# Patient Record
Sex: MALE | Race: BLACK OR AFRICAN AMERICAN | NOT HISPANIC OR LATINO | Employment: OTHER | URBAN - METROPOLITAN AREA
[De-identification: names, ages, dates, MRNs, and addresses within clinical notes are randomized per-mention and may not be internally consistent; named-entity substitution may affect disease eponyms.]

---

## 2017-02-03 ENCOUNTER — OFFICE VISIT (OUTPATIENT)
Dept: INTERNAL MEDICINE | Facility: CLINIC | Age: 77
End: 2017-02-03
Payer: MEDICARE

## 2017-02-03 ENCOUNTER — IMMUNIZATION (OUTPATIENT)
Dept: INTERNAL MEDICINE | Facility: CLINIC | Age: 77
End: 2017-02-03
Payer: MEDICARE

## 2017-02-03 VITALS
WEIGHT: 315 LBS | DIASTOLIC BLOOD PRESSURE: 88 MMHG | SYSTOLIC BLOOD PRESSURE: 138 MMHG | HEART RATE: 80 BPM | BODY MASS INDEX: 42.66 KG/M2 | HEIGHT: 72 IN

## 2017-02-03 DIAGNOSIS — G47.33 OSA ON CPAP: ICD-10-CM

## 2017-02-03 DIAGNOSIS — Z00.00 ENCOUNTER FOR PREVENTIVE HEALTH EXAMINATION: Primary | ICD-10-CM

## 2017-02-03 DIAGNOSIS — M54.50 MIDLINE LOW BACK PAIN WITHOUT SCIATICA, UNSPECIFIED CHRONICITY: ICD-10-CM

## 2017-02-03 DIAGNOSIS — E55.9 VITAMIN D DEFICIENCY DISEASE: ICD-10-CM

## 2017-02-03 DIAGNOSIS — Z85.46 HISTORY OF PROSTATE CANCER: ICD-10-CM

## 2017-02-03 DIAGNOSIS — Z99.89: ICD-10-CM

## 2017-02-03 DIAGNOSIS — E78.5 HYPERLIPIDEMIA, UNSPECIFIED HYPERLIPIDEMIA TYPE: ICD-10-CM

## 2017-02-03 DIAGNOSIS — I70.0 AORTIC ATHEROSCLEROSIS: ICD-10-CM

## 2017-02-03 DIAGNOSIS — I77.1 TORTUOUS AORTA: ICD-10-CM

## 2017-02-03 DIAGNOSIS — E11.40 TYPE 2 DIABETES MELLITUS WITH DIABETIC NEUROPATHY, WITHOUT LONG-TERM CURRENT USE OF INSULIN: ICD-10-CM

## 2017-02-03 DIAGNOSIS — G47.30 SLEEP APNEA, UNSPECIFIED TYPE: ICD-10-CM

## 2017-02-03 DIAGNOSIS — N52.9 IMPOTENCE OF ORGANIC ORIGIN: ICD-10-CM

## 2017-02-03 PROCEDURE — 90662 IIV NO PRSV INCREASED AG IM: CPT | Mod: S$GLB,,, | Performed by: NURSE PRACTITIONER

## 2017-02-03 PROCEDURE — 99499 UNLISTED E&M SERVICE: CPT | Mod: S$GLB,,, | Performed by: NURSE PRACTITIONER

## 2017-02-03 PROCEDURE — G0008 ADMIN INFLUENZA VIRUS VAC: HCPCS | Mod: S$GLB,,, | Performed by: NURSE PRACTITIONER

## 2017-02-03 PROCEDURE — G0439 PPPS, SUBSEQ VISIT: HCPCS | Mod: S$GLB,,, | Performed by: NURSE PRACTITIONER

## 2017-02-03 PROCEDURE — 99999 PR PBB SHADOW E&M-EST. PATIENT-LVL III: CPT | Mod: PBBFAC,,, | Performed by: NURSE PRACTITIONER

## 2017-02-03 NOTE — Clinical Note
Robert Jones MD,  Mr. Elan Yepez was seen today for an HRA visit.  At the visit a mini-cognitive assessment was performed and found to be abnormal (score: 4).  He also had a timed get up and go test that was found to be abnormal (score >12 sec.) I am bringing this to your attention so that further evaluation or follow-up can be done should you deem it is appropriate.   Thank you for allowing me to participate in the care of your patient.  SELINA Arevalo

## 2017-02-03 NOTE — MR AVS SNAPSHOT
[facility]  [address]  [facility] [address]  Phone: [phone]  Fax: [phone]                  Elan Yepez   2/3/2017 10:00 AM   Office Visit    Description:  Male : 1940   Provider:  AMY LAZARO   Department:  [facility]           Reason for Visit     Health Risk Assessment           Diagnoses this Visit        Comments    Encounter for preventive health examination    -  Primary            To Do List           Future Appointments        Provider Department Dept Phone    2/3/2017 11:00 AM FLU, INTERNAL MEDICINE [facility] [phone]    2017 11:00 AM Charles Collado MD [facility] [phone]    3/7/2017 9:00 AM Leeann Lomeli DPM [facility] [phone]    3/21/2017 10:40 AM Liane Tijerina MD [facility] [phone]      Goals (5 Years of Data)     None      Follow-Up and Disposition     Return in about 7 weeks (around 3/21/2017) for Follow up with PCP, SOONER IF NEEDED, HRA VISIT IN 1 YEAR.      [facility]     [facility] On [facility] Nurse Care Line -  Assistance  Registered nurses in the [facility] Center provide clinical advisement, health education, appointment booking, and other advisory services.  Call for this free service at [phone].             Medications           Message regarding Medications     Verify the changes and/or additions to your medication regime listed below are the same as discussed with your clinician today.  If any of these changes or additions are incorrect, please notify your healthcare provider.        STOP taking these medications     lidocaine-prilocaine (EMLA) cream            Verify that the below list of medications is an accurate representation of the medications you are currently taking.  If none reported, the list may be blank. If incorrect, please contact your healthcare provider. Carry this list with you in case of  emergency.           Current Medications     aspirin (ECOTRIN) 81 MG EC tablet     atorvastatin (LIPITOR) 40 MG tablet Take 1 tablet (40 mg total) by mouth once daily. 1 Tablet Oral Every day.  Replaces simvastatin 80mg daily.    blood sugar diagnostic Strp 1 strip by Misc.(Non-Drug; Combo Route) route once daily.    cholecalciferol, vitamin D3, 5,000 unit capsule Take 1 capsule by mouth Daily.    diclofenac sodium (VOLTAREN) 1 % Gel Apply 2 g topically once daily.    fluocinonide (LIDEX) 0.05 % ointment Apply topically Twice daily. to replace Elocon cream    lancets (SOFT TOUCH LANCETS) Misc 1 Device by Misc.(Non-Drug; Combo Route) route once daily.    metformin (GLUCOPHAGE) 500 MG tablet Take 2 tablets (1,000 mg total) by mouth 2 (two) times daily with meals.    MULTIVITS-MINERALS/FA/LYCOPENE (ONE-A-DAY MEN'S ORAL) Take 1 tablet by mouth Daily.    tadalafil (CIALIS) 20 MG Tab Take 1 tablet (20 mg total) by mouth daily as needed. take as directed    TRUE METRIX GLUCOSE METER [facility]            Clinical Reference Information           Your Vitals Were     BP Pulse Height Weight BMI    138/88 (BP Location: Right arm, Patient Position: Sitting, BP Method: Manual) 80 6' (1.829 m) 144.8 kg (319 lb 3.6 oz) 43.29 kg/m2      Blood Pressure          Most Recent Value    BP  138/88      Allergies as of 2/3/2017     No Known Drug Allergies      Immunizations Administered on Date of Encounter - 2/3/2017     None      [facility] Sign-Up     Activating your [facility] account is as easy as 1-2-3!     1) Visit [email or web address], select Sign Up Now, enter this activation code and your date of birth, then select Next.  [email or web address]  Expires: 3/20/2017 10:42 AM      2) Create a username and password to use when you visit [facility] in the future and select a security question in case you lose your password and select Next.    3) Enter your e-mail address and click Sign Up!    Additional Information  If you have questions, please e-mail  [email or web address] or call [phone] to talk to our [facility] staff. Remember, [facility] is NOT to be used for urgent needs. For medical emergencies, dial 911.         Instructions      Counseling and Referral of Other Preventative  (Italic type indicates deductible and co-insurance are waived)    Patient Name: Elan Yepez  Today's Date: 2/3/2017      SERVICE LIMITATIONS RECOMMENDATION    Vaccines    · Pneumococcal (once after 65)    · Influenza (annually)    · Hepatitis B (if medium/high risk)    · Prevnar 13      Hepatitis B medium/high risk factors:       - End-stage renal disease       - Hemophiliacs who received Factor VII or         IX concentrates       - Clients of institutions for the mentally             retarded       - Persons who live in the same house as          a HepB carrier       - Homosexual men       - Illicit injectable drug abusers     Pneumococcal:  03/02/2010  NO REPEAT IS NECESSARY     Influenza: Scheduled - see appointments     Hepatitis B: DEFER TO PCP RECOMMENDATIONS     Prevnar 13: 03/10/2016    NO REPEAT IS NECESSARY    Prostate cancer screening (annually to age 75)     Prostate specific antigen (PSA) Shared decision making with Provider. Sometimes a co-pay may be required if the patient decides to have this test. The USPSTF no longer recommends prostate cancer screening routinely in medicine:   04/07/2015 DISCUSS WITH PCP RECOMMENDATIONS    Colorectal cancer screening (to age 75)    · Fecal occult blood test (annual)  · Flexible sigmoidoscopy (5y)  · Screening colonoscopy (10y)  · Barium enema   Last done 02/04/2014, recommend to repeat every 3  years    Diabetes self-management training (no USPSTF recommendations)  Requires referral by treating physician for patient with diabetes or renal disease. 10 hours of initial DSMT sessions of no less than 30 minutes each in a continuous 12-month period. 2 hours of follow-up DSMT in subsequent years.  Scheduled, see appointments     Glaucoma screening (no USPSTF recommendation)  Diabetes mellitus, family history   , age 50 or over    American, age 65 or over  Last done 07/11/2016, recommend to repeat every 1  years    Medical nutrition therapy for diabetes or renal disease (no recommended schedule)  Requires referral by treating physician for patient with diabetes or renal disease or kidney transplant within the past 3 years.  Can be provided in same year as diabetes self-management training (DSMT), and CMS recommends medical nutrition therapy take place after DSMT. Up to 3 hours for initial year and 2 hours in subsequent years.  Scheduled, see appointments    Cardiovascular screening blood tests (every 5 years)  · Fasting lipid panel  Order as a panel if possible  Last done 09/08/2016, recommend to repeat every 1  years    Diabetes screening tests (at least every 3 years, Medicare covers annually or at 6-month intervals for prediabetic patients)  · Fasting blood sugar (FBS) or glucose tolerance test (GTT)  Patient must be diagnosed with one of the following:       - Hypertension       - Dyslipidemia       - Obesity (BMI 30kg/m2)       - Previous elevated impaired FBS or GTT       ... or any two of the following:       - Overweight (BMI 25 but <30)       - Family history of diabetes       - Age 65 or older       - History of gestational diabetes or birth of baby weighing more than 9 pounds  PATIENT IS DIABETIC     Abdominal aortic aneurysm screening (once)  · Sonogram   Limited to patients who meet one of the following criteria:       - Men who are 65-75 years old and have smoked more than 100 cigarette in their lifetime       - Anyone with a family history of abdominal aortic aneurysm       - Anyone recommended for screening by the USPSTF  NONSMOKER    HIV screening (annually for increased risk patients)  · HIV-1 and HIV-2 by EIA, or JAIME, rapid antibody test or oral mucosa transudate  Patients must be at increased  risk for HIV infection per USPSTF guidelines or pregnant. Tests covered annually for patient at increased risk or as requested by the patient. Pregnant patients may receive up to 3 tests during pregnancy.  Risks discussed, screening is not recommended    Smoking cessation counseling (up to 8 sessions per year)  Patients must be asymptomatic of tobacco-related conditions to receive as a preventative service.  NONSMOKER    Subsequent annual wellness visit  At least 12 months since last AWV  Return in one year     The following information is provided to all patients.  This information is to help you find resources for any of the problems found today that may be affecting your health:                Living healthy guide: www.[facility].[place].[facility]      Understanding Diabetes: www.diabetes.org      Eating healthy: www.cdc.gov/healthyweight      [facility] home safety checklist: www.cdc.gov/steadi/patient.html      Agency on Aging: www.goea.[place].[facility]      Alcoholics anonymous (AA): www.aa.org      Physical Activity: www.anup.nih.gov/pj3cfhc      Tobacco use: www.quitwithusla.org     Eating Heart-Healthy Foods  Eating has a big impact on your heart health. In fact, eating healthier can improve several of your heart risks at once. For instance, it helps you manage weight, cholesterol, and blood pressure. Here are ideas to help you make heart-healthy changes without giving up all the foods and flavors you love.  Getting started  · Talk with your health care provider about eating plans, such as the DASH or Mediterranean diet. You may also be referred to a dietitian.  · Change a few things at a time. Give yourself time to get used to a few eating changes before adding more.  · Work to create a tasty, healthy eating plan that you can stick to for the rest of your life.    Goals for healthy eating  Below are some tips to improve your eating habits:  · Limit saturated fats and trans fats. Saturated fats raise your levels of cholesterol,  so keep these fats to a minimum. They are found in foods such as fatty meats, whole milk, cheese, and palm and coconut oils. Avoid trans fats because they lower good cholesterol as well as raise bad cholesterol. Trans fats are most often found in processed foods.  · Reduce sodium (salt) intake. Eating too much salt may increase your blood pressure. Limit your sodium intake to 2,300 milligrams (mg) per day, or less if your health care provider recommends it. Dining out less often and eating fewer processed foods are two great ways to decrease the amount of salt you consume.  · Managing calories. A calorie is a unit of energy. Your body burns calories for fuel, but if you eat more calories than your body burns, the extras are stored as fat. Your health care provider can help you create a diet plan to manage your calories. This will likely include eating healthier foods as well as exercising regularly. To help you track your progress, keep a diary to record what you eat and how often you exercise.  Choose the right foods  Aim to make these foods staples of your diet. If you have diabetes, you may have different recommendations than what is listed here:  · Fruits and vegetable provide plenty of nutrients without a lot of calories. At meals, fill half your plate with these foods. Split the other half of your plate between whole grains and lean protein.  · Whole grains are high in fiber and rich in vitamins and nutrients. Good choices include whole-wheat bread, pasta, and brown rice.  · Lean proteins give you nutrition with less fat. Good choices include fish, skinless chicken, and beans.  · Low-fat or nonfat dairy provides nutrients without a lot of fat. Try low-fat or nonfat milk, cheese, or yogurt.  · Healthy fats can be good for you in small amounts. These are unsaturated fats, such as olive oil, nuts, and fish. Try to have at least 2 servings per week of fatty fish such as salmon, sardines, mackerel, rainbow trout,  and albacore tuna. These contain omega-3 fatty acids, which are good for your heart. Flaxseed is another source of a heart-healthy fat.  More on heart healthy eating    Read food labels  Healthy eating starts at the grocery store. Be sure to pay attention to food labels on packaged foods. Look for products that are high in fiber and protein, and low in saturated fat, cholesterol, and sodium. Avoid products that contain trans fat. And pay close attention to serving size. For instance, if you plan to eat two servings, double all the numbers on the label.  Prepare food right  A key part of healthy cooking is cutting down on added fat and salt. Look on the internet for lower-fat, lower-sodium recipes. Also, try these tips:  · Remove fat from meat and skin from poultry before cooking.  · Skim fat from the surface of soups and sauces.  · Broil, boil, bake, steam, grill, and microwave food without added fats.  · Choose ingredients that spice up your food without adding calories, fat, or sodium. Try these items: horseradish, hot sauce, lemon, mustard, nonfat salad dressings, and vinegar. For salt-free herbs and spices, try basil, cilantro, cinnamon, pepper, and rosemary.  Date Last Reviewed: 6/25/2015  © 5577-1116 [employer]. [address] [place] [address]. All rights reserved. This information is not intended as a substitute for professional medical care. Always follow your healthcare professional's instructions.        Diabetes and Heart Disease     Take your medicines as directed each day, even if you feel fine.   If you have diabetes, you are two to four times more likely to have heart disease than someone without diabetes. This higher risk is due to diabetes, but it is also due to other risk factors for heart disease that happen in people with diabetes. But theres good news. You can help control your health risks by making some changes in your life. You can take steps to reduce your risk of  heart disease by half--similar to the risk in people who don't have diabetes.  Your main risk factors  Three major risk factors for heart disease are high blood sugar, high blood pressure, and high levels of lipids. By keeping risk factors under control, you can help keep your heart and arteries healthy. This may reduce your chances of a heart attack.  · Blood sugar. High blood sugar can make artery walls tough and rough. Plaque (waxy material in the blood) can then build up along the artery walls, making it harder for blood to flow through the arteries. Having high blood sugar increases the chances of having high blood pressure and high cholesterol.  · Blood pressure. When blood pressure is high all the time it causes your heart to work harder to pump blood. Artery walls become damaged. This increases the risk for plaque build up.  · Lipids. The body needs some lipids in the blood to stay healthy. But lipid levels that are too high can damage the artery walls. Lipids include cholesterol and triglycerides. There are two kinds of cholesterol. LDL (bad) cholesterol can damage the arteries. But HDL (good) cholesterol helps clear LDL cholesterol from the blood vessels. This helps keep the arteries healthy. When blood sugar is high, the level of triglycerides in the blood may also be high. High blood triglyceride levels can cause plaque to form.   Other risk factors  Certain lifestyle factors can increase levels of your blood sugar, blood pressure, and lipids. Such increases raise your risk of heart disease:  · Smoking damages the lining of your arteries. This allows plaque to build up in the artery walls. Smoking also constricts (narrows) the arteries. This can raise blood pressure and cause chest pain or angina. Smoking also increases your risk of getting type 2 diabetes.  · Not being active makes it harder for your heart to do its work. Inactivity is linked to many other risk factors, such as high blood pressure  and poor cholesterol levels. Inactivity also increases your risk of getting type 2 diabetes.  · Being overweight makes it harder for your body to use insulin. It also makes your heart work too hard. Being overweight is also the main contributor to the development of type 2 diabetes,   Changes you can make  Following a few simple steps can help keep your risk factors under control. Work with your healthcare team to reach your goals.  · Quitting smoking could save your life. Smoking damages the lining of the blood vessels and raises blood pressure. Smoking also affects how your body uses insulin. This makes it harder to keep blood sugar under control. If you smoke and need help quitting, talk to your healthcare team.   · Testing your blood sugar is the only way to know whether it is under control. Be sure to test your blood sugar yourself. Also get your blood tested in the lab, as directed.  · Monitoring your blood pressure and lipid levels can help you achieve safe levels. Visit your healthcare team as scheduled.  · Taking medicines as directed can help control blood sugar, blood pressure, blood clotting, and/or cholesterol levels.  · Eating right can reduce your risk factors and help you lose weight. Try to limit the amount of processed or refined carbohydrates you eat at one time. Cut back on your total calorie intake. Eat foods low in saturated fat and cholesterol. Eat fiber, including vegetables and whole grains, and cut down on salt. A dietitian or diabetes educator can help form a meal plan that works for you--even if you are on a low budget.   · Being active can help reduce your weight, strengthen your heart, and lower your lipid levels and blood pressure. Exercise and activity are good for your whole body. Talk to your healthcare team about increasing your activity safely over time.  · Keeping your appointments with your healthcare provider helps you stay healthy. Go in for checkups and lab tests as  scheduled.  Date Last Reviewed: 5/19/2016  © 9892-4682 [employer]. [address] [place] [address], [place] [address]. All rights reserved. This information is not intended as a substitute for professional medical care. Always follow your healthcare professional's instructions.             Language Assistance Services     ATTENTION: Language assistance services are available, free of charge. Please call [phone].      ATENCIÓN: Si habla español, tiene a combs disposición servicios gratuitos de asistencia lingüística. Llame al [phone].     CHÚ Ý: N?u b?n nói Ti?ng Vi?t, có các d?ch v? h? tr? ngôn ng? mi?n phí dành cho b?n. G?i s? [phone].         [facility] complies with applicable Federal civil rights laws and does not discriminate on the basis of race, color, national origin, age, disability, or sex.

## 2017-02-03 NOTE — PATIENT INSTRUCTIONS
Counseling and Referral of Other Preventative  (Italic type indicates deductible and co-insurance are waived)    Patient Name: Elan Yepez  Today's Date: 2/3/2017      SERVICE LIMITATIONS RECOMMENDATION    Vaccines    · Pneumococcal (once after 65)    · Influenza (annually)    · Hepatitis B (if medium/high risk)    · Prevnar 13      Hepatitis B medium/high risk factors:       - End-stage renal disease       - Hemophiliacs who received Factor VII or         IX concentrates       - Clients of institutions for the mentally             retarded       - Persons who live in the same house as          a HepB carrier       - Homosexual men       - Illicit injectable drug abusers     Pneumococcal:  03/02/2010  NO REPEAT IS NECESSARY     Influenza: Scheduled - see appointments     Hepatitis B: DEFER TO PCP RECOMMENDATIONS     Prevnar 13: 03/10/2016    NO REPEAT IS NECESSARY    Prostate cancer screening (annually to age 75)     Prostate specific antigen (PSA) Shared decision making with Provider. Sometimes a co-pay may be required if the patient decides to have this test. The USPSTF no longer recommends prostate cancer screening routinely in medicine:   04/07/2015 DISCUSS WITH PCP RECOMMENDATIONS    Colorectal cancer screening (to age 75)    · Fecal occult blood test (annual)  · Flexible sigmoidoscopy (5y)  · Screening colonoscopy (10y)  · Barium enema   Last done 02/04/2014, recommend to repeat every 3  years    Diabetes self-management training (no USPSTF recommendations)  Requires referral by treating physician for patient with diabetes or renal disease. 10 hours of initial DSMT sessions of no less than 30 minutes each in a continuous 12-month period. 2 hours of follow-up DSMT in subsequent years.  Scheduled, see appointments    Glaucoma screening (no USPSTF recommendation)  Diabetes mellitus, family history   , age 50 or over    American, age 65 or over  Last done 07/11/2016, recommend to repeat  every 1  years    Medical nutrition therapy for diabetes or renal disease (no recommended schedule)  Requires referral by treating physician for patient with diabetes or renal disease or kidney transplant within the past 3 years.  Can be provided in same year as diabetes self-management training (DSMT), and CMS recommends medical nutrition therapy take place after DSMT. Up to 3 hours for initial year and 2 hours in subsequent years.  Scheduled, see appointments    Cardiovascular screening blood tests (every 5 years)  · Fasting lipid panel  Order as a panel if possible  Last done 09/08/2016, recommend to repeat every 1  years    Diabetes screening tests (at least every 3 years, Medicare covers annually or at 6-month intervals for prediabetic patients)  · Fasting blood sugar (FBS) or glucose tolerance test (GTT)  Patient must be diagnosed with one of the following:       - Hypertension       - Dyslipidemia       - Obesity (BMI 30kg/m2)       - Previous elevated impaired FBS or GTT       ... or any two of the following:       - Overweight (BMI 25 but <30)       - Family history of diabetes       - Age 65 or older       - History of gestational diabetes or birth of baby weighing more than 9 pounds  PATIENT IS DIABETIC     Abdominal aortic aneurysm screening (once)  · Sonogram   Limited to patients who meet one of the following criteria:       - Men who are 65-75 years old and have smoked more than 100 cigarette in their lifetime       - Anyone with a family history of abdominal aortic aneurysm       - Anyone recommended for screening by the USPSTF  NONSMOKER    HIV screening (annually for increased risk patients)  · HIV-1 and HIV-2 by EIA, or JAIME, rapid antibody test or oral mucosa transudate  Patients must be at increased risk for HIV infection per USPSTF guidelines or pregnant. Tests covered annually for patient at increased risk or as requested by the patient. Pregnant patients may receive up to 3 tests during  pregnancy.  Risks discussed, screening is not recommended    Smoking cessation counseling (up to 8 sessions per year)  Patients must be asymptomatic of tobacco-related conditions to receive as a preventative service.  NONSMOKER    Subsequent annual wellness visit  At least 12 months since last AWV  Return in one year     The following information is provided to all patients.  This information is to help you find resources for any of the problems found today that may be affecting your health:                Living healthy guide: www.[facility].[place].[facility]      Understanding Diabetes: www.diabetes.org      Eating healthy: www.cdc.gov/healthyweight      CDC home safety checklist: www.cdc.gov/steadi/patient.html      Agency on Aging: www.goea.[place].[facility]      Alcoholics anonymous (AA): www.aa.org      Physical Activity: www.anup.nih.gov/th2pgux      Tobacco use: www.quitwithusla.org     Eating Heart-Healthy Foods  Eating has a big impact on your heart health. In fact, eating healthier can improve several of your heart risks at once. For instance, it helps you manage weight, cholesterol, and blood pressure. Here are ideas to help you make heart-healthy changes without giving up all the foods and flavors you love.  Getting started  · Talk with your health care provider about eating plans, such as the DASH or Mediterranean diet. You may also be referred to a dietitian.  · Change a few things at a time. Give yourself time to get used to a few eating changes before adding more.  · Work to create a tasty, healthy eating plan that you can stick to for the rest of your life.    Goals for healthy eating  Below are some tips to improve your eating habits:  · Limit saturated fats and trans fats. Saturated fats raise your levels of cholesterol, so keep these fats to a minimum. They are found in foods such as fatty meats, whole milk, cheese, and palm and coconut oils. Avoid trans fats because they lower good cholesterol as well as raise  bad cholesterol. Trans fats are most often found in processed foods.  · Reduce sodium (salt) intake. Eating too much salt may increase your blood pressure. Limit your sodium intake to 2,300 milligrams (mg) per day, or less if your health care provider recommends it. Dining out less often and eating fewer processed foods are two great ways to decrease the amount of salt you consume.  · Managing calories. A calorie is a unit of energy. Your body burns calories for fuel, but if you eat more calories than your body burns, the extras are stored as fat. Your health care provider can help you create a diet plan to manage your calories. This will likely include eating healthier foods as well as exercising regularly. To help you track your progress, keep a diary to record what you eat and how often you exercise.  Choose the right foods  Aim to make these foods staples of your diet. If you have diabetes, you may have different recommendations than what is listed here:  · Fruits and vegetable provide plenty of nutrients without a lot of calories. At meals, fill half your plate with these foods. Split the other half of your plate between whole grains and lean protein.  · Whole grains are high in fiber and rich in vitamins and nutrients. Good choices include whole-wheat bread, pasta, and brown rice.  · Lean proteins give you nutrition with less fat. Good choices include fish, skinless chicken, and beans.  · Low-fat or nonfat dairy provides nutrients without a lot of fat. Try low-fat or nonfat milk, cheese, or yogurt.  · Healthy fats can be good for you in small amounts. These are unsaturated fats, such as olive oil, nuts, and fish. Try to have at least 2 servings per week of fatty fish such as salmon, sardines, mackerel, rainbow trout, and albacore tuna. These contain omega-3 fatty acids, which are good for your heart. Flaxseed is another source of a heart-healthy fat.  More on heart healthy eating    Read food labels  Healthy  eating starts at the grocery store. Be sure to pay attention to food labels on packaged foods. Look for products that are high in fiber and protein, and low in saturated fat, cholesterol, and sodium. Avoid products that contain trans fat. And pay close attention to serving size. For instance, if you plan to eat two servings, double all the numbers on the label.  Prepare food right  A key part of healthy cooking is cutting down on added fat and salt. Look on the internet for lower-fat, lower-sodium recipes. Also, try these tips:  · Remove fat from meat and skin from poultry before cooking.  · Skim fat from the surface of soups and sauces.  · Broil, boil, bake, steam, grill, and microwave food without added fats.  · Choose ingredients that spice up your food without adding calories, fat, or sodium. Try these items: horseradish, hot sauce, lemon, mustard, nonfat salad dressings, and vinegar. For salt-free herbs and spices, try basil, cilantro, cinnamon, pepper, and rosemary.  Date Last Reviewed: 6/25/2015  © 6881-9915 [employer]. [address] [place] [address], [place] [address]. All rights reserved. This information is not intended as a substitute for professional medical care. Always follow your healthcare professional's instructions.        Diabetes and Heart Disease     Take your medicines as directed each day, even if you feel fine.   If you have diabetes, you are two to four times more likely to have heart disease than someone without diabetes. This higher risk is due to diabetes, but it is also due to other risk factors for heart disease that happen in people with diabetes. But theres good news. You can help control your health risks by making some changes in your life. You can take steps to reduce your risk of heart disease by half--similar to the risk in people who don't have diabetes.  Your main risk factors  Three major risk factors for heart disease are high blood sugar, high blood pressure, and  high levels of lipids. By keeping risk factors under control, you can help keep your heart and arteries healthy. This may reduce your chances of a heart attack.  · Blood sugar. High blood sugar can make artery walls tough and rough. Plaque (waxy material in the blood) can then build up along the artery walls, making it harder for blood to flow through the arteries. Having high blood sugar increases the chances of having high blood pressure and high cholesterol.  · Blood pressure. When blood pressure is high all the time it causes your heart to work harder to pump blood. Artery walls become damaged. This increases the risk for plaque build up.  · Lipids. The body needs some lipids in the blood to stay healthy. But lipid levels that are too high can damage the artery walls. Lipids include cholesterol and triglycerides. There are two kinds of cholesterol. LDL (bad) cholesterol can damage the arteries. But HDL (good) cholesterol helps clear LDL cholesterol from the blood vessels. This helps keep the arteries healthy. When blood sugar is high, the level of triglycerides in the blood may also be high. High blood triglyceride levels can cause plaque to form.   Other risk factors  Certain lifestyle factors can increase levels of your blood sugar, blood pressure, and lipids. Such increases raise your risk of heart disease:  · Smoking damages the lining of your arteries. This allows plaque to build up in the artery walls. Smoking also constricts (narrows) the arteries. This can raise blood pressure and cause chest pain or angina. Smoking also increases your risk of getting type 2 diabetes.  · Not being active makes it harder for your heart to do its work. Inactivity is linked to many other risk factors, such as high blood pressure and poor cholesterol levels. Inactivity also increases your risk of getting type 2 diabetes.  · Being overweight makes it harder for your body to use insulin. It also makes your heart work too  hard. Being overweight is also the main contributor to the development of type 2 diabetes,   Changes you can make  Following a few simple steps can help keep your risk factors under control. Work with your healthcare team to reach your goals.  · Quitting smoking could save your life. Smoking damages the lining of the blood vessels and raises blood pressure. Smoking also affects how your body uses insulin. This makes it harder to keep blood sugar under control. If you smoke and need help quitting, talk to your healthcare team.   · Testing your blood sugar is the only way to know whether it is under control. Be sure to test your blood sugar yourself. Also get your blood tested in the lab, as directed.  · Monitoring your blood pressure and lipid levels can help you achieve safe levels. Visit your healthcare team as scheduled.  · Taking medicines as directed can help control blood sugar, blood pressure, blood clotting, and/or cholesterol levels.  · Eating right can reduce your risk factors and help you lose weight. Try to limit the amount of processed or refined carbohydrates you eat at one time. Cut back on your total calorie intake. Eat foods low in saturated fat and cholesterol. Eat fiber, including vegetables and whole grains, and cut down on salt. A dietitian or diabetes educator can help form a meal plan that works for you--even if you are on a low budget.   · Being active can help reduce your weight, strengthen your heart, and lower your lipid levels and blood pressure. Exercise and activity are good for your whole body. Talk to your healthcare team about increasing your activity safely over time.  · Keeping your appointments with your healthcare provider helps you stay healthy. Go in for checkups and lab tests as scheduled.  Date Last Reviewed: 5/19/2016 © 2000-2016 [employer]. [address] [place] [address], [place] [address]. All rights reserved. This information is not intended as a substitute for  professional medical care. Always follow your healthcare professional's instructions.

## 2017-02-03 NOTE — PROGRESS NOTES
Elan Yepez presented for a  Medicare AWV and comprehensive Health Risk Assessment today. His wife is present for today's visit. The following components were reviewed and updated:    · Medical history  · Family History  · Social history  · Allergies and Current Medications  · Health Risk Assessment  · Health Maintenance  · Care Team     ** See Completed Assessments for Annual Wellness Visit within the encounter summary.**       The following assessments were completed:  · Living Situation  · Depression Screening  · Timed Get Up and Go  · Whisper Test  · Cognitive Function Screening      · Nutrition Screening  · ADL Screening  · PAQ Screening    Vitals:    02/03/17 1000   BP: 138/88   BP Location: Right arm   Patient Position: Sitting   BP Method: Manual   Pulse: 80   Weight: (!) 144.8 kg (319 lb 3.6 oz)   Height: 6' (1.829 m)     Body mass index is 43.29 kg/(m^2).  Physical Exam   Constitutional: He is oriented to person, place, and time. He appears well-developed and well-nourished.   HENT:   Head: Normocephalic and atraumatic.   Mouth/Throat: Oropharynx is clear and moist.   Eyes: Pupils are equal, round, and reactive to light.   Neck: Normal range of motion.   Cardiovascular: Normal rate, regular rhythm, normal heart sounds and intact distal pulses.  Exam reveals no gallop and no friction rub.    No murmur heard.  Pulmonary/Chest: Effort normal and breath sounds normal. No respiratory distress. He has no wheezes.   Abdominal: Soft. Bowel sounds are normal. He exhibits no distension. There is no tenderness.   obese   Musculoskeletal: Normal range of motion.   Neurological: He is alert and oriented to person, place, and time.   Skin: Skin is warm and dry.   Psychiatric: He has a normal mood and affect. His behavior is normal.   Nursing note and vitals reviewed.        Diagnoses and health risks identified today and associated recommendations/orders:    1. Encounter for preventive health examination      2. Type 2  diabetes mellitus with diabetic neuropathy, without long-term current use of insulin  Stable and controlled. Continue current treatment plan as previously prescribed by PCP.       3. Aortic atherosclerosis  Stable and controlled. Continue current treatment plan as previously prescribed by PCP.       4. Tortuous aorta  Stable and controlled. Continue current treatment plan as previously prescribed by PCP.       5. BMI 40.0-44.9, adult  Stable and controlled.  Discussed with patient the importance of proper diet and exercise.  He was provided with literature from the [employer] database.  Continue current treatment plan as previously prescribed by PCP.       6. Hyperlipidemia, unspecified hyperlipidemia type  Stable and controlled. Continue current treatment plan as previously prescribed by PCP.       7. JESSICA on CPAP  Stable and controlled. Continue current treatment plan as previously prescribed by Sleep Medicine.       8. CPAP/BiPAP dependence  Stable and controlled. Continue current treatment plan as previously prescribed by Sleep Medicine.       9. Sleep apnea, unspecified type  Stable and controlled. Continue current treatment plan as previously prescribed by Sleep Medicine.       10. Impotence of organic origin  Stable and controlled. Continue current treatment plan as previously prescribed by PCP.       11. Midline low back pain without sciatica, unspecified chronicity  Stable and controlled. Continue current treatment plan as previously prescribed by PCP.       12. Vitamin D deficiency disease  Stable and controlled. Continue current treatment plan as previously prescribed by PCP.       13. History of prostate cancer  Stable and controlled. Continue current treatment plan as previously prescribed by PCP.         Provided Elan with a 5-10 year written screening schedule and personal prevention plan. Recommendations were developed using the USPSTF age appropriate recommendations. Education, counseling, and  referrals were provided as needed. After Visit Summary printed and given to patient which includes a list of additional screenings\tests needed.    Return in about 7 weeks (around 3/21/2017) for Follow up with PCP, SOONER IF NEEDED, HRA VISIT IN 1 YEAR.    Alesia Swenson, DINORAC

## 2017-02-22 DIAGNOSIS — G47.33 OSA ON CPAP: Primary | ICD-10-CM

## 2017-03-07 ENCOUNTER — OFFICE VISIT (OUTPATIENT)
Dept: PODIATRY | Facility: CLINIC | Age: 77
End: 2017-03-07
Payer: MEDICARE

## 2017-03-07 VITALS
HEIGHT: 72 IN | HEART RATE: 84 BPM | BODY MASS INDEX: 42.66 KG/M2 | WEIGHT: 315 LBS | SYSTOLIC BLOOD PRESSURE: 136 MMHG | DIASTOLIC BLOOD PRESSURE: 83 MMHG | RESPIRATION RATE: 18 BRPM

## 2017-03-07 DIAGNOSIS — B35.1 DERMATOPHYTOSIS, NAIL: ICD-10-CM

## 2017-03-07 DIAGNOSIS — E11.49 TYPE 2 DIABETES MELLITUS WITH NEUROLOGICAL MANIFESTATIONS: Primary | ICD-10-CM

## 2017-03-07 DIAGNOSIS — L84 CORN OR CALLUS: ICD-10-CM

## 2017-03-07 PROCEDURE — 11721 DEBRIDE NAIL 6 OR MORE: CPT | Mod: 59,Q9,S$GLB, | Performed by: PODIATRIST

## 2017-03-07 PROCEDURE — 99999 PR PBB SHADOW E&M-EST. PATIENT-LVL III: CPT | Mod: PBBFAC,,, | Performed by: PODIATRIST

## 2017-03-07 PROCEDURE — 99499 UNLISTED E&M SERVICE: CPT | Mod: S$GLB,,, | Performed by: PODIATRIST

## 2017-03-07 PROCEDURE — 11056 PARNG/CUTG B9 HYPRKR LES 2-4: CPT | Mod: Q9,S$GLB,, | Performed by: PODIATRIST

## 2017-03-07 NOTE — PROGRESS NOTES
Subjective:      Patient ID: Elan Yepez is a 76 y.o. male.    Chief Complaint: PCP (SELINA Ulloa 2/03/17); Diabetic Foot Exam; and Nail Care    Elan is a 76 y.o. male who presents to the clinic for evaluation and treatment of high risk feet. Elan has a past medical history of BMI 40.0-44.9, adult; Diabetes mellitus; Dysmetabolic syndrome X (10/2/2012); Hyperlipidemia; Hypertension; Joint pain; MDD (major depressive disorder) (4/7/2015); Midline low back pain without sciatica (5/5/2016); Prostate CA; Sleep apnea; Trouble in sleeping; and Type II or unspecified type diabetes mellitus with neurological manifestations, not stated as uncontrolled. The patient's chief complaint is long, thick toenails. This patient has documented high risk feet requiring routine maintenance secondary to diabetes mellitis and those secondary complications of diabetes, as mentioned..    PCP: Liane Jones MD    Date Last Seen by PCP:  Chief Complaint   Patient presents with    PCP     SELINA Ulloa 2/03/17    Diabetic Foot Exam    Nail Care         Current shoe gear:dm shoes   Hemoglobin A1C   Date Value Ref Range Status   09/08/2016 6.5 (H) 4.5 - 6.2 % Final     Comment:     According to ADA guidelines, hemoglobin A1C <7.0% represents  optimal control in non-pregnant diabetic patients.  Different  metrics may apply to specific populations.   Standards of Medical Care in Diabetes - 2016.  For the purpose of screening for the presence of diabetes:  <5.7%     Consistent with the absence of diabetes  5.7-6.4%  Consistent with increasing risk for diabetes   (prediabetes)  >or=6.5%  Consistent with diabetes  Currently no consensus exists for use of hemoglobin A1C  for diagnosis of diabetes for children.     05/05/2016 6.7 (H) 4.5 - 6.2 % Final   04/07/2015 6.6 (H) 4.5 - 6.2 % Final       Review of Systems   Constitution: Negative for chills, decreased appetite and fever.   Cardiovascular: Negative for chest pain,  claudication and leg swelling.   Respiratory: Negative for cough.    Skin: Positive for dry skin and nail changes. Negative for itching.   Musculoskeletal: Negative for arthritis, joint pain, joint swelling and myalgias.   Gastrointestinal: Negative for nausea and vomiting.   Neurological: Positive for numbness. Negative for loss of balance and paresthesias.           Objective:      Physical Exam   Constitutional: He is oriented to person, place, and time. He appears well-developed and well-nourished.   Cardiovascular:   Dorsalis pedis and posterior tibial pulses are diminished Bilaterally. Toes are cool to touch. Feet are warm proximally.There is decreased digital hair. Skin is atrophic, slightly hyperpigmented, and mildly edematous       Musculoskeletal: Normal range of motion. He exhibits no tenderness or deformity.   Adequate joint range of motion without pain, limitation, nor crepitation Bilateral feet and ankle joints. Muscle strength is 5/5 in all groups bilaterally.         Neurological: He is alert and oriented to person, place, and time.   [place]-Marci 5.07 monofilamant testing is diminished Raffi feet. Sharp/dull sensation diminished Bilaterally. Light touch absent Bilaterally.       Skin: Skin is warm, dry and intact. No ecchymosis and no lesion noted. No erythema. No pallor.   Nails x10 are elongated by  2-5mm's, thickened by 2-4 mm's, dystrophic, and are darkened in  coloration . Xerosis Bilaterally. No open lesions noted.    Hyperkeratotic tissue noted to distal hallux b/l      Psychiatric: He has a normal mood and affect. His behavior is normal.   Nursing note and vitals reviewed.            Assessment:       Encounter Diagnoses   Name Primary?    Type 2 diabetes mellitus with neurological manifestations Yes    Dermatophytosis, nail          Plan:       Elan was seen today for pcp, diabetic foot exam and nail care.    Diagnoses and all orders for this visit:    Type 2 diabetes mellitus with  neurological manifestations    Dermatophytosis, nail      I counseled the patient on his conditions, their implications and medical management.        - Shoe inspection. Diabetic Foot Education. Patient reminded of the importance of good nutrition and blood sugar control to help prevent podiatric complications of diabetes. Patient instructed on proper foot hygeine. We discussed wearing proper shoe gear, daily foot inspections, never walking without protective shoe gear, never putting sharp instruments to feet, routine podiatric nail visits every 2-3 months.      - With patient's permission, nails were aggressively reduced and debrided x 10 to their soft tissue attachment mechanically and with electric , removing all offending nail and debris. Patient relates relief following the procedure. He will continue to monitor the areas daily, inspect his feet, wear protective shoe gear when ambulatory, moisturizer to maintain skin integrity and follow in this office in approximately 2-3 months, sooner p.r.n.    - After cleansing the  area w/ alcohol prep pad the above mentioned hyperkeratosis was trimmed utilizing No 15 scapel, to a smooth base with out incident. Patient tolerated this  well and reported comfort to the area of distal hallux b/l

## 2017-03-07 NOTE — MR AVS SNAPSHOT
[facility]  [address]  [facility] [address]  Phone: [phone]                  Elan Yepez   3/7/2017 9:00 AM   Office Visit    Description:  Male : 1940   Provider:  Leeann Lomeli DPM   Department:  [facility]           Reason for Visit     PCP     Diabetic Foot Exam     Nail Care           Diagnoses this Visit        Comments    Type 2 diabetes mellitus with neurological manifestations    -  Primary     Dermatophytosis, nail                To Do List           Future Appointments        Provider Department Dept Phone    3/9/2017 10:00 AM Charles Collado MD [facility] - Sleep Clinic [phone]    3/21/2017 10:40 AM Liane Tijerina MD [facility] - Internal Medicine [phone]    3/21/2017 1:00 PM DIABETES EDUCATOR, INT MED 1 [facility] -  Diabetes Program [phone]    2017 9:00 AM Leeann Lomeli DPM [facility] [phone]      Goals (5 Years of Data)     None      [facility]     [facility] -  Assistance  Registered nurses in the [facility] Center provide clinical advisement, health education, appointment booking, and other advisory services.  Call for this free service at [phone].             Medications           Message regarding Medications     Verify the changes and/or additions to your medication regime listed below are the same as discussed with your clinician today.  If any of these changes or additions are incorrect, please notify your healthcare provider.             Verify that the below list of medications is an accurate representation of the medications you are currently taking.  If none reported, the list may be blank. If incorrect, please contact your healthcare provider. Carry this list with you in case of emergency.           Current Medications     aspirin (ECOTRIN) 81 MG EC tablet     atorvastatin (LIPITOR) 40 MG tablet Take 1 tablet (40 mg total) by mouth once daily. 1  Tablet Oral Every day.  Replaces simvastatin 80mg daily.    blood sugar diagnostic Strp 1 strip by Misc.(Non-Drug; Combo Route) route once daily.    cholecalciferol, vitamin D3, 5,000 unit capsule Take 1 capsule by mouth Daily.    diclofenac sodium (VOLTAREN) 1 % Gel Apply 2 g topically once daily.    fluocinonide (LIDEX) 0.05 % ointment Apply topically Twice daily. to replace Elocon cream    lancets (SOFT TOUCH LANCETS) Misc 1 Device by Misc.(Non-Drug; Combo Route) route once daily.    metformin (GLUCOPHAGE) 500 MG tablet Take 2 tablets (1,000 mg total) by mouth 2 (two) times daily with meals.    MULTIVITS-MINERALS/FA/LYCOPENE (ONE-A-DAY MEN'S ORAL) Take 1 tablet by mouth Daily.    tadalafil (CIALIS) 20 MG Tab Take 1 tablet (20 mg total) by mouth daily as needed. take as directed    TRUE METRIX GLUCOSE METER [place]            Clinical Reference Information           Your Vitals Were     BP Pulse Resp Height Weight BMI    136/83 84 18 6' (1.829 m) 144.7 kg (319 lb) 43.26 kg/m2      Blood Pressure          Most Recent Value    BP  136/83      Allergies as of 3/7/2017     No Known Drug Allergies      Immunizations Administered on Date of Encounter - 3/7/2017     None      [facility] Sign-Up     Activating your [facility] account is as easy as 1-2-3!     1) Visit [email or web address], select Sign Up Now, enter this activation code and your date of birth, then select Next.  [email or web address]  Expires: 3/20/2017 10:42 AM      2) Create a username and password to use when you visit [facility] in the future and select a security question in case you lose your password and select Next.    3) Enter your e-mail address and click Sign Up!    Additional Information  If you have questions, please e-mail [email or web address] or call [phone] to talk to our [facility] staff. Remember, [facility] is NOT to be used for urgent needs. For medical emergencies, dial 911.         Language Assistance Services     ATTENTION: Language assistance  services are available, free of charge. Please call [phone].      ATENCIÓN: Si habla ponchoañol, tiene a combs disposición servicios gratuitos de asistencia lingüística. Llame al [phone].     CHÚ Ý: N?u b?n nói Ti?ng Vi?t, có các d?ch v? h? tr? ngôn ng? mi?n phí dành cho b?n. G?i s? [phone].         [facility] complies with applicable Federal civil rights laws and does not discriminate on the basis of race, color, national origin, age, disability, or sex.

## 2017-03-09 ENCOUNTER — OFFICE VISIT (OUTPATIENT)
Dept: SLEEP MEDICINE | Facility: CLINIC | Age: 77
End: 2017-03-09
Payer: MEDICARE

## 2017-03-09 VITALS
HEIGHT: 72 IN | HEART RATE: 86 BPM | OXYGEN SATURATION: 95 % | WEIGHT: 315 LBS | SYSTOLIC BLOOD PRESSURE: 132 MMHG | DIASTOLIC BLOOD PRESSURE: 78 MMHG | RESPIRATION RATE: 18 BRPM | BODY MASS INDEX: 42.66 KG/M2

## 2017-03-09 DIAGNOSIS — G47.33 OSA ON CPAP: Primary | ICD-10-CM

## 2017-03-09 PROCEDURE — 1160F RVW MEDS BY RX/DR IN RCRD: CPT | Mod: S$GLB,,, | Performed by: INTERNAL MEDICINE

## 2017-03-09 PROCEDURE — 99999 PR PBB SHADOW E&M-EST. PATIENT-LVL III: CPT | Mod: PBBFAC,,, | Performed by: INTERNAL MEDICINE

## 2017-03-09 PROCEDURE — 99213 OFFICE O/P EST LOW 20 MIN: CPT | Mod: S$GLB,,, | Performed by: INTERNAL MEDICINE

## 2017-03-09 PROCEDURE — 1159F MED LIST DOCD IN RCRD: CPT | Mod: S$GLB,,, | Performed by: INTERNAL MEDICINE

## 2017-03-09 PROCEDURE — 1157F ADVNC CARE PLAN IN RCRD: CPT | Mod: S$GLB,,, | Performed by: INTERNAL MEDICINE

## 2017-03-09 NOTE — PROGRESS NOTES
Subjective:       Elan Yepez is a 76 y.o. male  Last seen 7/18/2016  He had a split study with severe obstructive sleep apnea.  He was converted to CPAP and tolerated 14 cm water pressure  His machine is currently set on AutoPap 4-20 with  mean pressure of around 7.9 cm water pressure  The AHI recorded on this pressure setting is 17.2.  He has had some leaking in the tubing and the tubing was replaced  He is getting at least 8 hours of sleep when he is a device  His usage greater than 4 hours was 86.7%  Patient is compliant using the device  His [place] score is 8  Bedtime is 10 PM wakeup time is 5 AM.  Patient doesn't have any daytime sleepiness.    He does have trouble hearing and he needs a hearing aid  I like him to change his pressure to 14 cm water pressure and have her download done in 4 weeks  Patient has also inquired as to whether he can get a nasal mask  I have no objection with him using a nasal mask but if he requires higher pressures may be difficult to use a nasal mask    Previous Report(s) Reviewed: historical medical records and lab reports     The following portions of the patient's history were reviewed and updated as appropriate:   He  has a past medical history of BMI 40.0-44.9, adult; Diabetes mellitus; Dysmetabolic syndrome X (10/2/2012); Hyperlipidemia; Hypertension; Joint pain; MDD (major depressive disorder) (4/7/2015); Midline low back pain without sciatica (5/5/2016); Prostate CA; Sleep apnea; Trouble in sleeping; and Type II or unspecified type diabetes mellitus with neurological manifestations, not stated as uncontrolled.  He  does not have any pertinent problems on file.  He  has a past surgical history that includes ostate surgery; Nasal septum surgery; and Colonoscopy (02/04/2014).  His family history includes Allergies in his brother; Cancer in his sister; Diabetes in his mother, sister, and sister; Heart disease in his father and mother; Hypertension in his brother and father; No  Known Problems in his sister and son; Thyroid disease in his brother. There is no history of Melanoma.  He  reports that he has never smoked. He has never used smokeless tobacco. He reports that he does not drink alcohol or use illicit drugs.  He has a current medication list which includes the following prescription(s): aspirin, atorvastatin, blood sugar diagnostic, cholecalciferol (vitamin d3), diclofenac sodium, fluocinonide, lancets, metformin, multivit-minerals/fa/lycopene, tadalafil, and true metrix glucose meter.  Current Outpatient Prescriptions on File Prior to Visit   Medication Sig Dispense Refill    aspirin (ECOTRIN) 81 MG EC tablet       atorvastatin (LIPITOR) 40 MG tablet Take 1 tablet (40 mg total) by mouth once daily. 1 Tablet Oral Every day.  Replaces simvastatin 80mg daily. 90 tablet 3    blood sugar diagnostic Strp 1 strip by Misc.(Non-Drug; Combo Route) route once daily. 100 strip 11    cholecalciferol, vitamin D3, 5,000 unit capsule Take 1 capsule by mouth Daily.      diclofenac sodium (VOLTAREN) 1 % Gel Apply 2 g topically once daily. 100 g 3    fluocinonide (LIDEX) 0.05 % ointment Apply topically Twice daily. to replace Elocon cream      lancets (SOFT TOUCH LANCETS) Misc 1 Device by Misc.(Non-Drug; Combo Route) route once daily. 100 each 11    metformin (GLUCOPHAGE) 500 MG tablet Take 2 tablets (1,000 mg total) by mouth 2 (two) times daily with meals. 360 tablet 3    MULTIVITS-MINERALS/FA/LYCOPENE (ONE-A-DAY MEN'S ORAL) Take 1 tablet by mouth Daily.      tadalafil (CIALIS) 20 MG Tab Take 1 tablet (20 mg total) by mouth daily as needed. take as directed 10 tablet 5    TRUE METRIX GLUCOSE METER Misc        No current facility-administered medications on file prior to visit.      He is allergic to no known drug allergies..    Review of Systems  A comprehensive review of systems was negative.      Objective:      /78  Pulse 86  Resp 18  Ht 6' (1.829 m)  Wt (!) 144.1 kg (317 lb  10.9 oz)  SpO2 95%  BMI 43.09 kg/m2  General appearance: alert, appears stated age, cooperative, no distress and morbidly obese  Head: Normocephalic, without obvious abnormality, atraumatic  Lungs: clear to auscultation bilaterally and normal percussion bilaterally  Heart: regular rate and rhythm, S1, S2 normal, no murmur, click, rub or gallop  Extremities: extremities normal, atraumatic, no cyanosis or edema  Pulses: 2+ and symmetric  Skin: Skin color, texture, turgor normal. No rashes or lesions  Neurologic: Grossly normal         DOWNLOAD    2/5/2017 - 3/6/2017  YOB: 1940  Mask:  Compliance Summary  2/5/2017 - 3/6/2017 (30 days)  Days with Device Usage 30 days  Days without Device Usage 0 days  Percent Days with Device Usage 100.0%  Cumulative Usage 10 days 13 hrs. 27 mins. 30 secs.  Maximum Usage (1 Day) 12 hrs. 33 mins. 36 secs.  Average Usage (All Days) 8 hrs. 26 mins. 55 secs.  Average Usage (Days Used) 8 hrs. 26 mins. 55 secs.  Minimum Usage (1 Day) 1 hrs. 38 mins. 55 secs.  Percent of Days with Usage >= 4 Hours 96.7%  Percent of Days with Usage < 4 Hours 3.3%  Date Range  Total Blower Time 10 days 15 hrs. 30 mins. 54 secs.  Average AHI 24.2  Auto CPAP Summary  Auto CPAP Mean Pressure 7.7 cmH2O  Auto CPAP Peak Average Pressure 9.1 cmH2O  Average Device Pressure <= 90% of Time 10.4 cmH2O  Average Time in Large Leak Per Day 4 hrs. 3 mins. 32 secs.  Printed By:  Assessment:      Problem List Items Addressed This Visit     JESSICA on CPAP - Primary     Eldridge score 8  Bed time 10 pm  Wake time 5 am    Change to CPAP 14 and download in 4 weeks             Relevant Orders    HME - OTHER         During titration CPAP 14 cm was tolerated with REM  Will trial this pressure for 4 weeks  Appears adherent however AHI is high  BIPAP may be necessary  Plan:      Change pressure   Return in about 3 months (around 6/9/2017) for download: pressure changed to 14 cm CARMEN.    This note was prepared using voice  recognition system and is likely to have sound alike errors that may have been overlooked even after proof reading.  Please call me with any questions    Discussed diagnosis, its evaluation, treatment and usual course. All questions answered.    Thank you for the courtesy of participating in the care of this patient      Charles Collado MD

## 2017-03-09 NOTE — PATIENT INSTRUCTIONS
Discussed therapeutic goals for positive airway pressure therapy(CPAP or BiPAP):   Ideal is usage 100% of nights for 6 - 8 hours per night.   Minimum usage is 70% of night for at least 4 hours per night used.  Mask choices discussed.  Patient expressed understanding. All Questions answered.

## 2017-03-09 NOTE — MR AVS SNAPSHOT
[facility]  [address] [place] [address]  Phone: [phone]                  Elan Yepez   3/9/2017 10:00 AM   Office Visit    Description:  Male : 1940   Provider:  Charles Collado MD   Department:  [facility]           Reason for Visit     Sleep Apnea           Diagnoses this Visit        Comments    JESSICA on CPAP    -  Primary            To Do List           Future Appointments        Provider Department Dept Phone    3/21/2017 10:40 AM MD [facility] - Internal Medicine [phone]    3/21/2017 1:00 PM DIABETES EDUCATOR, INT MED 1 [facility] IM Diabetes Program [phone]    2017 9:00 AM [facility] - Podiatry [phone]    2017 9:40 AM Elizabeth Lejeune, NP [facility] [phone]      Goals (5 Years of Data)     None      Follow-Up and Disposition     Return in about 3 months (around 2017) for download: pressure changed to 14 cm LIZ. [facility]     [facility] -  Assistance  Registered nurses in the [facility] Call Center provide clinical advisement, health education, appointment booking, and other advisory services.  Call for this free service at [phone].             Medications           Message regarding Medications     Verify the changes and/or additions to your medication regime listed below are the same as discussed with your clinician today.  If any of these changes or additions are incorrect, please notify your healthcare provider.             Verify that the below list of medications is an accurate representation of the medications you are currently taking.  If none reported, the list may be blank. If incorrect, please contact your healthcare provider. Carry this list with you in case of emergency.           Current Medications     aspirin (ECOTRIN) 81 MG EC tablet     atorvastatin (LIPITOR) 40 MG tablet Take 1 tablet (40 mg total) by mouth once  daily. 1 Tablet Oral Every day.  Replaces simvastatin 80mg daily.    blood sugar diagnostic Strp 1 strip by Misc.(Non-Drug; Combo Route) route once daily.    cholecalciferol, vitamin D3, 5,000 unit capsule Take 1 capsule by mouth Daily.    diclofenac sodium (VOLTAREN) 1 % Gel Apply 2 g topically once daily.    fluocinonide (LIDEX) 0.05 % ointment Apply topically Twice daily. to replace Elocon cream    lancets (SOFT TOUCH LANCETS) Misc 1 Device by Misc.(Non-Drug; Combo Route) route once daily.    metformin (GLUCOPHAGE) 500 MG tablet Take 2 tablets (1,000 mg total) by mouth 2 (two) times daily with meals.    MULTIVITS-MINERALS/FA/LYCOPENE (ONE-A-DAY MEN'S ORAL) Take 1 tablet by mouth Daily.    tadalafil (CIALIS) 20 MG Tab Take 1 tablet (20 mg total) by mouth daily as needed. take as directed    TRUE METRIX GLUCOSE METER [facility]            Clinical Reference Information           Your Vitals Were     BP Pulse Resp Height Weight SpO2    132/78 86 18 6' (1.829 m) 144.1 kg (317 lb 10.9 oz) 95%    BMI                43.09 kg/m2          Blood Pressure          Most Recent Value    BP  132/78      Allergies as of 3/9/2017     No Known Drug Allergies      Immunizations Administered on Date of Encounter - 3/9/2017     None      Orders Placed During Today's Visit      Normal Orders This Visit    E - OTHER       [facility] Sign-Up     Activating your [facility] account is as easy as 1-2-3!     1) Visit [email or web address], select Sign Up Now, enter this activation code and your date of birth, then select Next.  [email or web address]  Expires: 3/20/2017 10:42 AM      2) Create a username and password to use when you visit [facility] in the future and select a security question in case you lose your password and select Next.    3) Enter your e-mail address and click Sign Up!    Additional Information  If you have questions, please e-mail [email or web address] or call [phone] to talk to our [facility] staff. Remember, [facility] is NOT to  be used for urgent needs. For medical emergencies, dial 911.         Instructions    Discussed therapeutic goals for positive airway pressure therapy(CPAP or BiPAP):   Ideal is usage 100% of nights for 6 - 8 hours per night.   Minimum usage is 70% of night for at least 4 hours per night used.  Mask choices discussed.  Patient expressed understanding. All Questions answered.         Language Assistance Services     ATTENTION: Language assistance services are available, free of charge. Please call [phone].      ATENCIÓN: Si habla español, tiene a combs disposición servicios gratuitos de asistencia lingüística. Llame al [phone].     [facility] Ý: N?u b?n nói Ti?ng Vi?t, có các d?ch v? h? tr? ngôn ng? mi?n phí dành cho b?n. G?i s? [phone].         [facility] complies with applicable Federal civil rights laws and does not discriminate on the basis of race, color, national origin, age, disability, or sex.

## 2017-03-21 ENCOUNTER — LAB VISIT (OUTPATIENT)
Dept: LAB | Facility: HOSPITAL | Age: 77
End: 2017-03-21
Attending: INTERNAL MEDICINE
Payer: MEDICARE

## 2017-03-21 ENCOUNTER — CLINICAL SUPPORT (OUTPATIENT)
Dept: DIABETES | Facility: CLINIC | Age: 77
End: 2017-03-21
Payer: MEDICARE

## 2017-03-21 ENCOUNTER — OFFICE VISIT (OUTPATIENT)
Dept: INTERNAL MEDICINE | Facility: CLINIC | Age: 77
End: 2017-03-21
Payer: MEDICARE

## 2017-03-21 VITALS
DIASTOLIC BLOOD PRESSURE: 80 MMHG | HEIGHT: 72 IN | HEART RATE: 80 BPM | SYSTOLIC BLOOD PRESSURE: 120 MMHG | BODY MASS INDEX: 42.66 KG/M2 | WEIGHT: 315 LBS

## 2017-03-21 DIAGNOSIS — E11.40 TYPE 2 DIABETES MELLITUS WITH DIABETIC NEUROPATHY, WITHOUT LONG-TERM CURRENT USE OF INSULIN: ICD-10-CM

## 2017-03-21 DIAGNOSIS — E55.9 VITAMIN D DEFICIENCY DISEASE: ICD-10-CM

## 2017-03-21 DIAGNOSIS — I77.1 TORTUOUS AORTA: ICD-10-CM

## 2017-03-21 DIAGNOSIS — Z85.46 HISTORY OF PROSTATE CANCER: ICD-10-CM

## 2017-03-21 DIAGNOSIS — G31.84 AMNESTIC MCI (MILD COGNITIVE IMPAIRMENT WITH MEMORY LOSS): Primary | ICD-10-CM

## 2017-03-21 DIAGNOSIS — E53.8 VITAMIN B 12 DEFICIENCY: ICD-10-CM

## 2017-03-21 DIAGNOSIS — G31.84 AMNESTIC MCI (MILD COGNITIVE IMPAIRMENT WITH MEMORY LOSS): ICD-10-CM

## 2017-03-21 DIAGNOSIS — H90.0 CONDUCTIVE HEARING LOSS, BILATERAL: ICD-10-CM

## 2017-03-21 LAB
25(OH)D3+25(OH)D2 SERPL-MCNC: 57 NG/ML
ALBUMIN SERPL BCP-MCNC: 3.6 G/DL
ALP SERPL-CCNC: 86 U/L
ALT SERPL W/O P-5'-P-CCNC: 21 U/L
ANION GAP SERPL CALC-SCNC: 11 MMOL/L
AST SERPL-CCNC: 23 U/L
BASOPHILS # BLD AUTO: 0.01 K/UL
BASOPHILS NFR BLD: 0.1 %
BILIRUB SERPL-MCNC: 0.7 MG/DL
BUN SERPL-MCNC: 16 MG/DL
CALCIUM SERPL-MCNC: 9.6 MG/DL
CHLORIDE SERPL-SCNC: 104 MMOL/L
CHOLEST/HDLC SERPL: 3 {RATIO}
CO2 SERPL-SCNC: 26 MMOL/L
COMPLEXED PSA SERPL-MCNC: <0.01 NG/ML
CREAT SERPL-MCNC: 1.1 MG/DL
CRP SERPL-MCNC: 2.2 MG/L
DIFFERENTIAL METHOD: ABNORMAL
EOSINOPHIL # BLD AUTO: 0.1 K/UL
EOSINOPHIL NFR BLD: 0.9 %
ERYTHROCYTE [DISTWIDTH] IN BLOOD BY AUTOMATED COUNT: 13.4 %
ERYTHROCYTE [SEDIMENTATION RATE] IN BLOOD BY WESTERGREN METHOD: 30 MM/HR
EST. GFR  (AFRICAN AMERICAN): >60 ML/MIN/1.73 M^2
EST. GFR  (NON AFRICAN AMERICAN): >60 ML/MIN/1.73 M^2
GLUCOSE SERPL-MCNC: 83 MG/DL
HCT VFR BLD AUTO: 41 %
HDL/CHOLESTEROL RATIO: 33 %
HDLC SERPL-MCNC: 112 MG/DL
HDLC SERPL-MCNC: 37 MG/DL
HGB BLD-MCNC: 13.4 G/DL
LDLC SERPL CALC-MCNC: 58 MG/DL
LYMPHOCYTES # BLD AUTO: 2.6 K/UL
LYMPHOCYTES NFR BLD: 32.4 %
MCH RBC QN AUTO: 30.7 PG
MCHC RBC AUTO-ENTMCNC: 32.7 %
MCV RBC AUTO: 94 FL
MONOCYTES # BLD AUTO: 0.8 K/UL
MONOCYTES NFR BLD: 9.3 %
NEUTROPHILS # BLD AUTO: 4.6 K/UL
NEUTROPHILS NFR BLD: 56.9 %
NONHDLC SERPL-MCNC: 75 MG/DL
PLATELET # BLD AUTO: 194 K/UL
PMV BLD AUTO: 10.6 FL
POTASSIUM SERPL-SCNC: 4.5 MMOL/L
PROT SERPL-MCNC: 7.3 G/DL
RBC # BLD AUTO: 4.37 M/UL
SODIUM SERPL-SCNC: 141 MMOL/L
TRIGL SERPL-MCNC: 85 MG/DL
TSH SERPL DL<=0.005 MIU/L-ACNC: 1.71 UIU/ML
VIT B12 SERPL-MCNC: 906 PG/ML
WBC # BLD AUTO: 8.14 K/UL

## 2017-03-21 PROCEDURE — 1126F AMNT PAIN NOTED NONE PRSNT: CPT | Mod: S$GLB,,, | Performed by: INTERNAL MEDICINE

## 2017-03-21 PROCEDURE — 99215 OFFICE O/P EST HI 40 MIN: CPT | Mod: S$GLB,,, | Performed by: INTERNAL MEDICINE

## 2017-03-21 PROCEDURE — 1157F ADVNC CARE PLAN IN RCRD: CPT | Mod: S$GLB,,, | Performed by: INTERNAL MEDICINE

## 2017-03-21 PROCEDURE — 1159F MED LIST DOCD IN RCRD: CPT | Mod: S$GLB,,, | Performed by: INTERNAL MEDICINE

## 2017-03-21 PROCEDURE — 99999 PR PBB SHADOW E&M-EST. PATIENT-LVL V: CPT | Mod: PBBFAC,,, | Performed by: INTERNAL MEDICINE

## 2017-03-21 PROCEDURE — 1160F RVW MEDS BY RX/DR IN RCRD: CPT | Mod: S$GLB,,, | Performed by: INTERNAL MEDICINE

## 2017-03-21 PROCEDURE — G0108 DIAB MANAGE TRN  PER INDIV: HCPCS | Mod: S$GLB,,, | Performed by: DIETITIAN, REGISTERED

## 2017-03-21 RX ORDER — DONEPEZIL HYDROCHLORIDE 10 MG/1
10 TABLET, FILM COATED ORAL NIGHTLY
Qty: 90 TABLET | Refills: 3 | Status: SHIPPED | OUTPATIENT
Start: 2017-03-21 | End: 2017-06-22 | Stop reason: SDUPTHER

## 2017-03-21 NOTE — PROGRESS NOTES
Diabetes Education  Author: Joaquina Wilson RD  Date: 3/21/2017    Diabetes Education Visit  Diabetes Education Record Assessment/Progress: Initial    Diabetes Type  Diabetes Type : Type II         Nutrition  Meal Planning: 3 meals per day, water (does not like diet drinks - drinks juice, regular soda or regular tea occasionally, not daily)  Meal Plan 24 Hour Recall - Breakfast: 2 pkt grits, eggs, 1 slice toast, 1 cup milk, coffee with equal  Meal Plan 24 Hour Recall - Lunch: largest meal - meat, starch, vegetable, water or regular tea  Meal Plan 24 Hour Recall - Dinner: cornflakes and milk  Meal Plan 24 Hour Recall - Snack: rarely    Monitoring   Monitoring:  (True Metrix)  Self Monitoring : SMBG 2 times daily - reports readings mostly 110-120s, highest he has ever seen was 190  Blood Glucose Logs: No    Exercise   Exercise Type:  (no structured exercise)    Current Diabetes Treatment   Current Treatment: Oral Medication (Metformin 1000mg BID)    Social History  Preferred Learning Method: Face to Face, Reading Materials  Primary Support: Self, Spouse (wife attended visit today)  Occupation: retired                     Barriers to Change  Barriers to Change: None  Learning Challenges : Hearing  Hearing - further explanation: hearing impaired (hard of hearing)  Hearing -  present?: No    Readiness to Learn   Readiness to Learn : Acceptance    Cultural Influences  Cultural Influences: No    Diabetes Education Assessment/Progress    Acute Complications (preventing, detecting, and treating acute complications): Discussion, Instructed, Competent (verbalizes/demonstrates), Individual Session, Written Materials Provided (Reviewed s/s of hyperglycemia and s/s of hypoglycemia and appropriate treatment. Explained not likely to have hypos with Metformin. )    Chronic Complications (preventing, detecting, and treating chronic complications): Discussion, Instructed, Written Materials Provided, Individual Session,  Competent (verbalizes/demonstrates) (Up to date on foot exam. Has eye appt scheduled. Reviewed care schedule and home foot care. )    Diabetes Disease Process (diabetes disease process and treatment options): Discussion, Instructed, Written Materials Provided, Competent (verbalizes/demonstrates), Individual Session    Nutrition (Incorporating nutritional management into one's lifestyle): Discussion, Instructed, Written Materials Provided, Competent (verbalizes/demonstrates), Individual Session, Competent Family/SO (Pt is mindful of portion sizes and shows understanding of sources of CHO. Does drink regular beverages and juices on occasion. Encouraged limiting juice to no more than 4oz and making unsweet tea with non-CHO sweetener. Reviewed CHO serving sizes, label reading, and plate method of meal planning. )    Physical Activity (incorporating physical activity into one's lifestyle): Discussion, Instructed, Written Materials Provided, Competent (verbalizes/demonstrates), Individual Session (Verbalized has gym membership and plans to go back to riding bike regularly. Discussed benefits and goals.)    Medications (states correct name, dose, onset, peak, duration, side effects & timing of meds): Discussion, Instructed, Written Materials Provided, Competent (verbalizes/demonstrates), Individual Session (Reviewed timing, dosage, and MOA of Metformin. Denies missing doses. )    Monitoring (monitoring blood glucose/other parameters & using results): Discussion, Instructed, Written Materials Provided, Individual Session, Competent (verbalizes/demonstrates) (Reviewed SMBG once daily, alternating times, goal BG readings, and keeping log. Provided log sheet. )    Goal Setting and Problem Solving (verbalizes behavior change strategies & sets realistic goals): Discussion, Individual Session  Behavior Change (developing personal strategies to health & behavior change): Discussion, Individual Session    Goals  Physical Activity:  Set (Ride bike at least 3 times weekly.)  Start Date: 03/21/17  Target Date: 09/21/17         Diabetes Care Plan/Intervention  Education Plan/Intervention: Other (Pt is already meeting most self-management standards. Annual DE follow-up. )    Diabetes Meal Plan  Carbohydrate Per Meal: 45-60g  Carbohydrate Per Snack : 15-20g    Education Units of Time   Time Spent: 30 min              Health Maintenance Due   Topic Date Due    TETANUS VACCINE  04/09/1958    Urine Microalbumin  06/09/2016    Hemoglobin A1c  03/08/2017

## 2017-03-21 NOTE — MR AVS SNAPSHOT
[facility]  [address]  [facility] [address]  Phone: [phone]  Fax: [phone]                  Elan Yepez   3/21/2017 10:40 AM   Office Visit    Description:  Male : 1940   Provider:  Liane Tijerina MD   Department:  [facility]           Reason for Visit     Follow-up     Memory Loss           Diagnoses this Visit        Comments    Amnestic MCI (mild cognitive impairment with memory loss)    -  Primary     Vitamin D deficiency disease         Type 2 diabetes mellitus with diabetic neuropathy, without long-term current use of insulin         Tortuous aorta         Conductive hearing loss, bilateral         History of prostate cancer         Vitamin B 12 deficiency                To Do List           Future Appointments        Provider Department Dept Phone    3/21/2017 1:00 PM DIABETES EDUCATOR, INT MED [facility] [phone]    2017 9:00 AM MICKEY Huynh - Podiatry [phone]    2017 9:40 AM Elizabeth Lejeune, NP [facility] - Sleep Clinic [phone]      Goals (5 Years of Data)     None      Follow-Up and Disposition     Return in about 3 months (around 2017).       These Medications        Disp Refills Start End    donepezil (ARICEPT) 10 MG tablet 90 tablet 3 3/21/2017 3/21/2018    Take 1 tablet (10 mg total) by mouth every evening. - Oral    Pharmacy: [facility] - [place], [place] - [address] [facility] Ph #: [phone]         [facility] On Call     [facility] -  Assistance  Registered nurses in the [facility] On Call Center provide clinical advisement, health education, appointment booking, and other advisory services.  Call for this free service at [phone].             Medications           Message regarding Medications     Verify the changes and/or additions to your medication regime listed below are the same as discussed with your clinician  today.  If any of these changes or additions are incorrect, please notify your healthcare provider.        START taking these NEW medications        Refills    donepezil (ARICEPT) 10 MG tablet 3    Sig: Take 1 tablet (10 mg total) by mouth every evening.    Class: Normal    Route: Oral           Verify that the below list of medications is an accurate representation of the medications you are currently taking.  If none reported, the list may be blank. If incorrect, please contact your healthcare provider. Carry this list with you in case of emergency.           Current Medications     aspirin (ECOTRIN) 81 MG EC tablet     atorvastatin (LIPITOR) 40 MG tablet Take 1 tablet (40 mg total) by mouth once daily. 1 Tablet Oral Every day.  Replaces simvastatin 80mg daily.    blood sugar diagnostic Strp 1 strip by Misc.(Non-Drug; Combo Route) route once daily.    cholecalciferol, vitamin D3, 5,000 unit capsule Take 1 capsule by mouth Daily.    diclofenac sodium (VOLTAREN) 1 % Gel Apply 2 g topically once daily.    fluocinonide (LIDEX) 0.05 % ointment Apply topically Twice daily. to replace Elocon cream    lancets (SOFT TOUCH LANCETS) Misc 1 Device by Misc.(Non-Drug; Combo Route) route once daily.    metformin (GLUCOPHAGE) 500 MG tablet Take 2 tablets (1,000 mg total) by mouth 2 (two) times daily with meals.    MULTIVITS-MINERALS/FA/LYCOPENE (ONE-A-DAY MEN'S ORAL) Take 1 tablet by mouth Daily.    tadalafil (CIALIS) 20 MG Tab Take 1 tablet (20 mg total) by mouth daily as needed. take as directed    TRUE METRIX GLUCOSE METER [facility]     donepezil (ARICEPT) 10 MG tablet Take 1 tablet (10 mg total) by mouth every evening.           Clinical Reference Information           Your Vitals Were     BP Pulse Height Weight BMI    120/80 80 6' (1.829 m) 142.9 kg (315 lb 0.6 oz) 42.73 kg/m2      Blood Pressure          Most Recent Value    BP  120/80      Allergies as of 3/21/2017     No Known Drug Allergies      Immunizations Administered on  Date of Encounter - 3/21/2017     None      Orders Placed During Today's Visit      Normal Orders This Visit    Ambulatory Referral to Audiology     Ambulatory Referral to ENT     Ambulatory referral to Optometry     Future Labs/Procedures Expected by Expires    C-reactive protein  3/21/2017 3/21/2018    CBC auto differential  3/21/2017 3/21/2018    Comprehensive metabolic panel  3/21/2017 5/20/2018    Lipid panel  3/21/2017 5/20/2018    MRI Brain W WO Contrast  3/21/2017 3/21/2018    PROSTATE SPECIFIC ANTIGEN, DIAGNOSTIC  3/21/2017 5/20/2018    Sedimentation rate, manual  3/21/2017 3/21/2018    TSH  3/21/2017 5/20/2018    Vitamin B12  3/21/2017 5/20/2018    Vitamin D  3/21/2017 6/19/2017      [facility] Sign-Up     Activating your [facility] account is as easy as 1-2-3!     1) Visit [email or web address], select Sign Up Now, enter this activation code and your date of birth, then select Next.  [email or web address]  Expires: 5/5/2017 12:10 PM      2) Create a username and password to use when you visit [facility] in the future and select a security question in case you lose your password and select Next.    3) Enter your e-mail address and click Sign Up!    Additional Information  If you have questions, please e-mail [email or web address] or call [phone] to talk to our [facility] staff. Remember, [facility] is NOT to be used for urgent needs. For medical emergencies, dial 911.         Language Assistance Services     ATTENTION: Language assistance services are available, free of charge. Please call [phone].      ATENCIÓN: Si habla español, tiene a combs disposición servicios gratuitos de asistencia lingüística. Llame al [phone].     CHÚ Ý: N?u b?n nói Ti?ng Vi?t, có các d?ch v? h? tr? ngôn ng? mi?n phí dành cho b?n. G?i s? [phone].         [facility] complies with applicable Federal civil rights laws and does not discriminate on the basis of race, color, national origin, age, disability, or  sex.

## 2017-03-21 NOTE — PROGRESS NOTES
Subjective:       Patient ID: Elan Yepez is a 76 y.o. male.    Chief Complaint: Follow-up and Memory Loss   Mr. Yepez had an HRA screening.  He was unable to draw a clock or set the time at 10 minutes after 11.  He presents to the office today for a Mini-Mental Status Examination.      He scored a 20 out of a score of 30.  He has evidence of short-term memory loss, visual spatial impairment, inability to perform serial sevens, 0 recall of 3 named objects at 3 minutes.  He had no problem with orientation except he said the season of the year was fall and it is spring.    His wife accompanies him to this visit.  Mr. Yepez denies depression and she collaborates that he is not depressed.  She has done a checkbook for years.  He is independent with all activities of daily living and his wife has not noticed anything unusual about his behavior or mood.  He is retired and spends most of his time outdoors working in the garden, riding a mower to maintain 3 acres of lawn or on the tractor farming.  He prefers to be outdoors.  He likes to hunt.  He proudly showed a picture of himself with 2 deer.  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  He was the eldest of the family and never had the opportunity to go to college.  HPI  Review of Systems   Constitutional: Negative for activity change, chills and fever.   HENT: Negative for congestion, dental problem, hearing loss, tinnitus and trouble swallowing.    Eyes: Negative for visual disturbance.   Respiratory: Negative for cough, shortness of breath and wheezing.    Cardiovascular: Negative for chest pain, palpitations and leg swelling.   Genitourinary: Negative for dysuria, frequency and urgency.   Musculoskeletal: Negative for back pain and neck pain.   Neurological: Negative for dizziness, weakness and headaches.   Psychiatric/Behavioral: Negative for dysphoric mood and sleep disturbance. The patient is  not nervous/anxious.        Objective:      Physical Exam   Constitutional: He is oriented to person, place, and time. No distress.   HENT:   Head: Atraumatic.   Eyes: Conjunctivae are normal. No scleral icterus.   Neck: Neck supple.   Cardiovascular: Normal rate and regular rhythm.    Pulmonary/Chest: Effort normal and breath sounds normal.   Abdominal: Soft. There is no tenderness.   Musculoskeletal: He exhibits no edema.   Lymphadenopathy:     He has no cervical adenopathy.   Neurological: He is alert and oriented to person, place, and time.   Skin: Skin is warm and dry.   Psychiatric: He has a normal mood and affect. His behavior is normal.   Nursing note and vitals reviewed.      Assessment:       1. Amnestic MCI (mild cognitive impairment with memory loss)    2. Vitamin D deficiency disease    3. Type 2 diabetes mellitus with diabetic neuropathy, without long-term current use of insulin    4. Tortuous aorta    5. Conductive hearing loss, bilateral    6. History of prostate cancer    7. Vitamin B 12 deficiency        Plan:   Elan was seen today for follow-up and memory loss.    Diagnoses and all orders for this visit:    Amnestic MCI (mild cognitive impairment with memory loss).  He is at risk for multi-infarct dementia and he has a history of prostate cancer.  An MRI will be obtained.  Unfortunately, his presentation and his main mental status examination is most consistent with an Alzheimer's or related dementia.  He is given a handout on the [place] Protocol.  He is urged to begin exercising by walking as tolerated starting with 5 minutes per day.  Being active outdoors, on the tractor, on the mower, even the riding mower does count as exercise.  Recommend starting Aricept at 10 mg at bedtime.  -     TSH; Future  -     Sedimentation rate, manual; Future  -     C-reactive protein; Future  -     CBC auto differential; Future  -     MRI Brain W WO Contrast; Future    Vitamin D deficiency disease.   Recheck.  -     Vitamin D; Future    Type 2 diabetes mellitus with diabetic neuropathy, without long-term current use of insulin.  Monitor.  -     Lipid panel; Future  -     Comprehensive metabolic panel; Future  -     Sedimentation rate, manual; Future  -     C-reactive protein; Future  -     Ambulatory referral to Optometry  -     MRI Brain W WO Contrast; Future    -  Conductive hearing loss, bilateral.  He is missing out on a lot of conversation because of impaired hearing.  This doesn't appear to be a processing problem, it appears that he simply is hard of hearing.  If he needs hearing aids, I urged him to get them.  It will improve many facets of his life.  -     Ambulatory Referral to Audiology  -     Ambulatory Referral to ENT    History of prostate cancer.  Monitor with diagnostic PSA.  -     Comprehensive metabolic panel; Future  -     Sedimentation rate, manual; Future  -     C-reactive protein; Future  -     CBC auto differential; Future  -     PROSTATE SPECIFIC ANTIGEN, DIAGNOSTIC; Future  -     MRI Brain W WO Contrast; Future    Vitamin B 12 deficiency.  Recheck.  -     Vitamin B12; Future  -     MRI Brain W WO Contrast; Future    Other orders  -     donepezil (ARICEPT) 10 MG tablet; Take 1 tablet (10 mg total) by mouth every evening.    Return in about 3 months (around 6/5/2017).

## 2017-03-30 ENCOUNTER — TELEPHONE (OUTPATIENT)
Dept: INTERNAL MEDICINE | Facility: CLINIC | Age: 77
End: 2017-03-30

## 2017-03-30 ENCOUNTER — HOSPITAL ENCOUNTER (OUTPATIENT)
Dept: RADIOLOGY | Facility: HOSPITAL | Age: 77
Discharge: HOME | End: 2017-03-30
Attending: INTERNAL MEDICINE
Payer: MEDICARE

## 2017-03-30 DIAGNOSIS — G31.84 AMNESTIC MCI (MILD COGNITIVE IMPAIRMENT WITH MEMORY LOSS): ICD-10-CM

## 2017-03-30 DIAGNOSIS — I77.1 TORTUOUS AORTA: ICD-10-CM

## 2017-03-30 DIAGNOSIS — Z85.46 HISTORY OF PROSTATE CANCER: ICD-10-CM

## 2017-03-30 DIAGNOSIS — E11.40 TYPE 2 DIABETES MELLITUS WITH DIABETIC NEUROPATHY, WITHOUT LONG-TERM CURRENT USE OF INSULIN: ICD-10-CM

## 2017-03-30 DIAGNOSIS — E53.8 VITAMIN B 12 DEFICIENCY: ICD-10-CM

## 2017-03-30 PROCEDURE — 25500020 PHARM REV CODE 255: Performed by: INTERNAL MEDICINE

## 2017-03-30 PROCEDURE — 70553 MRI BRAIN STEM W/O & W/DYE: CPT | Mod: 26,,, | Performed by: RADIOLOGY

## 2017-03-30 PROCEDURE — A9585 GADOBUTROL INJECTION: HCPCS | Performed by: INTERNAL MEDICINE

## 2017-03-30 PROCEDURE — 70553 MRI BRAIN STEM W/O & W/DYE: CPT | Mod: TC

## 2017-03-30 RX ORDER — GADOBUTROL 604.72 MG/ML
10 INJECTION INTRAVENOUS
Status: COMPLETED | OUTPATIENT
Start: 2017-03-30 | End: 2017-03-30

## 2017-03-30 RX ADMIN — GADOBUTROL 10 ML: 604.72 INJECTION INTRAVENOUS at 04:03

## 2017-03-30 NOTE — TELEPHONE ENCOUNTER
Telephone call  No cancer, no stroke  His wife took the message for him.  Memory problem hopefully will improve on Aricept  Please schedule an appointment for this patient in about 3 -4 months

## 2017-04-06 ENCOUNTER — DOCUMENTATION ONLY (OUTPATIENT)
Dept: PULMONOLOGY | Facility: CLINIC | Age: 77
End: 2017-04-06

## 2017-04-06 NOTE — PROGRESS NOTES
Download on CPAP 14 cm  AHI now down to 14.5cm  Leak 1 hr 59 mins  Usage > 4 hrs was 96.7%  Period 3/7/2017 to 04/05/2017

## 2017-05-15 ENCOUNTER — CLINICAL SUPPORT (OUTPATIENT)
Dept: AUDIOLOGY | Facility: CLINIC | Age: 77
End: 2017-05-15
Payer: MEDICARE

## 2017-05-15 ENCOUNTER — INITIAL CONSULT (OUTPATIENT)
Dept: OTOLARYNGOLOGY | Facility: CLINIC | Age: 77
End: 2017-05-15
Payer: MEDICARE

## 2017-05-15 VITALS
WEIGHT: 313.69 LBS | HEART RATE: 86 BPM | BODY MASS INDEX: 42.49 KG/M2 | HEIGHT: 72 IN | DIASTOLIC BLOOD PRESSURE: 89 MMHG | TEMPERATURE: 99 F | SYSTOLIC BLOOD PRESSURE: 151 MMHG

## 2017-05-15 DIAGNOSIS — G31.84 MILD COGNITIVE IMPAIRMENT: ICD-10-CM

## 2017-05-15 DIAGNOSIS — H90.3 SENSORINEURAL HEARING LOSS, BILATERAL: Primary | ICD-10-CM

## 2017-05-15 PROCEDURE — 92567 TYMPANOMETRY: CPT | Mod: S$GLB,,, | Performed by: AUDIOLOGIST

## 2017-05-15 PROCEDURE — 1160F RVW MEDS BY RX/DR IN RCRD: CPT | Mod: S$GLB,,, | Performed by: OTOLARYNGOLOGY

## 2017-05-15 PROCEDURE — 99213 OFFICE O/P EST LOW 20 MIN: CPT | Mod: S$GLB,,, | Performed by: OTOLARYNGOLOGY

## 2017-05-15 PROCEDURE — 92557 COMPREHENSIVE HEARING TEST: CPT | Mod: S$GLB,,, | Performed by: AUDIOLOGIST

## 2017-05-15 PROCEDURE — 1126F AMNT PAIN NOTED NONE PRSNT: CPT | Mod: S$GLB,,, | Performed by: OTOLARYNGOLOGY

## 2017-05-15 PROCEDURE — 99999 PR PBB SHADOW E&M-EST. PATIENT-LVL III: CPT | Mod: PBBFAC,,, | Performed by: OTOLARYNGOLOGY

## 2017-05-15 PROCEDURE — 1159F MED LIST DOCD IN RCRD: CPT | Mod: S$GLB,,, | Performed by: OTOLARYNGOLOGY

## 2017-05-15 NOTE — PATIENT INSTRUCTIONS
Audiometry reviewed; significant bilateral  SNHL   Pt. is a candidate for hearing amplification for one or both ears  Copy of audiogram/SHELBY Guevara's card/Rx to obtain hearing aid(s) provided  Low sodium diet encouraged  Monitor hearng yearly

## 2017-05-15 NOTE — LETTER
May 15, 2017      Liane Tijerina MD  [address]  [facility] [address]           [facility] - Otorhinolaryngology  [address]  [facility] [address]  Phone: [phone]  Fax: [phone]          Patient: Elan Yepez   MR Number: [id number]   YOB: 1940   Date of Visit: 5/15/2017       Dear Dr. Liane Tijerina:    Thank you for referring Elan Yepez to me for evaluation. Attached you will find relevant portions of my assessment and plan of care.    If you have questions, please do not hesitate to call me. I look forward to following Elan Yepez along with you.    Sincerely,    Mk Wagner III, MD    Enclosure  CC:  No Recipients    If you would like to receive this communication electronically, please contact [email or web address] or [phone] to request more information on [facility] access.    For providers and/or their staff who would like to refer a patient to [facility], please contact us through our one-stop-shop provider referral line, [facility], at [phone].    If you feel you have received this communication in error or would no longer like to receive these types of communications, please e-mail [email or web address]

## 2017-05-15 NOTE — PROGRESS NOTES
Subjective:       Patient ID: Elan Yepez is a 77 y.o. male.    Chief Complaint: No chief complaint on file.    HPI: Mr. Yepez is a 77 year old AAM who is accompanied by his wife today.  He is here for hearing test related to his history of hearing loss.    He has a long history of hearing loss related to his history of noise exposure during his working with [employer] for many years.  His wife expresses frustration a communicating with him especially in a competing noise environments.  He completed an audiometric study performed in July 2015 which indicated his bilateral 35 dB SRT scores as well as sloping mild to moderate 1K and severe 2 through 8K sensorineural hearing loss problem.  He has never worn amplification to this point in time.    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    Nose:  Positive for postnasal drip and snoring.    Respiratory:  Positive for recent cough.    Other:  Positive for depression and anxiety. Negative for rash.    His medical problem list includes hyperlipidemia, type 2 diabetes, vitamin D deficiency, prostate cancer 2002 with recurrence 2009, sleep apnea, metabolic syndrome X, hearing loss      He completed an audiometric study performed by the [facility] audiology service.  The study is duplicated below and the results reviewed with the patient.  Objective:           Blood pressure 151/89 pulse 86 temperature 98.8  height 6 feet  weight 313 pounds  Gen.: Alert and oriented gentleman in no acute distress  Physical Exam   Constitutional: He is oriented to person, place, and time. He appears well-developed and well-nourished.   HENT:   Head: Normocephalic.   Right Ear: Hearing, tympanic membrane and ear canal normal. No drainage. No foreign bodies. No mastoid tenderness. Tympanic membrane  is not perforated. No decreased hearing is noted.   Left Ear: Hearing, tympanic membrane and ear canal normal. No drainage. No foreign bodies. No mastoid tenderness. Tympanic membrane is not perforated. No decreased hearing is noted.   Ears:    Nose: No nose lacerations, nasal deformity, septal deviation or nasal septal hematoma. No epistaxis. Right sinus exhibits no maxillary sinus tenderness and no frontal sinus tenderness. Left sinus exhibits no maxillary sinus tenderness and no frontal sinus tenderness.   Mouth/Throat: Uvula is midline, oropharynx is clear and moist and mucous membranes are normal. He does not have dentures. No oral lesions. No trismus in the jaw. No uvula swelling or dental caries. No oropharyngeal exudate or tonsillar abscesses.   Neck: No thyromegaly present.   Pulmonary/Chest: Effort normal. No stridor.   Lymphadenopathy:     He has no cervical adenopathy.   Neurological: He is alert and oriented to person, place, and time.   Skin: No rash noted.   Psychiatric: His behavior is normal.       Assessment:       1. 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 Mild cognitive impairment        Plan:     Audiometry reviewed; significant bilateral  SNHL   Pt. is a candidate for hearing amplification for one or both ears  Copy of audiogram/SHELBY Guevara's card/Rx to obtain hearing aid(s) provided  Low sodium diet encouraged  Monitor hearng yearly

## 2017-05-15 NOTE — PROGRESS NOTES
Normal to profound sensorineural hearing loss in the right ear.  Mild to severe sensorineural hearing loss in th left ear.  Type A tympanograms bilaterally.

## 2017-05-18 ENCOUNTER — DOCUMENTATION ONLY (OUTPATIENT)
Dept: PULMONOLOGY | Facility: CLINIC | Age: 77
End: 2017-05-18

## 2017-05-18 NOTE — PROGRESS NOTES
CPAP download compliance from 4/18/2017 to 5/17/2017  Device was used for 30 days 100% of the time.  Usage greater than 4 hours is 100%  Average AHI was 14.3 which is reduced from prior  Download  CPAP 14 cm water pressure

## 2017-06-08 ENCOUNTER — OFFICE VISIT (OUTPATIENT)
Dept: PODIATRY | Facility: CLINIC | Age: 77
End: 2017-06-08
Payer: MEDICARE

## 2017-06-08 VITALS
HEIGHT: 72 IN | WEIGHT: 315 LBS | RESPIRATION RATE: 19 BRPM | HEART RATE: 89 BPM | SYSTOLIC BLOOD PRESSURE: 125 MMHG | DIASTOLIC BLOOD PRESSURE: 78 MMHG | BODY MASS INDEX: 42.66 KG/M2

## 2017-06-08 DIAGNOSIS — E11.49 TYPE 2 DIABETES MELLITUS WITH NEUROLOGICAL MANIFESTATIONS: Primary | ICD-10-CM

## 2017-06-08 DIAGNOSIS — B35.1 DERMATOPHYTOSIS, NAIL: ICD-10-CM

## 2017-06-08 DIAGNOSIS — L84 CORN OR CALLUS: ICD-10-CM

## 2017-06-08 PROCEDURE — 99499 UNLISTED E&M SERVICE: CPT | Mod: S$GLB,,, | Performed by: PODIATRIST

## 2017-06-08 PROCEDURE — 99999 PR PBB SHADOW E&M-EST. PATIENT-LVL III: CPT | Mod: PBBFAC,,, | Performed by: PODIATRIST

## 2017-06-08 PROCEDURE — 11056 PARNG/CUTG B9 HYPRKR LES 2-4: CPT | Mod: Q9,S$GLB,, | Performed by: PODIATRIST

## 2017-06-08 PROCEDURE — 11721 DEBRIDE NAIL 6 OR MORE: CPT | Mod: 59,Q9,S$GLB, | Performed by: PODIATRIST

## 2017-06-08 NOTE — PROGRESS NOTES
Subjective:      Patient ID: Elan Yepez is a 77 y.o. male.    Chief Complaint: PCP (Liane Tijerina MD 3/21/17); Diabetic Foot Exam; and Nail Care    Elan is a 77 y.o. male who presents to the clinic for evaluation and treatment of high risk feet. Elan has a past medical history of BMI 40.0-44.9, adult; Diabetes mellitus; Dysmetabolic syndrome X (10/2/2012); Hyperlipidemia; Hypertension; Joint pain; MDD (major depressive disorder) (4/7/2015); Midline low back pain without sciatica (5/5/2016); Prostate CA; Sleep apnea; Trouble in sleeping; and Type II or unspecified type diabetes mellitus with neurological manifestations, not stated as uncontrolled. The patient's chief complaint is long, thick toenails. This patient has documented high risk feet requiring routine maintenance secondary to diabetes mellitis and those secondary complications of diabetes, as mentioned..    PCP: Liane Tijerina MD    Date Last Seen by PCP:  Chief Complaint   Patient presents with    PCP     Liane Tijerina MD 3/21/17    Diabetic Foot Exam    Nail Care         Current shoe gear:dm shoes   Hemoglobin A1C   Date Value Ref Range Status   09/08/2016 6.5 (H) 4.5 - 6.2 % Final     Comment:     According to ADA guidelines, hemoglobin A1C <7.0% represents  optimal control in non-pregnant diabetic patients.  Different  metrics may apply to specific populations.   Standards of Medical Care in Diabetes - 2016.  For the purpose of screening for the presence of diabetes:  <5.7%     Consistent with the absence of diabetes  5.7-6.4%  Consistent with increasing risk for diabetes   (prediabetes)  >or=6.5%  Consistent with diabetes  Currently no consensus exists for use of hemoglobin A1C  for diagnosis of diabetes for children.     05/05/2016 6.7 (H) 4.5 - 6.2 % Final   04/07/2015 6.6 (H) 4.5 - 6.2 % Final       Review of Systems   Constitution: Negative for chills, decreased appetite and fever.   Cardiovascular: Negative for chest pain,  claudication and leg swelling.   Respiratory: Negative for cough.    Skin: Positive for dry skin and nail changes. Negative for itching.   Musculoskeletal: Negative for arthritis, back pain, falls, joint pain, joint swelling and myalgias.   Gastrointestinal: Negative for nausea and vomiting.   Neurological: Positive for numbness. Negative for loss of balance and paresthesias.           Objective:      Physical Exam   Constitutional: He is oriented to person, place, and time. He appears well-developed and well-nourished.   Cardiovascular:   Dorsalis pedis and posterior tibial pulses are diminished Bilaterally. Toes are cool to touch. Feet are warm proximally.There is decreased digital hair. Skin is atrophic, slightly hyperpigmented, and mildly edematous       Musculoskeletal: Normal range of motion. He exhibits no tenderness or deformity.   Adequate joint range of motion without pain, limitation, nor crepitation Bilateral feet and ankle joints. Muscle strength is 5/5 in all groups bilaterally.         Neurological: He is alert and oriented to person, place, and time.   [place]iley[place]-Marci 5.07 monofilamant testing is diminished Raffi feet. Sharp/dull sensation diminished Bilaterally. Light touch absent Bilaterally.       Skin: Skin is warm, dry and intact. No ecchymosis and no lesion noted. No erythema. No pallor.   Nails x10 are elongated by  2-6mm's, thickened by 2-4 mm's, dystrophic, and are darkened in  coloration . Xerosis Bilaterally. No open lesions noted.    Hyperkeratotic tissue noted to distal hallux b/l      Psychiatric: He has a normal mood and affect. His behavior is normal.   Nursing note and vitals reviewed.            Assessment:       Encounter Diagnoses   Name Primary?    Type 2 diabetes mellitus with neurological manifestations Yes    Dermatophytosis, nail     Corn or callus          Plan:       Elan was seen today for pcp, diabetic foot exam and nail care.    Diagnoses and all orders for this  visit:    Type 2 diabetes mellitus with neurological manifestations    Dermatophytosis, nail    Corn or callus      I counseled the patient on his conditions, their implications and medical management.        - Shoe inspection. Diabetic Foot Education. Patient reminded of the importance of good nutrition and blood sugar control to help prevent podiatric complications of diabetes. Patient instructed on proper foot hygeine. We discussed wearing proper shoe gear, daily foot inspections, never walking without protective shoe gear, never putting sharp instruments to feet, routine podiatric nail visits every 2-3 months.      - With patient's permission, nails were aggressively reduced and debrided x 10 to their soft tissue attachment mechanically and with electric , removing all offending nail and debris. Patient relates relief following the procedure. He will continue to monitor the areas daily, inspect his feet, wear protective shoe gear when ambulatory, moisturizer to maintain skin integrity and follow in this office in approximately 2-3 months, sooner p.r.n.    - After cleansing the  area w/ alcohol prep pad the above mentioned hyperkeratosis was trimmed utilizing No 15 scapel, to a smooth base with out incident. Patient tolerated this  well and reported comfort to the area of distal hallux b/l

## 2017-06-21 ENCOUNTER — OFFICE VISIT (OUTPATIENT)
Dept: SLEEP MEDICINE | Facility: CLINIC | Age: 77
End: 2017-06-21
Payer: MEDICARE

## 2017-06-21 VITALS
RESPIRATION RATE: 18 BRPM | HEART RATE: 83 BPM | SYSTOLIC BLOOD PRESSURE: 136 MMHG | WEIGHT: 315 LBS | DIASTOLIC BLOOD PRESSURE: 70 MMHG | OXYGEN SATURATION: 98 % | HEIGHT: 72 IN | BODY MASS INDEX: 42.66 KG/M2

## 2017-06-21 DIAGNOSIS — G47.33 OSA ON CPAP: Primary | ICD-10-CM

## 2017-06-21 PROCEDURE — 99499 UNLISTED E&M SERVICE: CPT | Mod: S$GLB,,, | Performed by: NURSE PRACTITIONER

## 2017-06-21 PROCEDURE — 99213 OFFICE O/P EST LOW 20 MIN: CPT | Mod: S$GLB,,, | Performed by: NURSE PRACTITIONER

## 2017-06-21 PROCEDURE — 99999 PR PBB SHADOW E&M-EST. PATIENT-LVL III: CPT | Mod: PBBFAC,,, | Performed by: NURSE PRACTITIONER

## 2017-06-21 PROCEDURE — 1159F MED LIST DOCD IN RCRD: CPT | Mod: S$GLB,,, | Performed by: NURSE PRACTITIONER

## 2017-06-21 NOTE — PROGRESS NOTES
Subjective:      Patient ID: Elan Yepez is a 77 y.o. male.    Chief Complaint: Sleep Apnea    Patient presents to the office today for evaluation of sleep apnea.  Patient was on AutoPap with a high AHI.  After review of last sleep study, Dr. Collado change settings to 14 cm water pressure.  Patient is here to follow-up.  Patient states he wears nightly.  His wife states the CPAP is making a noise, possibly from the mask.  He continues to sleep throughout the day when he slows down.    Patient Active Problem List:     Hyperlipemia     Impotence of organic origin     Vitamin D deficiency disease     History of prostate cancer     BMI 40.0-44.9, adult     Aortic atherosclerosis     [place] (hard of hearing)     Type 2 diabetes mellitus with diabetic neuropathy     Tortuous aorta     JESSICA on CPAP     Asbestos exposure     Midline low back pain without sciatica     Amnestic MCI (mild cognitive impairment with memory loss)     Vitamin B 12 deficiency            /70   Pulse 83   Resp 18   Ht 6' (1.829 m)   Wt (!) 144.4 kg (318 lb 5.5 oz)   SpO2 98%   BMI 43.18 kg/m²   Body mass index is 43.18 kg/m².    Review of Systems   Constitutional: Negative.    HENT: Negative.    Respiratory: Negative.    Cardiovascular: Negative.    Musculoskeletal: Negative.    Gastrointestinal: Negative.    Neurological: Negative.    Psychiatric/Behavioral: Negative.      Objective:      Physical Exam   Constitutional: He is oriented to person, place, and time. He appears well-developed and well-nourished.   Obese   HENT:   Head: Normocephalic and atraumatic.   Nose: Nose normal.   Mouth/Throat: Uvula is midline and oropharynx is clear and moist.   Neck: Trachea normal and normal range of motion. Neck supple. No thyroid mass and no thyromegaly present.   Cardiovascular: Normal rate, regular rhythm and normal heart sounds.    Pulmonary/Chest: Effort normal and breath sounds normal. He has no wheezes. He has no rhonchi. He has no rales.  Chest wall is not dull to percussion.   Abdominal: Soft. He exhibits no mass. There is no hepatosplenomegaly or splenomegaly. There is no tenderness.   Musculoskeletal: Normal range of motion. He exhibits no edema.   Neurological: He is alert and oriented to person, place, and time.   Skin: Skin is warm and dry.   Psychiatric: He has a normal mood and affect.     Personal Diagnostic Review  CPAP download shows patient wears on average 9 hrs and 12 minutes. Greater than 4 hrs 100 % of the time. AHI 17.3.  Large mask leak for 3 hours and 53 minutes on average nightly    Assessment:       1. JESSICA on CPAP    2. BMI 40.0-44.9, adult        Outpatient Encounter Prescriptions as of 6/21/2017   Medication Sig Dispense Refill    aspirin (ECOTRIN) 81 MG EC tablet       atorvastatin (LIPITOR) 40 MG tablet Take 1 tablet (40 mg total) by mouth once daily. 1 Tablet Oral Every day.  Replaces simvastatin 80mg daily. 90 tablet 3    blood sugar diagnostic Strp 1 strip by Misc.(Non-Drug; Combo Route) route once daily. 100 strip 11    cholecalciferol, vitamin D3, 5,000 unit capsule Take 1 capsule by mouth Daily.      diclofenac sodium (VOLTAREN) 1 % Gel Apply 2 g topically once daily. 100 g 3    donepezil (ARICEPT) 10 MG tablet Take 1 tablet (10 mg total) by mouth every evening. 90 tablet 3    fluocinonide (LIDEX) 0.05 % ointment Apply topically Twice daily. to replace Elocon cream      lancets (SOFT TOUCH LANCETS) Misc 1 Device by Misc.(Non-Drug; Combo Route) route once daily. 100 each 11    metformin (GLUCOPHAGE) 500 MG tablet Take 2 tablets (1,000 mg total) by mouth 2 (two) times daily with meals. 360 tablet 3    MULTIVITS-MINERALS/FA/LYCOPENE (ONE-A-DAY MEN'S ORAL) Take 1 tablet by mouth Daily.      tadalafil (CIALIS) 20 MG Tab Take 1 tablet (20 mg total) by mouth daily as needed. take as directed 10 tablet 5    TRUE METRIX GLUCOSE METER [facility]        No facility-administered encounter medications on file as of 6/21/2017.       No orders of the defined types were placed in this encounter.    Plan:       no improvement in AHI.  Appears to have a large mask leak which could be contributing to some optimal treatment.  We did troubleshooting with mask and he will try again tonight. If continued noise/leak he will need a new mask fit.   Follow up in 2 months to review download.

## 2017-06-22 ENCOUNTER — OFFICE VISIT (OUTPATIENT)
Dept: INTERNAL MEDICINE | Facility: CLINIC | Age: 77
End: 2017-06-22
Payer: MEDICARE

## 2017-06-22 ENCOUNTER — LAB VISIT (OUTPATIENT)
Dept: LAB | Facility: HOSPITAL | Age: 77
End: 2017-06-22
Attending: INTERNAL MEDICINE
Payer: MEDICARE

## 2017-06-22 ENCOUNTER — OFFICE VISIT (OUTPATIENT)
Dept: OPTOMETRY | Facility: CLINIC | Age: 77
End: 2017-06-22
Payer: MEDICARE

## 2017-06-22 ENCOUNTER — TELEPHONE (OUTPATIENT)
Dept: OPTOMETRY | Facility: CLINIC | Age: 77
End: 2017-06-22

## 2017-06-22 ENCOUNTER — CLINICAL SUPPORT (OUTPATIENT)
Dept: OPHTHALMOLOGY | Facility: CLINIC | Age: 77
End: 2017-06-22
Payer: MEDICARE

## 2017-06-22 VITALS
WEIGHT: 315 LBS | DIASTOLIC BLOOD PRESSURE: 80 MMHG | SYSTOLIC BLOOD PRESSURE: 128 MMHG | BODY MASS INDEX: 42.66 KG/M2 | HEIGHT: 72 IN | HEART RATE: 73 BPM

## 2017-06-22 DIAGNOSIS — H90.0 CONDUCTIVE HEARING LOSS, BILATERAL: ICD-10-CM

## 2017-06-22 DIAGNOSIS — G31.84 AMNESTIC MCI (MILD COGNITIVE IMPAIRMENT WITH MEMORY LOSS): Primary | ICD-10-CM

## 2017-06-22 DIAGNOSIS — H25.13 NUCLEAR SCLEROTIC CATARACT OF BOTH EYES: ICD-10-CM

## 2017-06-22 DIAGNOSIS — E11.9 TYPE 2 DIABETES MELLITUS WITHOUT RETINOPATHY: Primary | ICD-10-CM

## 2017-06-22 DIAGNOSIS — H52.223 REGULAR ASTIGMATISM OF BOTH EYES: ICD-10-CM

## 2017-06-22 DIAGNOSIS — M54.50 MIDLINE LOW BACK PAIN WITHOUT SCIATICA, UNSPECIFIED CHRONICITY: ICD-10-CM

## 2017-06-22 DIAGNOSIS — Z83.511 FAMILY HISTORY OF GLAUCOMA: ICD-10-CM

## 2017-06-22 DIAGNOSIS — E11.9 TYPE 2 DIABETES MELLITUS WITHOUT COMPLICATION, WITHOUT LONG-TERM CURRENT USE OF INSULIN: ICD-10-CM

## 2017-06-22 DIAGNOSIS — H40.021 OAG (OPEN ANGLE GLAUCOMA) SUSPECT, HIGH RISK, RIGHT: ICD-10-CM

## 2017-06-22 DIAGNOSIS — G47.33 OSA ON CPAP: ICD-10-CM

## 2017-06-22 DIAGNOSIS — H35.372 ERM OS (EPIRETINAL MEMBRANE, LEFT EYE): ICD-10-CM

## 2017-06-22 DIAGNOSIS — Z85.46 HISTORY OF PROSTATE CANCER: ICD-10-CM

## 2017-06-22 DIAGNOSIS — E11.40 TYPE 2 DIABETES MELLITUS WITH DIABETIC NEUROPATHY, WITHOUT LONG-TERM CURRENT USE OF INSULIN: ICD-10-CM

## 2017-06-22 DIAGNOSIS — I70.0 AORTIC ATHEROSCLEROSIS: ICD-10-CM

## 2017-06-22 PROBLEM — E53.8 VITAMIN B 12 DEFICIENCY: Status: RESOLVED | Noted: 2017-03-21 | Resolved: 2017-06-22

## 2017-06-22 LAB
ESTIMATED AVG GLUCOSE: 134 MG/DL
HBA1C MFR BLD HPLC: 6.3 %

## 2017-06-22 PROCEDURE — 1126F AMNT PAIN NOTED NONE PRSNT: CPT | Mod: S$GLB,,, | Performed by: INTERNAL MEDICINE

## 2017-06-22 PROCEDURE — 92014 COMPRE OPH EXAM EST PT 1/>: CPT | Mod: S$GLB,,, | Performed by: OPTOMETRIST

## 2017-06-22 PROCEDURE — 1159F MED LIST DOCD IN RCRD: CPT | Mod: S$GLB,,, | Performed by: INTERNAL MEDICINE

## 2017-06-22 PROCEDURE — 99999 PR PBB SHADOW E&M-EST. PATIENT-LVL II: CPT | Mod: PBBFAC,,, | Performed by: OPTOMETRIST

## 2017-06-22 PROCEDURE — 99499 UNLISTED E&M SERVICE: CPT | Mod: S$GLB,,, | Performed by: INTERNAL MEDICINE

## 2017-06-22 PROCEDURE — 92133 CPTRZD OPH DX IMG PST SGM ON: CPT | Mod: S$GLB,,, | Performed by: OPTOMETRIST

## 2017-06-22 PROCEDURE — 92015 DETERMINE REFRACTIVE STATE: CPT | Mod: S$GLB,,, | Performed by: OPTOMETRIST

## 2017-06-22 PROCEDURE — 76514 ECHO EXAM OF EYE THICKNESS: CPT | Mod: S$GLB,,, | Performed by: OPTOMETRIST

## 2017-06-22 PROCEDURE — 92083 EXTENDED VISUAL FIELD XM: CPT | Mod: S$GLB,,, | Performed by: OPTOMETRIST

## 2017-06-22 PROCEDURE — 99999 PR PBB SHADOW E&M-EST. PATIENT-LVL III: CPT | Mod: PBBFAC,,, | Performed by: INTERNAL MEDICINE

## 2017-06-22 PROCEDURE — 99499 UNLISTED E&M SERVICE: CPT | Mod: S$GLB,,, | Performed by: OPTOMETRIST

## 2017-06-22 PROCEDURE — 99214 OFFICE O/P EST MOD 30 MIN: CPT | Mod: S$GLB,,, | Performed by: INTERNAL MEDICINE

## 2017-06-22 RX ORDER — ATORVASTATIN CALCIUM 40 MG/1
40 TABLET, FILM COATED ORAL NIGHTLY
Qty: 90 TABLET | Refills: 3 | Status: SHIPPED | OUTPATIENT
Start: 2017-06-22 | End: 2018-02-20 | Stop reason: SDUPTHER

## 2017-06-22 RX ORDER — ASPIRIN 81 MG/1
81 TABLET ORAL NIGHTLY
Qty: 100 TABLET | Refills: 3
Start: 2017-06-22

## 2017-06-22 RX ORDER — METFORMIN HYDROCHLORIDE 500 MG/1
1000 TABLET ORAL 2 TIMES DAILY WITH MEALS
Qty: 360 TABLET | Refills: 3 | Status: SHIPPED | OUTPATIENT
Start: 2017-06-22 | End: 2018-02-20 | Stop reason: SDUPTHER

## 2017-06-22 RX ORDER — DONEPEZIL HYDROCHLORIDE 10 MG/1
10 TABLET, FILM COATED ORAL NIGHTLY
Qty: 90 TABLET | Refills: 3 | Status: SHIPPED | OUTPATIENT
Start: 2017-06-22 | End: 2018-02-20 | Stop reason: SDUPTHER

## 2017-06-22 NOTE — PATIENT INSTRUCTIONS
GLAUCOMA    Glaucoma is a disease caused by increased intraocular pressure (IOP) resulting either from a malformation or malfunction of the eyes drainage structures.  Left untreated, an elevated IOP causes irreversible damage the optic nerve and retinal fibers resulting in a progressive, permanent loss of vision.  However, early detection and treatment can slow, or even halt the progression of the disease.     Causes of glaucoma:    The eye constantly produces aqueous, the clear fluid that fills the anterior chamber (the space between the cornea and iris).  The aqueous filters out of the anterior chamber through a complex drainage system.  The delicate balance between the production and drainage of aqueous determines the eyes intraocular pressure (IOP). Most peoples IOPs fall between 8 and 21.  However, some eyes can tolerate higher pressures than others. Thats why it may be normal for one person to have a higher pressure than another.     Common types of glaucoma    Open Angle    Open angle (also called chronic open angle or primary open angle) is the most common type of glaucoma.  With this type, even though the anterior structures of the eye appear normal, aqueous fluid builds within the anterior chamber, causing the IOP to become elevated.  Left untreated, this may result in permanent damage of the optic nerve and retina.  Eye drops are generally prescribed to lower the eye pressure.  In some cases, surgery is performed if the IOP cannot be adequately controlled with medical therapy.      Acute Angle Closure    Only about 10% of the population with glaucoma has this type.  Acute angle closure occurs because of an abnormality of the structures in the front of the eye.  In most of these cases, the space between the iris and cornea is more narrow than normal, leaving a smaller channel for the aqueous to pass through.  If the flow of aqueous becomes completely blocked, the IOP rises sharply, causing a sudden  angle closure attack.    While patients with open angle glaucoma don't typically have symptoms, those with angle closure glaucoma may experience severe eye pain accompanied by nausea, blurred vision, rainbows around lights, and a red eye. This problem is an emergency and should be treated immediately. If left untreated, severe and permanent loss of vision will occur in a matter of days.    Secondary Glaucoma    This type occurs as a result of another disease or problem within the eye such as: inflammation, trauma, previous surgery, diabetes, tumor, and certain medications.  For this type, both the glaucoma and the underlying problem must be treated.    Congenital    This is a rare type of glaucoma that is generally seen in infants. In most cases, surgery is required.    Signs and Symptoms    Glaucoma is an insidious disease because it rarely causes symptoms.        Detection and Diagnosis    Because glaucoma does not cause symptoms in most cases, those who are 40 or older should have an annual examination including a measurement of the intraocular pressure.  Those who are glaucoma suspects may need additional testing.      The glaucoma evaluation has several components. In addition to measuring the intraocular pressure, the doctor will also evaluate the health of the optic nerve (ophthalmoscopy), test the peripheral vision (visual field test), and examine the structures in the front of the eye with a special lens (gonioscopy) before making a diagnosis.       The doctor evaluates the optic nerve and grades its health by noting the cup to disc ratio.  This is simply a comparison of the cup (the depressed area in the center of the nerve) to the entire diameter of the optic nerve.  As glaucoma progresses, the area of cupping, or depression, increases.  Therefore, a patient with a higher ratio has more damage.     The progression of glaucoma is monitored with a visual field test.  This test maps the peripheral vision,  allowing the doctor to determine the extent of vision loss from glaucoma and a measure of the effectiveness of the treatment.  The visual field test is periodically repeated to verify that the intraocular pressure is being adequately controlled.    The structures in the front of the eye are normally difficult to see without the help of a special gonioscopy lens.  This special mirrored contact lens allows the doctor to examine the anterior chamber and the eyes drainage system.        Treatment     Most patients with glaucoma require only medication to control the eye pressure.  Sometimes, several medications that complement each other are necessary to reduce the pressure adequately.      Surgery is indicated when medical treatment fails to lower the pressure satisfactorily.  There are several types of procedures, some involve laser and can be done in the office, others must be performed in the operating room.  The objective of any glaucoma operation is to allow fluid to drain from the eye more efficiently.     ==============================================    CATARACT    Symptoms and Signs:  A cataract starts out small, and at first has little effect on your vision. You may notice that your vision is blurred a little, like looking through a cloudy piece of glass or viewing an impressionist painting. A cataract may make light from the sun or a lamp seem too bright or glaring. Or you may notice when you drive at night that the oncoming headlights cause more glare than before. Colors may not appear as bright as they once did.  The type of cataract you have will affect exactly which symptoms you experience and how soon they will occur. When a nuclear cataract first develops it can bring about a temporary improvement in your near vision, called second sight. Unfortunately, the improved vision is short-lived and will disappear as the cataract worsens. Meanwhile, a sub-capsular cataract may not produce any symptoms until  it's well-developed.    Causes:  No one knows for sure why the eye's lens changes as we age, forming cataracts. Researchers are gradually identifying factors that may cause cataracts - and information that may help to prevent them.  Many studies suggest that exposure to ultraviolet light is associated with cataracts, so eye care practitioners recommend wearing sunglasses and a wide-brimmed hat to lessen your exposure.  Other studies suggest people with diabetes are at risk for developing a cataract.   Some eye care practitioners believe that a diet high in antioxidants, such as beta-carotene (vitamin A), selenium and vitamins C and E, may forestall cataracts.  The most important of these is probably vitamin C; it might be helpful to supplement the diet with an extra Vitamin C tablet.  Meanwhile, eating a lot of salt may increase your risk.  Other risk factors include cigarette smoke, air pollution and heavy alcohol consumption.  We simply recommend that you be careful to use sunglasses and to take Vitamin C.    Treatment:  When symptoms begin to appear, we can improve your vision for a while using new glasses, strong bifocals, magnification, appropriate lighting or other visual aids.  This is true in your case; your cataract does not impact your vision very much at this time. If you experience any of the symptoms we described you can return at any time. Otherwise it is fine to see you in 1 year.

## 2017-06-22 NOTE — TELEPHONE ENCOUNTER
----- Message from Verena Haji OD sent at 6/22/2017  2:59 PM CDT -----  Regarding: call pt with test results  Hello,    Please call this patient with the results of the eye tests he did this afternoon. All the tests were normal and he does not have glaucoma at this time. I will continue to monitor him yearly.    Thank you,  Verena Haji OD

## 2017-06-22 NOTE — TELEPHONE ENCOUNTER
Called pt, got answering machine left message regarding testing done by Dr. Haji, everything came out normal, do not have glaucoma at this time will monitor yearly any questions are concerns please feel free to contact the office.

## 2017-06-22 NOTE — LETTER
June 22, 2017      Liane Tijerina MD  [address]  [facility] [address]           [facility]  [address]  [facility] [address]  Phone: [phone]          Patient: Elan Yepez   MR Number: [id number]   YOB: 1940   Date of Visit: 6/22/2017       Dear Dr. Liane Tijerina:    Thank you for referring Elan Yepez to me for evaluation. Attached you will find relevant portions of my assessment and plan of care.    If you have questions, please do not hesitate to call me. I look forward to following Elan Yepez along with you.    Sincerely,    Verena Haji, OD    Enclosure  CC:  No Recipients    If you would like to receive this communication electronically, please contact [email or web address] or [phone] to request more information on [facility] access.    For providers and/or their staff who would like to refer a patient to [facility], please contact us through our one-stop-shop provider referral line, [facility], at [phone].    If you feel you have received this communication in error or would no longer like to receive these types of communications, please e-mail [email or web address]

## 2017-06-22 NOTE — Clinical Note
Dear Dr. Tijerina,  I had the pleasure of seeing Mr. Yepez today for a diabetic eye examination and there is no retinopathy. Please let me know if you have questions.  Sincerely, Verena Haji OD

## 2017-06-22 NOTE — PROGRESS NOTES
HPI     Mr. Elan Yepez was referred by Liane Jones MD for a diabetic eye   examination.    He reports clear vision all ranges with glasses (Rx about 2-3 yrs old,   didn't bring today). He requests refraction today.    (-)drops  (-)flashes  (-)floaters  (-)diplopia    Diabetic Type 2  Hemoglobin A1C       Date                     Value               Ref Range             Status                09/08/2016               6.5 (H)             4.5 - 6.2 %           Final                 05/05/2016               6.7 (H)             4.5 - 6.2 %           Final                 04/07/2015               6.6 (H)             4.5 - 6.2 %           Final            ----------    OCULAR HISTORY  Last Eye Exam 07/11/16 with Dr. Doyle  (-)eye surgery   Cataracts OU  ERM OS    FAMILY HISTORY  (+)Glaucoma: paternal grandmother       Last edited by Verena Haji, OD on 6/22/2017  2:58 PM. (History)            Assessment /Plan     For exam results, see Encounter Report.    Type 2 diabetes mellitus without retinopathy   No retinopathy noted OU. Continue management of DM as directed by PCP. Monitor with DFE in 1 year, or RTC immediately with any vision changes.     OAG (open angle glaucoma) suspect, high risk, right  Family history of glaucoma   High risk based on increased c/d ratio OD (0.55 today vs 0.45 last year) and possible inferior notch, +family history of glaucoma in paternal grandmother, borderline-elevated Tmax (OD 21mmHg, OS 18mmHg), and central corneal thickness thinner than average OU (491/488). 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   Explained nature of glaucoma and potential to progress to permanent loss of peripheral vision if untreated. Recommended HVF 24-2ss, RNFL OCT, and Posterior Pole OCT; pt agrees, scheduled for later today. Will call pt with results.    Interpretation of RNFL OCT (6/22/2017): WNL all sectors OU.   Interpretation of HVF 24-2ss (6/22/2017):    OD: Fair reliability  (fixation losses 3/15, false positives 1%, false negatives 1%). A few scattered misses with no glaucomatous pattern. GHT WNL.    OS: Good reliability (fixation losses 2/14, false positives 0%, false negatives 1%). A few shallow scattered misses with no glaucomatous pattern. GHT WNL.   Plan: Will call pt with results. Monitor with yearly DFE. Consider repeat HVF/OCT in the future if changes noted in clinical appearance.    -     Kumar Visual Field - OU - Extended - Both Eyes  -     Posterior Segment OCT Optic Nerve- Both eyes    Nuclear sclerotic cataract of both eyes   Cause of mildly reduced best-corrected visual acuity; but activities of daily living unaffected so surgery not yet indicated. Monitor yearly.    ERM OS (epiretinal membrane, left eye)   Mild, as previously noted. Monitor.    Regular astigmatism of both eyes   Relatively stable refractive error OU vs last wearing Rx documented in past notes. New glasses prescription released, adaptation expected.  New glasses optional.   Eyeglass Final Rx     Eyeglass Final Rx       Sphere Cylinder Axis Add    Right -0.50 +0.75 180 +2.50    Left -0.50 +1.00 175 +2.50    Expiration Date:  6/23/2018                 RTC later today for HVF 24-2ss, RNFL OCT, and posterior pole OCT  Next diabetic DFE due in 1 year (June 2018)

## 2017-06-23 NOTE — PROGRESS NOTES
Subjective:       Patient ID: Elan Yepez is a 77 y.o. male.    Chief Complaint: Follow-up (3 month f/u) and Back Pain (1+ week , pain= 7 (onset) , Laying down and when Pt gets up , Pt has not been taking anything for the pain )  since March 21, 2017, he has been taking Aricept 10 mg at bedtime.  He reports that he believes this medication has helped him.  He thinks he understands what is happening on television better.  His wife says that she is very pleased and thinks that he has improved because he is actively participating in Sunday school recently.    He went to be evaluated for hearing aids. He does not wish to wear hearing aids.  His wife is bothered by him having the television very loud.  He will consider hearing aids but isn't ready at this time.  He enjoys outdoor activities particularly riding on his tractor and he could not wear hearing aids when doing these activities.    He has obstructive sleep apnea and he is using CPAP.    He's not having any problems with his feet.    He complains of back pain at about waist level on the left side.  Nonradiating.  He has trouble standing for any length of time because his legs get weak.    He is taking metformin 500 mg twice a day.he is trying to lose some weight.  HPI  Review of Systems    Objective:      Physical Exam    Assessment:       1. Amnestic MCI (mild cognitive impairment with memory loss)    2. Type 2 diabetes mellitus with diabetic neuropathy, without long-term current use of insulin    3. Aortic atherosclerosis    4. Conductive hearing loss, bilateral    5. Midline low back pain without sciatica, unspecified chronicity    6. Type 2 diabetes mellitus without complication, without long-term current use of insulin    7. JESSICA on CPAP    8. History of prostate cancer        Plan:   Elan was seen today for follow-up and back pain.    Diagnoses and all orders for this visit:    Amnestic MCI (mild cognitive impairment with memory loss).  Continue Aricept  10 mg nightly.    Type 2 diabetes mellitus with diabetic neuropathy, without long-term current use of insulin  -     Hemoglobin A1c; Future  -     Hemoglobin A1c; Future  -     Comprehensive metabolic panel; Future    Aortic atherosclerosis.  Continue aspirin 81 mg daily    Conductive hearing loss, bilateral.  Consider a trial of hearing aids.    Midline low back pain without sciatica, unspecified chronicity.  We will monitor.    JESSICA on CPAP    History of prostate cancer.  PSA March 2017 negative.    Other orders  -     atorvastatin (LIPITOR) 40 MG tablet; Take 1 tablet (40 mg total) by mouth every evening.  -     aspirin (ECOTRIN) 81 MG EC tablet; Take 1 tablet (81 mg total) by mouth every evening.  -     donepezil (ARICEPT) 10 MG tablet; Take 1 tablet (10 mg total) by mouth every evening.  -     metformin (GLUCOPHAGE) 500 MG tablet; Take 2 tablets (1,000 mg total) by mouth 2 (two) times daily with meals.    Return in about 6 months (around 12/22/2017).\

## 2017-08-24 ENCOUNTER — DOCUMENTATION ONLY (OUTPATIENT)
Dept: PULMONOLOGY | Facility: CLINIC | Age: 77
End: 2017-08-24

## 2017-09-14 ENCOUNTER — OFFICE VISIT (OUTPATIENT)
Dept: PODIATRY | Facility: CLINIC | Age: 77
End: 2017-09-14
Payer: MEDICARE

## 2017-09-14 VITALS
HEIGHT: 72 IN | SYSTOLIC BLOOD PRESSURE: 137 MMHG | HEART RATE: 88 BPM | DIASTOLIC BLOOD PRESSURE: 84 MMHG | WEIGHT: 315 LBS | RESPIRATION RATE: 19 BRPM | BODY MASS INDEX: 42.66 KG/M2

## 2017-09-14 DIAGNOSIS — L84 CORN OR CALLUS: ICD-10-CM

## 2017-09-14 DIAGNOSIS — E11.49 TYPE 2 DIABETES MELLITUS WITH NEUROLOGICAL MANIFESTATIONS: Primary | ICD-10-CM

## 2017-09-14 DIAGNOSIS — B35.1 DERMATOPHYTOSIS, NAIL: ICD-10-CM

## 2017-09-14 PROCEDURE — 99499 UNLISTED E&M SERVICE: CPT | Mod: S$GLB,,, | Performed by: PODIATRIST

## 2017-09-14 PROCEDURE — 11721 DEBRIDE NAIL 6 OR MORE: CPT | Mod: 59,Q9,S$GLB, | Performed by: PODIATRIST

## 2017-09-14 PROCEDURE — 99999 PR PBB SHADOW E&M-EST. PATIENT-LVL III: CPT | Mod: PBBFAC,,, | Performed by: PODIATRIST

## 2017-09-14 PROCEDURE — 11056 PARNG/CUTG B9 HYPRKR LES 2-4: CPT | Mod: Q9,S$GLB,, | Performed by: PODIATRIST

## 2017-09-14 NOTE — PROGRESS NOTES
Subjective:      Patient ID: Elan Yepez is a 77 y.o. male.    Chief Complaint: PCP (Liane Tijerina MD 6/22/17); Diabetic Foot Exam; and Nail Care    Elan is a 77 y.o. male who presents to the clinic for evaluation and treatment of high risk feet. 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 The patient's chief complaint is long, thick toenails. This patient has documented high risk feet requiring routine maintenance secondary to diabetes mellitis and those secondary complications of diabetes, as mentioned..    PCP: Liane Tijerina MD    Date Last Seen by PCP:  Chief Complaint   Patient presents with    PCP     Liane Tijerina MD 6/22/17    Diabetic Foot Exam    Nail Care         Current shoe gear:dm shoes   Hemoglobin A1C   Date Value Ref Range Status   06/22/2017 6.3 (H) 4.0 - 5.6 % Final     Comment:     According to ADA guidelines, hemoglobin A1c <7.0% represents  optimal control in non-pregnant diabetic patients. Different  metrics may apply to specific patient populations.   Standards of Medical Care in Diabetes-2016.  For the purpose of screening for the presence of diabetes:  <5.7%     Consistent with the absence of diabetes  5.7-6.4%  Consistent with increasing risk for diabetes   (prediabetes)  >or=6.5%  Consistent with diabetes  Currently, no consensus exists for use of hemoglobin A1c  for diagnosis of diabetes for children.  This Hemoglobin A1c assay has significant interference with fetal   hemoglobin   (HbF). The results are invalid for patients with abnormal  amounts of   HbF,   including those with known Hereditary Persistence   of Fetal Hemoglobin. Heterozygous hemoglobin variants (HbAS, HbAC,   HbAD, HbAE, HbA2) do not significantly interfere with this assay;   however, presence of multiple variants in a sample may impact the %   interference.     09/08/2016 6.5 (H) 4.5 - 6.2 % Final     Comment:     According to ADA guidelines, hemoglobin A1C <7.0% represents  optimal control in non-pregnant diabetic patients.  Different  metrics may apply to specific populations.   Standards of Medical Care in Diabetes - 2016.  For the purpose of screening for the presence of diabetes:  <5.7%     Consistent with the absence of diabetes  5.7-6.4%  Consistent with increasing risk for diabetes   (prediabetes)  >or=6.5%  Consistent with diabetes  Currently no consensus exists for use of hemoglobin A1C  for diagnosis of diabetes for children.     05/05/2016 6.7 (H) 4.5 - 6.2 % Final       Review of Systems   Constitution: Negative for chills, decreased appetite and fever.   Cardiovascular: Negative for chest pain, claudication and leg swelling.   Respiratory: Negative for cough.    Skin: Positive for nail changes. Negative for itching.   Musculoskeletal: Negative for arthritis, back pain, falls, gout, joint pain, joint swelling and myalgias.   Gastrointestinal: Negative for nausea and vomiting.   Neurological: Positive for numbness. Negative for loss of balance and paresthesias.           Objective:      Physical Exam   Constitutional: He is oriented to person, place, and time. He appears well-developed and well-nourished.   Cardiovascular:   Dorsalis pedis and posterior tibial pulses are diminished Bilaterally. Toes are cool to touch. Feet are warm proximally.There is decreased digital hair. Skin is atrophic, slightly hyperpigmented, and mildly edematous       Musculoskeletal: Normal range of motion. He exhibits no tenderness or deformity.   Adequate joint range of motion without pain,  limitation, nor crepitation Bilateral feet and ankle joints. Muscle strength is 5/5 in all groups bilaterally.         Neurological: He is alert and oriented to person, place, and time.   Lee-Marci 5.07 monofilamant testing is diminished Raffi feet. Sharp/dull sensation diminished Bilaterally. Light touch absent Bilaterally.       Skin: Skin is warm, dry and intact. No ecchymosis and no lesion noted. No erythema. No pallor.   Nails x10 are elongated by  2-4mm's, thickened by 2-4 mm's, dystrophic, and are darkened in  coloration . Xerosis Bilaterally. No open lesions noted.    Hyperkeratotic tissue noted to distal hallux b/l      Psychiatric: He has a normal mood and affect. His behavior is normal.   Nursing note and vitals reviewed.            Assessment:       Encounter Diagnoses   Name Primary?    Type 2 diabetes mellitus with neurological manifestations Yes    Dermatophytosis, nail     Corn or callus          Plan:       Elan was seen today for pcp, diabetic foot exam and nail care.    Diagnoses and all orders for this visit:    Type 2 diabetes mellitus with neurological manifestations    Dermatophytosis, nail    Corn or callus      I counseled the patient on his conditions, their implications and medical management.        - Shoe inspection. Diabetic Foot Education. Patient reminded of the importance of good nutrition and blood sugar control to help prevent podiatric complications of diabetes. Patient instructed on proper foot hygeine. We discussed wearing proper shoe gear, daily foot inspections, never walking without protective shoe gear, never putting sharp instruments to feet, routine podiatric nail visits every 2-3 months.      - With patient's permission, nails were aggressively reduced and debrided x 10 to their soft tissue attachment mechanically and with electric , removing all offending nail and debris. Patient relates relief following the procedure. He will continue to monitor the  areas daily, inspect his feet, wear protective shoe gear when ambulatory, moisturizer to maintain skin integrity and follow in this office in approximately 2-3 months, sooner p.r.n.    - After cleansing the  area w/ alcohol prep pad the above mentioned hyperkeratosis was trimmed utilizing No 15 scapel, to a smooth base with out incident. Patient tolerated this  well and reported comfort to the area of distal hallux b/l

## 2017-09-29 DIAGNOSIS — E11.9 TYPE 2 DIABETES MELLITUS WITHOUT COMPLICATION: ICD-10-CM

## 2017-10-20 ENCOUNTER — TELEPHONE (OUTPATIENT)
Dept: INTERNAL MEDICINE | Facility: CLINIC | Age: 77
End: 2017-10-20

## 2017-10-20 NOTE — TELEPHONE ENCOUNTER
----- Message from Pramod Henderson sent at 10/20/2017  2:51 PM CDT -----  Contact: self [phone]  Patient is returning a call from the office . Please advise , Thanks !

## 2018-01-19 ENCOUNTER — PES CALL (OUTPATIENT)
Dept: ADMINISTRATIVE | Facility: CLINIC | Age: 78
End: 2018-01-19

## 2018-02-13 ENCOUNTER — LAB VISIT (OUTPATIENT)
Dept: LAB | Facility: HOSPITAL | Age: 78
End: 2018-02-13
Attending: INTERNAL MEDICINE
Payer: MEDICARE

## 2018-02-13 DIAGNOSIS — E11.40 TYPE 2 DIABETES MELLITUS WITH DIABETIC NEUROPATHY, WITHOUT LONG-TERM CURRENT USE OF INSULIN: ICD-10-CM

## 2018-02-13 LAB
ALBUMIN SERPL BCP-MCNC: 3.7 G/DL
ALP SERPL-CCNC: 88 U/L
ALT SERPL W/O P-5'-P-CCNC: 33 U/L
ANION GAP SERPL CALC-SCNC: 11 MMOL/L
AST SERPL-CCNC: 25 U/L
BILIRUB SERPL-MCNC: 0.6 MG/DL
BUN SERPL-MCNC: 17 MG/DL
CALCIUM SERPL-MCNC: 9.8 MG/DL
CHLORIDE SERPL-SCNC: 103 MMOL/L
CO2 SERPL-SCNC: 25 MMOL/L
CREAT SERPL-MCNC: 0.9 MG/DL
EST. GFR  (AFRICAN AMERICAN): >60 ML/MIN/1.73 M^2
EST. GFR  (NON AFRICAN AMERICAN): >60 ML/MIN/1.73 M^2
ESTIMATED AVG GLUCOSE: 128 MG/DL
GLUCOSE SERPL-MCNC: 116 MG/DL
HBA1C MFR BLD HPLC: 6.1 %
POTASSIUM SERPL-SCNC: 4.9 MMOL/L
PROT SERPL-MCNC: 7.7 G/DL
SODIUM SERPL-SCNC: 139 MMOL/L

## 2018-02-13 PROCEDURE — 36415 COLL VENOUS BLD VENIPUNCTURE: CPT | Mod: PO

## 2018-02-13 PROCEDURE — 80053 COMPREHEN METABOLIC PANEL: CPT

## 2018-02-13 PROCEDURE — 83036 HEMOGLOBIN GLYCOSYLATED A1C: CPT

## 2018-02-20 ENCOUNTER — HOSPITAL ENCOUNTER (OUTPATIENT)
Dept: RADIOLOGY | Facility: HOSPITAL | Age: 78
Discharge: HOME | End: 2018-02-20
Attending: INTERNAL MEDICINE
Payer: MEDICARE

## 2018-02-20 ENCOUNTER — OFFICE VISIT (OUTPATIENT)
Dept: INTERNAL MEDICINE | Facility: CLINIC | Age: 78
End: 2018-02-20
Payer: MEDICARE

## 2018-02-20 ENCOUNTER — OFFICE VISIT (OUTPATIENT)
Dept: PODIATRY | Facility: CLINIC | Age: 78
End: 2018-02-20
Payer: MEDICARE

## 2018-02-20 ENCOUNTER — TELEPHONE (OUTPATIENT)
Dept: INTERNAL MEDICINE | Facility: CLINIC | Age: 78
End: 2018-02-20

## 2018-02-20 VITALS
SYSTOLIC BLOOD PRESSURE: 138 MMHG | BODY MASS INDEX: 44.1 KG/M2 | WEIGHT: 315 LBS | HEART RATE: 76 BPM | DIASTOLIC BLOOD PRESSURE: 72 MMHG | HEIGHT: 71 IN

## 2018-02-20 VITALS
DIASTOLIC BLOOD PRESSURE: 81 MMHG | HEART RATE: 74 BPM | WEIGHT: 315 LBS | RESPIRATION RATE: 19 BRPM | SYSTOLIC BLOOD PRESSURE: 135 MMHG | BODY MASS INDEX: 42.66 KG/M2 | HEIGHT: 72 IN

## 2018-02-20 DIAGNOSIS — I70.0 AORTIC ATHEROSCLEROSIS: ICD-10-CM

## 2018-02-20 DIAGNOSIS — G31.84 AMNESTIC MCI (MILD COGNITIVE IMPAIRMENT WITH MEMORY LOSS): ICD-10-CM

## 2018-02-20 DIAGNOSIS — E11.49 TYPE 2 DIABETES MELLITUS WITH NEUROLOGICAL MANIFESTATIONS: Primary | ICD-10-CM

## 2018-02-20 DIAGNOSIS — R06.09 DOE (DYSPNEA ON EXERTION): ICD-10-CM

## 2018-02-20 DIAGNOSIS — B35.1 DERMATOPHYTOSIS, NAIL: ICD-10-CM

## 2018-02-20 DIAGNOSIS — R53.83 OTHER FATIGUE: ICD-10-CM

## 2018-02-20 DIAGNOSIS — L84 CORN OR CALLUS: ICD-10-CM

## 2018-02-20 DIAGNOSIS — R19.7 DIARRHEA, UNSPECIFIED TYPE: ICD-10-CM

## 2018-02-20 DIAGNOSIS — E78.5 HYPERLIPIDEMIA, UNSPECIFIED HYPERLIPIDEMIA TYPE: ICD-10-CM

## 2018-02-20 DIAGNOSIS — E11.40 TYPE 2 DIABETES MELLITUS WITH DIABETIC NEUROPATHY, WITHOUT LONG-TERM CURRENT USE OF INSULIN: ICD-10-CM

## 2018-02-20 DIAGNOSIS — Z77.090 ASBESTOS EXPOSURE: ICD-10-CM

## 2018-02-20 DIAGNOSIS — G47.33 OSA ON CPAP: ICD-10-CM

## 2018-02-20 DIAGNOSIS — M17.11 PRIMARY OSTEOARTHRITIS OF RIGHT KNEE: ICD-10-CM

## 2018-02-20 DIAGNOSIS — Z85.46 HISTORY OF PROSTATE CANCER: ICD-10-CM

## 2018-02-20 DIAGNOSIS — H90.0 CONDUCTIVE HEARING LOSS, BILATERAL: ICD-10-CM

## 2018-02-20 DIAGNOSIS — F33.1 MODERATE EPISODE OF RECURRENT MAJOR DEPRESSIVE DISORDER: Primary | ICD-10-CM

## 2018-02-20 PROCEDURE — 99499 UNLISTED E&M SERVICE: CPT | Mod: S$GLB,,, | Performed by: PODIATRIST

## 2018-02-20 PROCEDURE — 71046 X-RAY EXAM CHEST 2 VIEWS: CPT | Mod: TC

## 2018-02-20 PROCEDURE — 99999 PR PBB SHADOW E&M-EST. PATIENT-LVL IV: CPT | Mod: PBBFAC,,, | Performed by: INTERNAL MEDICINE

## 2018-02-20 PROCEDURE — 1125F AMNT PAIN NOTED PAIN PRSNT: CPT | Mod: S$GLB,,, | Performed by: INTERNAL MEDICINE

## 2018-02-20 PROCEDURE — 11721 DEBRIDE NAIL 6 OR MORE: CPT | Mod: 59,Q9,S$GLB, | Performed by: PODIATRIST

## 2018-02-20 PROCEDURE — 99999 PR PBB SHADOW E&M-EST. PATIENT-LVL III: CPT | Mod: PBBFAC,,, | Performed by: PODIATRIST

## 2018-02-20 PROCEDURE — 73560 X-RAY EXAM OF KNEE 1 OR 2: CPT | Mod: 26,50,, | Performed by: RADIOLOGY

## 2018-02-20 PROCEDURE — 11056 PARNG/CUTG B9 HYPRKR LES 2-4: CPT | Mod: Q9,S$GLB,, | Performed by: PODIATRIST

## 2018-02-20 PROCEDURE — 99499 UNLISTED E&M SERVICE: CPT | Mod: S$GLB,,, | Performed by: INTERNAL MEDICINE

## 2018-02-20 PROCEDURE — 99214 OFFICE O/P EST MOD 30 MIN: CPT | Mod: S$GLB,,, | Performed by: INTERNAL MEDICINE

## 2018-02-20 PROCEDURE — 71046 X-RAY EXAM CHEST 2 VIEWS: CPT | Mod: 26,,, | Performed by: RADIOLOGY

## 2018-02-20 PROCEDURE — 3008F BODY MASS INDEX DOCD: CPT | Mod: S$GLB,,, | Performed by: INTERNAL MEDICINE

## 2018-02-20 PROCEDURE — 1159F MED LIST DOCD IN RCRD: CPT | Mod: S$GLB,,, | Performed by: INTERNAL MEDICINE

## 2018-02-20 PROCEDURE — 73560 X-RAY EXAM OF KNEE 1 OR 2: CPT | Mod: TC,50

## 2018-02-20 RX ORDER — ATORVASTATIN CALCIUM 40 MG/1
40 TABLET, FILM COATED ORAL NIGHTLY
Qty: 90 TABLET | Refills: 3 | Status: SHIPPED | OUTPATIENT
Start: 2018-02-20 | End: 2018-05-23 | Stop reason: SINTOL

## 2018-02-20 RX ORDER — METFORMIN HYDROCHLORIDE 500 MG/1
TABLET ORAL
Qty: 180 TABLET | Refills: 3 | Status: SHIPPED | OUTPATIENT
Start: 2018-02-20 | End: 2018-08-29 | Stop reason: SDUPTHER

## 2018-02-20 RX ORDER — DONEPEZIL HYDROCHLORIDE 10 MG/1
10 TABLET, FILM COATED ORAL NIGHTLY
Qty: 90 TABLET | Refills: 3 | Status: SHIPPED | OUTPATIENT
Start: 2018-02-20 | End: 2018-05-23 | Stop reason: SDUPTHER

## 2018-02-20 RX ORDER — METFORMIN HYDROCHLORIDE 500 MG/1
TABLET ORAL
Qty: 180 TABLET | Refills: 3 | Status: SHIPPED | OUTPATIENT
Start: 2018-02-20 | End: 2018-02-20 | Stop reason: SDUPTHER

## 2018-02-20 RX ORDER — SERTRALINE HYDROCHLORIDE 50 MG/1
50 TABLET, FILM COATED ORAL EVERY MORNING
Qty: 30 TABLET | Refills: 11 | Status: SHIPPED | OUTPATIENT
Start: 2018-02-20 | End: 2018-05-23 | Stop reason: SDUPTHER

## 2018-02-20 NOTE — PROGRESS NOTES
Subjective:      Patient ID: Elan Yepez is a 77 y.o. male.    Chief Complaint: PCP (Liane Tijerina MD 2/20/18); Diabetic Foot Exam; Nail Problem; and Nail Care    Elan is a 77 y.o. male who presents to the clinic for evaluation and treatment of high risk feet. 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 The patient's chief complaint is long, thick toenails. This patient has documented high risk feet requiring routine maintenance secondary to diabetes mellitis and those secondary complications of diabetes, as mentioned..    PCP: Liane Tijerina MD    Date Last Seen by PCP:  Chief Complaint   Patient presents with    PCP     Liane Tijerina MD 2/20/18    Diabetic Foot Exam    Nail Problem    Nail Care         Current shoe gear:dm shoes   Hemoglobin A1C   Date Value Ref Range Status   02/13/2018 6.1 (H) 4.0 - 5.6 % Final     Comment:     According to ADA guidelines, hemoglobin A1c <7.0% represents  optimal control in non-pregnant diabetic patients. Different  metrics may apply to specific patient populations.   Standards of Medical Care in Diabetes-2016.  For the purpose of screening for the presence of diabetes:  <5.7%     Consistent with the absence of diabetes  5.7-6.4%  Consistent with increasing risk for diabetes   (prediabetes)  >or=6.5%  Consistent with diabetes  Currently, no consensus exists for use of hemoglobin A1c  for diagnosis of diabetes for children.  This Hemoglobin A1c assay has significant interference with fetal   hemoglobin   (HbF). The results  are invalid for patients with abnormal amounts of   HbF,   including those with known Hereditary Persistence   of Fetal Hemoglobin. Heterozygous hemoglobin variants (HbAS, HbAC,   HbAD, HbAE, HbA2) do not significantly interfere with this assay;   however, presence of multiple variants in a sample may impact the %   interference.     06/22/2017 6.3 (H) 4.0 - 5.6 % Final     Comment:     According to ADA guidelines, hemoglobin A1c <7.0% represents  optimal control in non-pregnant diabetic patients. Different  metrics may apply to specific patient populations.   Standards of Medical Care in Diabetes-2016.  For the purpose of screening for the presence of diabetes:  <5.7%     Consistent with the absence of diabetes  5.7-6.4%  Consistent with increasing risk for diabetes   (prediabetes)  >or=6.5%  Consistent with diabetes  Currently, no consensus exists for use of hemoglobin A1c  for diagnosis of diabetes for children.  This Hemoglobin A1c assay has significant interference with fetal   hemoglobin   (HbF). The results are invalid for patients with abnormal amounts of   HbF,   including those with known Hereditary Persistence   of Fetal Hemoglobin. Heterozygous hemoglobin variants (HbAS, HbAC,   HbAD, HbAE, HbA2) do not significantly interfere with this assay;   however, presence of multiple variants in a sample may impact the %   interference.     09/08/2016 6.5 (H) 4.5 - 6.2 % Final     Comment:     According to ADA guidelines, hemoglobin A1C <7.0% represents  optimal control in non-pregnant diabetic patients.  Different  metrics may apply to specific populations.   Standards of Medical Care in Diabetes - 2016.  For the purpose of screening for the presence of diabetes:  <5.7%     Consistent with the absence of diabetes  5.7-6.4%  Consistent with increasing risk for diabetes   (prediabetes)  >or=6.5%  Consistent with diabetes  Currently no consensus exists for use of hemoglobin A1C  for diagnosis of diabetes for  children.         Review of Systems   Constitution: Negative for chills, decreased appetite and fever.   Cardiovascular: Negative for chest pain, claudication and leg swelling.   Respiratory: Negative for cough.    Skin: Positive for nail changes. Negative for color change, dry skin and itching.   Musculoskeletal: Negative for arthritis, back pain, falls, gout, joint pain, joint swelling and myalgias.   Gastrointestinal: Negative for nausea and vomiting.   Neurological: Positive for numbness. Negative for loss of balance and paresthesias.           Objective:      Physical Exam   Constitutional: He is oriented to person, place, and time. He appears well-developed and well-nourished.   Cardiovascular:   Dorsalis pedis and posterior tibial pulses are diminished Bilaterally. Toes are cool to touch. Feet are warm proximally.There is decreased digital hair. Skin is atrophic, slightly hyperpigmented, and mildly edematous       Musculoskeletal: Normal range of motion. He exhibits no tenderness or deformity.   Adequate joint range of motion without pain, limitation, nor crepitation Bilateral feet and ankle joints. Muscle strength is 5/5 in all groups bilaterally.         Neurological: He is alert and oriented to person, place, and time.   [place]-Marci 5.07 monofilamant testing is diminished Raffi feet. Sharp/dull sensation diminished Bilaterally. Light touch absent Bilaterally.       Skin: Skin is warm, dry and intact. No ecchymosis and no lesion noted. No erythema. No pallor.   Nails x10 are elongated by  2-8mm's, thickened by 2-4 mm's, dystrophic, and are darkened in  coloration . Xerosis Bilaterally. No open lesions noted.    Hyperkeratotic tissue noted to distal hallux b/l      Psychiatric: He has a normal mood and affect. His behavior is normal.   Nursing note and vitals reviewed.            Assessment:       Encounter Diagnoses   Name Primary?    Type 2 diabetes mellitus with neurological manifestations Yes     Dermatophytosis, nail     Corn or callus          Plan:       Elan was seen today for pcp, diabetic foot exam, nail problem and nail care.    Diagnoses and all orders for this visit:    Type 2 diabetes mellitus with neurological manifestations    Dermatophytosis, nail    Corn or callus      I counseled the patient on his conditions, their implications and medical management.        - Shoe inspection. Diabetic Foot Education. Patient reminded of the importance of good nutrition and blood sugar control to help prevent podiatric complications of diabetes. Patient instructed on proper foot hygeine. We discussed wearing proper shoe gear, daily foot inspections, never walking without protective shoe gear, never putting sharp instruments to feet, routine podiatric nail visits every 2-3 months.      - With patient's permission, nails were aggressively reduced and debrided x 10 to their soft tissue attachment mechanically and with electric , removing all offending nail and debris. Patient relates relief following the procedure. He will continue to monitor the areas daily, inspect his feet, wear protective shoe gear when ambulatory, moisturizer to maintain skin integrity and follow in this office in approximately 2-3 months, sooner p.r.n.    - After cleansing the  area w/ alcohol prep pad the above mentioned hyperkeratosis was trimmed utilizing No 15 scapel, to a smooth base with out incident. Patient tolerated this  well and reported comfort to the area of distal hallux b/l

## 2018-02-20 NOTE — PATIENT INSTRUCTIONS
Chest Echocardiography (Transthoracic)     During an echo, images of your heart appear on a monitor.   An echocardiogram (echo) is an imaging test.  A transthoracic echocardiogram is sometimes called by its abbreviation TTE. It may also be called surface echocardiogram because the images are non-invasive taken from the surface of the chest wall. It helps your healthcare provider evaluate your heart. A more invasive type of echocardiogram involves the ultrasound probe being passed into the esophagus to get images (transesophageal).     This test:  · Is safe and generally painless. Some people have discomfort from the echo probe being pressed against the bony areas of the chest. This is relieves once the probe is moved.  · Can be done in a hospital, test center, or doctors office  · Bounces harmless sound waves (ultrasound) off the heart using a transducer or probe (device that looks like a microphone)  · Allows your healthcare provider evaluate the size and shape of your heart, and the size, thickness and movement of your heart's walls, and the heart's pumping strength.  · Shows if the heart valves are working correctly, if blood is leaking backwards through your heart valves (regurgitation), or if the heart valves are to onarrow (stenosis)  · Shows if there is a tumor or infectious growth around your heart valves  · Will help your healthcare provider find out if there are problems with the outer lining of your heart (pericardium)  · Shows problems with the large blood vessels that enter and leave the heart  · Demonstrates blood clots in the heart chambers  · Shows abnormal holes between heart chambers  Before your echo  · Discuss any questions or concerns you have with your healthcare provider.  · Mention any over-the-counter or prescription medicines, herbs, or supplements youre taking.  · Allow extra time for checking in. Bring your insurance cards, identification, and any co-payments that are required for the  test.  · Wear a 2-piece outfit for the test. You may be asked to remove clothing and jewelry from the waist up. If so, youll be given a short hospital gown.  · An intravenous catheter may be inserted into a vein in your arm or hand. Contrast or bubbles will be injected during the study.  During your echo  · Small pads (electrodes) are placed on your chest to monitor your heartbeat.  · A transducer coated with cool gel is moved firmly over your chest. This device creates the sound waves that make images of your heart. If you are overweight, the technician may have to apply more pressure to the chest wall to improve the quality of the images. This pressure can be uncomfortable over bony areas. Tell your technician if you are uncomfortable.  · At times, you may be asked to exhale and hold your breath for a few seconds. Air in your lungs can affect the images.  · The transducer may also be used to do a Doppler study. This test measures the direction and speed of blood flowing through the heart. During the test, you may hear a whooshing sound. This is the sound of blood flowing through the heart.  · The technician may use IV contrast to improve the image quality or agitated saline may be used to follow blood flow through the chambers of the heart.  · The images of your heart are stored electronically. This is so your healthcare provider can review them later.  After your echo  · Return to normal activity unless your healthcare provider tells you otherwise.  · Be sure to keep follow-up appointments.  Your test results  Your healthcare provider will discuss your test results with you during a future office visit. The test results help the healthcare provider plan your treatment and any other tests that are needed.  Date Last Reviewed: 12/1/2016  © 8361-1561 "[employer]. [address] [place] [address], [place] [address]. All rights reserved. This information is not intended as a substitute for professional medical  care. Always follow your healthcare professional's instructions.

## 2018-02-21 NOTE — TELEPHONE ENCOUNTER
Please call patient to let him know his chest x-ray is normal.  He was worried because he's having trouble with shortness of breath.  Additionally, his blood test, PSA is normal.  The x-ray of his knees shows degenerative change and he needs to see an orthopedist about that right knee

## 2018-02-21 NOTE — TELEPHONE ENCOUNTER
Spoke with pt and he understood his results pt also has a appt scheduled for Orthopedics and he will keep his upcoming appt with them

## 2018-02-23 NOTE — PROGRESS NOTES
Subjective:       Patient ID: Elan Yepez is a 77 y.o. male.    Chief Complaint: Follow-up (amnestic MCI (Mild Impairment With Memory Loss))    Past Medical History:   Diagnosis Date    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  Began Aricept 10 mg at bedtime.    BMI 40.0-44.9, adult     Diabetes mellitus     diet management    Dysmetabolic syndrome X 10/2/2012    Hyperlipidemia     Hypertension     diet managemen    Joint pain     MDD (major depressive disorder) 4/7/2015    Midline low back pain without sciatica 5/5/2016    Primary osteoarthritis of right knee 2/20/2018    Prostate CA     Sleep apnea     cipap    Trouble in sleeping     Type II or unspecified type diabetes mellitus with neurological manifestations, not stated as uncontrolled(250.60)        since March 21, 2017, he has been taking Aricept 10 mg at bedtime.  He reports that he believes this medication has helped him.  He thinks he understands what is happening on television better.  His wife says that she is very pleased and thinks that he has improved because he is actively participating in Sunday school recently.   The only problem has been his affect.  He's been flat or down.  He is having trouble getting up out of bed to get started with this day.  He is going to drive to visit his brother alone who lives outside of [place].  His wife does not have any apprehension about this.  Since he has improved on Aricept, his wife would like to know if he could try an antidepressant to boost his mood and energy.    Otherwise, he has been doing well.  No cardiovascular symptoms.  HPI  Review of Systems   Constitutional: Negative for activity change, chills and fever.   HENT: Negative for congestion, dental problem, hearing loss, tinnitus and trouble swallowing.    Eyes: Negative for visual disturbance.   Respiratory: Negative for cough,  shortness of breath and wheezing.    Cardiovascular: Negative for chest pain, palpitations and leg swelling.   Genitourinary: Negative for dysuria, frequency and urgency.   Musculoskeletal: Negative for back pain and neck pain.   Neurological: Negative for dizziness, weakness and headaches.   Psychiatric/Behavioral: Negative for dysphoric mood and sleep disturbance. The patient is not nervous/anxious.        Objective:      Physical Exam   Neurological: Abnormal reflex: .follo.   Nursing note and vitals reviewed.      Assessment:       1. Moderate episode of recurrent major depressive disorder    2. Amnestic MCI (mild cognitive impairment with memory loss)    3. Aortic atherosclerosis    4. Asbestos exposure    5. BMI 40.0-44.9, adult    6. JESSICA on CPAP    7. Other fatigue    8. History of prostate cancer    9. Conductive hearing loss, bilateral    10. Hyperlipidemia, unspecified hyperlipidemia type    11. Type 2 diabetes mellitus with diabetic neuropathy, without long-term current use of insulin    12. Primary osteoarthritis of right knee    13. Diarrhea, unspecified type    14. URRUTIA (dyspnea on exertion)        Plan:   Elan was seen today for follow-up.    Diagnoses and all orders for this visit:    Moderate episode of recurrent major depressive disorder    Amnestic MCI (mild cognitive impairment with memory loss)    Aortic atherosclerosis    Asbestos exposure    BMI 40.0-44.9, adult    JESSICA on CPAP  -     EKG 12-lead; Future  -     X-Ray Chest PA And Lateral; Future  -     2D echo with color flow doppler; Future    Other fatigue  -     EKG 12-lead; Future  -     X-Ray Chest PA And Lateral; Future  -     2D echo with color flow doppler; Future    History of prostate cancer  -     PROSTATE SPECIFIC ANTIGEN, DIAGNOSTIC; Future    Conductive hearing loss, bilateral    Hyperlipidemia, unspecified hyperlipidemia type    Type 2 diabetes mellitus with diabetic neuropathy, without long-term current use of insulin  -      Hemoglobin A1c; Future    Primary osteoarthritis of right knee  -     X-ray AP Standing Knees with Right Lateral; Future  -     Ambulatory Referral to Orthopedics    Diarrhea, unspecified type    URRUTIA (dyspnea on exertion)  -     EKG 12-lead; Future  -     X-Ray Chest PA And Lateral; Future  -     2D echo with color flow doppler; Future    Other orders  -     sertraline (ZOLOFT) 50 MG tablet; Take 1 tablet (50 mg total) by mouth every morning. Increase energy and boost mood  -     Discontinue: metFORMIN (GLUCOPHAGE) 500 MG tablet; Take two tablets at bedtime  -     donepezil (ARICEPT) 10 MG tablet; Take 1 tablet (10 mg total) by mouth every evening.  -     atorvastatin (LIPITOR) 40 MG tablet; Take 1 tablet (40 mg total) by mouth every evening.  -     metFORMIN (GLUCOPHAGE) 500 MG tablet; Take two tablets at bedtime

## 2018-02-27 ENCOUNTER — OFFICE VISIT (OUTPATIENT)
Dept: ORTHOPEDICS | Facility: CLINIC | Age: 78
End: 2018-02-27
Payer: MEDICARE

## 2018-02-27 ENCOUNTER — HOSPITAL ENCOUNTER (OUTPATIENT)
Dept: CARDIOLOGY | Facility: CLINIC | Age: 78
Discharge: HOME | End: 2018-02-27
Attending: INTERNAL MEDICINE
Payer: MEDICARE

## 2018-02-27 ENCOUNTER — HOSPITAL ENCOUNTER (OUTPATIENT)
Dept: CARDIOLOGY | Facility: CLINIC | Age: 78
Discharge: HOME | End: 2018-02-27
Payer: MEDICARE

## 2018-02-27 DIAGNOSIS — I36.2: ICD-10-CM

## 2018-02-27 DIAGNOSIS — R06.09 DOE (DYSPNEA ON EXERTION): ICD-10-CM

## 2018-02-27 DIAGNOSIS — G47.33 OSA ON CPAP: ICD-10-CM

## 2018-02-27 DIAGNOSIS — M70.51 SUPRAPATELLAR BURSITIS OF RIGHT KNEE: ICD-10-CM

## 2018-02-27 DIAGNOSIS — R53.83 OTHER FATIGUE: ICD-10-CM

## 2018-02-27 DIAGNOSIS — M17.11 OSTEOARTHRITIS OF RIGHT KNEE, UNSPECIFIED OSTEOARTHRITIS TYPE: Primary | ICD-10-CM

## 2018-02-27 LAB
ESTIMATED PA SYSTOLIC PRESSURE: 29.42
MITRAL VALVE MOBILITY: NORMAL
RETIRED EF AND QEF - SEE NOTES: 50 (ref 55–65)
TRICUSPID VALVE REGURGITATION: ABNORMAL

## 2018-02-27 PROCEDURE — 99203 OFFICE O/P NEW LOW 30 MIN: CPT | Mod: S$GLB,,, | Performed by: PHYSICIAN ASSISTANT

## 2018-02-27 PROCEDURE — 93000 ELECTROCARDIOGRAM COMPLETE: CPT | Mod: S$GLB,,, | Performed by: INTERNAL MEDICINE

## 2018-02-27 PROCEDURE — 93306 TTE W/DOPPLER COMPLETE: CPT | Mod: S$GLB,,, | Performed by: INTERNAL MEDICINE

## 2018-02-27 PROCEDURE — 99999 PR PBB SHADOW E&M-EST. PATIENT-LVL II: CPT | Mod: PBBFAC,,, | Performed by: PHYSICIAN ASSISTANT

## 2018-02-27 RX ORDER — NAPROXEN 500 MG/1
500 TABLET ORAL 2 TIMES DAILY WITH MEALS
Qty: 30 TABLET | Refills: 0 | Status: SHIPPED | OUTPATIENT
Start: 2018-02-27 | End: 2018-05-23

## 2018-02-27 NOTE — LETTER
March 4, 2018      Liane Tijerina MD  [address]  [facility] [address]           [facility]  [address], [address]  [facility] [address]  Phone: [phone]          Patient: Elan Yepez   MR Number: [id number]   YOB: 1940   Date of Visit: 2/27/2018       Dear Dr. Liane Tijerina:    Thank you for referring Elan Yepez to me for evaluation. Attached you will find relevant portions of my assessment and plan of care.    If you have questions, please do not hesitate to call me. I look forward to following Elan Yepez along with you.    Sincerely,    Krysta Yuen PA-C    Enclosure  CC:  No Recipients    If you would like to receive this communication electronically, please contact [email or web address] or [phone] to request more information on [facility] [employer] access.    For providers and/or their staff who would like to refer a patient to [facility], please contact us through our one-stop-shop provider referral line, Adina Moon, at [phone].    If you feel you have received this communication in error or would no longer like to receive these types of communications, please e-mail [email or web address]

## 2018-03-03 DIAGNOSIS — E11.8 TYPE 2 DIABETES MELLITUS WITH COMPLICATION, WITHOUT LONG-TERM CURRENT USE OF INSULIN: Primary | ICD-10-CM
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
## 2018-04-05 ENCOUNTER — CLINICAL SUPPORT (OUTPATIENT)
Dept: DIABETES | Facility: CLINIC | Age: 78
End: 2018-04-05
Payer: MEDICARE

## 2018-04-05 DIAGNOSIS — E11.9 TYPE 2 DIABETES MELLITUS WITHOUT COMPLICATION, WITHOUT LONG-TERM CURRENT USE OF INSULIN: ICD-10-CM

## 2018-04-05 PROCEDURE — G0108 DIAB MANAGE TRN  PER INDIV: HCPCS | Mod: S$GLB,,, | Performed by: DIETITIAN, REGISTERED

## 2018-04-13 NOTE — PROGRESS NOTES
Diabetes Education  Author: Aminah Obrien RD  Date: 2018    Diabetes Education Visit  Diabetes Education Record Assessment/Progress: Initial (annual f/u)    Diabetes Type  Diabetes Type : Type II    Nutrition  Meal Planning: 3 meals per day  What type of beverages do you drink?: regular soda/tea (peach tea (with sugar - 12 oz per day, half at lunch and half at dinner))  Meal Plan 24 Hour Recall - Breakfast:  (grits, eggs, blue - no more biscuits)  Meal Plan 24 Hour Recall - Lunch:  (mustard greens or green beans, cornbread, corn, meat)  Meal Plan 24 Hour Recall - Dinner:  (milk and cereal (cornflakes))  Meal Plan 24 Hour Recall - Snack:  (fruit)    Monitoring   Self Monitoring :  (SMBG 1-2 times daily; FB; after dinner: 125 )  Blood Glucose Logs: No  Do you use a personal glucose monitor?: No    In the last month, how often have you had a low blood sugar reaction?: never (reports one value between 70-80 in the last month, but none <70)  Can you tell when your blood sugar is too high?: no      Exercise   Exercise Type:  (gardening, yard work; starting gym membership now)    Current Diabetes Treatment   Current Treatment: Oral Medication (metformin 500 mg - 2 tabs in evening)    Social History  Preferred Learning Method: Face to Face  Primary Support: Self, Spouse  Smoking Status: Never a Smoker    DDS-2 Score  ( > 3 = SIGNIFICANT DISTRESS): 1      Barriers to Change  Barriers to Change: None  Learning Challenges : Hearing  Hearing - further explanation: hearing impaired  Hearing -  present?: No    Readiness to Learn   Readiness to Learn : Acceptance    Cultural Influences  Cultural Influences: No      Diabetes Education Assessment/Progress  Diabetes Disease Process (diabetes disease process and treatment options): Discussion (Reviewed disease course and management options. Pt's A1c has continued to decrease - has been able to cut back on metformin dose.)    Nutrition (Incorporating nutritional  management into one's lifestyle): Discussion, Instructed, Individual Session, Written Materials Provided, Comprehends Key Points (Reviewed basic ADA diet recommendations including carb vs non-carb food and appropriate servings of carbs at meals and snack times. Encouraged limiting peach tea to <6 oz at one time.)    Physical Activity (incorporating physical activity into one's lifestyle): Discussion (Encouraged resuming gym visits - 3 hours weekly or more. )    Medications (states correct name, dose, onset, peak, duration, side effects & timing of meds): Discussion, Instructed, Individual Session, Comprehends Key Points (Reviewed timing and MOA of metformin - pt takes both pills at night.)    Monitoring (monitoring blood glucose/other parameters & using results): Discussion, Instructed, Individual Session, Written Materials Provided, Comprehends Key Points (Reviewed goal BG's and encouraged to continue testing at least 3-4 times per week.)    Acute Complications (preventing, detecting, and treating acute complications): Discussion, Instructed, Individual Session, Written Materials Provided, Comprehends Key Points (Reviewed s/s of hyperglycemia and s/s of hypoglycemia and appropriate treatment. )    Chronic Complications (preventing, detecting, and treating chronic complications): Discussion (Reviewed annual care schedule. Reports he is UTD.)    Cognitive (knowledge of self-management skills, functional health literacy): Discussion (Arrives with prior and current knowledge of diabetes self-management. )    Behavioral (readiness for change, lifestyle practices, self-care behaviors): Discussion, Individual Session (Pt remains motivated to maintain dietary habits and BG control.)        Goals  Patient has selected/evaluated goals during today's session: No         Diabetes Care Plan/Intervention  Education Plan/Intervention: Other (Pt is already meeting most self-management standards. Annual DE follow-up.  )      Diabetes Meal Plan  Carbohydrate Per Meal: 45-60g  Carbohydrate Per Snack : 15-20g      Education Units of Time   Time Spent: 45 min    Health Maintenance was reviewed today with patient. Discussed with patient importance of routine eye exams, foot exams/foot care, blood work (i.e.: A1c, microalbumin, and lipid), dental visits, yearly flu vaccine, and pneumonia vaccine as indicated by PCP. Patient verbalized understanding.     Health Maintenance Topics with due status: Not Due       Topic Last Completion Date    Foot Exam 06/22/2017    Eye Exam 06/22/2017    Hemoglobin A1c 02/13/2018     Health Maintenance Due   Topic Date Due    TETANUS VACCINE  04/09/1958    Urine Microalbumin  06/09/2016    Influenza Vaccine  08/01/2017    Lipid Panel  03/21/2018

## 2018-05-23 ENCOUNTER — OFFICE VISIT (OUTPATIENT)
Dept: PODIATRY | Facility: CLINIC | Age: 78
End: 2018-05-23
Payer: MEDICARE

## 2018-05-23 ENCOUNTER — OFFICE VISIT (OUTPATIENT)
Dept: INTERNAL MEDICINE | Facility: CLINIC | Age: 78
End: 2018-05-23
Payer: MEDICARE

## 2018-05-23 VITALS
BODY MASS INDEX: 42.66 KG/M2 | OXYGEN SATURATION: 96 % | DIASTOLIC BLOOD PRESSURE: 72 MMHG | WEIGHT: 315 LBS | HEART RATE: 80 BPM | HEIGHT: 72 IN | SYSTOLIC BLOOD PRESSURE: 114 MMHG

## 2018-05-23 VITALS
DIASTOLIC BLOOD PRESSURE: 78 MMHG | SYSTOLIC BLOOD PRESSURE: 135 MMHG | HEART RATE: 82 BPM | BODY MASS INDEX: 44.1 KG/M2 | RESPIRATION RATE: 18 BRPM | HEIGHT: 71 IN | WEIGHT: 315 LBS

## 2018-05-23 DIAGNOSIS — Z85.46 HISTORY OF PROSTATE CANCER: ICD-10-CM

## 2018-05-23 DIAGNOSIS — M17.11 PRIMARY OSTEOARTHRITIS OF RIGHT KNEE: ICD-10-CM

## 2018-05-23 DIAGNOSIS — L84 CORN OR CALLUS: ICD-10-CM

## 2018-05-23 DIAGNOSIS — R29.898 WEAKNESS OF BOTH LOWER EXTREMITIES: ICD-10-CM

## 2018-05-23 DIAGNOSIS — G47.33 OSA ON CPAP: ICD-10-CM

## 2018-05-23 DIAGNOSIS — E11.9 TYPE 2 DIABETES MELLITUS WITHOUT COMPLICATION, WITHOUT LONG-TERM CURRENT USE OF INSULIN: Primary | ICD-10-CM

## 2018-05-23 DIAGNOSIS — I77.1 TORTUOUS AORTA: ICD-10-CM

## 2018-05-23 DIAGNOSIS — G72.0 STATIN MYOPATHY: ICD-10-CM

## 2018-05-23 DIAGNOSIS — C61 PROSTATE CANCER: ICD-10-CM

## 2018-05-23 DIAGNOSIS — E11.49 TYPE 2 DIABETES MELLITUS WITH NEUROLOGICAL MANIFESTATIONS: Primary | ICD-10-CM

## 2018-05-23 DIAGNOSIS — B35.1 DERMATOPHYTOSIS, NAIL: ICD-10-CM

## 2018-05-23 DIAGNOSIS — I70.0 AORTIC ATHEROSCLEROSIS: ICD-10-CM

## 2018-05-23 DIAGNOSIS — G31.84 AMNESTIC MCI (MILD COGNITIVE IMPAIRMENT WITH MEMORY LOSS): ICD-10-CM

## 2018-05-23 DIAGNOSIS — T46.6X5A STATIN MYOPATHY: ICD-10-CM

## 2018-05-23 PROCEDURE — 11056 PARNG/CUTG B9 HYPRKR LES 2-4: CPT | Mod: Q9,S$GLB,, | Performed by: PODIATRIST

## 2018-05-23 PROCEDURE — 99499 UNLISTED E&M SERVICE: CPT | Mod: S$GLB,,, | Performed by: PODIATRIST

## 2018-05-23 PROCEDURE — 99499 UNLISTED E&M SERVICE: CPT | Mod: HCNC,S$GLB,, | Performed by: INTERNAL MEDICINE

## 2018-05-23 PROCEDURE — 99214 OFFICE O/P EST MOD 30 MIN: CPT | Mod: S$GLB,,, | Performed by: INTERNAL MEDICINE

## 2018-05-23 PROCEDURE — 99999 PR PBB SHADOW E&M-EST. PATIENT-LVL III: CPT | Mod: PBBFAC,,, | Performed by: PODIATRIST

## 2018-05-23 PROCEDURE — 99999 PR PBB SHADOW E&M-EST. PATIENT-LVL IV: CPT | Mod: PBBFAC,,, | Performed by: INTERNAL MEDICINE

## 2018-05-23 PROCEDURE — 11721 DEBRIDE NAIL 6 OR MORE: CPT | Mod: 59,Q9,S$GLB, | Performed by: PODIATRIST

## 2018-05-23 RX ORDER — DONEPEZIL HYDROCHLORIDE 10 MG/1
10 TABLET, FILM COATED ORAL NIGHTLY
Qty: 90 TABLET | Refills: 3 | Status: SHIPPED | OUTPATIENT
Start: 2018-05-23 | End: 2018-06-25 | Stop reason: SDUPTHER

## 2018-05-23 RX ORDER — SERTRALINE HYDROCHLORIDE 50 MG/1
50 TABLET, FILM COATED ORAL EVERY MORNING
Qty: 90 TABLET | Refills: 3 | Status: SHIPPED | OUTPATIENT
Start: 2018-05-23 | End: 2019-06-05 | Stop reason: SDUPTHER

## 2018-05-23 NOTE — PROGRESS NOTES
Subjective:      Patient ID: Elan Yepez is a 78 y.o. male.    Chief Complaint: PCP (Liane Tijerina MD  2/20/18); Diabetic Foot Exam; and Nail Care    Elan is a 78 y.o. male who presents to the clinic for evaluation and treatment of high risk feet. 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 The patient's chief complaint is long, thick toenails. This patient has documented high risk feet requiring routine maintenance secondary to diabetes mellitis and those secondary complications of diabetes, as mentioned..    PCP: Liane Tijerina MD    Date Last Seen by PCP:  Chief Complaint   Patient presents with    PCP     Liane Tijerina MD  2/20/18    Diabetic Foot Exam    Nail Care         Current shoe gear:dm shoes   Hemoglobin A1C   Date Value Ref Range Status   02/13/2018 6.1 (H) 4.0 - 5.6 % Final     Comment:     According to ADA guidelines, hemoglobin A1c <7.0% represents  optimal control in non-pregnant diabetic patients. Different  metrics may apply to specific patient populations.   Standards of Medical Care in Diabetes-2016.  For the purpose of screening for the presence of diabetes:  <5.7%     Consistent with the absence of diabetes  5.7-6.4%  Consistent with increasing risk for diabetes   (prediabetes)  >or=6.5%  Consistent with diabetes  Currently, no consensus exists for use of hemoglobin A1c  for diagnosis of diabetes for children.  This Hemoglobin A1c assay has significant interference with fetal   hemoglobin    (HbF). The results are invalid for patients with abnormal amounts of   HbF,   including those with known Hereditary Persistence   of Fetal Hemoglobin. Heterozygous hemoglobin variants (HbAS, HbAC,   HbAD, HbAE, HbA2) do not significantly interfere with this assay;   however, presence of multiple variants in a sample may impact the %   interference.     06/22/2017 6.3 (H) 4.0 - 5.6 % Final     Comment:     According to ADA guidelines, hemoglobin A1c <7.0% represents  optimal control in non-pregnant diabetic patients. Different  metrics may apply to specific patient populations.   Standards of Medical Care in Diabetes-2016.  For the purpose of screening for the presence of diabetes:  <5.7%     Consistent with the absence of diabetes  5.7-6.4%  Consistent with increasing risk for diabetes   (prediabetes)  >or=6.5%  Consistent with diabetes  Currently, no consensus exists for use of hemoglobin A1c  for diagnosis of diabetes for children.  This Hemoglobin A1c assay has significant interference with fetal   hemoglobin   (HbF). The results are invalid for patients with abnormal amounts of   HbF,   including those with known Hereditary Persistence   of Fetal Hemoglobin. Heterozygous hemoglobin variants (HbAS, HbAC,   HbAD, HbAE, HbA2) do not significantly interfere with this assay;   however, presence of multiple variants in a sample may impact the %   interference.     09/08/2016 6.5 (H) 4.5 - 6.2 % Final     Comment:     According to ADA guidelines, hemoglobin A1C <7.0% represents  optimal control in non-pregnant diabetic patients.  Different  metrics may apply to specific populations.   Standards of Medical Care in Diabetes - 2016.  For the purpose of screening for the presence of diabetes:  <5.7%     Consistent with the absence of diabetes  5.7-6.4%  Consistent with increasing risk for diabetes   (prediabetes)  >or=6.5%  Consistent with diabetes  Currently no consensus exists for use of hemoglobin A1C  for diagnosis  of diabetes for children.         Review of Systems   Constitution: Negative for chills, decreased appetite and fever.   Cardiovascular: Negative for chest pain, claudication and leg swelling.   Respiratory: Negative for cough.    Skin: Positive for nail changes. Negative for color change, dry skin and itching.   Musculoskeletal: Negative for arthritis, back pain, falls, gout, joint pain, joint swelling and myalgias.   Gastrointestinal: Negative for nausea and vomiting.   Neurological: Positive for numbness. Negative for loss of balance and paresthesias.           Objective:      Physical Exam   Constitutional: He is oriented to person, place, and time. He appears well-developed and well-nourished.   Cardiovascular:   Dorsalis pedis and posterior tibial pulses are diminished Bilaterally. Toes are cool to touch. Feet are warm proximally.There is decreased digital hair. Skin is atrophic, slightly hyperpigmented, and mildly edematous       Musculoskeletal: Normal range of motion. He exhibits no tenderness or deformity.   Adequate joint range of motion without pain, limitation, nor crepitation Bilateral feet and ankle joints. Muscle strength is 5/5 in all groups bilaterally.         Neurological: He is alert and oriented to person, place, and time.   Grimstead-Marci 5.07 monofilamant testing is diminished Raffi feet. Sharp/dull sensation diminished Bilaterally. Light touch absent Bilaterally.       Skin: Skin is warm, dry and intact. No ecchymosis and no lesion noted. No erythema. No pallor.   Nails x10 are elongated by  2-5mm's, thickened by 2-4 mm's, dystrophic, and are darkened in  coloration . Xerosis Bilaterally. No open lesions noted.    Hyperkeratotic tissue noted to distal hallux b/l      Psychiatric: He has a normal mood and affect. His behavior is normal.   Nursing note and vitals reviewed.            Assessment:       Encounter Diagnoses   Name Primary?    Type 2 diabetes mellitus with neurological  manifestations Yes    Dermatophytosis, nail     Corn or callus          Plan:       Elan was seen today for pcp, diabetic foot exam and nail care.    Diagnoses and all orders for this visit:    Type 2 diabetes mellitus with neurological manifestations    Dermatophytosis, nail    Corn or callus      I counseled the patient on his conditions, their implications and medical management.        - Shoe inspection. Diabetic Foot Education. Patient reminded of the importance of good nutrition and blood sugar control to help prevent podiatric complications of diabetes. Patient instructed on proper foot hygeine. We discussed wearing proper shoe gear, daily foot inspections, never walking without protective shoe gear, never putting sharp instruments to feet, routine podiatric nail visits every 2-3 months.      - With patient's permission, nails were aggressively reduced and debrided x 10 to their soft tissue attachment mechanically and with electric , removing all offending nail and debris. Patient relates relief following the procedure. He will continue to monitor the areas daily, inspect his feet, wear protective shoe gear when ambulatory, moisturizer to maintain skin integrity and follow in this office in approximately 2-3 months, sooner p.r.n.    - After cleansing the  area w/ alcohol prep pad the above mentioned hyperkeratosis was trimmed utilizing No 15 scapel, to a smooth base with out incident. Patient tolerated this  well and reported comfort to the area of distal hallux b/l

## 2018-05-23 NOTE — PATIENT INSTRUCTIONS
Medication List with Changes/Refills   Current Medications    ASPIRIN (ECOTRIN) 81 MG EC TABLET    Take 1 tablet (81 mg total) by mouth every evening.    BLOOD SUGAR DIAGNOSTIC STRP    1 strip by Misc.(Non-Drug; Combo Route) route once daily.    CHOLECALCIFEROL, VITAMIN D3, 5,000 UNIT CAPSULE    Take 1 capsule by mouth Daily.    LANCETS (SOFT TOUCH LANCETS) MISC    1 Device by Misc.(Non-Drug; Combo Route) route once daily.    METFORMIN (GLUCOPHAGE) 500 MG TABLET    Take two tablets at bedtime    MULTIVITS-MINERALS/FA/LYCOPENE (ONE-A-DAY MEN'S ORAL)    Take 1 tablet by mouth Daily.    NAPROXEN (NAPROSYN) 500 MG TABLET    Take 1 tablet (500 mg total) by mouth 2 (two) times daily with meals.    TADALAFIL (CIALIS) 20 MG TAB    Take 1 tablet (20 mg total) by mouth daily as needed. take as directed    TRUE METRIX GLUCOSE METER MISC       Changed and/or Refilled Medications    Modified Medication Previous Medication    DONEPEZIL (ARICEPT) 10 MG TABLET donepezil (ARICEPT) 10 MG tablet       Take 1 tablet (10 mg total) by mouth every evening.    Take 1 tablet (10 mg total) by mouth every evening.    SERTRALINE (ZOLOFT) 50 MG TABLET sertraline (ZOLOFT) 50 MG tablet       Take 1 tablet (50 mg total) by mouth every morning. Increase energy and boost mood    Take 1 tablet (50 mg total) by mouth every morning. Increase energy and boost mood   Discontinued Medications    ATORVASTATIN (LIPITOR) 40 MG TABLET    Take 1 tablet (40 mg total) by mouth every evening.

## 2018-05-24 RX ORDER — CALCIUM CITRATE/VITAMIN D3 200MG-6.25
TABLET ORAL
Qty: 100 STRIP | Refills: 3 | Status: SHIPPED | OUTPATIENT
Start: 2018-05-24 | End: 2018-06-09 | Stop reason: SDUPTHER

## 2018-05-25 NOTE — PROGRESS NOTES
Subjective:       Patient ID: Elan Yepez is a 78 y.o. male.    Chief Complaint: Follow-up (3 month follow up) and Knee Pain   He has been having a lot of trouble standing.  His legs got really weak and heavy.  He has sit down after standing at [employer] for about 5 min.  He has had trouble with statin toxicity in the past.  He has never had a heart attack or stroke.  The value of primary prevention in this gentleman in view of the symptoms is in question.  HPI  Review of Systems   Neurological: Positive for weakness.       Objective:      Physical Exam   Constitutional: He is oriented to person, place, and time. No distress.   HENT:   Head: Atraumatic.   Eyes: Conjunctivae are normal. No scleral icterus.   Neck: Neck supple.   Cardiovascular: Normal rate and regular rhythm.    Pulmonary/Chest: Effort normal and breath sounds normal.   Abdominal: Soft. There is no tenderness.   Musculoskeletal: He exhibits no edema.   Lymphadenopathy:     He has no cervical adenopathy.   Neurological: He is alert and oriented to person, place, and time.   Skin: Skin is warm and dry.   Psychiatric: He has a normal mood and affect. His behavior is normal.   Nursing note and vitals reviewed.      Assessment:       1. Type 2 diabetes mellitus without complication, without long-term current use of insulin    2. Tortuous aorta    3. Aortic atherosclerosis    4. Amnestic MCI (mild cognitive impairment with memory loss)    5. BMI 40.0-44.9, adult    6. History of prostate cancer    7. Weakness of both lower extremities    8. Prostate cancer    9. Statin myopathy    10. JESSICA on CPAP    11. Primary osteoarthritis of right knee        Plan:   Elan was seen today for follow-up and knee pain.    Diagnoses and all orders for this visit:    Type 2 diabetes mellitus without complication, without long-term current use of insulin  -     Comprehensive metabolic panel; Future  -     Hemoglobin A1c; Future    Tortuous aorta    Aortic  atherosclerosis    Amnestic MCI (mild cognitive impairment with memory loss)  -     Ambulatory Referral to Physical/Occupational Therapy    BMI 40.0-44.9, adult    History of prostate cancer    Weakness of both lower extremities  -     CK; Future  -     Ambulatory Referral to Physical/Occupational Therapy  -     CANE FOR HOME USE    Prostate cancer    Statin myopathy  -     CK; Future  -     Ambulatory Referral to Physical/Occupational Therapy    JESSICA on CPAP    Primary osteoarthritis of right knee  -     Ambulatory Referral to Physical/Occupational Therapy  -     CANE FOR HOME USE    Other orders  -     sertraline (ZOLOFT) 50 MG tablet; Take 1 tablet (50 mg total) by mouth every morning. Increase energy and boost mood  -     donepezil (ARICEPT) 10 MG tablet; Take 1 tablet (10 mg total) by mouth every evening.      Medication List with Changes/Refills   Current Medications    ASPIRIN (ECOTRIN) 81 MG EC TABLET    Take 1 tablet (81 mg total) by mouth every evening.    CHOLECALCIFEROL, VITAMIN D3, 5,000 UNIT CAPSULE    Take 1 capsule by mouth Daily.    LANCETS (SOFT TOUCH LANCETS) MISC    1 Device by Misc.(Non-Drug; Combo Route) route once daily.    METFORMIN (GLUCOPHAGE) 500 MG TABLET    Take two tablets at bedtime    MULTIVITS-MINERALS/FA/LYCOPENE (ONE-A-DAY MEN'S ORAL)    Take 1 tablet by mouth Daily.    TADALAFIL (CIALIS) 20 MG TAB    Take 1 tablet (20 mg total) by mouth daily as needed. take as directed    TRUE METRIX GLUCOSE METER MISC       Changed and/or Refilled Medications    Modified Medication Previous Medication    DONEPEZIL (ARICEPT) 10 MG TABLET donepezil (ARICEPT) 10 MG tablet       Take 1 tablet (10 mg total) by mouth every evening.    Take 1 tablet (10 mg total) by mouth every evening.    SERTRALINE (ZOLOFT) 50 MG TABLET sertraline (ZOLOFT) 50 MG tablet       Take 1 tablet (50 mg total) by mouth every morning. Increase energy and boost mood    Take 1 tablet (50 mg total) by mouth every morning.  Increase energy and boost mood    TRUE METRIX GLUCOSE TEST STRIP STRP blood sugar diagnostic Strp       USE STRIP TO CHECK GLUCOSE ONCE DAILY    1 strip by Misc.(Non-Drug; Combo Route) route once daily.   Discontinued Medications    ATORVASTATIN (LIPITOR) 40 MG TABLET    Take 1 tablet (40 mg total) by mouth every evening.     Follow-up for schedule labs before August appointment.

## 2018-06-07 ENCOUNTER — CLINICAL SUPPORT (OUTPATIENT)
Dept: REHABILITATION | Facility: HOSPITAL | Age: 78
End: 2018-06-07
Payer: MEDICARE

## 2018-06-07 DIAGNOSIS — R29.898 BILATERAL LEG WEAKNESS: Primary | ICD-10-CM

## 2018-06-07 PROCEDURE — G8979 MOBILITY GOAL STATUS: HCPCS | Mod: CL,PO

## 2018-06-07 PROCEDURE — 97110 THERAPEUTIC EXERCISES: CPT | Mod: PO

## 2018-06-07 PROCEDURE — 97162 PT EVAL MOD COMPLEX 30 MIN: CPT | Mod: PO

## 2018-06-07 PROCEDURE — G8978 MOBILITY CURRENT STATUS: HCPCS | Mod: CL,PO

## 2018-06-07 NOTE — PLAN OF CARE
PHYSICAL THERAPY INITIAL OUTPATIENT EVALUATION    Referring Provider:  Dr. Liane Tijerina    Diagnosis:       ICD-10-CM ICD-9-CM    1. Bilateral leg weakness R29.898 729.89        Orders:  Evaluate and Treat    Date of Initial Evaluation:  18    Orders :  18    Coding Cycle Visit # 1     SUBJECTIVE:  Patient reports to therapy with weakness in both lower extremities. He states the past couple years he has been getting progressively weaker and at times has pain. His pain usually starts after walking and standing for long periods. The patient states his main concern is he gets tired and wants to strengthen and increase his endurance. The right side feels like it gets tired quicker and he tends to feel more pain on that side. The pain he feels is a burning pain when he works the muscle to much. The knee pain on the right is described as achy. He has no history of knee and pain and hasn't received any therapy before. He also feels because of the weakness he has trouble with his balance.     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  Began Aricept 10 mg at bedtime.    BMI 40.0-44.9, adult     Diabetes mellitus     diet management    Dysmetabolic syndrome X 10/2/2012    Hyperlipidemia     Hypertension     diet managemen    Joint pain     MDD (major depressive disorder) 2015    Midline low back pain without sciatica 2016    Primary osteoarthritis of right knee 2018    Prostate CA     Sleep apnea     cipap    Trouble in sleeping     Type II or unspecified type diabetes mellitus with neurological manifestations, not stated as uncontrolled(250.60)        Patient Active Problem List   Diagnosis    Hyperlipemia    Impotence of organic origin    History of prostate cancer    BMI 40.0-44.9, adult    Aortic atherosclerosis    [place] (hard of  hearing)    Tortuous aorta    JESSICA on CPAP    Asbestos exposure    Midline low back pain without sciatica    Amnestic MCI (mild cognitive impairment with memory loss)    Type 2 diabetes mellitus without complication, without long-term current use of insulin    Primary osteoarthritis of right knee    URRUTIA (dyspnea on exertion)    Weakness of both lower extremities    Prostate cancer    Statin myopathy         Current Outpatient Prescriptions:     aspirin (ECOTRIN) 81 MG EC tablet, Take 1 tablet (81 mg total) by mouth every evening., Disp: 100 tablet, Rfl: 3    cholecalciferol, vitamin D3, 5,000 unit capsule, Take 1 capsule by mouth Daily., Disp: , Rfl:     donepezil (ARICEPT) 10 MG tablet, Take 1 tablet (10 mg total) by mouth every evening., Disp: 90 tablet, Rfl: 3    lancets (SOFT TOUCH LANCETS) Misc, 1 Device by Misc.(Non-Drug; Combo Route) route once daily., Disp: 100 each, Rfl: 11    metFORMIN (GLUCOPHAGE) 500 MG tablet, Take two tablets at bedtime, Disp: 180 tablet, Rfl: 3    MULTIVITS-MINERALS/FA/LYCOPENE (ONE-A-DAY MEN'S ORAL), Take 1 tablet by mouth Daily., Disp: , Rfl:     sertraline (ZOLOFT) 50 MG tablet, Take 1 tablet (50 mg total) by mouth every morning. Increase energy and boost mood, Disp: 90 tablet, Rfl: 3    tadalafil (CIALIS) 20 MG Tab, Take 1 tablet (20 mg total) by mouth daily as needed. take as directed, Disp: 10 tablet, Rfl: 5    TRUE METRIX GLUCOSE METER Misc, , Disp: , Rfl:     TRUE METRIX GLUCOSE TEST STRIP Strp, USE STRIP TO CHECK GLUCOSE ONCE DAILY, Disp: 100 strip, Rfl: 3    OBJECTIVE:  Pain: now 0/10, worst 6/10 located in the front of his right knee, described as achy pain.    Sensation:  intact to light touch     Knee ROM:  Flexion     R 110 L 120      Extension   R -5 L -5       Strength:  Quadriceps   R 4-/5 L 4/5     Hamstrings   R 4-/5 L 4/5      Hip Flexor   R 4/5 L 4/5        Ankle Dorsiflexion   R 4/5 L 4/5    Muscle Length:  Decrease hamstring length  bilaterally           Function: LOWER EXTREMITY FUNCTIONAL SCALE                EVAL  reeval  1. Any of your usual work, housework or school activities   1/4  2. Your usual hobbies, sporting     0/4  3. Getting in and out of tub      3/4  4. Walking between rooms      1/4  5. Putting on shoes or socks      4/4  6. Squatting        1/4  7. Lifting an object from the ground      4/4  8. Performing light activities around the home   3/4  9. Performing heavy activities around the home   1/4  10. Getting in and out of car      3/4  11. Walking 2 blocks       0/4  12.Walking a mile       0/4  13. Getting up and down 1 flight of stairs    0/4  14. Standing for 1 hour      0/4  15. Sitting for an hour       4/4  16. Running on even ground      0/4  17. Running on uneven ground     0/4  18. Making sharp turns when running fast    0/4  19. Hopping        0/4  20. Rolling over in bed       4/4    Patient reports 31.25% ability based on score of the Lower Extremity Functional Scale.    Functional Limitations and Goal:  This patient's primary physical therapy goal is to return to their prior level of function (Mobility - Walking, Moving Around [id number]) without limitations. The patient's current level of impairment is 60 to 79% impaired (CL) based on their score of 39% on the Lower Extremity Functional Scale. The patient is expected to achieve a score of 60-79% impaired (CL) within 10 treatment days.    KUMAR Assessment    1. Sitting to Standing   3 - able to stand independely using hands  2. Standing Unsupported   4 - able to stand safely 2 minutes without hold  3. Sitting Unsupported   4 - able to sit safely and securely 2 minutes  4. Standing to Sitting   2 - uses back of legs against chair to control descent  5. Pivot Transfer   3 - able to transfer safely with definite use of hands  6. Standing with Eyes Closed   3 - able to stand 10 seconds with supervision  7. Standing with Feet Together   4 - able to place feet together  independently and stand 1 minute safely  8. Reaching Forward with Outstretched Arm   3 - can reach forward 12 cm/5 inches safely  9. Retrieving Object from Floor   3 - able to pick slipper but needs supervision  10. Turning to Look Behind   4 - looks behind from both sides and weights shifts well  11. Turning 360 Degrees   2 - able to trun 360 safely but slowly  12. Placing Alternate Foot on Step   3 - able to stand independently and completely 8 steps > 20 seconds  13. Standing with One Foot in Front   0 - Looses balance while stepping or standing  14. Standing on One Foot   1 - tries to lift leg and unable to hold 3 seconds but remains standing independently    Eval 6/7/18: 39/56    Tenderness to palpation:  Over the medial joint line of the right knee    ASSESSMENT:  The patient is a 78 y.o. year old male who presents to physical therapy with complaints of bilateral lower extremity weakness with pain in the right knee.  Patient's impairments include muscle weakness. Decrease muscle length, decreased joint ROM, and decreased balance.  These impairments are limiting patient's ability to perform house hold activities, ascend and descend stairs, get in and out of the bath, and walk around home and community.  Patient's prognosis is good.  Patient will benefit from skilled physical therapy intervention to increase muscle strength, improve joint ROM, increase balance, increase endurance, and decrease pain.    Co-morbidities which may impact the plan of care and potentially impede the patient's progress in therapy include:  Obesity, chronic stage of condition, DM.     The patient's clinical presentation is stable.  Based on patient's stable clinical presentation, 3+ co-morbidities, and examination of 2 body systems, patient presents with moderate complexity.    Short Term Goals:  (3 weeks)  1.  Patient will improve R knee flexion ROM to 115 degrees in order to ascend/descend stairs safely.  2.  Patient will report  improved function indicated by a score of 38% ability on the Lower Extremity Functional Scale..  3.  Patient will be independent with home exercise program.    Long Term Goals:  (6 weeks)  1.  Patient will improve quad strength bilaterally to 4+/5 in order to get in and out of the bath safely.  2.  Patient will improve Freeman Balance score to 42 in order to improve safety during ambulation.  3.  Patient will report improved function indicated by a score of 45% ability on the Lower Extremity Functional Scale.  4.  Patient will improve hamstring strength bilaterally to 4+/5 in order to get in and out of the car with less difficulty.   5.  Patient will improve R knee flexion ROM to 120 degrees in order bend to  objects off the floor.     TREATMENT PROVIDED:    Initial evaluation completed.    Manual Therapy:  (0 minutes)      Therapeutic Exercise:  (10 minutes)      Heels Slide x30    Hamstring Stretch 2 minutes each side    Step taps x10 each    Patient tolerated today's treatment well with no complaints.     PLAN:  Patient will benefit from physical therapy (3) x/week for (6) weeks including manual therapy, therapeutic exercise, neuromuscular re-education, functional activities, modalities, and patient education.    Thank you for this referral.    These services are reasonable and necessary for the conditions set forth above while under my care.

## 2018-06-07 NOTE — PROGRESS NOTES
PHYSICAL THERAPY INITIAL OUTPATIENT EVALUATION    Referring Provider:  Dr. Liaen Tijerina    Diagnosis:       ICD-10-CM ICD-9-CM    1. Bilateral leg weakness R29.898 729.89        Orders:  Evaluate and Treat    Date of Initial Evaluation:  18    Orders :  18    Coding Cycle Visit # 1     SUBJECTIVE:  Patient reports to therapy with weakness in both lower extremities. He states the past couple years he has been getting progressively weaker and at times has pain. His pain usually starts after walking and standing for long periods. The patient states his main concern is he gets tired and wants to strengthen and increase his endurance. The right side feels like it gets tired quicker and he tends to feel more pain on that side. The pain he feels is a burning pain when he works the muscle to much. The knee pain on the right is described as achy. He has no history of knee and pain and hasn't received any therapy before. He also feels because of the weakness he has trouble with his balance.     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  Began Aricept 10 mg at bedtime.    BMI 40.0-44.9, adult     Diabetes mellitus     diet management    Dysmetabolic syndrome X 10/2/2012    Hyperlipidemia     Hypertension     diet managemen    Joint pain     MDD (major depressive disorder) 2015    Midline low back pain without sciatica 2016    Primary osteoarthritis of right knee 2018    Prostate CA     Sleep apnea     cipap    Trouble in sleeping     Type II or unspecified type diabetes mellitus with neurological manifestations, not stated as uncontrolled(250.60)        Patient Active Problem List   Diagnosis    Hyperlipemia    Impotence of organic origin    History of prostate cancer    BMI 40.0-44.9, adult    Aortic atherosclerosis    [place] (hard of  hearing)    Tortuous aorta    JESSICA on CPAP    Asbestos exposure    Midline low back pain without sciatica    Amnestic MCI (mild cognitive impairment with memory loss)    Type 2 diabetes mellitus without complication, without long-term current use of insulin    Primary osteoarthritis of right knee    URRUTIA (dyspnea on exertion)    Weakness of both lower extremities    Prostate cancer    Statin myopathy         Current Outpatient Prescriptions:     aspirin (ECOTRIN) 81 MG EC tablet, Take 1 tablet (81 mg total) by mouth every evening., Disp: 100 tablet, Rfl: 3    cholecalciferol, vitamin D3, 5,000 unit capsule, Take 1 capsule by mouth Daily., Disp: , Rfl:     donepezil (ARICEPT) 10 MG tablet, Take 1 tablet (10 mg total) by mouth every evening., Disp: 90 tablet, Rfl: 3    lancets (SOFT TOUCH LANCETS) Misc, 1 Device by Misc.(Non-Drug; Combo Route) route once daily., Disp: 100 each, Rfl: 11    metFORMIN (GLUCOPHAGE) 500 MG tablet, Take two tablets at bedtime, Disp: 180 tablet, Rfl: 3    MULTIVITS-MINERALS/FA/LYCOPENE (ONE-A-DAY MEN'S ORAL), Take 1 tablet by mouth Daily., Disp: , Rfl:     sertraline (ZOLOFT) 50 MG tablet, Take 1 tablet (50 mg total) by mouth every morning. Increase energy and boost mood, Disp: 90 tablet, Rfl: 3    tadalafil (CIALIS) 20 MG Tab, Take 1 tablet (20 mg total) by mouth daily as needed. take as directed, Disp: 10 tablet, Rfl: 5    TRUE METRIX GLUCOSE METER Misc, , Disp: , Rfl:     TRUE METRIX GLUCOSE TEST STRIP Strp, USE STRIP TO CHECK GLUCOSE ONCE DAILY, Disp: 100 strip, Rfl: 3    OBJECTIVE:  Pain: now 0/10, worst 6/10 located in the front of his right knee, described as achy pain.    Sensation:  intact to light touch     Knee ROM:  Flexion     R 110 L 120      Extension   R -5 L -5       Strength:  Quadriceps   R 4-/5 L 4/5     Hamstrings   R 4-/5 L 4/5      Hip Flexor   R 4/5 L 4/5        Ankle Dorsiflexion   R 4/5 L 4/5    Muscle Length:  Decrease hamstring length  bilaterally           Function: LOWER EXTREMITY FUNCTIONAL SCALE                EVAL  reeval  1. Any of your usual work, housework or school activities   1/4  2. Your usual hobbies, sporting     0/4  3. Getting in and out of tub      3/4  4. Walking between rooms      1/4  5. Putting on shoes or socks      4/4  6. Squatting        1/4  7. Lifting an object from the ground      4/4  8. Performing light activities around the home   3/4  9. Performing heavy activities around the home   1/4  10. Getting in and out of car      3/4  11. Walking 2 blocks       0/4  12.Walking a mile       0/4  13. Getting up and down 1 flight of stairs    0/4  14. Standing for 1 hour      0/4  15. Sitting for an hour       4/4  16. Running on even ground      0/4  17. Running on uneven ground     0/4  18. Making sharp turns when running fast    0/4  19. Hopping        0/4  20. Rolling over in bed       4/4    Patient reports 31.25% ability based on score of the Lower Extremity Functional Scale.    Functional Limitations and Goal:  This patient's primary physical therapy goal is to return to their prior level of function (Mobility - Walking, Moving Around [id number]) without limitations. The patient's current level of impairment is 60 to 79% impaired (CL) based on their score of 39% on the Lower Extremity Functional Scale. The patient is expected to achieve a score of 60-79% impaired (CL) within 10 treatment days.    KUMAR Assessment    1. Sitting to Standing   3 - able to stand independely using hands  2. Standing Unsupported   4 - able to stand safely 2 minutes without hold  3. Sitting Unsupported   4 - able to sit safely and securely 2 minutes  4. Standing to Sitting   2 - uses back of legs against chair to control descent  5. Pivot Transfer   3 - able to transfer safely with definite use of hands  6. Standing with Eyes Closed   3 - able to stand 10 seconds with supervision  7. Standing with Feet Together   4 - able to place feet together  independently and stand 1 minute safely  8. Reaching Forward with Outstretched Arm   3 - can reach forward 12 cm/5 inches safely  9. Retrieving Object from Floor   3 - able to pick slipper but needs supervision  10. Turning to Look Behind   4 - looks behind from both sides and weights shifts well  11. Turning 360 Degrees   2 - able to trun 360 safely but slowly  12. Placing Alternate Foot on Step   3 - able to stand independently and completely 8 steps > 20 seconds  13. Standing with One Foot in Front   0 - Looses balance while stepping or standing  14. Standing on One Foot   1 - tries to lift leg and unable to hold 3 seconds but remains standing independently    Eval 6/7/18: 39/56    Tenderness to palpation:  Over the medial joint line of the right knee    ASSESSMENT:  The patient is a 78 y.o. year old male who presents to physical therapy with complaints of bilateral lower extremity weakness with pain in the right knee.  Patient's impairments include muscle weakness. Decrease muscle length, decreased joint ROM, and decreased balance.  These impairments are limiting patient's ability to perform house hold activities, ascend and descend stairs, get in and out of the bath, and walk around home and community.  Patient's prognosis is good.  Patient will benefit from skilled physical therapy intervention to increase muscle strength, improve joint ROM, increase balance, increase endurance, and decrease pain.    Co-morbidities which may impact the plan of care and potentially impede the patient's progress in therapy include:  Obesity, chronic stage of condition, DM.     The patient's clinical presentation is stable.  Based on patient's stable clinical presentation, 3+ co-morbidities, and examination of 2 body systems, patient presents with moderate complexity.    Short Term Goals:  (3 weeks)  1.  Patient will improve R knee flexion ROM to 115 degrees in order to ascend/descend stairs safely.  2.  Patient will report  improved function indicated by a score of 38% ability on the Lower Extremity Functional Scale..  3.  Patient will be independent with home exercise program.    Long Term Goals:  (6 weeks)  1.  Patient will improve quad strength bilaterally to 4+/5 in order to get in and out of the bath safely.  2.  Patient will improve Freeman Balance score to 42 in order to improve safety during ambulation.  3.  Patient will report improved function indicated by a score of 45% ability on the Lower Extremity Functional Scale.  4.  Patient will improve hamstring strength bilaterally to 4+/5 in order to get in and out of the car with less difficulty.   5.  Patient will improve R knee flexion ROM to 120 degrees in order bend to  objects off the floor.     TREATMENT PROVIDED:    Initial evaluation completed.    Manual Therapy:  (0 minutes)      Therapeutic Exercise:  (10 minutes)      Heels Slide x30    Hamstring Stretch 2 minutes each side    Step taps x10 each    Patient tolerated today's treatment well with no complaints.     PLAN:  Patient will benefit from physical therapy (3) x/week for (6) weeks including manual therapy, therapeutic exercise, neuromuscular re-education, functional activities, modalities, and patient education.    Thank you for this referral.    These services are reasonable and necessary for the conditions set forth above while under my care.

## 2018-06-11 RX ORDER — CALCIUM CITRATE/VITAMIN D3 200MG-6.25
TABLET ORAL
Qty: 50 STRIP | Refills: 3 | Status: SHIPPED | OUTPATIENT
Start: 2018-06-11 | End: 2019-01-01 | Stop reason: SDUPTHER

## 2018-06-13 ENCOUNTER — CLINICAL SUPPORT (OUTPATIENT)
Dept: REHABILITATION | Facility: HOSPITAL | Age: 78
End: 2018-06-13
Payer: MEDICARE

## 2018-06-13 DIAGNOSIS — R29.898 BILATERAL LEG WEAKNESS: Primary | ICD-10-CM

## 2018-06-13 PROCEDURE — 97110 THERAPEUTIC EXERCISES: CPT | Mod: PO

## 2018-06-13 NOTE — PROGRESS NOTES
PHYSICAL THERAPY OUTPATIENT TREATMENT    Referring Provider:  Dr. Liane Tijerina    Diagnosis:       ICD-10-CM ICD-9-CM    1. Bilateral leg weakness R29.898 729.89        Orders:  Evaluate and Treat    Date of Initial Evaluation:  18    Orders :  18    Coding Cycle Visit # 2    SUBJECTIVE: Patient reports he felt good after his first treatment. His right knee is bothering him some.    Initial: Patient reports to therapy with weakness in both lower extremities. He states the past couple years he has been getting progressively weaker and at times has pain. His pain usually starts after walking and standing for long periods. The patient states his main concern is he gets tired and wants to strengthen and increase his endurance. The right side feels like it gets tired quicker and he tends to feel more pain on that side. The pain he feels is a burning pain when he works the muscle to much. The knee pain on the right is described as achy. He has no history of knee and pain and hasn't received any therapy before. He also feels because of the weakness he has trouble with his balance.     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  Began Aricept 10 mg at bedtime.    BMI 40.0-44.9, adult     Diabetes mellitus     diet management    Dysmetabolic syndrome X 10/2/2012    Hyperlipidemia     Hypertension     diet managemen    Joint pain     MDD (major depressive disorder) 2015    Midline low back pain without sciatica 2016    Primary osteoarthritis of right knee 2018    Prostate CA     Sleep apnea     cipap    Trouble in sleeping     Type II or unspecified type diabetes mellitus with neurological manifestations, not stated as uncontrolled(250.60)        Patient Active Problem List   Diagnosis    Hyperlipemia    Impotence of organic origin     History of prostate cancer    BMI 40.0-44.9, adult    Aortic atherosclerosis    [place] (hard of hearing)    Tortuous aorta    JESSICA on CPAP    Asbestos exposure    Midline low back pain without sciatica    Amnestic MCI (mild cognitive impairment with memory loss)    Type 2 diabetes mellitus without complication, without long-term current use of insulin    Primary osteoarthritis of right knee    URRUTIA (dyspnea on exertion)    Weakness of both lower extremities    Prostate cancer    Statin myopathy         Current Outpatient Prescriptions:     aspirin (ECOTRIN) 81 MG EC tablet, Take 1 tablet (81 mg total) by mouth every evening., Disp: 100 tablet, Rfl: 3    cholecalciferol, vitamin D3, 5,000 unit capsule, Take 1 capsule by mouth Daily., Disp: , Rfl:     donepezil (ARICEPT) 10 MG tablet, Take 1 tablet (10 mg total) by mouth every evening., Disp: 90 tablet, Rfl: 3    lancets (SOFT TOUCH LANCETS) Misc, 1 Device by Misc.(Non-Drug; Combo Route) route once daily., Disp: 100 each, Rfl: 11    metFORMIN (GLUCOPHAGE) 500 MG tablet, Take two tablets at bedtime, Disp: 180 tablet, Rfl: 3    MULTIVITS-MINERALS/FA/LYCOPENE (ONE-A-DAY MEN'S ORAL), Take 1 tablet by mouth Daily., Disp: , Rfl:     sertraline (ZOLOFT) 50 MG tablet, Take 1 tablet (50 mg total) by mouth every morning. Increase energy and boost mood, Disp: 90 tablet, Rfl: 3    tadalafil (CIALIS) 20 MG Tab, Take 1 tablet (20 mg total) by mouth daily as needed. take as directed, Disp: 10 tablet, Rfl: 5    TRUE METRIX GLUCOSE METER Misc, , Disp: , Rfl:     TRUE METRIX GLUCOSE TEST STRIP Strp, USE STRIP TO CHECK GLUCOSE ONCE DAILY, Disp: 50 strip, Rfl: 3    OBJECTIVE:  Pain: now 0/10, worst 6/10 located in the front of his right knee, described as achy pain.    Sensation:  intact to light touch     Knee ROM:  Flexion     R 110 L 120      Extension   R -5 L -5       Strength:  Quadriceps   R 4-/5 L 4/5     Hamstrings   R 4-/5 L 4/5      Hip Flexor   R 4/5 L  4/5        Ankle Dorsiflexion   R 4/5 L 4/5    Muscle Length:  Decrease hamstring length bilaterally           Function: LOWER EXTREMITY FUNCTIONAL SCALE                EVAL  reeval  1. Any of your usual work, housework or school activities   1/4  2. Your usual hobbies, sporting     0/4  3. Getting in and out of tub      3/4  4. Walking between rooms      1/4  5. Putting on shoes or socks      4/4  6. Squatting        1/4  7. Lifting an object from the ground      4/4  8. Performing light activities around the home   3/4  9. Performing heavy activities around the home   1/4  10. Getting in and out of car      3/4  11. Walking 2 blocks       0/4  12.Walking a mile       0/4  13. Getting up and down 1 flight of stairs    0/4  14. Standing for 1 hour      0/4  15. Sitting for an hour       4/4  16. Running on even ground      0/4  17. Running on uneven ground     0/4  18. Making sharp turns when running fast    0/4  19. Hopping        0/4  20. Rolling over in bed       4/4    Patient reports 31.25% ability based on score of the Lower Extremity Functional Scale.    Functional Limitations and Goal:  This patient's primary physical therapy goal is to return to their prior level of function (Mobility - Walking, Moving Around [id number]) without limitations. The patient's current level of impairment is 60 to 79% impaired (CL) based on their score of 39% on the Lower Extremity Functional Scale. The patient is expected to achieve a score of 60-79% impaired (CL) within 10 treatment days.    KUMAR Assessment    1. Sitting to Standing   3 - able to stand independely using hands  2. Standing Unsupported   4 - able to stand safely 2 minutes without hold  3. Sitting Unsupported   4 - able to sit safely and securely 2 minutes  4. Standing to Sitting   2 - uses back of legs against chair to control descent  5. Pivot Transfer   3 - able to transfer safely with definite use of hands  6. Standing with Eyes Closed   3 - able to stand 10  seconds with supervision  7. Standing with Feet Together   4 - able to place feet together independently and stand 1 minute safely  8. Reaching Forward with Outstretched Arm   3 - can reach forward 12 cm/5 inches safely  9. Retrieving Object from Floor   3 - able to pick slipper but needs supervision  10. Turning to Look Behind   4 - looks behind from both sides and weights shifts well  11. Turning 360 Degrees   2 - able to trun 360 safely but slowly  12. Placing Alternate Foot on Step   3 - able to stand independently and completely 8 steps > 20 seconds  13. Standing with One Foot in Front   0 - Looses balance while stepping or standing  14. Standing on One Foot   1 - tries to lift leg and unable to hold 3 seconds but remains standing independently    Eval 6/7/18: 39/56    Tenderness to palpation:  Over the medial joint line of the right knee    ASSESSMENT:  Patient tolerated today's treatment well with slight fatigue during standing activities.     Initial: The patient is a 78 y.o. year old male who presents to physical therapy with complaints of bilateral lower extremity weakness with pain in the right knee.  Patient's impairments include muscle weakness. Decrease muscle length, decreased joint ROM, and decreased balance.  These impairments are limiting patient's ability to perform house hold activities, ascend and descend stairs, get in and out of the bath, and walk around home and community.  Patient's prognosis is good.  Patient will benefit from skilled physical therapy intervention to increase muscle strength, improve joint ROM, increase balance, increase endurance, and decrease pain.    Co-morbidities which may impact the plan of care and potentially impede the patient's progress in therapy include:  Obesity, chronic stage of condition, DM.     The patient's clinical presentation is stable.  Based on patient's stable clinical presentation, 3+ co-morbidities, and examination of 2 body systems, patient  presents with moderate complexity.    Short Term Goals:  (3 weeks)  1.  Patient will improve R knee flexion ROM to 115 degrees in order to ascend/descend stairs safely.  2.  Patient will report improved function indicated by a score of 38% ability on the Lower Extremity Functional Scale..  3.  Patient will be independent with home exercise program.    Long Term Goals:  (6 weeks)  1.  Patient will improve quad strength bilaterally to 4+/5 in order to get in and out of the bath safely.  2.  Patient will improve Freeman Balance score to 42 in order to improve safety during ambulation.  3.  Patient will report improved function indicated by a score of 45% ability on the Lower Extremity Functional Scale.  4.  Patient will improve hamstring strength bilaterally to 4+/5 in order to get in and out of the car with less difficulty.   5.  Patient will improve R knee flexion ROM to 120 degrees in order bend to  objects off the floor.     TREATMENT PROVIDED:    Initial evaluation completed.    Manual Therapy:  (0 minutes)      Therapeutic Exercise:  (45 minutes)      Heels Slide x30    Hamstring Stretch 2 minutes each side    Step taps 2 minutes    Standing March 3 minutes    LAQ x30    Mini Squats x30    Hip Abd x30    Hip Add x30    Bike 10 minutes    PLAN:  Patient will benefit from physical therapy (3) x/week for (6) weeks including manual therapy, therapeutic exercise, neuromuscular re-education, functional activities, modalities, and patient education.    Thank you for this referral.    These services are reasonable and necessary for the conditions set forth above while under my care.

## 2018-06-15 ENCOUNTER — CLINICAL SUPPORT (OUTPATIENT)
Dept: REHABILITATION | Facility: HOSPITAL | Age: 78
End: 2018-06-15
Payer: MEDICARE

## 2018-06-15 DIAGNOSIS — R29.898 BILATERAL LEG WEAKNESS: Primary | ICD-10-CM

## 2018-06-15 PROCEDURE — 97110 THERAPEUTIC EXERCISES: CPT | Mod: PO

## 2018-06-15 NOTE — PROGRESS NOTES
PHYSICAL THERAPY OUTPATIENT TREATMENT    Referring Provider:  Dr. Liane Tijerina    Diagnosis:       ICD-10-CM ICD-9-CM    1. Bilateral leg weakness R29.898 729.89        Orders:  Evaluate and Treat    Date of Initial Evaluation:  18    Orders :  18    Coding Cycle Visit # 3    SUBJECTIVE: Patient reports that he was a little sore after last treatment but he has found that his knee pain is decreasing.     Initial: Patient reports to therapy with weakness in both lower extremities. He states the past couple years he has been getting progressively weaker and at times has pain. His pain usually starts after walking and standing for long periods. The patient states his main concern is he gets tired and wants to strengthen and increase his endurance. The right side feels like it gets tired quicker and he tends to feel more pain on that side. The pain he feels is a burning pain when he works the muscle to much. The knee pain on the right is described as achy. He has no history of knee and pain and hasn't received any therapy before. He also feels because of the weakness he has trouble with his balance.     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  Began Aricept 10 mg at bedtime.    BMI 40.0-44.9, adult     Diabetes mellitus     diet management    Dysmetabolic syndrome X 10/2/2012    Hyperlipidemia     Hypertension     diet managemen    Joint pain     MDD (major depressive disorder) 2015    Midline low back pain without sciatica 2016    Primary osteoarthritis of right knee 2018    Prostate CA     Sleep apnea     cipap    Trouble in sleeping     Type II or unspecified type diabetes mellitus with neurological manifestations, not stated as uncontrolled(250.60)        Patient Active Problem List   Diagnosis    Hyperlipemia    Impotence of  organic origin    History of prostate cancer    BMI 40.0-44.9, adult    Aortic atherosclerosis    [place] (hard of hearing)    Tortuous aorta    JESSICA on CPAP    Asbestos exposure    Midline low back pain without sciatica    Amnestic MCI (mild cognitive impairment with memory loss)    Type 2 diabetes mellitus without complication, without long-term current use of insulin    Primary osteoarthritis of right knee    URRUTIA (dyspnea on exertion)    Weakness of both lower extremities    Prostate cancer    Statin myopathy         Current Outpatient Prescriptions:     aspirin (ECOTRIN) 81 MG EC tablet, Take 1 tablet (81 mg total) by mouth every evening., Disp: 100 tablet, Rfl: 3    cholecalciferol, vitamin D3, 5,000 unit capsule, Take 1 capsule by mouth Daily., Disp: , Rfl:     donepezil (ARICEPT) 10 MG tablet, Take 1 tablet (10 mg total) by mouth every evening., Disp: 90 tablet, Rfl: 3    lancets (SOFT TOUCH LANCETS) Misc, 1 Device by Misc.(Non-Drug; Combo Route) route once daily., Disp: 100 each, Rfl: 11    metFORMIN (GLUCOPHAGE) 500 MG tablet, Take two tablets at bedtime, Disp: 180 tablet, Rfl: 3    MULTIVITS-MINERALS/FA/LYCOPENE (ONE-A-DAY MEN'S ORAL), Take 1 tablet by mouth Daily., Disp: , Rfl:     sertraline (ZOLOFT) 50 MG tablet, Take 1 tablet (50 mg total) by mouth every morning. Increase energy and boost mood, Disp: 90 tablet, Rfl: 3    tadalafil (CIALIS) 20 MG Tab, Take 1 tablet (20 mg total) by mouth daily as needed. take as directed, Disp: 10 tablet, Rfl: 5    TRUE METRIX GLUCOSE METER Misc, , Disp: , Rfl:     TRUE METRIX GLUCOSE TEST STRIP Strp, USE STRIP TO CHECK GLUCOSE ONCE DAILY, Disp: 50 strip, Rfl: 3    OBJECTIVE:  Pain: now 0/10, worst 6/10 located in the front of his right knee, described as achy pain.    Sensation:  intact to light touch     Knee ROM:  Flexion     R 110 L 120      Extension   R -5 L -5       Strength:  Quadriceps   R 4-/5 L 4/5     Hamstrings   R 4-/5 L 4/5      Hip  Flexor   R 4/5 L 4/5        Ankle Dorsiflexion   R 4/5 L 4/5    Muscle Length:  Decrease hamstring length bilaterally           Function: LOWER EXTREMITY FUNCTIONAL SCALE                EVAL  reeval  1. Any of your usual work, housework or school activities   1/4  2. Your usual hobbies, sporting     0/4  3. Getting in and out of tub      3/4  4. Walking between rooms      1/4  5. Putting on shoes or socks      4/4  6. Squatting        1/4  7. Lifting an object from the ground      4/4  8. Performing light activities around the home   3/4  9. Performing heavy activities around the home   1/4  10. Getting in and out of car      3/4  11. Walking 2 blocks       0/4  12.Walking a mile       0/4  13. Getting up and down 1 flight of stairs    0/4  14. Standing for 1 hour      0/4  15. Sitting for an hour       4/4  16. Running on even ground      0/4  17. Running on uneven ground     0/4  18. Making sharp turns when running fast    0/4  19. Hopping        0/4  20. Rolling over in bed       4/4    Patient reports 31.25% ability based on score of the Lower Extremity Functional Scale.    Functional Limitations and Goal:  This patient's primary physical therapy goal is to return to their prior level of function (Mobility - Walking, Moving Around [id number]) without limitations. The patient's current level of impairment is 60 to 79% impaired (CL) based on their score of 39% on the Lower Extremity Functional Scale. The patient is expected to achieve a score of 60-79% impaired (CL) within 10 treatment days.    KUMAR Assessment    1. Sitting to Standing   3 - able to stand independely using hands  2. Standing Unsupported   4 - able to stand safely 2 minutes without hold  3. Sitting Unsupported   4 - able to sit safely and securely 2 minutes  4. Standing to Sitting   2 - uses back of legs against chair to control descent  5. Pivot Transfer   3 - able to transfer safely with definite use of hands  6. Standing with Eyes Closed   3 - able  to stand 10 seconds with supervision  7. Standing with Feet Together   4 - able to place feet together independently and stand 1 minute safely  8. Reaching Forward with Outstretched Arm   3 - can reach forward 12 cm/5 inches safely  9. Retrieving Object from Floor   3 - able to pick slipper but needs supervision  10. Turning to Look Behind   4 - looks behind from both sides and weights shifts well  11. Turning 360 Degrees   2 - able to trun 360 safely but slowly  12. Placing Alternate Foot on Step   3 - able to stand independently and completely 8 steps > 20 seconds  13. Standing with One Foot in Front   0 - Looses balance while stepping or standing  14. Standing on One Foot   1 - tries to lift leg and unable to hold 3 seconds but remains standing independently    Eval 6/7/18: 39/56    Tenderness to palpation:  Over the medial joint line of the right knee    ASSESSMENT:  Patient tolerated today's treatment well. His right knee was in a little pain, ice after treatment provided relief.     Initial: The patient is a 78 y.o. year old male who presents to physical therapy with complaints of bilateral lower extremity weakness with pain in the right knee.  Patient's impairments include muscle weakness. Decrease muscle length, decreased joint ROM, and decreased balance.  These impairments are limiting patient's ability to perform house hold activities, ascend and descend stairs, get in and out of the bath, and walk around home and community.  Patient's prognosis is good.  Patient will benefit from skilled physical therapy intervention to increase muscle strength, improve joint ROM, increase balance, increase endurance, and decrease pain.    Co-morbidities which may impact the plan of care and potentially impede the patient's progress in therapy include:  Obesity, chronic stage of condition, DM.     The patient's clinical presentation is stable.  Based on patient's stable clinical presentation, 3+ co-morbidities, and  examination of 2 body systems, patient presents with moderate complexity.    Short Term Goals:  (3 weeks)  1.  Patient will improve R knee flexion ROM to 115 degrees in order to ascend/descend stairs safely.  2.  Patient will report improved function indicated by a score of 38% ability on the Lower Extremity Functional Scale..  3.  Patient will be independent with home exercise program.    Long Term Goals:  (6 weeks)  1.  Patient will improve quad strength bilaterally to 4+/5 in order to get in and out of the bath safely.  2.  Patient will improve Freeman Balance score to 42 in order to improve safety during ambulation.  3.  Patient will report improved function indicated by a score of 45% ability on the Lower Extremity Functional Scale.  4.  Patient will improve hamstring strength bilaterally to 4+/5 in order to get in and out of the car with less difficulty.   5.  Patient will improve R knee flexion ROM to 120 degrees in order bend to  objects off the floor.     TREATMENT PROVIDED:    Initial evaluation completed.    Manual Therapy:  (0 minutes)      Therapeutic Exercise:  (55 minutes)      Heels Slide x30    Hamstring Stretch 2 minutes each side    Step taps 2 minutes    Standing March 3 minutes    LAQ x30    Mini Squats x30    Hip Abd x30    Hip Add x30    Bike 10 minutes    Gastroc Stretch 2 minutes    Step Ups 2 minutes    Modalities:  Ice for 10 minutes. Patient tolerated well with no skin irritation.     PLAN:  Patient will benefit from physical therapy (3) x/week for (6) weeks including manual therapy, therapeutic exercise, neuromuscular re-education, functional activities, modalities, and patient education.    Thank you for this referral.    These services are reasonable and necessary for the conditions set forth above while under my care.

## 2018-06-18 ENCOUNTER — CLINICAL SUPPORT (OUTPATIENT)
Dept: REHABILITATION | Facility: HOSPITAL | Age: 78
End: 2018-06-18
Payer: MEDICARE

## 2018-06-18 DIAGNOSIS — R29.898 BILATERAL LEG WEAKNESS: Primary | ICD-10-CM

## 2018-06-18 PROCEDURE — 97110 THERAPEUTIC EXERCISES: CPT | Mod: PO

## 2018-06-18 NOTE — PROGRESS NOTES
PHYSICAL THERAPY OUTPATIENT TREATMENT    Referring Provider:  Dr. Liane Tijerina    Diagnosis:       ICD-10-CM ICD-9-CM    1. Bilateral leg weakness R29.898 729.89        Orders:  Evaluate and Treat    Date of Initial Evaluation:  18    Orders :  18    Coding Cycle Visit # 4    SUBJECTIVE: Patient reports to therapy stating he is feeling good but is still having some clicking in his right knee.     Initial: Patient reports to therapy with weakness in both lower extremities. He states the past couple years he has been getting progressively weaker and at times has pain. His pain usually starts after walking and standing for long periods. The patient states his main concern is he gets tired and wants to strengthen and increase his endurance. The right side feels like it gets tired quicker and he tends to feel more pain on that side. The pain he feels is a burning pain when he works the muscle to much. The knee pain on the right is described as achy. He has no history of knee and pain and hasn't received any therapy before. He also feels because of the weakness he has trouble with his balance.     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  Began Aricept 10 mg at bedtime.    BMI 40.0-44.9, adult     Diabetes mellitus     diet management    Dysmetabolic syndrome X 10/2/2012    Hyperlipidemia     Hypertension     diet managemen    Joint pain     MDD (major depressive disorder) 2015    Midline low back pain without sciatica 2016    Primary osteoarthritis of right knee 2018    Prostate CA     Sleep apnea     cipap    Trouble in sleeping     Type II or unspecified type diabetes mellitus with neurological manifestations, not stated as uncontrolled(250.60)        Patient Active Problem List   Diagnosis    Hyperlipemia    Impotence of organic  origin    History of prostate cancer    BMI 40.0-44.9, adult    Aortic atherosclerosis    [place] (hard of hearing)    Tortuous aorta    JESSICA on CPAP    Asbestos exposure    Midline low back pain without sciatica    Amnestic MCI (mild cognitive impairment with memory loss)    Type 2 diabetes mellitus without complication, without long-term current use of insulin    Primary osteoarthritis of right knee    URRUTIA (dyspnea on exertion)    Weakness of both lower extremities    Prostate cancer    Statin myopathy         Current Outpatient Prescriptions:     aspirin (ECOTRIN) 81 MG EC tablet, Take 1 tablet (81 mg total) by mouth every evening., Disp: 100 tablet, Rfl: 3    cholecalciferol, vitamin D3, 5,000 unit capsule, Take 1 capsule by mouth Daily., Disp: , Rfl:     donepezil (ARICEPT) 10 MG tablet, Take 1 tablet (10 mg total) by mouth every evening., Disp: 90 tablet, Rfl: 3    lancets (SOFT TOUCH LANCETS) Misc, 1 Device by Misc.(Non-Drug; Combo Route) route once daily., Disp: 100 each, Rfl: 11    metFORMIN (GLUCOPHAGE) 500 MG tablet, Take two tablets at bedtime, Disp: 180 tablet, Rfl: 3    MULTIVITS-MINERALS/FA/LYCOPENE (ONE-A-DAY MEN'S ORAL), Take 1 tablet by mouth Daily., Disp: , Rfl:     sertraline (ZOLOFT) 50 MG tablet, Take 1 tablet (50 mg total) by mouth every morning. Increase energy and boost mood, Disp: 90 tablet, Rfl: 3    tadalafil (CIALIS) 20 MG Tab, Take 1 tablet (20 mg total) by mouth daily as needed. take as directed, Disp: 10 tablet, Rfl: 5    TRUE METRIX GLUCOSE METER Misc, , Disp: , Rfl:     TRUE METRIX GLUCOSE TEST STRIP Strp, USE STRIP TO CHECK GLUCOSE ONCE DAILY, Disp: 50 strip, Rfl: 3    OBJECTIVE:  Pain: now 0/10, worst 6/10 located in the front of his right knee, described as achy pain.    Sensation:  intact to light touch     Knee ROM:  Flexion     R 110 L 120      Extension   R -5 L -5       Strength:  Quadriceps   R 4-/5 L 4/5     Hamstrings   R 4-/5 L 4/5      Hip Flexor   R  4/5 L 4/5        Ankle Dorsiflexion   R 4/5 L 4/5    Muscle Length:  Decrease hamstring length bilaterally           Function: LOWER EXTREMITY FUNCTIONAL SCALE                EVAL  reeval  1. Any of your usual work, housework or school activities   1/4  2. Your usual hobbies, sporting     0/4  3. Getting in and out of tub      3/4  4. Walking between rooms      1/4  5. Putting on shoes or socks      4/4  6. Squatting        1/4  7. Lifting an object from the ground      4/4  8. Performing light activities around the home   3/4  9. Performing heavy activities around the home   1/4  10. Getting in and out of car      3/4  11. Walking 2 blocks       0/4  12.Walking a mile       0/4  13. Getting up and down 1 flight of stairs    0/4  14. Standing for 1 hour      0/4  15. Sitting for an hour       4/4  16. Running on even ground      0/4  17. Running on uneven ground     0/4  18. Making sharp turns when running fast    0/4  19. Hopping        0/4  20. Rolling over in bed       4/4    Patient reports 31.25% ability based on score of the Lower Extremity Functional Scale.    Functional Limitations and Goal:  This patient's primary physical therapy goal is to return to their prior level of function (Mobility - Walking, Moving Around [id number]) without limitations. The patient's current level of impairment is 60 to 79% impaired (CL) based on their score of 39% on the Lower Extremity Functional Scale. The patient is expected to achieve a score of 60-79% impaired (CL) within 10 treatment days.    KUMAR Assessment    1. Sitting to Standing   3 - able to stand independely using hands  2. Standing Unsupported   4 - able to stand safely 2 minutes without hold  3. Sitting Unsupported   4 - able to sit safely and securely 2 minutes  4. Standing to Sitting   2 - uses back of legs against chair to control descent  5. Pivot Transfer   3 - able to transfer safely with definite use of hands  6. Standing with Eyes Closed   3 - able to stand 10  seconds with supervision  7. Standing with Feet Together   4 - able to place feet together independently and stand 1 minute safely  8. Reaching Forward with Outstretched Arm   3 - can reach forward 12 cm/5 inches safely  9. Retrieving Object from Floor   3 - able to pick slipper but needs supervision  10. Turning to Look Behind   4 - looks behind from both sides and weights shifts well  11. Turning 360 Degrees   2 - able to trun 360 safely but slowly  12. Placing Alternate Foot on Step   3 - able to stand independently and completely 8 steps > 20 seconds  13. Standing with One Foot in Front   0 - Looses balance while stepping or standing  14. Standing on One Foot   1 - tries to lift leg and unable to hold 3 seconds but remains standing independently    Eval 6/7/18: 39/56    Tenderness to palpation:  Over the medial joint line of the right knee    ASSESSMENT:  Patient states today's treatment was good and he thinks we can add more exercises next visit. Standing tolerance is increasing.     Initial: The patient is a 78 y.o. year old male who presents to physical therapy with complaints of bilateral lower extremity weakness with pain in the right knee.  Patient's impairments include muscle weakness. Decrease muscle length, decreased joint ROM, and decreased balance.  These impairments are limiting patient's ability to perform house hold activities, ascend and descend stairs, get in and out of the bath, and walk around home and community.  Patient's prognosis is good.  Patient will benefit from skilled physical therapy intervention to increase muscle strength, improve joint ROM, increase balance, increase endurance, and decrease pain.    Co-morbidities which may impact the plan of care and potentially impede the patient's progress in therapy include:  Obesity, chronic stage of condition, DM.     The patient's clinical presentation is stable.  Based on patient's stable clinical presentation, 3+ co-morbidities, and  examination of 2 body systems, patient presents with moderate complexity.    Short Term Goals:  (3 weeks)  1.  Patient will improve R knee flexion ROM to 115 degrees in order to ascend/descend stairs safely.  2.  Patient will report improved function indicated by a score of 38% ability on the Lower Extremity Functional Scale..  3.  Patient will be independent with home exercise program.    Long Term Goals:  (6 weeks)  1.  Patient will improve quad strength bilaterally to 4+/5 in order to get in and out of the bath safely.  2.  Patient will improve Freeman Balance score to 42 in order to improve safety during ambulation.  3.  Patient will report improved function indicated by a score of 45% ability on the Lower Extremity Functional Scale.  4.  Patient will improve hamstring strength bilaterally to 4+/5 in order to get in and out of the car with less difficulty.   5.  Patient will improve R knee flexion ROM to 120 degrees in order bend to  objects off the floor.     TREATMENT PROVIDED:    Initial evaluation completed.    Manual Therapy:  (0 minutes)      Therapeutic Exercise:  (56 minutes)      Heels Slide x30    Hamstring Stretch 2 minutes each side    Step taps 2 minutes    Standing March 3 minutes    LAQ x30    Mini Squats x30    Hip Abd x30    Hip Add x30    Bike 10 minutes    Gastroc Stretch 2 minutes    Step Ups 2 minutes    Glute set x30    Abdominal Isometric x30    Modalities:  Ice for 10 minutes. Patient tolerated well with no skin irritation or adverse reaction.    PLAN:  Patient will benefit from physical therapy (3) x/week for (6) weeks including manual therapy, therapeutic exercise, neuromuscular re-education, functional activities, modalities, and patient education.    Thank you for this referral.    These services are reasonable and necessary for the conditions set forth above while under my care.

## 2018-06-20 ENCOUNTER — CLINICAL SUPPORT (OUTPATIENT)
Dept: REHABILITATION | Facility: HOSPITAL | Age: 78
End: 2018-06-20
Payer: MEDICARE

## 2018-06-20 DIAGNOSIS — R29.898 BILATERAL LEG WEAKNESS: Primary | ICD-10-CM

## 2018-06-20 PROCEDURE — 97110 THERAPEUTIC EXERCISES: CPT | Mod: PO

## 2018-06-20 NOTE — PROGRESS NOTES
PHYSICAL THERAPY OUTPATIENT TREATMENT    Referring Provider:  Dr. Liane Tijerina    Diagnosis:       ICD-10-CM ICD-9-CM    1. Bilateral leg weakness R29.898 729.89        Orders:  Evaluate and Treat    Date of Initial Evaluation:  18    Orders :  18    Coding Cycle Visit # 5    SUBJECTIVE: Patient states he isn't having any pain and the clicking in his right knee is decreasing.     Initial: Patient reports to therapy with weakness in both lower extremities. He states the past couple years he has been getting progressively weaker and at times has pain. His pain usually starts after walking and standing for long periods. The patient states his main concern is he gets tired and wants to strengthen and increase his endurance. The right side feels like it gets tired quicker and he tends to feel more pain on that side. The pain he feels is a burning pain when he works the muscle to much. The knee pain on the right is described as achy. He has no history of knee and pain and hasn't received any therapy before. He also feels because of the weakness he has trouble with his balance.     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  Began Aricept 10 mg at bedtime.    BMI 40.0-44.9, adult     Diabetes mellitus     diet management    Dysmetabolic syndrome X 10/2/2012    Hyperlipidemia     Hypertension     diet managemen    Joint pain     MDD (major depressive disorder) 2015    Midline low back pain without sciatica 2016    Primary osteoarthritis of right knee 2018    Prostate CA     Sleep apnea     cipap    Trouble in sleeping     Type II or unspecified type diabetes mellitus with neurological manifestations, not stated as uncontrolled(250.60)        Patient Active Problem List   Diagnosis    Hyperlipemia    Impotence of organic origin     History of prostate cancer    BMI 40.0-44.9, adult    Aortic atherosclerosis    [place] (hard of hearing)    Tortuous aorta    JESSICA on CPAP    Asbestos exposure    Midline low back pain without sciatica    Amnestic MCI (mild cognitive impairment with memory loss)    Type 2 diabetes mellitus without complication, without long-term current use of insulin    Primary osteoarthritis of right knee    URRUTIA (dyspnea on exertion)    Weakness of both lower extremities    Prostate cancer    Statin myopathy         Current Outpatient Prescriptions:     aspirin (ECOTRIN) 81 MG EC tablet, Take 1 tablet (81 mg total) by mouth every evening., Disp: 100 tablet, Rfl: 3    cholecalciferol, vitamin D3, 5,000 unit capsule, Take 1 capsule by mouth Daily., Disp: , Rfl:     donepezil (ARICEPT) 10 MG tablet, Take 1 tablet (10 mg total) by mouth every evening., Disp: 90 tablet, Rfl: 3    lancets (SOFT TOUCH LANCETS) Misc, 1 Device by Misc.(Non-Drug; Combo Route) route once daily., Disp: 100 each, Rfl: 11    metFORMIN (GLUCOPHAGE) 500 MG tablet, Take two tablets at bedtime, Disp: 180 tablet, Rfl: 3    MULTIVITS-MINERALS/FA/LYCOPENE (ONE-A-DAY MEN'S ORAL), Take 1 tablet by mouth Daily., Disp: , Rfl:     sertraline (ZOLOFT) 50 MG tablet, Take 1 tablet (50 mg total) by mouth every morning. Increase energy and boost mood, Disp: 90 tablet, Rfl: 3    tadalafil (CIALIS) 20 MG Tab, Take 1 tablet (20 mg total) by mouth daily as needed. take as directed, Disp: 10 tablet, Rfl: 5    TRUE METRIX GLUCOSE METER Misc, , Disp: , Rfl:     TRUE METRIX GLUCOSE TEST STRIP Strp, USE STRIP TO CHECK GLUCOSE ONCE DAILY, Disp: 50 strip, Rfl: 3    OBJECTIVE:  Pain: now 0/10, worst 6/10 located in the front of his right knee, described as achy pain.    Sensation:  intact to light touch     Knee ROM:  Flexion     R 110 L 120      Extension   R -5 L -5       Strength:  Quadriceps   R 4-/5 L 4/5     Hamstrings   R 4-/5 L 4/5      Hip Flexor   R 4/5 L  4/5        Ankle Dorsiflexion   R 4/5 L 4/5    Muscle Length:  Decrease hamstring length bilaterally           Function: LOWER EXTREMITY FUNCTIONAL SCALE                EVAL  reeval  1. Any of your usual work, housework or school activities   1/4  2. Your usual hobbies, sporting     0/4  3. Getting in and out of tub      3/4  4. Walking between rooms      1/4  5. Putting on shoes or socks      4/4  6. Squatting        1/4  7. Lifting an object from the ground      4/4  8. Performing light activities around the home   3/4  9. Performing heavy activities around the home   1/4  10. Getting in and out of car      3/4  11. Walking 2 blocks       0/4  12.Walking a mile       0/4  13. Getting up and down 1 flight of stairs    0/4  14. Standing for 1 hour      0/4  15. Sitting for an hour       4/4  16. Running on even ground      0/4  17. Running on uneven ground     0/4  18. Making sharp turns when running fast    0/4  19. Hopping        0/4  20. Rolling over in bed       4/4    Patient reports 31.25% ability based on score of the Lower Extremity Functional Scale.    Functional Limitations and Goal:  This patient's primary physical therapy goal is to return to their prior level of function (Mobility - Walking, Moving Around [id number]) without limitations. The patient's current level of impairment is 60 to 79% impaired (CL) based on their score of 39% on the Lower Extremity Functional Scale. The patient is expected to achieve a score of 60-79% impaired (CL) within 10 treatment days.    KUMAR Assessment    1. Sitting to Standing   3 - able to stand independely using hands  2. Standing Unsupported   4 - able to stand safely 2 minutes without hold  3. Sitting Unsupported   4 - able to sit safely and securely 2 minutes  4. Standing to Sitting   2 - uses back of legs against chair to control descent  5. Pivot Transfer   3 - able to transfer safely with definite use of hands  6. Standing with Eyes Closed   3 - able to stand 10  seconds with supervision  7. Standing with Feet Together   4 - able to place feet together independently and stand 1 minute safely  8. Reaching Forward with Outstretched Arm   3 - can reach forward 12 cm/5 inches safely  9. Retrieving Object from Floor   3 - able to pick slipper but needs supervision  10. Turning to Look Behind   4 - looks behind from both sides and weights shifts well  11. Turning 360 Degrees   2 - able to trun 360 safely but slowly  12. Placing Alternate Foot on Step   3 - able to stand independently and completely 8 steps > 20 seconds  13. Standing with One Foot in Front   0 - Looses balance while stepping or standing  14. Standing on One Foot   1 - tries to lift leg and unable to hold 3 seconds but remains standing independently    Eval 6/7/18: 39/56    Tenderness to palpation:  Over the medial joint line of the right knee    ASSESSMENT:  Patient tolerated today's treatment well. Single leg balance was added and patient tolerated with increased fatigue.     Initial: The patient is a 78 y.o. year old male who presents to physical therapy with complaints of bilateral lower extremity weakness with pain in the right knee.  Patient's impairments include muscle weakness. Decrease muscle length, decreased joint ROM, and decreased balance.  These impairments are limiting patient's ability to perform house hold activities, ascend and descend stairs, get in and out of the bath, and walk around home and community.  Patient's prognosis is good.  Patient will benefit from skilled physical therapy intervention to increase muscle strength, improve joint ROM, increase balance, increase endurance, and decrease pain.    Co-morbidities which may impact the plan of care and potentially impede the patient's progress in therapy include:  Obesity, chronic stage of condition, DM.     The patient's clinical presentation is stable.  Based on patient's stable clinical presentation, 3+ co-morbidities, and examination of 2  body systems, patient presents with moderate complexity.    Short Term Goals:  (3 weeks)  1.  Patient will improve R knee flexion ROM to 115 degrees in order to ascend/descend stairs safely.  2.  Patient will report improved function indicated by a score of 38% ability on the Lower Extremity Functional Scale..  3.  Patient will be independent with home exercise program.    Long Term Goals:  (6 weeks)  1.  Patient will improve quad strength bilaterally to 4+/5 in order to get in and out of the bath safely.  2.  Patient will improve Freeman Balance score to 42 in order to improve safety during ambulation.  3.  Patient will report improved function indicated by a score of 45% ability on the Lower Extremity Functional Scale.  4.  Patient will improve hamstring strength bilaterally to 4+/5 in order to get in and out of the car with less difficulty.   5.  Patient will improve R knee flexion ROM to 120 degrees in order bend to  objects off the floor.     TREATMENT PROVIDED:    Initial evaluation completed.    Manual Therapy:  (0 minutes)      Therapeutic Exercise:  (56 minutes)      Heels Slide x30    Hamstring Stretch 2 minutes each side    Step taps 2 minutes    Standing March 3 minutes    LAQ x30    Mini Squats x30    Hip Abd x30    Hip Add x30    Bike 10 minutes    Gastroc Stretch 2 minutes    Step Ups 2 minutes    Glute set x30    Abdominal Isometric x30    Modalities:  Ice for 10 minutes. Patient tolerated well with no skin irritation or adverse reaction.    PLAN:  Patient will benefit from physical therapy (3) x/week for (6) weeks including manual therapy, therapeutic exercise, neuromuscular re-education, functional activities, modalities, and patient education.    Thank you for this referral.    These services are reasonable and necessary for the conditions set forth above while under my care.

## 2018-06-21 ENCOUNTER — TELEPHONE (OUTPATIENT)
Dept: PULMONOLOGY | Facility: CLINIC | Age: 78
End: 2018-06-21

## 2018-06-21 NOTE — TELEPHONE ENCOUNTER
----- Message from Lobito Pavon sent at 6/21/2018  3:09 PM CDT -----  Contact: pt   Pt has run out of tubing and headgear and would like to  more at [address] [place] [address] if possible.          ..[phone] (home) [phone]

## 2018-06-22 ENCOUNTER — CLINICAL SUPPORT (OUTPATIENT)
Dept: REHABILITATION | Facility: HOSPITAL | Age: 78
End: 2018-06-22
Payer: MEDICARE

## 2018-06-22 DIAGNOSIS — G47.33 OSA ON CPAP: Primary | ICD-10-CM

## 2018-06-22 DIAGNOSIS — R29.898 BILATERAL LEG WEAKNESS: Primary | ICD-10-CM

## 2018-06-22 PROCEDURE — 97110 THERAPEUTIC EXERCISES: CPT | Mod: PO

## 2018-06-22 NOTE — PROGRESS NOTES
PHYSICAL THERAPY OUTPATIENT TREATMENT    Referring Provider:  Dr. Liane Tijerina    Diagnosis:       ICD-10-CM ICD-9-CM    1. Bilateral leg weakness R29.898 729.89        Orders:  Evaluate and Treat    Date of Initial Evaluation:  18    Orders :  18    Coding Cycle Visit # 6    SUBJECTIVE: Patient reports he is feeling good today.     Initial: Patient reports to therapy with weakness in both lower extremities. He states the past couple years he has been getting progressively weaker and at times has pain. His pain usually starts after walking and standing for long periods. The patient states his main concern is he gets tired and wants to strengthen and increase his endurance. The right side feels like it gets tired quicker and he tends to feel more pain on that side. The pain he feels is a burning pain when he works the muscle to much. The knee pain on the right is described as achy. He has no history of knee and pain and hasn't received any therapy before. He also feels because of the weakness he has trouble with his balance.     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  Began Aricept 10 mg at bedtime.    BMI 40.0-44.9, adult     Diabetes mellitus     diet management    Dysmetabolic syndrome X 10/2/2012    Hyperlipidemia     Hypertension     diet managemen    Joint pain     MDD (major depressive disorder) 2015    Midline low back pain without sciatica 2016    Primary osteoarthritis of right knee 2018    Prostate CA     Sleep apnea     cipap    Trouble in sleeping     Type II or unspecified type diabetes mellitus with neurological manifestations, not stated as uncontrolled(250.60)        Patient Active Problem List   Diagnosis    Hyperlipemia    Impotence of organic origin    History of prostate cancer    BMI 40.0-44.9, adult     Aortic atherosclerosis    Douglas (hard of hearing)    Tortuous aorta    JESSICA on CPAP    Asbestos exposure    Midline low back pain without sciatica    Amnestic MCI (mild cognitive impairment with memory loss)    Type 2 diabetes mellitus without complication, without long-term current use of insulin    Primary osteoarthritis of right knee    URRUTIA (dyspnea on exertion)    Weakness of both lower extremities    Prostate cancer    Statin myopathy         Current Outpatient Prescriptions:     aspirin (ECOTRIN) 81 MG EC tablet, Take 1 tablet (81 mg total) by mouth every evening., Disp: 100 tablet, Rfl: 3    cholecalciferol, vitamin D3, 5,000 unit capsule, Take 1 capsule by mouth Daily., Disp: , Rfl:     donepezil (ARICEPT) 10 MG tablet, Take 1 tablet (10 mg total) by mouth every evening., Disp: 90 tablet, Rfl: 3    lancets (SOFT TOUCH LANCETS) Misc, 1 Device by Misc.(Non-Drug; Combo Route) route once daily., Disp: 100 each, Rfl: 11    metFORMIN (GLUCOPHAGE) 500 MG tablet, Take two tablets at bedtime, Disp: 180 tablet, Rfl: 3    MULTIVITS-MINERALS/FA/LYCOPENE (ONE-A-DAY MEN'S ORAL), Take 1 tablet by mouth Daily., Disp: , Rfl:     sertraline (ZOLOFT) 50 MG tablet, Take 1 tablet (50 mg total) by mouth every morning. Increase energy and boost mood, Disp: 90 tablet, Rfl: 3    tadalafil (CIALIS) 20 MG Tab, Take 1 tablet (20 mg total) by mouth daily as needed. take as directed, Disp: 10 tablet, Rfl: 5    TRUE METRIX GLUCOSE METER Misc, , Disp: , Rfl:     TRUE METRIX GLUCOSE TEST STRIP Strp, USE STRIP TO CHECK GLUCOSE ONCE DAILY, Disp: 50 strip, Rfl: 3    OBJECTIVE:  Pain: now 0/10, worst 6/10 located in the front of his right knee, described as achy pain.    Sensation:  intact to light touch     Knee ROM:  Flexion     R 110 L 120      Extension   R -5 L -5       Strength:  Quadriceps   R 4-/5 L 4/5     Hamstrings   R 4-/5 L 4/5      Hip Flexor   R 4/5 L 4/5        Ankle Dorsiflexion   R 4/5 L 4/5    Muscle  Length:  Decrease hamstring length bilaterally           Function: LOWER EXTREMITY FUNCTIONAL SCALE                EVAL  reeval  1. Any of your usual work, housework or school activities   1/4  2. Your usual hobbies, sporting     0/4  3. Getting in and out of tub      3/4  4. Walking between rooms      1/4  5. Putting on shoes or socks      4/4  6. Squatting        1/4  7. Lifting an object from the ground      4/4  8. Performing light activities around the home   3/4  9. Performing heavy activities around the home   1/4  10. Getting in and out of car      3/4  11. Walking 2 blocks       0/4  12.Walking a mile       0/4  13. Getting up and down 1 flight of stairs    0/4  14. Standing for 1 hour      0/4  15. Sitting for an hour       4/4  16. Running on even ground      0/4  17. Running on uneven ground     0/4  18. Making sharp turns when running fast    0/4  19. Hopping        0/4  20. Rolling over in bed       4/4    Patient reports 31.25% ability based on score of the Lower Extremity Functional Scale.    Functional Limitations and Goal:  This patient's primary physical therapy goal is to return to their prior level of function (Mobility - Walking, Moving Around [id number]) without limitations. The patient's current level of impairment is 60 to 79% impaired (CL) based on their score of 39% on the Lower Extremity Functional Scale. The patient is expected to achieve a score of 60-79% impaired (CL) within 10 treatment days.    KUMAR Assessment    1. Sitting to Standing   3 - able to stand independely using hands  2. Standing Unsupported   4 - able to stand safely 2 minutes without hold  3. Sitting Unsupported   4 - able to sit safely and securely 2 minutes  4. Standing to Sitting   2 - uses back of legs against chair to control descent  5. Pivot Transfer   3 - able to transfer safely with definite use of hands  6. Standing with Eyes Closed   3 - able to stand 10 seconds with supervision  7. Standing with Feet  Together   4 - able to place feet together independently and stand 1 minute safely  8. Reaching Forward with Outstretched Arm   3 - can reach forward 12 cm/5 inches safely  9. Retrieving Object from Floor   3 - able to pick slipper but needs supervision  10. Turning to Look Behind   4 - looks behind from both sides and weights shifts well  11. Turning 360 Degrees   2 - able to trun 360 safely but slowly  12. Placing Alternate Foot on Step   3 - able to stand independently and completely 8 steps > 20 seconds  13. Standing with One Foot in Front   0 - Looses balance while stepping or standing  14. Standing on One Foot   1 - tries to lift leg and unable to hold 3 seconds but remains standing independently    Eval 6/7/18: 39/56    Tenderness to palpation:  Over the medial joint line of the right knee    ASSESSMENT:  Patient tolerated treatment well. Endurance exercises still fatigue him quickly.     Initial: The patient is a 78 y.o. year old male who presents to physical therapy with complaints of bilateral lower extremity weakness with pain in the right knee.  Patient's impairments include muscle weakness. Decrease muscle length, decreased joint ROM, and decreased balance.  These impairments are limiting patient's ability to perform house hold activities, ascend and descend stairs, get in and out of the bath, and walk around home and community.  Patient's prognosis is good.  Patient will benefit from skilled physical therapy intervention to increase muscle strength, improve joint ROM, increase balance, increase endurance, and decrease pain.    Co-morbidities which may impact the plan of care and potentially impede the patient's progress in therapy include:  Obesity, chronic stage of condition, DM.     The patient's clinical presentation is stable.  Based on patient's stable clinical presentation, 3+ co-morbidities, and examination of 2 body systems, patient presents with moderate complexity.    Short Term Goals:  (3  weeks)  1.  Patient will improve R knee flexion ROM to 115 degrees in order to ascend/descend stairs safely.  2.  Patient will report improved function indicated by a score of 38% ability on the Lower Extremity Functional Scale..  3.  Patient will be independent with home exercise program.    Long Term Goals:  (6 weeks)  1.  Patient will improve quad strength bilaterally to 4+/5 in order to get in and out of the bath safely.  2.  Patient will improve Freeman Balance score to 42 in order to improve safety during ambulation.  3.  Patient will report improved function indicated by a score of 45% ability on the Lower Extremity Functional Scale.  4.  Patient will improve hamstring strength bilaterally to 4+/5 in order to get in and out of the car with less difficulty.   5.  Patient will improve R knee flexion ROM to 120 degrees in order bend to  objects off the floor.     TREATMENT PROVIDED:    Initial evaluation completed.    Manual Therapy:  (0 minutes)      Therapeutic Exercise:  (55 minutes)      Heels Slide x30    Hamstring Stretch 2 minutes each side    Step taps 2 minutes    Standing March 3 minutes    LAQ x30    Mini Squats x30    Hip Abd x30    Hip Add x30    Bike 10 minutes    Gastroc Stretch 2 minutes    Step Ups 2 minutes    Glute set x30    Abdominal Isometric x30    Modalities:  Ice for 10 minutes. Patient tolerated well with no skin irritation or adverse reaction.    PLAN:  Patient will benefit from physical therapy (3) x/week for (6) weeks including manual therapy, therapeutic exercise, neuromuscular re-education, functional activities, modalities, and patient education.    Thank you for this referral.    These services are reasonable and necessary for the conditions set forth above while under my care.

## 2018-06-25 ENCOUNTER — OFFICE VISIT (OUTPATIENT)
Dept: INTERNAL MEDICINE | Facility: CLINIC | Age: 78
End: 2018-06-25
Payer: MEDICARE

## 2018-06-25 VITALS
BODY MASS INDEX: 42.66 KG/M2 | DIASTOLIC BLOOD PRESSURE: 60 MMHG | OXYGEN SATURATION: 96 % | SYSTOLIC BLOOD PRESSURE: 110 MMHG | WEIGHT: 315 LBS | HEART RATE: 76 BPM | HEIGHT: 72 IN

## 2018-06-25 DIAGNOSIS — Z77.090 ASBESTOS EXPOSURE: ICD-10-CM

## 2018-06-25 DIAGNOSIS — R29.898 WEAKNESS OF BOTH LOWER EXTREMITIES: ICD-10-CM

## 2018-06-25 DIAGNOSIS — E66.9 DIABETES MELLITUS TYPE 2 IN OBESE: ICD-10-CM

## 2018-06-25 DIAGNOSIS — G47.33 OSA ON CPAP: ICD-10-CM

## 2018-06-25 DIAGNOSIS — H90.0 CONDUCTIVE HEARING LOSS, BILATERAL: ICD-10-CM

## 2018-06-25 DIAGNOSIS — E11.69 DIABETES MELLITUS TYPE 2 IN OBESE: ICD-10-CM

## 2018-06-25 DIAGNOSIS — G31.84 AMNESTIC MCI (MILD COGNITIVE IMPAIRMENT WITH MEMORY LOSS): ICD-10-CM

## 2018-06-25 DIAGNOSIS — E11.9 TYPE 2 DIABETES MELLITUS WITHOUT COMPLICATION, WITHOUT LONG-TERM CURRENT USE OF INSULIN: ICD-10-CM

## 2018-06-25 DIAGNOSIS — I77.1 TORTUOUS AORTA: ICD-10-CM

## 2018-06-25 DIAGNOSIS — M17.11 PRIMARY OSTEOARTHRITIS OF RIGHT KNEE: ICD-10-CM

## 2018-06-25 DIAGNOSIS — E78.5 HYPERLIPIDEMIA, UNSPECIFIED HYPERLIPIDEMIA TYPE: ICD-10-CM

## 2018-06-25 DIAGNOSIS — I70.0 AORTIC ATHEROSCLEROSIS: ICD-10-CM

## 2018-06-25 DIAGNOSIS — N52.9 IMPOTENCE OF ORGANIC ORIGIN: ICD-10-CM

## 2018-06-25 DIAGNOSIS — Z00.00 ENCOUNTER FOR PREVENTIVE HEALTH EXAMINATION: Primary | ICD-10-CM

## 2018-06-25 DIAGNOSIS — R06.09 DOE (DYSPNEA ON EXERTION): ICD-10-CM

## 2018-06-25 DIAGNOSIS — Z85.46 HISTORY OF PROSTATE CANCER: ICD-10-CM

## 2018-06-25 PROBLEM — C61 PROSTATE CANCER: Status: RESOLVED | Noted: 2018-05-23 | Resolved: 2018-06-25

## 2018-06-25 PROCEDURE — 99999 PR PBB SHADOW E&M-EST. PATIENT-LVL IV: CPT | Mod: PBBFAC,,, | Performed by: NURSE PRACTITIONER

## 2018-06-25 PROCEDURE — 99499 UNLISTED E&M SERVICE: CPT | Mod: HCNC,S$GLB,, | Performed by: NURSE PRACTITIONER

## 2018-06-25 PROCEDURE — G0439 PPPS, SUBSEQ VISIT: HCPCS | Mod: S$GLB,,, | Performed by: NURSE PRACTITIONER

## 2018-06-25 NOTE — PATIENT INSTRUCTIONS
Counseling and Referral of Other Preventative  (Italic type indicates deductible and co-insurance are waived)    Patient Name: Elan Yepez  Today's Date: 6/25/2018    Health Maintenance       Date Due Completion Date    Urine Microalbumin 06/09/2016 6/9/2015    Eye Exam 06/22/2018-will schedule 6/22/2017    Influenza Vaccine 08/01/2018 2/3/2017    Override on 3/10/2016: Declined (Patient will wait till next flu season)    Hemoglobin A1c 08/13/2018 2/13/2018    Foot Exam 05/23/2019 5/23/2018 (Done)    Override on 5/23/2018: Done    Override on 6/22/2017: Done    Override on 11/7/2016: Done    Override on 11/13/2013: Done    Lipid Panel 05/23/2019 5/23/2018 (ClinicallyNA)    Override on 5/23/2018: Not Clinically Appropriate    TETANUS VACCINE 06/25/2028 6/25/2018 (Declined)    Override on 6/25/2018: Declined        No orders of the defined types were placed in this encounter.    The following information is provided to all patients.  This information is to help you find resources for any of the problems found today that may be affecting your health:                Living healthy guide: www.[facility].[place].gov      Understanding Diabetes: www.diabetes.org      Eating healthy: www.cdc.gov/healthyweight      CDC home safety checklist: www.cdc.gov/steadi/patient.html      Agency on Aging: www.goea.[place].[facility]      Alcoholics anonymous (AA): www.aa.org      Physical Activity: www.anup.nih.gov/gk5fhjv      Tobacco use: www.quitwithusla.org

## 2018-06-25 NOTE — PROGRESS NOTES
Elan Yepez presented for a  Medicare AWV and comprehensive Health Risk Assessment today. The following components were reviewed and updated:    · Medical history  · Family History  · Social history  · Allergies and Current Medications  · Health Risk Assessment  · Health Maintenance  · Care Team     ** See Completed Assessments for Annual Wellness Visit within the encounter summary.**       The following assessments were completed:  · Living Situation  · CAGE  · Depression Screening  · Timed Get Up and Go  · Whisper Test  · Cognitive Function Screening*Amnestic MCI  · Nutrition Screening  · ADL Screening  · PAQ Screening    Vitals:    06/25/18 0857   BP: 110/60   Pulse: 76   SpO2: 96%   Weight: (!) 143.1 kg (315 lb 7.7 oz)   Height: 6' (1.829 m)     Body mass index is 42.79 kg/m².  Physical Exam   Constitutional: He is oriented to person, place, and time. He appears well-developed.   obese   HENT:   Head: Normocephalic and atraumatic.   Nose: Nose normal.   Eyes: Conjunctivae and EOM are normal.   Cardiovascular: Normal rate, regular rhythm, normal heart sounds and intact distal pulses.    Pulmonary/Chest: Effort normal and breath sounds normal. He has no wheezes.   Abdominal: Soft.   Musculoskeletal:   Decreased strength BLE   Neurological: He is alert and oriented to person, place, and time.   Skin: Skin is warm and dry.   Psychiatric: He has a normal mood and affect. His behavior is normal. Judgment and thought content normal.   Nursing note and vitals reviewed.        Diagnoses and health risks identified today and associated recommendations/orders:    1. Encounter for preventive health examination  Assessment performed. Health maintenance updated. Chart review completed.    2. Tortuous aorta  Noted on imaging. Stable with blood pressure control. Followed by PCP.    3. History of prostate cancer  Stable. Followed by PCP and Radiation Oncology.    4. Aortic atherosclerosis  Noted on imaging. Stable with blood  pressure control. Followed by PCP.    5. Amnestic MCI (mild cognitive impairment with memory loss)  Chronic. Stable with current regimen. Followed by PCP.  Reports being out of Aricept. Will send refill request to PCP.    6. Conductive hearing loss, bilateral  Chronic. Does not wear hearing aids. Followed by Audiology.  Last visit 2017.    7. Hyperlipidemia, unspecified hyperlipidemia type  Component      Latest Ref Rng & Units 3/21/2017   Cholesterol      120 - 199 mg/dL 112 (L)   Triglycerides      30 - 150 mg/dL 85   HDL      40 - 75 mg/dL 37 (L)   LDL Cholesterol      63.0 - 159.0 mg/dL 58.0 (L)   HDL/Chol Ratio      20.0 - 50.0 % 33.0   Total Cholesterol/HDL Ratio      2.0 - 5.0 3.0   Non-HDL Cholesterol      mg/dL 75   Chronic. Stable. Not currently on statin therapy. Followed by PCP.    8. Diabetes mellitus type 2 in obese  Component      Latest Ref Rng & Units 2/13/2018   Hemoglobin A1C      4.0 - 5.6 % 6.1 (H)   Stable. Controlled. Active PT. Followed by PCP and Diabetes Management.    9. Impotence of organic origin  Stable. Continue current regimen as instructed. Followed by PCP.    10. Type 2 diabetes mellitus without complication, without long-term current use of insulin  Component      Latest Ref Rng & Units 2/13/2018   Hemoglobin A1C      4.0 - 5.6 % 6.1 (H)   Stable. Well controlled. Followed by PCP and Diabetes Management.     11. BMI 40.0-44.9, adult  Chronic. Continue current regimen. Followed by PT.    12. Primary osteoarthritis of right knee  Chronic. Continue current regimen. Followed by PT.    13. Weakness of both lower extremities  Chronic. Continue current regimen. Followed by PT.    14. Asbestos exposure  Chronic. Stable. Followed by Pulmonology.    15. URRUTIA (dyspnea on exertion)  Chronic. Stable. Followed by Pulmonology.    16. JESSICA on CPAP  Chronic. Stable. Followed by Pulmonology.  Will likely be fitted for new equipment at next visit with sleep medicine this week.    Ervin Ansari with a  5-10 year written screening schedule and personal prevention plan. Recommendations were developed using the USPSTF age appropriate recommendations. Education, counseling, and referrals were provided as needed. After Visit Summary printed and given to patient which includes a list of additional screenings\tests needed.    Follow-up for follow up with Primary Care Provider as instructed, ;sooner if problems, HRA in 1 year.    HUNTER Medrano

## 2018-06-26 ENCOUNTER — CLINICAL SUPPORT (OUTPATIENT)
Dept: REHABILITATION | Facility: HOSPITAL | Age: 78
End: 2018-06-26
Payer: MEDICARE

## 2018-06-26 DIAGNOSIS — R29.898 BILATERAL LEG WEAKNESS: Primary | ICD-10-CM

## 2018-06-26 PROCEDURE — 97110 THERAPEUTIC EXERCISES: CPT | Mod: PO

## 2018-06-26 RX ORDER — DONEPEZIL HYDROCHLORIDE 10 MG/1
10 TABLET, FILM COATED ORAL NIGHTLY
Qty: 90 TABLET | Refills: 3 | Status: SHIPPED | OUTPATIENT
Start: 2018-06-26 | End: 2019-06-05 | Stop reason: SDUPTHER

## 2018-06-26 NOTE — PROGRESS NOTES
PHYSICAL THERAPY OUTPATIENT TREATMENT    Referring Provider:  Dr. Liane Tijerina    Diagnosis:       ICD-10-CM ICD-9-CM    1. Bilateral leg weakness R29.898 729.89        Orders:  Evaluate and Treat    Date of Initial Evaluation:  18    Orders :  18    Coding Cycle Visit # 7    SUBJECTIVE: Patient reports he is feeling good today.     Initial: Patient reports to therapy with weakness in both lower extremities. He states the past couple years he has been getting progressively weaker and at times has pain. His pain usually starts after walking and standing for long periods. The patient states his main concern is he gets tired and wants to strengthen and increase his endurance. The right side feels like it gets tired quicker and he tends to feel more pain on that side. The pain he feels is a burning pain when he works the muscle to much. The knee pain on the right is described as achy. He has no history of knee and pain and hasn't received any therapy before. He also feels because of the weakness he has trouble with his balance.     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  Began Aricept 10 mg at bedtime.    BMI 40.0-44.9, adult     Diabetes mellitus     diet management    Dysmetabolic syndrome X 10/2/2012    Hyperlipidemia     Hypertension     diet managemen    Joint pain     MDD (major depressive disorder) 2015    Midline low back pain without sciatica 2016    Primary osteoarthritis of right knee 2018    Prostate CA     Sleep apnea     cipap    Trouble in sleeping     Type II or unspecified type diabetes mellitus with neurological manifestations, not stated as uncontrolled(250.60)        Patient Active Problem List   Diagnosis    Hyperlipemia    Impotence of organic origin    History of prostate cancer    BMI 40.0-44.9, adult     Aortic atherosclerosis    [place] (hard of hearing)    Tortuous aorta    JESSICA on CPAP    Asbestos exposure    Midline low back pain without sciatica    Amnestic MCI (mild cognitive impairment with memory loss)    Type 2 diabetes mellitus without complication, without long-term current use of insulin    Primary osteoarthritis of right knee    URRUTIA (dyspnea on exertion)    Weakness of both lower extremities    Statin myopathy    Diabetes mellitus type 2 in obese         Current Outpatient Prescriptions:     aspirin (ECOTRIN) 81 MG EC tablet, Take 1 tablet (81 mg total) by mouth every evening., Disp: 100 tablet, Rfl: 3    cholecalciferol, vitamin D3, 5,000 unit capsule, Take 1 capsule by mouth Daily., Disp: , Rfl:     donepezil (ARICEPT) 10 MG tablet, Take 1 tablet (10 mg total) by mouth every evening., Disp: 90 tablet, Rfl: 3    lancets (SOFT TOUCH LANCETS) Misc, 1 Device by Misc.(Non-Drug; Combo Route) route once daily., Disp: 100 each, Rfl: 11    metFORMIN (GLUCOPHAGE) 500 MG tablet, Take two tablets at bedtime, Disp: 180 tablet, Rfl: 3    MULTIVITS-MINERALS/FA/LYCOPENE (ONE-A-DAY MEN'S ORAL), Take 1 tablet by mouth Daily., Disp: , Rfl:     sertraline (ZOLOFT) 50 MG tablet, Take 1 tablet (50 mg total) by mouth every morning. Increase energy and boost mood, Disp: 90 tablet, Rfl: 3    tadalafil (CIALIS) 20 MG Tab, Take 1 tablet (20 mg total) by mouth daily as needed. take as directed, Disp: 10 tablet, Rfl: 5    TRUE METRIX GLUCOSE METER Misc, , Disp: , Rfl:     TRUE METRIX GLUCOSE TEST STRIP Strp, USE STRIP TO CHECK GLUCOSE ONCE DAILY, Disp: 50 strip, Rfl: 3    OBJECTIVE:  Pain: now 0/10, worst 6/10 located in the front of his right knee, described as achy pain.    Sensation:  intact to light touch     Knee ROM:  Flexion     R 110 L 120      Extension   R -5 L -5       Strength:  Quadriceps   R 4-/5 L 4/5     Hamstrings   R 4-/5 L 4/5      Hip Flexor   R 4/5 L 4/5        Ankle Dorsiflexion   R 4/5  L 4/5    Muscle Length:  Decrease hamstring length bilaterally           Function: LOWER EXTREMITY FUNCTIONAL SCALE                EVAL  reeval  1. Any of your usual work, housework or school activities   1/4  2. Your usual hobbies, sporting     0/4  3. Getting in and out of tub      3/4  4. Walking between rooms      1/4  5. Putting on shoes or socks      4/4  6. Squatting        1/4  7. Lifting an object from the ground      4/4  8. Performing light activities around the home   3/4  9. Performing heavy activities around the home   1/4  10. Getting in and out of car      3/4  11. Walking 2 blocks       0/4  12.Walking a mile       0/4  13. Getting up and down 1 flight of stairs    0/4  14. Standing for 1 hour      0/4  15. Sitting for an hour       4/4  16. Running on even ground      0/4  17. Running on uneven ground     0/4  18. Making sharp turns when running fast    0/4  19. Hopping        0/4  20. Rolling over in bed       4/4    Patient reports 31.25% ability based on score of the Lower Extremity Functional Scale.    Functional Limitations and Goal:  This patient's primary physical therapy goal is to return to their prior level of function (Mobility - Walking, Moving Around [id number]) without limitations. The patient's current level of impairment is 60 to 79% impaired (CL) based on their score of 39% on the Lower Extremity Functional Scale. The patient is expected to achieve a score of 60-79% impaired (CL) within 10 treatment days.    KUMAR Assessment    1. Sitting to Standing   3 - able to stand independely using hands  2. Standing Unsupported   4 - able to stand safely 2 minutes without hold  3. Sitting Unsupported   4 - able to sit safely and securely 2 minutes  4. Standing to Sitting   2 - uses back of legs against chair to control descent  5. Pivot Transfer   3 - able to transfer safely with definite use of hands  6. Standing with Eyes Closed   3 - able to stand 10 seconds with supervision  7. Standing with  Feet Together   4 - able to place feet together independently and stand 1 minute safely  8. Reaching Forward with Outstretched Arm   3 - can reach forward 12 cm/5 inches safely  9. Retrieving Object from Floor   3 - able to pick slipper but needs supervision  10. Turning to Look Behind   4 - looks behind from both sides and weights shifts well  11. Turning 360 Degrees   2 - able to trun 360 safely but slowly  12. Placing Alternate Foot on Step   3 - able to stand independently and completely 8 steps > 20 seconds  13. Standing with One Foot in Front   0 - Looses balance while stepping or standing  14. Standing on One Foot   1 - tries to lift leg and unable to hold 3 seconds but remains standing independently    Eval 6/7/18: 39/56    Tenderness to palpation:  Over the medial joint line of the right knee    ASSESSMENT:  Patient tolerated today's treatment well with no complaints.     Initial: The patient is a 78 y.o. year old male who presents to physical therapy with complaints of bilateral lower extremity weakness with pain in the right knee.  Patient's impairments include muscle weakness. Decrease muscle length, decreased joint ROM, and decreased balance.  These impairments are limiting patient's ability to perform house hold activities, ascend and descend stairs, get in and out of the bath, and walk around home and community.  Patient's prognosis is good.  Patient will benefit from skilled physical therapy intervention to increase muscle strength, improve joint ROM, increase balance, increase endurance, and decrease pain.    Co-morbidities which may impact the plan of care and potentially impede the patient's progress in therapy include:  Obesity, chronic stage of condition, DM.     The patient's clinical presentation is stable.  Based on patient's stable clinical presentation, 3+ co-morbidities, and examination of 2 body systems, patient presents with moderate complexity.    Short Term Goals:  (3 weeks)  1.   Patient will improve R knee flexion ROM to 115 degrees in order to ascend/descend stairs safely.  2.  Patient will report improved function indicated by a score of 38% ability on the Lower Extremity Functional Scale..  3.  Patient will be independent with home exercise program.    Long Term Goals:  (6 weeks)  1.  Patient will improve quad strength bilaterally to 4+/5 in order to get in and out of the bath safely.  2.  Patient will improve Freeman Balance score to 42 in order to improve safety during ambulation.  3.  Patient will report improved function indicated by a score of 45% ability on the Lower Extremity Functional Scale.  4.  Patient will improve hamstring strength bilaterally to 4+/5 in order to get in and out of the car with less difficulty.   5.  Patient will improve R knee flexion ROM to 120 degrees in order bend to  objects off the floor.     TREATMENT PROVIDED:    Initial evaluation completed.    Manual Therapy:  (0 minutes)      Therapeutic Exercise:  (56 minutes)      Heels Slide x30    Hamstring Stretch 2 minutes each side    Step taps 2 minutes -(progressed)     Standing March 3 minutes    LAQ x30    Mini Squats x30    Hip Abd x30    Hip Add x30    Bike 10 minutes    Gastroc Stretch 2 minutes    Step Ups 2 minutes    Side step ups 2 minutes    Glute set x30    Abdominal Isometric x30    Modalities:  Ice for 10 minutes. Patient tolerated well with no skin irritation or adverse reaction.    PLAN:  Patient will benefit from physical therapy (3) x/week for (6) weeks including manual therapy, therapeutic exercise, neuromuscular re-education, functional activities, modalities, and patient education.    Thank you for this referral.    These services are reasonable and necessary for the conditions set forth above while under my care.

## 2018-06-27 ENCOUNTER — OFFICE VISIT (OUTPATIENT)
Dept: SLEEP MEDICINE | Facility: CLINIC | Age: 78
End: 2018-06-27
Payer: MEDICARE

## 2018-06-27 VITALS
WEIGHT: 315 LBS | SYSTOLIC BLOOD PRESSURE: 110 MMHG | RESPIRATION RATE: 18 BRPM | HEIGHT: 72 IN | HEART RATE: 78 BPM | BODY MASS INDEX: 42.66 KG/M2 | DIASTOLIC BLOOD PRESSURE: 70 MMHG | OXYGEN SATURATION: 96 %

## 2018-06-27 DIAGNOSIS — G47.33 OSA (OBSTRUCTIVE SLEEP APNEA): Primary | ICD-10-CM

## 2018-06-27 DIAGNOSIS — E66.01 MORBID OBESITY WITH BMI OF 40.0-44.9, ADULT: ICD-10-CM

## 2018-06-27 PROCEDURE — 99213 OFFICE O/P EST LOW 20 MIN: CPT | Mod: S$GLB,,, | Performed by: NURSE PRACTITIONER

## 2018-06-27 PROCEDURE — 99999 PR PBB SHADOW E&M-EST. PATIENT-LVL III: CPT | Mod: PBBFAC,,, | Performed by: NURSE PRACTITIONER

## 2018-06-27 NOTE — PROGRESS NOTES
Subjective:      Patient ID: Elan Yepez is a 78 y.o. male.    Chief Complaint: Sleep Apnea    Presents to office for review of CPAP therapy. Patient states improved symptoms with use of CPAP. Sleeping more soundly. Waking up feeling more refreshed. Improved daytime sleepiness. Patient states he is benefiting from use of the CPAP.  His mask is fitting better but still leaks out the side. He will tighten the straps.   Patient Active Problem List:     Hyperlipemia     Impotence of organic origin     History of prostate cancer     BMI 40.0-44.9, adult     Aortic atherosclerosis     [place] (hard of hearing)     Tortuous aorta     JESSICA on CPAP     Asbestos exposure     Midline low back pain without sciatica     Amnestic MCI (mild cognitive impairment with memory loss)     Type 2 diabetes mellitus without complication, without long-term current use of insulin     Primary osteoarthritis of right knee     URRUTIA (dyspnea on exertion)     Weakness of both lower extremities     Statin myopathy     Diabetes mellitus type 2 in obese            /70 (BP Location: Right arm, Patient Position: Sitting)   Pulse 78   Resp 18   Ht 6' (1.829 m)   Wt (!) 144 kg (317 lb 7.4 oz)   SpO2 96%   BMI 43.06 kg/m²   Body mass index is 43.06 kg/m².    Review of Systems   Constitutional: Negative.    HENT: Negative.    Respiratory: Negative.    Cardiovascular: Negative.    Musculoskeletal: Negative.    Gastrointestinal: Negative.    Neurological: Negative.    Psychiatric/Behavioral: Negative.      Objective:      Physical Exam   Constitutional: He is oriented to person, place, and time. He appears well-developed and well-nourished.   Obese   HENT:   Head: Normocephalic and atraumatic.   Neck: Normal range of motion. Neck supple.   Cardiovascular: Normal rate and regular rhythm.    Pulmonary/Chest: Effort normal.   Abdominal: Soft.   Musculoskeletal: Normal range of motion. He exhibits no edema.   Neurological: He is alert and oriented to  person, place, and time.   Skin: Skin is warm and dry.   Psychiatric: He has a normal mood and affect.     Personal Diagnostic Review  CPAP download shows patient wears on average 9 hrs and 44 minutes. Greater than 4 hrs 96.7 % of the time. AHI 8.9        Assessment:       1. JESSICA (obstructive sleep apnea)    2. Morbid obesity with BMI of 40.0-44.9, adult        Outpatient Encounter Prescriptions as of 6/27/2018   Medication Sig Dispense Refill    aspirin (ECOTRIN) 81 MG EC tablet Take 1 tablet (81 mg total) by mouth every evening. 100 tablet 3    cholecalciferol, vitamin D3, 5,000 unit capsule Take 1 capsule by mouth Daily.      donepezil (ARICEPT) 10 MG tablet Take 1 tablet (10 mg total) by mouth every evening. 90 tablet 3    lancets (SOFT TOUCH LANCETS) Misc 1 Device by Misc.(Non-Drug; Combo Route) route once daily. 100 each 11    metFORMIN (GLUCOPHAGE) 500 MG tablet Take two tablets at bedtime 180 tablet 3    MULTIVITS-MINERALS/FA/LYCOPENE (ONE-A-DAY MEN'S ORAL) Take 1 tablet by mouth Daily.      sertraline (ZOLOFT) 50 MG tablet Take 1 tablet (50 mg total) by mouth every morning. Increase energy and boost mood 90 tablet 3    tadalafil (CIALIS) 20 MG Tab Take 1 tablet (20 mg total) by mouth daily as needed. take as directed 10 tablet 5    TRUE METRIX GLUCOSE METER Misc       TRUE METRIX GLUCOSE TEST STRIP Strp USE STRIP TO CHECK GLUCOSE ONCE DAILY 50 strip 3     No facility-administered encounter medications on file as of 6/27/2018.      No orders of the defined types were placed in this encounter.    Plan:      Tighten mask straps. Doing well on PAP settings. Patient is compliant. Follow up in 12 months with PAP data download or call earlier if any problems.

## 2018-06-29 ENCOUNTER — CLINICAL SUPPORT (OUTPATIENT)
Dept: REHABILITATION | Facility: HOSPITAL | Age: 78
End: 2018-06-29
Payer: MEDICARE

## 2018-06-29 DIAGNOSIS — R29.898 BILATERAL LEG WEAKNESS: Primary | ICD-10-CM

## 2018-06-29 PROCEDURE — 97110 THERAPEUTIC EXERCISES: CPT | Mod: PO

## 2018-06-29 NOTE — PROGRESS NOTES
PHYSICAL THERAPY OUTPATIENT TREATMENT    Referring Provider:  Dr. Liane Tijerina    Diagnosis:       ICD-10-CM ICD-9-CM    1. Bilateral leg weakness R29.898 729.89        Orders:  Evaluate and Treat    Date of Initial Evaluation:  18    Orders :  18    Coding Cycle Visit # 8    SUBJECTIVE: Patient states he is feeling good today and isn't having any pain.     Initial: Patient reports to therapy with weakness in both lower extremities. He states the past couple years he has been getting progressively weaker and at times has pain. His pain usually starts after walking and standing for long periods. The patient states his main concern is he gets tired and wants to strengthen and increase his endurance. The right side feels like it gets tired quicker and he tends to feel more pain on that side. The pain he feels is a burning pain when he works the muscle to much. The knee pain on the right is described as achy. He has no history of knee and pain and hasn't received any therapy before. He also feels because of the weakness he has trouble with his balance.     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  Began Aricept 10 mg at bedtime.    BMI 40.0-44.9, adult     Diabetes mellitus     diet management    Dysmetabolic syndrome X 10/2/2012    Hyperlipidemia     Hypertension     diet managemen    Joint pain     MDD (major depressive disorder) 2015    Midline low back pain without sciatica 2016    Primary osteoarthritis of right knee 2018    Prostate CA     Sleep apnea     cipap    Trouble in sleeping     Type II or unspecified type diabetes mellitus with neurological manifestations, not stated as uncontrolled(250.60)        Patient Active Problem List   Diagnosis    Hyperlipemia    Impotence of organic origin    History of prostate cancer     BMI 40.0-44.9, adult    Aortic atherosclerosis    [place] (hard of hearing)    Tortuous aorta    JESSICA on CPAP    Asbestos exposure    Midline low back pain without sciatica    Amnestic MCI (mild cognitive impairment with memory loss)    Type 2 diabetes mellitus without complication, without long-term current use of insulin    Primary osteoarthritis of right knee    URRUTIA (dyspnea on exertion)    Weakness of both lower extremities    Statin myopathy    Diabetes mellitus type 2 in obese         Current Outpatient Prescriptions:     aspirin (ECOTRIN) 81 MG EC tablet, Take 1 tablet (81 mg total) by mouth every evening., Disp: 100 tablet, Rfl: 3    cholecalciferol, vitamin D3, 5,000 unit capsule, Take 1 capsule by mouth Daily., Disp: , Rfl:     donepezil (ARICEPT) 10 MG tablet, Take 1 tablet (10 mg total) by mouth every evening., Disp: 90 tablet, Rfl: 3    lancets (SOFT TOUCH LANCETS) Misc, 1 Device by Misc.(Non-Drug; Combo Route) route once daily., Disp: 100 each, Rfl: 11    metFORMIN (GLUCOPHAGE) 500 MG tablet, Take two tablets at bedtime, Disp: 180 tablet, Rfl: 3    MULTIVITS-MINERALS/FA/LYCOPENE (ONE-A-DAY MEN'S ORAL), Take 1 tablet by mouth Daily., Disp: , Rfl:     sertraline (ZOLOFT) 50 MG tablet, Take 1 tablet (50 mg total) by mouth every morning. Increase energy and boost mood, Disp: 90 tablet, Rfl: 3    tadalafil (CIALIS) 20 MG Tab, Take 1 tablet (20 mg total) by mouth daily as needed. take as directed, Disp: 10 tablet, Rfl: 5    TRUE METRIX GLUCOSE METER Misc, , Disp: , Rfl:     TRUE METRIX GLUCOSE TEST STRIP Strp, USE STRIP TO CHECK GLUCOSE ONCE DAILY, Disp: 50 strip, Rfl: 3    OBJECTIVE:  Pain: now 0/10, worst 6/10 located in the front of his right knee, described as achy pain.    Sensation:  intact to light touch     Knee ROM:  Flexion     R 110 L 120      Extension   R -5 L -5       Strength:  Quadriceps   R 4-/5 L 4/5     Hamstrings   R 4-/5 L 4/5      Hip Flexor   R 4/5 L  4/5        Ankle Dorsiflexion   R 4/5 L 4/5    Muscle Length:  Decrease hamstring length bilaterally           Function: LOWER EXTREMITY FUNCTIONAL SCALE                EVAL  reeval  1. Any of your usual work, housework or school activities   1/4  2. Your usual hobbies, sporting     0/4  3. Getting in and out of tub      3/4  4. Walking between rooms      1/4  5. Putting on shoes or socks      4/4  6. Squatting        1/4  7. Lifting an object from the ground      4/4  8. Performing light activities around the home   3/4  9. Performing heavy activities around the home   1/4  10. Getting in and out of car      3/4  11. Walking 2 blocks       0/4  12.Walking a mile       0/4  13. Getting up and down 1 flight of stairs    0/4  14. Standing for 1 hour      0/4  15. Sitting for an hour       4/4  16. Running on even ground      0/4  17. Running on uneven ground     0/4  18. Making sharp turns when running fast    0/4  19. Hopping        0/4  20. Rolling over in bed       4/4    Patient reports 31.25% ability based on score of the Lower Extremity Functional Scale.    Functional Limitations and Goal:  This patient's primary physical therapy goal is to return to their prior level of function (Mobility - Walking, Moving Around [id number]) without limitations. The patient's current level of impairment is 60 to 79% impaired (CL) based on their score of 39% on the Lower Extremity Functional Scale. The patient is expected to achieve a score of 60-79% impaired (CL) within 10 treatment days.    KUMAR Assessment    1. Sitting to Standing   3 - able to stand independely using hands  2. Standing Unsupported   4 - able to stand safely 2 minutes without hold  3. Sitting Unsupported   4 - able to sit safely and securely 2 minutes  4. Standing to Sitting   2 - uses back of legs against chair to control descent  5. Pivot Transfer   3 - able to transfer safely with definite use of hands  6. Standing with Eyes Closed   3 - able to stand 10  seconds with supervision  7. Standing with Feet Together   4 - able to place feet together independently and stand 1 minute safely  8. Reaching Forward with Outstretched Arm   3 - can reach forward 12 cm/5 inches safely  9. Retrieving Object from Floor   3 - able to pick slipper but needs supervision  10. Turning to Look Behind   4 - looks behind from both sides and weights shifts well  11. Turning 360 Degrees   2 - able to trun 360 safely but slowly  12. Placing Alternate Foot on Step   3 - able to stand independently and completely 8 steps > 20 seconds  13. Standing with One Foot in Front   0 - Looses balance while stepping or standing  14. Standing on One Foot   1 - tries to lift leg and unable to hold 3 seconds but remains standing independently    Eval 6/7/18: 39/56    Tenderness to palpation:  Over the medial joint line of the right knee    ASSESSMENT:  Patient tolerated treatment well. Sit to stand was added and was tolerated well.     Initial: The patient is a 78 y.o. year old male who presents to physical therapy with complaints of bilateral lower extremity weakness with pain in the right knee.  Patient's impairments include muscle weakness. Decrease muscle length, decreased joint ROM, and decreased balance.  These impairments are limiting patient's ability to perform house hold activities, ascend and descend stairs, get in and out of the bath, and walk around home and community.  Patient's prognosis is good.  Patient will benefit from skilled physical therapy intervention to increase muscle strength, improve joint ROM, increase balance, increase endurance, and decrease pain.    Co-morbidities which may impact the plan of care and potentially impede the patient's progress in therapy include:  Obesity, chronic stage of condition, DM.     The patient's clinical presentation is stable.  Based on patient's stable clinical presentation, 3+ co-morbidities, and examination of 2 body systems, patient presents with  moderate complexity.    Short Term Goals:  (3 weeks)  1.  Patient will improve R knee flexion ROM to 115 degrees in order to ascend/descend stairs safely.  2.  Patient will report improved function indicated by a score of 38% ability on the Lower Extremity Functional Scale..  3.  Patient will be independent with home exercise program.    Long Term Goals:  (6 weeks)  1.  Patient will improve quad strength bilaterally to 4+/5 in order to get in and out of the bath safely.  2.  Patient will improve Freeman Balance score to 42 in order to improve safety during ambulation.  3.  Patient will report improved function indicated by a score of 45% ability on the Lower Extremity Functional Scale.  4.  Patient will improve hamstring strength bilaterally to 4+/5 in order to get in and out of the car with less difficulty.   5.  Patient will improve R knee flexion ROM to 120 degrees in order bend to  objects off the floor.     TREATMENT PROVIDED:    Initial evaluation completed.    Manual Therapy:  (0 minutes)      Therapeutic Exercise:  (55 minutes)      Heels Slide x30    Hamstring Stretch 2 minutes each side    Step taps 2 minutes -(progressed)     Standing March 3 minutes    LAQ x30    Mini Squats x30    Hip Abd x30    Hip Add x30    Bike 10 minutes    Gastroc Stretch 2 minutes    Step Ups 2 minutes    Side step ups 2 minutes    Glute set x30    Abdominal Isometric x30    Sit to Stand x10    Modalities:  Ice for 10 minutes. Patient tolerated well with no skin irritation or adverse reaction.    PLAN:  Patient will benefit from physical therapy (3) x/week for (6) weeks including manual therapy, therapeutic exercise, neuromuscular re-education, functional activities, modalities, and patient education.    Thank you for this referral.    These services are reasonable and necessary for the conditions set forth above while under my care.

## 2018-07-02 ENCOUNTER — CLINICAL SUPPORT (OUTPATIENT)
Dept: REHABILITATION | Facility: HOSPITAL | Age: 78
End: 2018-07-02
Payer: MEDICARE

## 2018-07-02 DIAGNOSIS — R29.898 BILATERAL LEG WEAKNESS: Primary | ICD-10-CM

## 2018-07-02 PROCEDURE — 97110 THERAPEUTIC EXERCISES: CPT | Mod: PO

## 2018-07-02 NOTE — PROGRESS NOTES
PHYSICAL THERAPY OUTPATIENT TREATMENT    Referring Provider:  Dr. Liane Tijerina    Diagnosis:       ICD-10-CM ICD-9-CM    1. Bilateral leg weakness R29.898 729.89        Orders:  Evaluate and Treat    Date of Initial Evaluation:  18    Orders :  18    Coding Cycle Visit # 9    SUBJECTIVE: Patient reports he is feeling good today but he had increased soreness after last visit.     Initial: Patient reports to therapy with weakness in both lower extremities. He states the past couple years he has been getting progressively weaker and at times has pain. His pain usually starts after walking and standing for long periods. The patient states his main concern is he gets tired and wants to strengthen and increase his endurance. The right side feels like it gets tired quicker and he tends to feel more pain on that side. The pain he feels is a burning pain when he works the muscle to much. The knee pain on the right is described as achy. He has no history of knee and pain and hasn't received any therapy before. He also feels because of the weakness he has trouble with his balance.     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  Began Aricept 10 mg at bedtime.    BMI 40.0-44.9, adult     Diabetes mellitus     diet management    Dysmetabolic syndrome X 10/2/2012    Hyperlipidemia     Hypertension     diet managemen    Joint pain     MDD (major depressive disorder) 2015    Midline low back pain without sciatica 2016    Primary osteoarthritis of right knee 2018    Prostate CA     Sleep apnea     cipap    Trouble in sleeping     Type II or unspecified type diabetes mellitus with neurological manifestations, not stated as uncontrolled(250.60)        Patient Active Problem List   Diagnosis    Hyperlipemia    Impotence of organic origin    History  of prostate cancer    BMI 40.0-44.9, adult    Aortic atherosclerosis    [place] (hard of hearing)    Tortuous aorta    JESSICA on CPAP    Asbestos exposure    Midline low back pain without sciatica    Amnestic MCI (mild cognitive impairment with memory loss)    Type 2 diabetes mellitus without complication, without long-term current use of insulin    Primary osteoarthritis of right knee    URRUTIA (dyspnea on exertion)    Weakness of both lower extremities    Statin myopathy    Diabetes mellitus type 2 in obese         Current Outpatient Prescriptions:     aspirin (ECOTRIN) 81 MG EC tablet, Take 1 tablet (81 mg total) by mouth every evening., Disp: 100 tablet, Rfl: 3    cholecalciferol, vitamin D3, 5,000 unit capsule, Take 1 capsule by mouth Daily., Disp: , Rfl:     donepezil (ARICEPT) 10 MG tablet, Take 1 tablet (10 mg total) by mouth every evening., Disp: 90 tablet, Rfl: 3    lancets (SOFT TOUCH LANCETS) Misc, 1 Device by Misc.(Non-Drug; Combo Route) route once daily., Disp: 100 each, Rfl: 11    metFORMIN (GLUCOPHAGE) 500 MG tablet, Take two tablets at bedtime, Disp: 180 tablet, Rfl: 3    MULTIVITS-MINERALS/FA/LYCOPENE (ONE-A-DAY MEN'S ORAL), Take 1 tablet by mouth Daily., Disp: , Rfl:     sertraline (ZOLOFT) 50 MG tablet, Take 1 tablet (50 mg total) by mouth every morning. Increase energy and boost mood, Disp: 90 tablet, Rfl: 3    tadalafil (CIALIS) 20 MG Tab, Take 1 tablet (20 mg total) by mouth daily as needed. take as directed, Disp: 10 tablet, Rfl: 5    TRUE METRIX GLUCOSE METER Misc, , Disp: , Rfl:     TRUE METRIX GLUCOSE TEST STRIP Strp, USE STRIP TO CHECK GLUCOSE ONCE DAILY, Disp: 50 strip, Rfl: 3    OBJECTIVE:  Pain: now 0/10, worst 6/10 located in the front of his right knee, described as achy pain.    Sensation:  intact to light touch     Knee ROM:  Flexion     R 110 L 120      Extension   R -5 L -5       Strength:  Quadriceps   R 4-/5 L 4/5     Hamstrings   R 4-/5 L 4/5      Hip Flexor   R  4/5 L 4/5        Ankle Dorsiflexion   R 4/5 L 4/5    Muscle Length:  Decrease hamstring length bilaterally           Function: LOWER EXTREMITY FUNCTIONAL SCALE                EVAL  reeval  1. Any of your usual work, housework or school activities   1/4  2. Your usual hobbies, sporting     0/4  3. Getting in and out of tub      3/4  4. Walking between rooms      1/4  5. Putting on shoes or socks      4/4  6. Squatting        1/4  7. Lifting an object from the ground      4/4  8. Performing light activities around the home   3/4  9. Performing heavy activities around the home   1/4  10. Getting in and out of car      3/4  11. Walking 2 blocks       0/4  12.Walking a mile       0/4  13. Getting up and down 1 flight of stairs    0/4  14. Standing for 1 hour      0/4  15. Sitting for an hour       4/4  16. Running on even ground      0/4  17. Running on uneven ground     0/4  18. Making sharp turns when running fast    0/4  19. Hopping        0/4  20. Rolling over in bed       4/4    Patient reports 31.25% ability based on score of the Lower Extremity Functional Scale.    Functional Limitations and Goal:  This patient's primary physical therapy goal is to return to their prior level of function (Mobility - Walking, Moving Around [id number]) without limitations. The patient's current level of impairment is 60 to 79% impaired (CL) based on their score of 39% on the Lower Extremity Functional Scale. The patient is expected to achieve a score of 60-79% impaired (CL) within 10 treatment days.    KUMAR Assessment    1. Sitting to Standing   3 - able to stand independely using hands  2. Standing Unsupported   4 - able to stand safely 2 minutes without hold  3. Sitting Unsupported   4 - able to sit safely and securely 2 minutes  4. Standing to Sitting   2 - uses back of legs against chair to control descent  5. Pivot Transfer   3 - able to transfer safely with definite use of hands  6. Standing with Eyes Closed   3 - able to stand 10  seconds with supervision  7. Standing with Feet Together   4 - able to place feet together independently and stand 1 minute safely  8. Reaching Forward with Outstretched Arm   3 - can reach forward 12 cm/5 inches safely  9. Retrieving Object from Floor   3 - able to pick slipper but needs supervision  10. Turning to Look Behind   4 - looks behind from both sides and weights shifts well  11. Turning 360 Degrees   2 - able to trun 360 safely but slowly  12. Placing Alternate Foot on Step   3 - able to stand independently and completely 8 steps > 20 seconds  13. Standing with One Foot in Front   0 - Looses balance while stepping or standing  14. Standing on One Foot   1 - tries to lift leg and unable to hold 3 seconds but remains standing independently    Eval 6/7/18: 39/56    Tenderness to palpation:  Over the medial joint line of the right knee    ASSESSMENT:  Patient tolerated today's treatment well with no complaints.     Initial: The patient is a 78 y.o. year old male who presents to physical therapy with complaints of bilateral lower extremity weakness with pain in the right knee.  Patient's impairments include muscle weakness. Decrease muscle length, decreased joint ROM, and decreased balance.  These impairments are limiting patient's ability to perform house hold activities, ascend and descend stairs, get in and out of the bath, and walk around home and community.  Patient's prognosis is good.  Patient will benefit from skilled physical therapy intervention to increase muscle strength, improve joint ROM, increase balance, increase endurance, and decrease pain.    Co-morbidities which may impact the plan of care and potentially impede the patient's progress in therapy include:  Obesity, chronic stage of condition, DM.     The patient's clinical presentation is stable.  Based on patient's stable clinical presentation, 3+ co-morbidities, and examination of 2 body systems, patient presents with moderate  complexity.    Short Term Goals:  (3 weeks)  1.  Patient will improve R knee flexion ROM to 115 degrees in order to ascend/descend stairs safely.  2.  Patient will report improved function indicated by a score of 38% ability on the Lower Extremity Functional Scale..  3.  Patient will be independent with home exercise program.    Long Term Goals:  (6 weeks)  1.  Patient will improve quad strength bilaterally to 4+/5 in order to get in and out of the bath safely.  2.  Patient will improve Freeman Balance score to 42 in order to improve safety during ambulation.  3.  Patient will report improved function indicated by a score of 45% ability on the Lower Extremity Functional Scale.  4.  Patient will improve hamstring strength bilaterally to 4+/5 in order to get in and out of the car with less difficulty.   5.  Patient will improve R knee flexion ROM to 120 degrees in order bend to  objects off the floor.     TREATMENT PROVIDED:    Initial evaluation completed.    Manual Therapy:  (0 minutes)      Therapeutic Exercise:  (54 minutes)      Heels Slide x30    Hamstring Stretch 2 minutes each side    Step taps 2 minutes -(progressed)     Standing March 3 minutes    LAQ x30    Mini Squats x30    Hip Abd x30    Hip Add x30    Bike 10 minutes    Gastroc Stretch 2 minutes    Step Ups 2 minutes    Side step ups 2 minutes    Glute set x30    Abdominal Isometric x30    Sit to Stand x10    Modalities:  Ice for 10 minutes. Patient tolerated well with no skin irritation or adverse reaction.    PLAN:  Patient will benefit from physical therapy (3) x/week for (6) weeks including manual therapy, therapeutic exercise, neuromuscular re-education, functional activities, modalities, and patient education.    Thank you for this referral.    These services are reasonable and necessary for the conditions set forth above while under my care.

## 2018-07-05 ENCOUNTER — CLINICAL SUPPORT (OUTPATIENT)
Dept: REHABILITATION | Facility: HOSPITAL | Age: 78
End: 2018-07-05
Payer: MEDICARE

## 2018-07-05 DIAGNOSIS — R29.898 BILATERAL LEG WEAKNESS: Primary | ICD-10-CM

## 2018-07-05 PROCEDURE — G8979 MOBILITY GOAL STATUS: HCPCS | Mod: CK,PO

## 2018-07-05 PROCEDURE — 97110 THERAPEUTIC EXERCISES: CPT | Mod: PO

## 2018-07-05 PROCEDURE — G8978 MOBILITY CURRENT STATUS: HCPCS | Mod: CK,PO

## 2018-07-05 NOTE — PROGRESS NOTES
PHYSICAL THERAPY OUTPATIENT PROGRESS NOTE    Referring Provider:  Dr. Liane Tijerina    Diagnosis:       ICD-10-CM ICD-9-CM    1. Bilateral leg weakness R29.898 729.89        Orders:  Evaluate and Treat    Date of Initial Evaluation:  18    Orders :  18    Coding Cycle Visit # 10    SUBJECTIVE: Patient reports he isn't in any pain and he feels like he is getting stronger. He states that his right knee is giving him less problems at home during every day activities.     Initial: Patient reports to therapy with weakness in both lower extremities. He states the past couple years he has been getting progressively weaker and at times has pain. His pain usually starts after walking and standing for long periods. The patient states his main concern is he gets tired and wants to strengthen and increase his endurance. The right side feels like it gets tired quicker and he tends to feel more pain on that side. The pain he feels is a burning pain when he works the muscle to much. The knee pain on the right is described as achy. He has no history of knee and pain and hasn't received any therapy before. He also feels because of the weakness he has trouble with his balance.     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  Began Aricept 10 mg at bedtime.    BMI 40.0-44.9, adult     Diabetes mellitus     diet management    Dysmetabolic syndrome X 10/2/2012    Hyperlipidemia     Hypertension     diet managemen    Joint pain     MDD (major depressive disorder) 2015    Midline low back pain without sciatica 2016    Primary osteoarthritis of right knee 2018    Prostate CA     Sleep apnea     cipap    Trouble in sleeping     Type II or unspecified type diabetes mellitus with neurological manifestations, not stated as uncontrolled(250.60)        Patient  Active Problem List   Diagnosis    Hyperlipemia    Impotence of organic origin    History of prostate cancer    BMI 40.0-44.9, adult    Aortic atherosclerosis    [place] (hard of hearing)    Tortuous aorta    JESSICA on CPAP    Asbestos exposure    Midline low back pain without sciatica    Amnestic MCI (mild cognitive impairment with memory loss)    Type 2 diabetes mellitus without complication, without long-term current use of insulin    Primary osteoarthritis of right knee    URRUTIA (dyspnea on exertion)    Weakness of both lower extremities    Statin myopathy    Diabetes mellitus type 2 in obese         Current Outpatient Prescriptions:     aspirin (ECOTRIN) 81 MG EC tablet, Take 1 tablet (81 mg total) by mouth every evening., Disp: 100 tablet, Rfl: 3    cholecalciferol, vitamin D3, 5,000 unit capsule, Take 1 capsule by mouth Daily., Disp: , Rfl:     donepezil (ARICEPT) 10 MG tablet, Take 1 tablet (10 mg total) by mouth every evening., Disp: 90 tablet, Rfl: 3    lancets (SOFT TOUCH LANCETS) Misc, 1 Device by Misc.(Non-Drug; Combo Route) route once daily., Disp: 100 each, Rfl: 11    metFORMIN (GLUCOPHAGE) 500 MG tablet, Take two tablets at bedtime, Disp: 180 tablet, Rfl: 3    MULTIVITS-MINERALS/FA/LYCOPENE (ONE-A-DAY MEN'S ORAL), Take 1 tablet by mouth Daily., Disp: , Rfl:     sertraline (ZOLOFT) 50 MG tablet, Take 1 tablet (50 mg total) by mouth every morning. Increase energy and boost mood, Disp: 90 tablet, Rfl: 3    tadalafil (CIALIS) 20 MG Tab, Take 1 tablet (20 mg total) by mouth daily as needed. take as directed, Disp: 10 tablet, Rfl: 5    TRUE METRIX GLUCOSE METER Misc, , Disp: , Rfl:     TRUE METRIX GLUCOSE TEST STRIP Strp, USE STRIP TO CHECK GLUCOSE ONCE DAILY, Disp: 50 strip, Rfl: 3    OBJECTIVE:  Pain: now 0/10, worst 6/10 located in the front of his right knee, described as achy pain.    Sensation:  intact to light touch     Knee ROM:  Flexion     R 115 L 120      Extension   R -5 L  -5       Strength:  Quadriceps   R 4-/5 L 4/5     Hamstrings   R 4-/5 L 4/5      Hip Flexor   R 4/5 L 4/5        Ankle Dorsiflexion   R 4+/5 L 4+/5    Muscle Length:  Decrease hamstring length bilaterally           Function: LOWER EXTREMITY FUNCTIONAL SCALE                EVAL  reeval  1. Any of your usual work, housework or school activities   1/4 2/4  2. Your usual hobbies, sporting     0/4 0/4  3. Getting in and out of tub      3/4  3/4  4. Walking between rooms      1/4 2/4  5. Putting on shoes or socks      4/4 4/4  6. Squatting        1/4 2/4  7. Lifting an object from the ground      4/4 4/4  8. Performing light activities around the home   3/4  3/4  9. Performing heavy activities around the home   1/4 2/4  10. Getting in and out of car      3/4  4/4  11. Walking 2 blocks       0/4 0/4  12.Walking a mile       0/4 0/4  13. Getting up and down 1 flight of stairs    0/4 1/4  14. Standing for 1 hour      0/4 0/4  15. Sitting for an hour       4/4 4/4  16. Running on even ground      0/4 0/4  17. Running on uneven ground     0/4 0/4  18. Making sharp turns when running fast    0/4 0/4  19. Hopping        0/4 0/4  20. Rolling over in bed       4/4 0/4    Patient reports (Initial 31.25%) 44% ability based on score of the Lower Extremity Functional Scale.    Functional Limitations and Goal:  This patient's primary physical therapy goal is to return to their prior level of function (Mobility - Walking, Moving Around [id number]) without limitations. The patient's current level of impairment is 40 to 59% impaired (CK) based on their score of 44% on the Lower Extremity Functional Scale. The patient is expected to achieve a score of 40-59% impaired (CK) within 10 treatment days.    KUMAR Assessment    1. Sitting to Standing   3 - able to stand independely using hands  2. Standing Unsupported   4 - able to stand safely 2 minutes without hold  3. Sitting Unsupported   4 - able to sit safely and securely 2  minutes  4. Standing to Sitting   2 - uses back of legs against chair to control descent  5. Pivot Transfer   3 - able to transfer safely with definite use of hands  6. Standing with Eyes Closed   3 - able to stand 10 seconds with supervision  7. Standing with Feet Together   4 - able to place feet together independently and stand 1 minute safely  8. Reaching Forward with Outstretched Arm   3 - can reach forward 12 cm/5 inches safely  9. Retrieving Object from Floor   3 - able to pick slipper but needs supervision  10. Turning to Look Behind   4 - looks behind from both sides and weights shifts well  11. Turning 360 Degrees   2 - able to trun 360 safely but slowly  12. Placing Alternate Foot on Step   3 - able to stand independently and completely 8 steps > 20 seconds  13. Standing with One Foot in Front   0 - Looses balance while stepping or standing  14. Standing on One Foot   1 - tries to lift leg and unable to hold 3 seconds but remains standing independently    Eval 6/7/18: 39/56    Tenderness to palpation:  Over the medial joint line of the right knee    ASSESSMENT:  Patient tolerated today's treatment well with no complaints. Patient has made improvements in ROM of right knee flexion from 110 degrees to 115, dorsiflexion strength from 4/5 to 4+/5, and increase from 31% to 44% on the Lower Extremity Functional Scale. Patient demonstrates decrease muscle weakness, decreased ROM, decreased balance, and decrease muscle length. Patient's impairments limits his ability to perform activities at home, go up and down stairs, and walk long distances. Patient will benefit from 10 more visit in order to increase muscle strength, increase ROM, increase balance, and increase muscle length.     Initial: The patient is a 78 y.o. year old male who presents to physical therapy with complaints of bilateral lower extremity weakness with pain in the right knee.  Patient's impairments include muscle weakness. Decrease muscle  length, decreased joint ROM, and decreased balance.  These impairments are limiting patient's ability to perform house hold activities, ascend and descend stairs, get in and out of the bath, and walk around home and community.  Patient's prognosis is good.  Patient will benefit from skilled physical therapy intervention to increase muscle strength, improve joint ROM, increase balance, increase endurance, and decrease pain.    Co-morbidities which may impact the plan of care and potentially impede the patient's progress in therapy include:  Obesity, chronic stage of condition, DM.     The patient's clinical presentation is stable.  Based on patient's stable clinical presentation, 3+ co-morbidities, and examination of 2 body systems, patient presents with moderate complexity.    Short Term Goals:  (3 weeks)  1.  Patient will improve R knee flexion ROM to 115 degrees in order to ascend/descend stairs safely.   -Goal met on 7/5/17  2.  Patient will report improved function indicated by a score of 38% ability on the Lower Extremity Functional Scale.. -Goal met on 7/5/18  3.  Patient will be independent with home exercise program.        -Goal met on 7/5/18    Long Term Goals:  (6 weeks)  1.  Patient will improve quad strength bilaterally to 4+/5 in order to get in and out of the bath safely.  2.  Patient will improve Freeman Balance score to 42 in order to improve safety during ambulation.  3.  Patient will report improved function indicated by a score of 45% ability on the Lower Extremity Functional Scale.  4.  Patient will improve hamstring strength bilaterally to 4+/5 in order to get in and out of the car with less difficulty.   5.  Patient will improve R knee flexion ROM to 120 degrees in order bend to  objects off the floor.     TREATMENT PROVIDED:    Initial evaluation completed.    Manual Therapy:  (0 minutes)      Therapeutic Exercise:  (56 minutes)      Heels Slide x30    Hamstring Stretch 2 minutes each  side    Step taps 2 minutes -(progressed)     Standing March 3 minutes    LAQ x30    Mini Squats x30    Hip Abd x30    Hip Add x30    Bike 10 minutes    Gastroc Stretch 2 minutes    Step Ups 2 minutes    Side step ups 2 minutes    Glute set x30    Abdominal Isometric x30    Sit to Stand x10    Modalities:  Ice for 10 minutes. Patient tolerated well with no skin irritation or adverse reaction.    PLAN:  Patient will benefit from physical therapy (3) x/week for (6) weeks including manual therapy, therapeutic exercise, neuromuscular re-education, functional activities, modalities, and patient education.    Thank you for this referral.    These services are reasonable and necessary for the conditions set forth above while under my care.

## 2018-07-05 NOTE — PLAN OF CARE
PHYSICAL THERAPY OUTPATIENT PROGRESS NOTE    Referring Provider:  Dr. Liane Tijerina    Diagnosis:       ICD-10-CM ICD-9-CM    1. Bilateral leg weakness R29.898 729.89        Orders:  Evaluate and Treat    Date of Initial Evaluation:  18    Orders :  18    Coding Cycle Visit # 10    SUBJECTIVE: Patient reports he isn't in any pain and he feels like he is getting stronger. He states that his right knee is giving him less problems at home during every day activities.     Initial: Patient reports to therapy with weakness in both lower extremities. He states the past couple years he has been getting progressively weaker and at times has pain. His pain usually starts after walking and standing for long periods. The patient states his main concern is he gets tired and wants to strengthen and increase his endurance. The right side feels like it gets tired quicker and he tends to feel more pain on that side. The pain he feels is a burning pain when he works the muscle to much. The knee pain on the right is described as achy. He has no history of knee and pain and hasn't received any therapy before. He also feels because of the weakness he has trouble with his balance.     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  Began Aricept 10 mg at bedtime.    BMI 40.0-44.9, adult     Diabetes mellitus     diet management    Dysmetabolic syndrome X 10/2/2012    Hyperlipidemia     Hypertension     diet managemen    Joint pain     MDD (major depressive disorder) 2015    Midline low back pain without sciatica 2016    Primary osteoarthritis of right knee 2018    Prostate CA     Sleep apnea     cipap    Trouble in sleeping     Type II or unspecified type diabetes mellitus with neurological manifestations, not stated as uncontrolled(250.60)        Patient  Active Problem List   Diagnosis    Hyperlipemia    Impotence of organic origin    History of prostate cancer    BMI 40.0-44.9, adult    Aortic atherosclerosis    [place] (hard of hearing)    Tortuous aorta    JESSICA on CPAP    Asbestos exposure    Midline low back pain without sciatica    Amnestic MCI (mild cognitive impairment with memory loss)    Type 2 diabetes mellitus without complication, without long-term current use of insulin    Primary osteoarthritis of right knee    URRUTIA (dyspnea on exertion)    Weakness of both lower extremities    Statin myopathy    Diabetes mellitus type 2 in obese         Current Outpatient Prescriptions:     aspirin (ECOTRIN) 81 MG EC tablet, Take 1 tablet (81 mg total) by mouth every evening., Disp: 100 tablet, Rfl: 3    cholecalciferol, vitamin D3, 5,000 unit capsule, Take 1 capsule by mouth Daily., Disp: , Rfl:     donepezil (ARICEPT) 10 MG tablet, Take 1 tablet (10 mg total) by mouth every evening., Disp: 90 tablet, Rfl: 3    lancets (SOFT TOUCH LANCETS) Misc, 1 Device by Misc.(Non-Drug; Combo Route) route once daily., Disp: 100 each, Rfl: 11    metFORMIN (GLUCOPHAGE) 500 MG tablet, Take two tablets at bedtime, Disp: 180 tablet, Rfl: 3    MULTIVITS-MINERALS/FA/LYCOPENE (ONE-A-DAY MEN'S ORAL), Take 1 tablet by mouth Daily., Disp: , Rfl:     sertraline (ZOLOFT) 50 MG tablet, Take 1 tablet (50 mg total) by mouth every morning. Increase energy and boost mood, Disp: 90 tablet, Rfl: 3    tadalafil (CIALIS) 20 MG Tab, Take 1 tablet (20 mg total) by mouth daily as needed. take as directed, Disp: 10 tablet, Rfl: 5    TRUE METRIX GLUCOSE METER Misc, , Disp: , Rfl:     TRUE METRIX GLUCOSE TEST STRIP Strp, USE STRIP TO CHECK GLUCOSE ONCE DAILY, Disp: 50 strip, Rfl: 3    OBJECTIVE:  Pain: now 0/10, worst 6/10 located in the front of his right knee, described as achy pain.    Sensation:  intact to light touch     Knee ROM:  Flexion     R 115 L 120      Extension   R -5 L  -5       Strength:  Quadriceps   R 4-/5 L 4/5     Hamstrings   R 4-/5 L 4/5      Hip Flexor   R 4/5 L 4/5        Ankle Dorsiflexion   R 4+/5 L 4+/5    Muscle Length:  Decrease hamstring length bilaterally           Function: LOWER EXTREMITY FUNCTIONAL SCALE                EVAL  reeval  1. Any of your usual work, housework or school activities   1/4 2/4  2. Your usual hobbies, sporting     0/4 0/4  3. Getting in and out of tub      3/4  3/4  4. Walking between rooms      1/4 2/4  5. Putting on shoes or socks      4/4 4/4  6. Squatting        1/4 2/4  7. Lifting an object from the ground      4/4 4/4  8. Performing light activities around the home   3/4  3/4  9. Performing heavy activities around the home   1/4 2/4  10. Getting in and out of car      3/4  4/4  11. Walking 2 blocks       0/4 0/4  12.Walking a mile       0/4 0/4  13. Getting up and down 1 flight of stairs    0/4 1/4  14. Standing for 1 hour      0/4 0/4  15. Sitting for an hour       4/4 4/4  16. Running on even ground      0/4 0/4  17. Running on uneven ground     0/4 0/4  18. Making sharp turns when running fast    0/4 0/4  19. Hopping        0/4 0/4  20. Rolling over in bed       4/4 0/4    Patient reports (Initial 31.25%) 44% ability based on score of the Lower Extremity Functional Scale.    Functional Limitations and Goal:  This patient's primary physical therapy goal is to return to their prior level of function (Mobility - Walking, Moving Around [id number]) without limitations. The patient's current level of impairment is 40 to 59% impaired (CK) based on their score of 44% on the Lower Extremity Functional Scale. The patient is expected to achieve a score of 40-59% impaired (CK) within 10 treatment days.    KUMAR Assessment    1. Sitting to Standing   3 - able to stand independely using hands  2. Standing Unsupported   4 - able to stand safely 2 minutes without hold  3. Sitting Unsupported   4 - able to sit safely and securely 2  minutes  4. Standing to Sitting   2 - uses back of legs against chair to control descent  5. Pivot Transfer   3 - able to transfer safely with definite use of hands  6. Standing with Eyes Closed   3 - able to stand 10 seconds with supervision  7. Standing with Feet Together   4 - able to place feet together independently and stand 1 minute safely  8. Reaching Forward with Outstretched Arm   3 - can reach forward 12 cm/5 inches safely  9. Retrieving Object from Floor   3 - able to pick slipper but needs supervision  10. Turning to Look Behind   4 - looks behind from both sides and weights shifts well  11. Turning 360 Degrees   2 - able to trun 360 safely but slowly  12. Placing Alternate Foot on Step   3 - able to stand independently and completely 8 steps > 20 seconds  13. Standing with One Foot in Front   0 - Looses balance while stepping or standing  14. Standing on One Foot   1 - tries to lift leg and unable to hold 3 seconds but remains standing independently    Eval 6/7/18: 39/56    Tenderness to palpation:  Over the medial joint line of the right knee    ASSESSMENT:  Patient tolerated today's treatment well with no complaints. Patient has made improvements in ROM of right knee flexion from 110 degrees to 115, dorsiflexion strength from 4/5 to 4+/5, and increase from 31% to 44% on the Lower Extremity Functional Scale. Patient demonstrates decrease muscle weakness, decreased ROM, decreased balance, and decrease muscle length. Patient's impairments limits his ability to perform activities at home, go up and down stairs, and walk long distances. Patient will benefit from 10 more visit in order to increase muscle strength, increase ROM, increase balance, and increase muscle length.     Initial: The patient is a 78 y.o. year old male who presents to physical therapy with complaints of bilateral lower extremity weakness with pain in the right knee.  Patient's impairments include muscle weakness. Decrease muscle  length, decreased joint ROM, and decreased balance.  These impairments are limiting patient's ability to perform house hold activities, ascend and descend stairs, get in and out of the bath, and walk around home and community.  Patient's prognosis is good.  Patient will benefit from skilled physical therapy intervention to increase muscle strength, improve joint ROM, increase balance, increase endurance, and decrease pain.    Co-morbidities which may impact the plan of care and potentially impede the patient's progress in therapy include:  Obesity, chronic stage of condition, DM.     The patient's clinical presentation is stable.  Based on patient's stable clinical presentation, 3+ co-morbidities, and examination of 2 body systems, patient presents with moderate complexity.    Short Term Goals:  (3 weeks)  1.  Patient will improve R knee flexion ROM to 115 degrees in order to ascend/descend stairs safely.   -Goal met on 7/5/17  2.  Patient will report improved function indicated by a score of 38% ability on the Lower Extremity Functional Scale.. -Goal met on 7/5/18  3.  Patient will be independent with home exercise program.        -Goal met on 7/5/18    Long Term Goals:  (6 weeks)  1.  Patient will improve quad strength bilaterally to 4+/5 in order to get in and out of the bath safely.  2.  Patient will improve Freeman Balance score to 42 in order to improve safety during ambulation.  3.  Patient will report improved function indicated by a score of 45% ability on the Lower Extremity Functional Scale.  4.  Patient will improve hamstring strength bilaterally to 4+/5 in order to get in and out of the car with less difficulty.   5.  Patient will improve R knee flexion ROM to 120 degrees in order bend to  objects off the floor.     TREATMENT PROVIDED:    Initial evaluation completed.    Manual Therapy:  (0 minutes)      Therapeutic Exercise:  (56 minutes)      Heels Slide x30    Hamstring Stretch 2 minutes each  side    Step taps 2 minutes -(progressed)     Standing March 3 minutes    LAQ x30    Mini Squats x30    Hip Abd x30    Hip Add x30    Bike 10 minutes    Gastroc Stretch 2 minutes    Step Ups 2 minutes    Side step ups 2 minutes    Glute set x30    Abdominal Isometric x30    Sit to Stand x10    Modalities:  Ice for 10 minutes. Patient tolerated well with no skin irritation or adverse reaction.    PLAN:  Patient will benefit from physical therapy (3) x/week for (6) weeks including manual therapy, therapeutic exercise, neuromuscular re-education, functional activities, modalities, and patient education.    Thank you for this referral.    These services are reasonable and necessary for the conditions set forth above while under my care.

## 2018-07-09 ENCOUNTER — CLINICAL SUPPORT (OUTPATIENT)
Dept: REHABILITATION | Facility: HOSPITAL | Age: 78
End: 2018-07-09
Payer: MEDICARE

## 2018-07-09 DIAGNOSIS — R29.898 BILATERAL LEG WEAKNESS: Primary | ICD-10-CM

## 2018-07-09 PROCEDURE — 97110 THERAPEUTIC EXERCISES: CPT | Mod: PO

## 2018-07-09 NOTE — PROGRESS NOTES
PHYSICAL THERAPY OUTPATIENT TREATMENT    Referring Provider:  Dr. Liane Tijerina    Diagnosis:       ICD-10-CM ICD-9-CM    1. Bilateral leg weakness R29.898 729.89        Orders:  Evaluate and Treat    Date of Initial Evaluation:  18    Orders :  18    Coding Cycle Visit # 11    SUBJECTIVE: Patient reports he is feeling good today. He states his knee felt like it was going to give out yesterday after walking a lot but it held up.     Initial: Patient reports to therapy with weakness in both lower extremities. He states the past couple years he has been getting progressively weaker and at times has pain. His pain usually starts after walking and standing for long periods. The patient states his main concern is he gets tired and wants to strengthen and increase his endurance. The right side feels like it gets tired quicker and he tends to feel more pain on that side. The pain he feels is a burning pain when he works the muscle to much. The knee pain on the right is described as achy. He has no history of knee and pain and hasn't received any therapy before. He also feels because of the weakness he has trouble with his balance.     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  Began Aricept 10 mg at bedtime.    BMI 40.0-44.9, adult     Diabetes mellitus     diet management    Dysmetabolic syndrome X 10/2/2012    Hyperlipidemia     Hypertension     diet managemen    Joint pain     MDD (major depressive disorder) 2015    Midline low back pain without sciatica 2016    Primary osteoarthritis of right knee 2018    Prostate CA     Sleep apnea     cipap    Trouble in sleeping     Type II or unspecified type diabetes mellitus with neurological manifestations, not stated as uncontrolled(250.60)        Patient Active Problem List   Diagnosis     Hyperlipemia    Impotence of organic origin    History of prostate cancer    BMI 40.0-44.9, adult    Aortic atherosclerosis    [place] (hard of hearing)    Tortuous aorta    JESSICA on CPAP    Asbestos exposure    Midline low back pain without sciatica    Amnestic MCI (mild cognitive impairment with memory loss)    Type 2 diabetes mellitus without complication, without long-term current use of insulin    Primary osteoarthritis of right knee    URRUTIA (dyspnea on exertion)    Weakness of both lower extremities    Statin myopathy    Diabetes mellitus type 2 in obese         Current Outpatient Prescriptions:     aspirin (ECOTRIN) 81 MG EC tablet, Take 1 tablet (81 mg total) by mouth every evening., Disp: 100 tablet, Rfl: 3    cholecalciferol, vitamin D3, 5,000 unit capsule, Take 1 capsule by mouth Daily., Disp: , Rfl:     donepezil (ARICEPT) 10 MG tablet, Take 1 tablet (10 mg total) by mouth every evening., Disp: 90 tablet, Rfl: 3    lancets (SOFT TOUCH LANCETS) Misc, 1 Device by Misc.(Non-Drug; Combo Route) route once daily., Disp: 100 each, Rfl: 11    metFORMIN (GLUCOPHAGE) 500 MG tablet, Take two tablets at bedtime, Disp: 180 tablet, Rfl: 3    MULTIVITS-MINERALS/FA/LYCOPENE (ONE-A-DAY MEN'S ORAL), Take 1 tablet by mouth Daily., Disp: , Rfl:     sertraline (ZOLOFT) 50 MG tablet, Take 1 tablet (50 mg total) by mouth every morning. Increase energy and boost mood, Disp: 90 tablet, Rfl: 3    tadalafil (CIALIS) 20 MG Tab, Take 1 tablet (20 mg total) by mouth daily as needed. take as directed, Disp: 10 tablet, Rfl: 5    TRUE METRIX GLUCOSE METER Misc, , Disp: , Rfl:     TRUE METRIX GLUCOSE TEST STRIP Strp, USE STRIP TO CHECK GLUCOSE ONCE DAILY, Disp: 50 strip, Rfl: 3    OBJECTIVE:  Pain: now 0/10, worst 6/10 located in the front of his right knee, described as achy pain.    Sensation:  intact to light touch     Knee ROM:  Flexion     R 115 L 120      Extension   R -5 L -5       Strength:  Quadriceps   R 4-/5  L 4/5     Hamstrings   R 4-/5 L 4/5      Hip Flexor   R 4/5 L 4/5        Ankle Dorsiflexion   R 4+/5 L 4+/5    Muscle Length:  Decrease hamstring length bilaterally           Function: LOWER EXTREMITY FUNCTIONAL SCALE                EVAL  reeval  1. Any of your usual work, housework or school activities   1/4 2/4  2. Your usual hobbies, sporting     0/4 0/4  3. Getting in and out of tub      3/4  3/4  4. Walking between rooms      1/4 2/4  5. Putting on shoes or socks      4/4 4/4  6. Squatting        1/4 2/4  7. Lifting an object from the ground      4/4 4/4  8. Performing light activities around the home   3/4  3/4  9. Performing heavy activities around the home   1/4 2/4  10. Getting in and out of car      3/4  4/4  11. Walking 2 blocks       0/4 0/4  12.Walking a mile       0/4 0/4  13. Getting up and down 1 flight of stairs    0/4 1/4  14. Standing for 1 hour      0/4 0/4  15. Sitting for an hour       4/4 4/4  16. Running on even ground      0/4 0/4  17. Running on uneven ground     0/4 0/4  18. Making sharp turns when running fast    0/4 0/4  19. Hopping        0/4 0/4  20. Rolling over in bed       4/4 0/4    Patient reports (Initial 31.25%) 44% ability based on score of the Lower Extremity Functional Scale.    Functional Limitations and Goal:  This patient's primary physical therapy goal is to return to their prior level of function (Mobility - Walking, Moving Around [id number]) without limitations. The patient's current level of impairment is 40 to 59% impaired (CK) based on their score of 44% on the Lower Extremity Functional Scale. The patient is expected to achieve a score of 40-59% impaired (CK) within 10 treatment days.    KUMAR Assessment    1. Sitting to Standing   3 - able to stand independely using hands  2. Standing Unsupported   4 - able to stand safely 2 minutes without hold  3. Sitting Unsupported   4 - able to sit safely and securely 2 minutes  4. Standing to Sitting   2 - uses  back of legs against chair to control descent  5. Pivot Transfer   3 - able to transfer safely with definite use of hands  6. Standing with Eyes Closed   3 - able to stand 10 seconds with supervision  7. Standing with Feet Together   4 - able to place feet together independently and stand 1 minute safely  8. Reaching Forward with Outstretched Arm   3 - can reach forward 12 cm/5 inches safely  9. Retrieving Object from Floor   3 - able to pick slipper but needs supervision  10. Turning to Look Behind   4 - looks behind from both sides and weights shifts well  11. Turning 360 Degrees   2 - able to trun 360 safely but slowly  12. Placing Alternate Foot on Step   3 - able to stand independently and completely 8 steps > 20 seconds  13. Standing with One Foot in Front   0 - Looses balance while stepping or standing  14. Standing on One Foot   1 - tries to lift leg and unable to hold 3 seconds but remains standing independently    Eval 6/7/18: 39/56    Tenderness to palpation:  Over the medial joint line of the right knee    ASSESSMENT:  Patient tolerated today's treatment well. Patient still fatigues with step ups but mini squats and sit to stand is improving.     Progress Note:Patient tolerated today's treatment well with no complaints. Patient has made improvements in ROM of right knee flexion from 110 degrees to 115, dorsiflexion strength from 4/5 to 4+/5, and increase from 31% to 44% on the Lower Extremity Functional Scale. Patient demonstrates decrease muscle weakness, decreased ROM, decreased balance, and decrease muscle length. Patient's impairments limits his ability to perform activities at home, go up and down stairs, and walk long distances. Patient will benefit from 10 more visit in order to increase muscle strength, increase ROM, increase balance, and increase muscle length.     Initial: The patient is a 78 y.o. year old male who presents to physical therapy with complaints of bilateral lower extremity  weakness with pain in the right knee.  Patient's impairments include muscle weakness. Decrease muscle length, decreased joint ROM, and decreased balance.  These impairments are limiting patient's ability to perform house hold activities, ascend and descend stairs, get in and out of the bath, and walk around home and community.  Patient's prognosis is good.  Patient will benefit from skilled physical therapy intervention to increase muscle strength, improve joint ROM, increase balance, increase endurance, and decrease pain.    Co-morbidities which may impact the plan of care and potentially impede the patient's progress in therapy include:  Obesity, chronic stage of condition, DM.     The patient's clinical presentation is stable.  Based on patient's stable clinical presentation, 3+ co-morbidities, and examination of 2 body systems, patient presents with moderate complexity.    Short Term Goals:  (3 weeks)  1.  Patient will improve R knee flexion ROM to 115 degrees in order to ascend/descend stairs safely.   -Goal met on 7/5/17  2.  Patient will report improved function indicated by a score of 38% ability on the Lower Extremity Functional Scale.. -Goal met on 7/5/18  3.  Patient will be independent with home exercise program.        -Goal met on 7/5/18    Long Term Goals:  (6 weeks)  1.  Patient will improve quad strength bilaterally to 4+/5 in order to get in and out of the bath safely.  2.  Patient will improve Freeman Balance score to 42 in order to improve safety during ambulation.  3.  Patient will report improved function indicated by a score of 45% ability on the Lower Extremity Functional Scale.  4.  Patient will improve hamstring strength bilaterally to 4+/5 in order to get in and out of the car with less difficulty.   5.  Patient will improve R knee flexion ROM to 120 degrees in order bend to  objects off the floor.     TREATMENT PROVIDED:    Initial evaluation completed.    Manual Therapy:  (0 minutes)       Therapeutic Exercise:  (55 minutes)      Heels Slide x30    Hamstring Stretch 2 minutes each side    Step taps 2 minutes -(progressed)     Standing March 3 minutes    LAQ x30    Mini Squats x30    Hip Abd x30    Hip Add x30    Bike 10 minutes    Gastroc Stretch 2 minutes    Step Ups 2 minutes    Side step ups 2 minutes    Glute set x30    Abdominal Isometric x30    Sit to Stand x10    Modalities:  Ice for 10 minutes. Patient tolerated well with no skin irritation or adverse reaction.    PLAN:  Patient will benefit from physical therapy (3) x/week for (6) weeks including manual therapy, therapeutic exercise, neuromuscular re-education, functional activities, modalities, and patient education.    Thank you for this referral.    These services are reasonable and necessary for the conditions set forth above while under my care.

## 2018-07-11 ENCOUNTER — CLINICAL SUPPORT (OUTPATIENT)
Dept: REHABILITATION | Facility: HOSPITAL | Age: 78
End: 2018-07-11
Payer: MEDICARE

## 2018-07-11 DIAGNOSIS — R29.898 BILATERAL LEG WEAKNESS: Primary | ICD-10-CM

## 2018-07-11 PROCEDURE — 97110 THERAPEUTIC EXERCISES: CPT | Mod: PO

## 2018-07-11 NOTE — PROGRESS NOTES
PHYSICAL THERAPY OUTPATIENT TREATMENT    Referring Provider:  Dr. Liane Tijerina    Diagnosis:       ICD-10-CM ICD-9-CM    1. Bilateral leg weakness R29.898 729.89        Orders:  Evaluate and Treat    Date of Initial Evaluation:  18    Orders :  18    Coding Cycle Visit # 12    SUBJECTIVE: Patient reports that he has been feeling good.     Initial: Patient reports to therapy with weakness in both lower extremities. He states the past couple years he has been getting progressively weaker and at times has pain. His pain usually starts after walking and standing for long periods. The patient states his main concern is he gets tired and wants to strengthen and increase his endurance. The right side feels like it gets tired quicker and he tends to feel more pain on that side. The pain he feels is a burning pain when he works the muscle to much. The knee pain on the right is described as achy. He has no history of knee and pain and hasn't received any therapy before. He also feels because of the weakness he has trouble with his balance.     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  Began Aricept 10 mg at bedtime.    BMI 40.0-44.9, adult     Diabetes mellitus     diet management    Dysmetabolic syndrome X 10/2/2012    Hyperlipidemia     Hypertension     diet managemen    Joint pain     MDD (major depressive disorder) 2015    Midline low back pain without sciatica 2016    Primary osteoarthritis of right knee 2018    Prostate CA     Sleep apnea     cipap    Trouble in sleeping     Type II or unspecified type diabetes mellitus with neurological manifestations, not stated as uncontrolled(250.60)        Patient Active Problem List   Diagnosis    Hyperlipemia    Impotence of organic origin    History of prostate cancer    BMI 40.0-44.9,  adult    Aortic atherosclerosis    [place] (hard of hearing)    Tortuous aorta    JESSICA on CPAP    Asbestos exposure    Midline low back pain without sciatica    Amnestic MCI (mild cognitive impairment with memory loss)    Type 2 diabetes mellitus without complication, without long-term current use of insulin    Primary osteoarthritis of right knee    URRUTIA (dyspnea on exertion)    Weakness of both lower extremities    Statin myopathy    Diabetes mellitus type 2 in obese         Current Outpatient Prescriptions:     aspirin (ECOTRIN) 81 MG EC tablet, Take 1 tablet (81 mg total) by mouth every evening., Disp: 100 tablet, Rfl: 3    cholecalciferol, vitamin D3, 5,000 unit capsule, Take 1 capsule by mouth Daily., Disp: , Rfl:     donepezil (ARICEPT) 10 MG tablet, Take 1 tablet (10 mg total) by mouth every evening., Disp: 90 tablet, Rfl: 3    lancets (SOFT TOUCH LANCETS) Misc, 1 Device by Misc.(Non-Drug; Combo Route) route once daily., Disp: 100 each, Rfl: 11    metFORMIN (GLUCOPHAGE) 500 MG tablet, Take two tablets at bedtime, Disp: 180 tablet, Rfl: 3    MULTIVITS-MINERALS/FA/LYCOPENE (ONE-A-DAY MEN'S ORAL), Take 1 tablet by mouth Daily., Disp: , Rfl:     sertraline (ZOLOFT) 50 MG tablet, Take 1 tablet (50 mg total) by mouth every morning. Increase energy and boost mood, Disp: 90 tablet, Rfl: 3    tadalafil (CIALIS) 20 MG Tab, Take 1 tablet (20 mg total) by mouth daily as needed. take as directed, Disp: 10 tablet, Rfl: 5    TRUE METRIX GLUCOSE METER Misc, , Disp: , Rfl:     TRUE METRIX GLUCOSE TEST STRIP Strp, USE STRIP TO CHECK GLUCOSE ONCE DAILY, Disp: 50 strip, Rfl: 3    OBJECTIVE:  Pain: now 0/10, worst 6/10 located in the front of his right knee, described as achy pain.    Sensation:  intact to light touch     Knee ROM:  Flexion     R 115 L 120      Extension   R -5 L -5       Strength:  Quadriceps   R 4-/5 L 4/5     Hamstrings   R 4-/5 L 4/5      Hip Flexor   R 4/5 L 4/5        Ankle Dorsiflexion    R 4+/5 L 4+/5    Muscle Length:  Decrease hamstring length bilaterally           Function: LOWER EXTREMITY FUNCTIONAL SCALE                EVAL  reeval  1. Any of your usual work, housework or school activities   1/4 2/4  2. Your usual hobbies, sporting     0/4 0/4  3. Getting in and out of tub      3/4  3/4  4. Walking between rooms      1/4 2/4  5. Putting on shoes or socks      4/4 4/4  6. Squatting        1/4 2/4  7. Lifting an object from the ground      4/4 4/4  8. Performing light activities around the home   3/4  3/4  9. Performing heavy activities around the home   1/4 2/4  10. Getting in and out of car      3/4  4/4  11. Walking 2 blocks       0/4 0/4  12.Walking a mile       0/4 0/4  13. Getting up and down 1 flight of stairs    0/4 1/4  14. Standing for 1 hour      0/4 0/4  15. Sitting for an hour       4/4 4/4  16. Running on even ground      0/4 0/4  17. Running on uneven ground     0/4 0/4  18. Making sharp turns when running fast    0/4 0/4  19. Hopping        0/4 0/4  20. Rolling over in bed       4/4 0/4    Patient reports (Initial 31.25%) 44% ability based on score of the Lower Extremity Functional Scale.    Functional Limitations and Goal:  This patient's primary physical therapy goal is to return to their prior level of function (Mobility - Walking, Moving Around [id number]) without limitations. The patient's current level of impairment is 40 to 59% impaired (CK) based on their score of 44% on the Lower Extremity Functional Scale. The patient is expected to achieve a score of 40-59% impaired (CK) within 10 treatment days.    KUMAR Assessment    1. Sitting to Standing   3 - able to stand independely using hands  2. Standing Unsupported   4 - able to stand safely 2 minutes without hold  3. Sitting Unsupported   4 - able to sit safely and securely 2 minutes  4. Standing to Sitting   2 - uses back of legs against chair to control descent  5. Pivot Transfer   3 - able to transfer safely  with definite use of hands  6. Standing with Eyes Closed   3 - able to stand 10 seconds with supervision  7. Standing with Feet Together   4 - able to place feet together independently and stand 1 minute safely  8. Reaching Forward with Outstretched Arm   3 - can reach forward 12 cm/5 inches safely  9. Retrieving Object from Floor   3 - able to pick slipper but needs supervision  10. Turning to Look Behind   4 - looks behind from both sides and weights shifts well  11. Turning 360 Degrees   2 - able to trun 360 safely but slowly  12. Placing Alternate Foot on Step   3 - able to stand independently and completely 8 steps > 20 seconds  13. Standing with One Foot in Front   0 - Looses balance while stepping or standing  14. Standing on One Foot   1 - tries to lift leg and unable to hold 3 seconds but remains standing independently    Eval 6/7/18: 39/56    Tenderness to palpation:  Over the medial joint line of the right knee    ASSESSMENT:  Patient improving endurance in exercises. Patient will come to therapy once a week for 2 more visits. Patient tolerated today's treatment well with no complaints.      Progress Note:Patient tolerated today's treatment well with no complaints. Patient has made improvements in ROM of right knee flexion from 110 degrees to 115, dorsiflexion strength from 4/5 to 4+/5, and increase from 31% to 44% on the Lower Extremity Functional Scale. Patient demonstrates decrease muscle weakness, decreased ROM, decreased balance, and decrease muscle length. Patient's impairments limits his ability to perform activities at home, go up and down stairs, and walk long distances. Patient will benefit from 10 more visit in order to increase muscle strength, increase ROM, increase balance, and increase muscle length.     Initial: The patient is a 78 y.o. year old male who presents to physical therapy with complaints of bilateral lower extremity weakness with pain in the right knee.  Patient's impairments  include muscle weakness. Decrease muscle length, decreased joint ROM, and decreased balance.  These impairments are limiting patient's ability to perform house hold activities, ascend and descend stairs, get in and out of the bath, and walk around home and community.  Patient's prognosis is good.  Patient will benefit from skilled physical therapy intervention to increase muscle strength, improve joint ROM, increase balance, increase endurance, and decrease pain.    Co-morbidities which may impact the plan of care and potentially impede the patient's progress in therapy include:  Obesity, chronic stage of condition, DM.     The patient's clinical presentation is stable.  Based on patient's stable clinical presentation, 3+ co-morbidities, and examination of 2 body systems, patient presents with moderate complexity.    Short Term Goals:  (3 weeks)  1.  Patient will improve R knee flexion ROM to 115 degrees in order to ascend/descend stairs safely.   -Goal met on 7/5/17  2.  Patient will report improved function indicated by a score of 38% ability on the Lower Extremity Functional Scale.. -Goal met on 7/5/18  3.  Patient will be independent with home exercise program.        -Goal met on 7/5/18    Long Term Goals:  (6 weeks)  1.  Patient will improve quad strength bilaterally to 4+/5 in order to get in and out of the bath safely.  2.  Patient will improve Freeman Balance score to 42 in order to improve safety during ambulation.  3.  Patient will report improved function indicated by a score of 45% ability on the Lower Extremity Functional Scale.  4.  Patient will improve hamstring strength bilaterally to 4+/5 in order to get in and out of the car with less difficulty.   5.  Patient will improve R knee flexion ROM to 120 degrees in order bend to  objects off the floor.     TREATMENT PROVIDED:    Initial evaluation completed.    Manual Therapy:  (0 minutes)      Therapeutic Exercise:  (56 minutes)      Heels Slide  x30    Hamstring Stretch 2 minutes each side    Step taps 2 minutes -(progressed)     Standing March 3 minutes    LAQ x30    Mini Squats x30    Hip Abd x30    Hip Add x30    Bike 10 minutes    Gastroc Stretch 2 minutes    Step Ups 2 minutes    Side step ups 2 minutes    Glute set x30    Abdominal Isometric x30    Sit to Stand x15    Modalities:  Ice for 10 minutes. Patient tolerated well with no skin irritation or adverse reaction.    PLAN:  Patient will benefit from physical therapy (3) x/week for (6) weeks including manual therapy, therapeutic exercise, neuromuscular re-education, functional activities, modalities, and patient education.    Thank you for this referral.    These services are reasonable and necessary for the conditions set forth above while under my care.

## 2018-07-18 ENCOUNTER — CLINICAL SUPPORT (OUTPATIENT)
Dept: REHABILITATION | Facility: HOSPITAL | Age: 78
End: 2018-07-18
Payer: MEDICARE

## 2018-07-18 DIAGNOSIS — R29.898 BILATERAL LEG WEAKNESS: Primary | ICD-10-CM

## 2018-07-18 PROCEDURE — 97110 THERAPEUTIC EXERCISES: CPT | Mod: PO

## 2018-07-18 NOTE — PROGRESS NOTES
PHYSICAL THERAPY OUTPATIENT TREATMENT    Referring Provider:  Dr. Liane Tijerina    Diagnosis:       ICD-10-CM ICD-9-CM    1. Bilateral leg weakness R29.898 729.89        Orders:  Evaluate and Treat    Date of Initial Evaluation:  18    Orders :  18    Coding Cycle Visit # 13    SUBJECTIVE: Patient reports he isn't having any pain and he is feeling stronger.     Initial: Patient reports to therapy with weakness in both lower extremities. He states the past couple years he has been getting progressively weaker and at times has pain. His pain usually starts after walking and standing for long periods. The patient states his main concern is he gets tired and wants to strengthen and increase his endurance. The right side feels like it gets tired quicker and he tends to feel more pain on that side. The pain he feels is a burning pain when he works the muscle to much. The knee pain on the right is described as achy. He has no history of knee and pain and hasn't received any therapy before. He also feels because of the weakness he has trouble with his balance.     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  Began Aricept 10 mg at bedtime.    BMI 40.0-44.9, adult     Diabetes mellitus     diet management    Dysmetabolic syndrome X 10/2/2012    Hyperlipidemia     Hypertension     diet managemen    Joint pain     MDD (major depressive disorder) 2015    Midline low back pain without sciatica 2016    Primary osteoarthritis of right knee 2018    Prostate CA     Sleep apnea     cipap    Trouble in sleeping     Type II or unspecified type diabetes mellitus with neurological manifestations, not stated as uncontrolled(250.60)        Patient Active Problem List   Diagnosis    Hyperlipemia    Impotence of organic origin    History of prostate cancer     BMI 40.0-44.9, adult    Aortic atherosclerosis    [place] (hard of hearing)    Tortuous aorta    JESSICA on CPAP    Asbestos exposure    Midline low back pain without sciatica    Amnestic MCI (mild cognitive impairment with memory loss)    Type 2 diabetes mellitus without complication, without long-term current use of insulin    Primary osteoarthritis of right knee    URRUTIA (dyspnea on exertion)    Weakness of both lower extremities    Statin myopathy    Diabetes mellitus type 2 in obese         Current Outpatient Prescriptions:     aspirin (ECOTRIN) 81 MG EC tablet, Take 1 tablet (81 mg total) by mouth every evening., Disp: 100 tablet, Rfl: 3    cholecalciferol, vitamin D3, 5,000 unit capsule, Take 1 capsule by mouth Daily., Disp: , Rfl:     donepezil (ARICEPT) 10 MG tablet, Take 1 tablet (10 mg total) by mouth every evening., Disp: 90 tablet, Rfl: 3    lancets (SOFT TOUCH LANCETS) Misc, 1 Device by Misc.(Non-Drug; Combo Route) route once daily., Disp: 100 each, Rfl: 11    metFORMIN (GLUCOPHAGE) 500 MG tablet, Take two tablets at bedtime, Disp: 180 tablet, Rfl: 3    MULTIVITS-MINERALS/FA/LYCOPENE (ONE-A-DAY MEN'S ORAL), Take 1 tablet by mouth Daily., Disp: , Rfl:     sertraline (ZOLOFT) 50 MG tablet, Take 1 tablet (50 mg total) by mouth every morning. Increase energy and boost mood, Disp: 90 tablet, Rfl: 3    tadalafil (CIALIS) 20 MG Tab, Take 1 tablet (20 mg total) by mouth daily as needed. take as directed, Disp: 10 tablet, Rfl: 5    TRUE METRIX GLUCOSE METER Misc, , Disp: , Rfl:     TRUE METRIX GLUCOSE TEST STRIP Strp, USE STRIP TO CHECK GLUCOSE ONCE DAILY, Disp: 50 strip, Rfl: 3    OBJECTIVE:  Pain: now 0/10, worst 6/10 located in the front of his right knee, described as achy pain.    Sensation:  intact to light touch     Knee ROM:  Flexion     R 115 L 120      Extension   R -5 L -5       Strength:  Quadriceps   R 4-/5 L 4/5     Hamstrings   R 4-/5 L 4/5      Hip Flexor   R 4/5 L  4/5        Ankle Dorsiflexion   R 4+/5 L 4+/5    Muscle Length:  Decrease hamstring length bilaterally           Function: LOWER EXTREMITY FUNCTIONAL SCALE                EVAL  reeval  1. Any of your usual work, housework or school activities   1/4 2/4  2. Your usual hobbies, sporting     0/4 0/4  3. Getting in and out of tub      3/4  3/4  4. Walking between rooms      1/4 2/4  5. Putting on shoes or socks      4/4 4/4  6. Squatting        1/4 2/4  7. Lifting an object from the ground      4/4 4/4  8. Performing light activities around the home   3/4  3/4  9. Performing heavy activities around the home   1/4 2/4  10. Getting in and out of car      3/4  4/4  11. Walking 2 blocks       0/4 0/4  12.Walking a mile       0/4 0/4  13. Getting up and down 1 flight of stairs    0/4 1/4  14. Standing for 1 hour      0/4 0/4  15. Sitting for an hour       4/4 4/4  16. Running on even ground      0/4 0/4  17. Running on uneven ground     0/4 0/4  18. Making sharp turns when running fast    0/4 0/4  19. Hopping        0/4 0/4  20. Rolling over in bed       4/4 0/4    Patient reports (Initial 31.25%) 44% ability based on score of the Lower Extremity Functional Scale.    Functional Limitations and Goal:  This patient's primary physical therapy goal is to return to their prior level of function (Mobility - Walking, Moving Around [id number]) without limitations. The patient's current level of impairment is 40 to 59% impaired (CK) based on their score of 44% on the Lower Extremity Functional Scale. The patient is expected to achieve a score of 40-59% impaired (CK) within 10 treatment days.    KUMAR Assessment    1. Sitting to Standing   3 - able to stand independely using hands  2. Standing Unsupported   4 - able to stand safely 2 minutes without hold  3. Sitting Unsupported   4 - able to sit safely and securely 2 minutes  4. Standing to Sitting   2 - uses back of legs against chair to control descent  5. Pivot  Transfer   3 - able to transfer safely with definite use of hands  6. Standing with Eyes Closed   3 - able to stand 10 seconds with supervision  7. Standing with Feet Together   4 - able to place feet together independently and stand 1 minute safely  8. Reaching Forward with Outstretched Arm   3 - can reach forward 12 cm/5 inches safely  9. Retrieving Object from Floor   3 - able to pick slipper but needs supervision  10. Turning to Look Behind   4 - looks behind from both sides and weights shifts well  11. Turning 360 Degrees   2 - able to trun 360 safely but slowly  12. Placing Alternate Foot on Step   3 - able to stand independently and completely 8 steps > 20 seconds  13. Standing with One Foot in Front   0 - Looses balance while stepping or standing  14. Standing on One Foot   1 - tries to lift leg and unable to hold 3 seconds but remains standing independently    Eval 6/7/18: 39/56    Tenderness to palpation:  Over the medial joint line of the right knee    ASSESSMENT:  Patient tolerated exercises well. Patient continues to tolerate more reps and shorter rest times.     Progress Note:Patient tolerated today's treatment well with no complaints. Patient has made improvements in ROM of right knee flexion from 110 degrees to 115, dorsiflexion strength from 4/5 to 4+/5, and increase from 31% to 44% on the Lower Extremity Functional Scale. Patient demonstrates decrease muscle weakness, decreased ROM, decreased balance, and decrease muscle length. Patient's impairments limits his ability to perform activities at home, go up and down stairs, and walk long distances. Patient will benefit from 10 more visit in order to increase muscle strength, increase ROM, increase balance, and increase muscle length.     Initial: The patient is a 78 y.o. year old male who presents to physical therapy with complaints of bilateral lower extremity weakness with pain in the right knee.  Patient's impairments include muscle weakness.  Decrease muscle length, decreased joint ROM, and decreased balance.  These impairments are limiting patient's ability to perform house hold activities, ascend and descend stairs, get in and out of the bath, and walk around home and community.  Patient's prognosis is good.  Patient will benefit from skilled physical therapy intervention to increase muscle strength, improve joint ROM, increase balance, increase endurance, and decrease pain.    Co-morbidities which may impact the plan of care and potentially impede the patient's progress in therapy include:  Obesity, chronic stage of condition, DM.     The patient's clinical presentation is stable.  Based on patient's stable clinical presentation, 3+ co-morbidities, and examination of 2 body systems, patient presents with moderate complexity.    Short Term Goals:  (3 weeks)  1.  Patient will improve R knee flexion ROM to 115 degrees in order to ascend/descend stairs safely.   -Goal met on 7/5/17  2.  Patient will report improved function indicated by a score of 38% ability on the Lower Extremity Functional Scale.. -Goal met on 7/5/18  3.  Patient will be independent with home exercise program.        -Goal met on 7/5/18    Long Term Goals:  (6 weeks)  1.  Patient will improve quad strength bilaterally to 4+/5 in order to get in and out of the bath safely.  2.  Patient will improve Freeman Balance score to 42 in order to improve safety during ambulation.  3.  Patient will report improved function indicated by a score of 45% ability on the Lower Extremity Functional Scale.  4.  Patient will improve hamstring strength bilaterally to 4+/5 in order to get in and out of the car with less difficulty.   5.  Patient will improve R knee flexion ROM to 120 degrees in order bend to  objects off the floor.     TREATMENT PROVIDED:    Initial evaluation completed.    Manual Therapy:  (0 minutes)      Therapeutic Exercise:  (56 minutes)      Heels Slide x30    Hamstring Stretch 2  minutes each side    Step taps 2 minutes -(progressed)     Standing March 3 minutes    LAQ x30    Mini Squats x30    Hip Abd x30    Hip Add x30    Bike 10 minutes    Gastroc Stretch 2 minutes    Step Ups 2 minutes    Side step ups 2 minutes    Glute set x30    Abdominal Isometric x30    Sit to Stand x20    Modalities:  Ice for 10 minutes. Patient tolerated well with no skin irritation or adverse reaction.    PLAN:  Patient will benefit from physical therapy (3) x/week for (6) weeks including manual therapy, therapeutic exercise, neuromuscular re-education, functional activities, modalities, and patient education.    Thank you for this referral.    These services are reasonable and necessary for the conditions set forth above while under my care.

## 2018-08-01 ENCOUNTER — CLINICAL SUPPORT (OUTPATIENT)
Dept: REHABILITATION | Facility: HOSPITAL | Age: 78
End: 2018-08-01
Payer: MEDICARE

## 2018-08-01 DIAGNOSIS — R29.898 BILATERAL LEG WEAKNESS: Primary | ICD-10-CM

## 2018-08-01 PROCEDURE — 97110 THERAPEUTIC EXERCISES: CPT | Mod: PO

## 2018-08-01 PROCEDURE — G8978 MOBILITY CURRENT STATUS: HCPCS | Mod: CK,PO

## 2018-08-01 PROCEDURE — G8980 MOBILITY D/C STATUS: HCPCS | Mod: CK,PO

## 2018-08-01 PROCEDURE — G8979 MOBILITY GOAL STATUS: HCPCS | Mod: CK,PO

## 2018-08-01 NOTE — PLAN OF CARE
PHYSICAL THERAPY OUTPATIENT TREATMENT DISCHARGE NOTE    Referring Provider:  Dr. Liane Tijerina    Diagnosis:       ICD-10-CM ICD-9-CM    1. Bilateral leg weakness R29.898 729.89        Orders:  Evaluate and Treat    Date of Initial Evaluation:  18    Orders :  18    Coding Cycle Visit # 14    SUBJECTIVE: Patient reports he is feeling good today.     Initial: Patient reports to therapy with weakness in both lower extremities. He states the past couple years he has been getting progressively weaker and at times has pain. His pain usually starts after walking and standing for long periods. The patient states his main concern is he gets tired and wants to strengthen and increase his endurance. The right side feels like it gets tired quicker and he tends to feel more pain on that side. The pain he feels is a burning pain when he works the muscle to much. The knee pain on the right is described as achy. He has no history of knee and pain and hasn't received any therapy before. He also feels because of the weakness he has trouble with his balance.     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  Began Aricept 10 mg at bedtime.    BMI 40.0-44.9, adult     Diabetes mellitus     diet management    Dysmetabolic syndrome X 10/2/2012    Hyperlipidemia     Hypertension     diet managemen    Joint pain     MDD (major depressive disorder) 2015    Midline low back pain without sciatica 2016    Primary osteoarthritis of right knee 2018    Prostate CA     Sleep apnea     cipap    Trouble in sleeping     Type II or unspecified type diabetes mellitus with neurological manifestations, not stated as uncontrolled(250.60)        Patient Active Problem List   Diagnosis    Hyperlipemia    Impotence of organic origin    History of prostate cancer    BMI  40.0-44.9, adult    Aortic atherosclerosis    [place] (hard of hearing)    Tortuous aorta    JESSICA on CPAP    Asbestos exposure    Midline low back pain without sciatica    Amnestic MCI (mild cognitive impairment with memory loss)    Type 2 diabetes mellitus without complication, without long-term current use of insulin    Primary osteoarthritis of right knee    URRUTIA (dyspnea on exertion)    Weakness of both lower extremities    Statin myopathy    Diabetes mellitus type 2 in obese         Current Outpatient Prescriptions:     aspirin (ECOTRIN) 81 MG EC tablet, Take 1 tablet (81 mg total) by mouth every evening., Disp: 100 tablet, Rfl: 3    cholecalciferol, vitamin D3, 5,000 unit capsule, Take 1 capsule by mouth Daily., Disp: , Rfl:     donepezil (ARICEPT) 10 MG tablet, Take 1 tablet (10 mg total) by mouth every evening., Disp: 90 tablet, Rfl: 3    lancets (SOFT TOUCH LANCETS) Misc, 1 Device by Misc.(Non-Drug; Combo Route) route once daily., Disp: 100 each, Rfl: 11    metFORMIN (GLUCOPHAGE) 500 MG tablet, Take two tablets at bedtime, Disp: 180 tablet, Rfl: 3    MULTIVITS-MINERALS/FA/LYCOPENE (ONE-A-DAY MEN'S ORAL), Take 1 tablet by mouth Daily., Disp: , Rfl:     sertraline (ZOLOFT) 50 MG tablet, Take 1 tablet (50 mg total) by mouth every morning. Increase energy and boost mood, Disp: 90 tablet, Rfl: 3    tadalafil (CIALIS) 20 MG Tab, Take 1 tablet (20 mg total) by mouth daily as needed. take as directed, Disp: 10 tablet, Rfl: 5    TRUE METRIX GLUCOSE METER Misc, , Disp: , Rfl:     TRUE METRIX GLUCOSE TEST STRIP Strp, USE STRIP TO CHECK GLUCOSE ONCE DAILY, Disp: 50 strip, Rfl: 3    OBJECTIVE:  Pain: now 0/10, worst 6/10 located in the front of his right knee, described as achy pain.    Sensation:  intact to light touch     Knee ROM:  Flexion     R 120 L 120      Extension   R -5  L -5       Strength:  Quadriceps   R 4+/5 L 4+/5     Hamstrings   R 4+/5 L 4+/5      Hip Flexor   R 4+/5 L 4+/5        Ankle  Dorsiflexion   R 4+/5 L 4+/5    Muscle Length:  Decrease hamstring length bilaterally           Function: LOWER EXTREMITY FUNCTIONAL SCALE                EVAL  Reeval  Discharge   1. Any of your usual work, housework or school activities   1/4 2/4 2/4  2. Your usual hobbies, sporting     0/4 0/4 1/4  3. Getting in and out of tub      3/4  3/4  4/4  4. Walking between rooms      1/4 2/4 4/4  5. Putting on shoes or socks      4/4 4/4 4/4  6. Squatting        1/4 2/4 2/4  7. Lifting an object from the ground      4/4 4/4 4/4  8. Performing light activities around the home   3/4  3/4  3/4  9. Performing heavy activities around the home   1/4  2/4  3/4  10. Getting in and out of car      3/4  4/4  4/4  11. Walking 2 blocks       0/4 0/4 1/4  12.Walking a mile       0/4 0/4 1/4  13. Getting up and down 1 flight of stairs    0/4 1/4 2/4  14. Standing for 1 hour      0/4 0/4 0/4  15. Sitting for an hour       4/4 4/4 4/4  16. Running on even ground      0/4 0/4 0/4  17. Running on uneven ground     0/4 0/4 0/4  18. Making sharp turns when running fast    0/4 0/4 0/4  19. Hopping        0/4 0/4 0/4  20. Rolling over in bed       4/4 0/4 4/4    Patient reports (Initial 31.25%) 54% ability based on score of the Lower Extremity Functional Scale.    Functional Limitations and Goal:  This patient's primary physical therapy goal is to return to their prior level of function (Mobility - Walking, Moving Around [id number]) without limitations. The patient's current level of impairment is 46% impaired (40 to 59%) impaired (CK) based on their score of 54% on the Lower Extremity Functional Scale. The patient is expected to achieve a score of 40-59% impaired (CK) within 10 treatment days.    KUMAR Assessment (Eval 6/7/18)    1. Sitting to Standing   3 - able to stand independely using hands  2. Standing Unsupported   4 - able to stand safely 2 minutes without hold  3. Sitting Unsupported   4 - able to sit  safely and securely 2 minutes  4. Standing to Sitting   2 - uses back of legs against chair to control descent  5. Pivot Transfer   3 - able to transfer safely with definite use of hands  6. Standing with Eyes Closed   3 - able to stand 10 seconds with supervision  7. Standing with Feet Together   4 - able to place feet together independently and stand 1 minute safely  8. Reaching Forward with Outstretched Arm   3 - can reach forward 12 cm/5 inches safely  9. Retrieving Object from Floor   3 - able to pick slipper but needs supervision  10. Turning to Look Behind   4 - looks behind from both sides and weights shifts well  11. Turning 360 Degrees   2 - able to trun 360 safely but slowly  12. Placing Alternate Foot on Step   3 - able to stand independently and completely 8 steps > 20 seconds  13. Standing with One Foot in Front   0 - Looses balance while stepping or standing  14. Standing on One Foot   1 - tries to lift leg and unable to hold 3 seconds but remains standing independently    Eval 6/7/18: 39/56    Balance:  Kumar Balance Assessment Discharge (8/1/18)       1.Sitting to Standing       4  2.Standing Unsupported      4   3.Sitting with Back Unsupported     4   4.Standing to Sitting       4   5.Transfers        4   6.Standing Unsupported with Eyes Closed    3   7.Standing Unsupported with Feet Together    4   8.Reaching Forward with Outstretched Arm while Standing  4   9. Object from the Floor from a Standing Position  4   10.Turning to Look Behind Shoulder while Standing   4   11.Turn 360 Degrees       3   12.Placing Alternate Foot on Step while Standing Unsupported 3   13.Standing Unsupported One Foot in Front    0   14.Standing on One Leg      1             Total 46       Tenderness to palpation:  Over the medial joint line of the right knee    ASSESSMENT:  Patient tolerated today's treatment well. Patient has made improvements from 31% to 54% on the LEFS, increased muscle strength, KUMAR Balance  from 39 to 46, and decreased pain. Patient is being discharged due to meeting all goals. Patient will follow up with his physician as needed.    Progress Note:Patient tolerated today's treatment well with no complaints. Patient has made improvements in ROM of right knee flexion from 110 degrees to 115, dorsiflexion strength from 4/5 to 4+/5, and increase from 31% to 44% on the Lower Extremity Functional Scale. Patient demonstrates decrease muscle weakness, decreased ROM, decreased balance, and decrease muscle length. Patient's impairments limits his ability to perform activities at home, go up and down stairs, and walk long distances. Patient will benefit from 10 more visit in order to increase muscle strength, increase ROM, increase balance, and increase muscle length.     Initial: The patient is a 78 y.o. year old male who presents to physical therapy with complaints of bilateral lower extremity weakness with pain in the right knee.  Patient's impairments include muscle weakness. Decrease muscle length, decreased joint ROM, and decreased balance.  These impairments are limiting patient's ability to perform house hold activities, ascend and descend stairs, get in and out of the bath, and walk around home and community.  Patient's prognosis is good.  Patient will benefit from skilled physical therapy intervention to increase muscle strength, improve joint ROM, increase balance, increase endurance, and decrease pain.    Co-morbidities which may impact the plan of care and potentially impede the patient's progress in therapy include:  Obesity, chronic stage of condition, DM.     The patient's clinical presentation is stable.  Based on patient's stable clinical presentation, 3+ co-morbidities, and examination of 2 body systems, patient presents with moderate complexity.    Short Term Goals:  (3 weeks)  1.  Patient will improve R knee flexion ROM to 115 degrees in order to ascend/descend stairs safely.   -Goal met on  7/5/17  2.  Patient will report improved function indicated by a score of 38% ability on the Lower Extremity Functional Scale.. -Goal met on 7/5/18  3.  Patient will be independent with home exercise program.        -Goal met on 7/5/18    Long Term Goals:  (6 weeks)  1.  Patient will improve quad strength bilaterally to 4+/5 in order to get in and out of the bath safely.   -Goal met on 8/1/18  2.  Patient will improve Freeman Balance score to 42 in order to improve safety during ambulation.    -Goal met on 8/1/18  3.  Patient will report improved function indicated by a score of 45% ability on the Lower Extremity Functional Scale. -Goal met on 8/1/18  4.  Patient will improve hamstring strength bilaterally to 4+/5 in order to get in and out of the car with less difficulty.  -Goal met on 8/1/18  5.  Patient will improve R knee flexion ROM to 120 degrees in order bend to  objects off the floor.    -Goal met on 8/1/18    TREATMENT PROVIDED:    Initial evaluation completed.    Manual Therapy:  (0 minutes)      Therapeutic Exercise:  (58 minutes)      Heels Slide x30    Hamstring Stretch 2 minutes each side    Step taps 2 minutes -(progressed)     Standing March 3 minutes    LAQ x30    Mini Squats x30    Hip Abd x30    Hip Add x30    Bike 10 minutes    Gastroc Stretch 2 minutes    Step Ups 2 minutes    Side step ups 2 minutes    Glute set x30    Abdominal Isometric x30    Sit to Stand x20    Modalities:  Ice for 10 minutes. Patient tolerated well with no skin irritation or adverse reaction.    PLAN:  Patient will benefit from physical therapy (3) x/week for (6) weeks including manual therapy, therapeutic exercise, neuromuscular re-education, functional activities, modalities, and patient education.    Thank you for this referral.    These services are reasonable and necessary for the conditions set forth above while under my care.

## 2018-08-01 NOTE — PROGRESS NOTES
PHYSICAL THERAPY OUTPATIENT TREATMENT DISCHARGE NOTE    Referring Provider:  Dr. Liane Tijerina    Diagnosis:       ICD-10-CM ICD-9-CM    1. Bilateral leg weakness R29.898 729.89        Orders:  Evaluate and Treat    Date of Initial Evaluation:  18    Orders :  18    Coding Cycle Visit # 14    SUBJECTIVE: Patient reports he is feeling good today.     Initial: Patient reports to therapy with weakness in both lower extremities. He states the past couple years he has been getting progressively weaker and at times has pain. His pain usually starts after walking and standing for long periods. The patient states his main concern is he gets tired and wants to strengthen and increase his endurance. The right side feels like it gets tired quicker and he tends to feel more pain on that side. The pain he feels is a burning pain when he works the muscle to much. The knee pain on the right is described as achy. He has no history of knee and pain and hasn't received any therapy before. He also feels because of the weakness he has trouble with his balance.     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  Began Aricept 10 mg at bedtime.    BMI 40.0-44.9, adult     Diabetes mellitus     diet management    Dysmetabolic syndrome X 10/2/2012    Hyperlipidemia     Hypertension     diet managemen    Joint pain     MDD (major depressive disorder) 2015    Midline low back pain without sciatica 2016    Primary osteoarthritis of right knee 2018    Prostate CA     Sleep apnea     cipap    Trouble in sleeping     Type II or unspecified type diabetes mellitus with neurological manifestations, not stated as uncontrolled(250.60)        Patient Active Problem List   Diagnosis    Hyperlipemia    Impotence of organic origin    History of prostate cancer    BMI  40.0-44.9, adult    Aortic atherosclerosis    [place] (hard of hearing)    Tortuous aorta    JESSICA on CPAP    Asbestos exposure    Midline low back pain without sciatica    Amnestic MCI (mild cognitive impairment with memory loss)    Type 2 diabetes mellitus without complication, without long-term current use of insulin    Primary osteoarthritis of right knee    URRUTIA (dyspnea on exertion)    Weakness of both lower extremities    Statin myopathy    Diabetes mellitus type 2 in obese         Current Outpatient Prescriptions:     aspirin (ECOTRIN) 81 MG EC tablet, Take 1 tablet (81 mg total) by mouth every evening., Disp: 100 tablet, Rfl: 3    cholecalciferol, vitamin D3, 5,000 unit capsule, Take 1 capsule by mouth Daily., Disp: , Rfl:     donepezil (ARICEPT) 10 MG tablet, Take 1 tablet (10 mg total) by mouth every evening., Disp: 90 tablet, Rfl: 3    lancets (SOFT TOUCH LANCETS) Misc, 1 Device by Misc.(Non-Drug; Combo Route) route once daily., Disp: 100 each, Rfl: 11    metFORMIN (GLUCOPHAGE) 500 MG tablet, Take two tablets at bedtime, Disp: 180 tablet, Rfl: 3    MULTIVITS-MINERALS/FA/LYCOPENE (ONE-A-DAY MEN'S ORAL), Take 1 tablet by mouth Daily., Disp: , Rfl:     sertraline (ZOLOFT) 50 MG tablet, Take 1 tablet (50 mg total) by mouth every morning. Increase energy and boost mood, Disp: 90 tablet, Rfl: 3    tadalafil (CIALIS) 20 MG Tab, Take 1 tablet (20 mg total) by mouth daily as needed. take as directed, Disp: 10 tablet, Rfl: 5    TRUE METRIX GLUCOSE METER Misc, , Disp: , Rfl:     TRUE METRIX GLUCOSE TEST STRIP Strp, USE STRIP TO CHECK GLUCOSE ONCE DAILY, Disp: 50 strip, Rfl: 3    OBJECTIVE:  Pain: now 0/10, worst 6/10 located in the front of his right knee, described as achy pain.    Sensation:  intact to light touch     Knee ROM:  Flexion     R 120 L 120      Extension   R -5  L -5       Strength:  Quadriceps   R 4+/5 L 4+/5     Hamstrings   R 4+/5 L 4+/5      Hip Flexor   R 4+/5 L 4+/5        Ankle  Dorsiflexion   R 4+/5 L 4+/5    Muscle Length:  Decrease hamstring length bilaterally           Function: LOWER EXTREMITY FUNCTIONAL SCALE                EVAL  Reeval  Discharge   1. Any of your usual work, housework or school activities   1/4 2/4 2/4  2. Your usual hobbies, sporting     0/4 0/4 1/4  3. Getting in and out of tub      3/4  3/4  4/4  4. Walking between rooms      1/4 2/4 4/4  5. Putting on shoes or socks      4/4 4/4 4/4  6. Squatting        1/4 2/4 2/4  7. Lifting an object from the ground      4/4 4/4 4/4  8. Performing light activities around the home   3/4  3/4  3/4  9. Performing heavy activities around the home   1/4  2/4  3/4  10. Getting in and out of car      3/4  4/4  4/4  11. Walking 2 blocks       0/4 0/4 1/4  12.Walking a mile       0/4 0/4 1/4  13. Getting up and down 1 flight of stairs    0/4 1/4 2/4  14. Standing for 1 hour      0/4 0/4 0/4  15. Sitting for an hour       4/4 4/4 4/4  16. Running on even ground      0/4 0/4 0/4  17. Running on uneven ground     0/4 0/4 0/4  18. Making sharp turns when running fast    0/4 0/4 0/4  19. Hopping        0/4 0/4 0/4  20. Rolling over in bed       4/4 0/4 4/4    Patient reports (Initial 31.25%) 54% ability based on score of the Lower Extremity Functional Scale.    Functional Limitations and Goal:  This patient's primary physical therapy goal is to return to their prior level of function (Mobility - Walking, Moving Around [id number]) without limitations. The patient's current level of impairment is 46% impaired (40 to 59%) impaired (CK) based on their score of 54% on the Lower Extremity Functional Scale. The patient is expected to achieve a score of 40-59% impaired (CK) within 10 treatment days.    KUMAR Assessment (Eval 6/7/18)    1. Sitting to Standing   3 - able to stand independely using hands  2. Standing Unsupported   4 - able to stand safely 2 minutes without hold  3. Sitting Unsupported   4 - able to sit  safely and securely 2 minutes  4. Standing to Sitting   2 - uses back of legs against chair to control descent  5. Pivot Transfer   3 - able to transfer safely with definite use of hands  6. Standing with Eyes Closed   3 - able to stand 10 seconds with supervision  7. Standing with Feet Together   4 - able to place feet together independently and stand 1 minute safely  8. Reaching Forward with Outstretched Arm   3 - can reach forward 12 cm/5 inches safely  9. Retrieving Object from Floor   3 - able to pick slipper but needs supervision  10. Turning to Look Behind   4 - looks behind from both sides and weights shifts well  11. Turning 360 Degrees   2 - able to trun 360 safely but slowly  12. Placing Alternate Foot on Step   3 - able to stand independently and completely 8 steps > 20 seconds  13. Standing with One Foot in Front   0 - Looses balance while stepping or standing  14. Standing on One Foot   1 - tries to lift leg and unable to hold 3 seconds but remains standing independently    Eval 6/7/18: 39/56    Balance:  Kumar Balance Assessment Discharge (8/1/18)       1.Sitting to Standing       4  2.Standing Unsupported      4   3.Sitting with Back Unsupported     4   4.Standing to Sitting       4   5.Transfers        4   6.Standing Unsupported with Eyes Closed    3   7.Standing Unsupported with Feet Together    4   8.Reaching Forward with Outstretched Arm while Standing  4   9. Object from the Floor from a Standing Position  4   10.Turning to Look Behind Shoulder while Standing   4   11.Turn 360 Degrees       3   12.Placing Alternate Foot on Step while Standing Unsupported 3   13.Standing Unsupported One Foot in Front    0   14.Standing on One Leg      1             Total 46       Tenderness to palpation:  Over the medial joint line of the right knee    ASSESSMENT:  Patient tolerated today's treatment well. Patient has made improvements from 31% to 54% on the LEFS, increased muscle strength, KUMAR Balance  from 39 to 46, and decreased pain. Patient is being discharged due to meeting all goals. Patient will follow up with his physician as needed.    Progress Note:Patient tolerated today's treatment well with no complaints. Patient has made improvements in ROM of right knee flexion from 110 degrees to 115, dorsiflexion strength from 4/5 to 4+/5, and increase from 31% to 44% on the Lower Extremity Functional Scale. Patient demonstrates decrease muscle weakness, decreased ROM, decreased balance, and decrease muscle length. Patient's impairments limits his ability to perform activities at home, go up and down stairs, and walk long distances. Patient will benefit from 10 more visit in order to increase muscle strength, increase ROM, increase balance, and increase muscle length.     Initial: The patient is a 78 y.o. year old male who presents to physical therapy with complaints of bilateral lower extremity weakness with pain in the right knee.  Patient's impairments include muscle weakness. Decrease muscle length, decreased joint ROM, and decreased balance.  These impairments are limiting patient's ability to perform house hold activities, ascend and descend stairs, get in and out of the bath, and walk around home and community.  Patient's prognosis is good.  Patient will benefit from skilled physical therapy intervention to increase muscle strength, improve joint ROM, increase balance, increase endurance, and decrease pain.    Co-morbidities which may impact the plan of care and potentially impede the patient's progress in therapy include:  Obesity, chronic stage of condition, DM.     The patient's clinical presentation is stable.  Based on patient's stable clinical presentation, 3+ co-morbidities, and examination of 2 body systems, patient presents with moderate complexity.    Short Term Goals:  (3 weeks)  1.  Patient will improve R knee flexion ROM to 115 degrees in order to ascend/descend stairs safely.   -Goal met on  7/5/17  2.  Patient will report improved function indicated by a score of 38% ability on the Lower Extremity Functional Scale.. -Goal met on 7/5/18  3.  Patient will be independent with home exercise program.        -Goal met on 7/5/18    Long Term Goals:  (6 weeks)  1.  Patient will improve quad strength bilaterally to 4+/5 in order to get in and out of the bath safely.   -Goal met on 8/1/18  2.  Patient will improve Freeman Balance score to 42 in order to improve safety during ambulation.    -Goal met on 8/1/18  3.  Patient will report improved function indicated by a score of 45% ability on the Lower Extremity Functional Scale. -Goal met on 8/1/18  4.  Patient will improve hamstring strength bilaterally to 4+/5 in order to get in and out of the car with less difficulty.  -Goal met on 8/1/18  5.  Patient will improve R knee flexion ROM to 120 degrees in order bend to  objects off the floor.    -Goal met on 8/1/18    TREATMENT PROVIDED:    Initial evaluation completed.    Manual Therapy:  (0 minutes)      Therapeutic Exercise:  (58 minutes)      Heels Slide x30    Hamstring Stretch 2 minutes each side    Step taps 2 minutes -(progressed)     Standing March 3 minutes    LAQ x30    Mini Squats x30    Hip Abd x30    Hip Add x30    Bike 10 minutes    Gastroc Stretch 2 minutes    Step Ups 2 minutes    Side step ups 2 minutes    Glute set x30    Abdominal Isometric x30    Sit to Stand x20    Modalities:  Ice for 10 minutes. Patient tolerated well with no skin irritation or adverse reaction.    PLAN:  Patient will benefit from physical therapy (3) x/week for (6) weeks including manual therapy, therapeutic exercise, neuromuscular re-education, functional activities, modalities, and patient education.    Thank you for this referral.    These services are reasonable and necessary for the conditions set forth above while under my care.

## 2018-08-22 ENCOUNTER — LAB VISIT (OUTPATIENT)
Dept: LAB | Facility: HOSPITAL | Age: 78
End: 2018-08-22
Attending: INTERNAL MEDICINE
Payer: MEDICARE

## 2018-08-22 DIAGNOSIS — E11.9 TYPE 2 DIABETES MELLITUS WITHOUT COMPLICATION, WITHOUT LONG-TERM CURRENT USE OF INSULIN: ICD-10-CM

## 2018-08-22 LAB
ALBUMIN SERPL BCP-MCNC: 3.8 G/DL
ALP SERPL-CCNC: 91 U/L
ALT SERPL W/O P-5'-P-CCNC: 23 U/L
ANION GAP SERPL CALC-SCNC: 12 MMOL/L
AST SERPL-CCNC: 19 U/L
BILIRUB SERPL-MCNC: 0.6 MG/DL
BUN SERPL-MCNC: 17 MG/DL
CALCIUM SERPL-MCNC: 9.4 MG/DL
CHLORIDE SERPL-SCNC: 104 MMOL/L
CO2 SERPL-SCNC: 22 MMOL/L
CREAT SERPL-MCNC: 1 MG/DL
EST. GFR  (AFRICAN AMERICAN): >60 ML/MIN/1.73 M^2
EST. GFR  (NON AFRICAN AMERICAN): >60 ML/MIN/1.73 M^2
ESTIMATED AVG GLUCOSE: 128 MG/DL
GLUCOSE SERPL-MCNC: 113 MG/DL
HBA1C MFR BLD HPLC: 6.1 %
POTASSIUM SERPL-SCNC: 4.1 MMOL/L
PROT SERPL-MCNC: 7.8 G/DL
SODIUM SERPL-SCNC: 138 MMOL/L

## 2018-08-22 PROCEDURE — 36415 COLL VENOUS BLD VENIPUNCTURE: CPT

## 2018-08-22 PROCEDURE — 80053 COMPREHEN METABOLIC PANEL: CPT

## 2018-08-22 PROCEDURE — 83036 HEMOGLOBIN GLYCOSYLATED A1C: CPT

## 2018-08-29 ENCOUNTER — OFFICE VISIT (OUTPATIENT)
Dept: INTERNAL MEDICINE | Facility: CLINIC | Age: 78
End: 2018-08-29
Payer: MEDICARE

## 2018-08-29 VITALS
HEIGHT: 72 IN | SYSTOLIC BLOOD PRESSURE: 130 MMHG | BODY MASS INDEX: 42.07 KG/M2 | WEIGHT: 310.63 LBS | DIASTOLIC BLOOD PRESSURE: 70 MMHG | OXYGEN SATURATION: 95 % | HEART RATE: 73 BPM

## 2018-08-29 DIAGNOSIS — G47.33 OSA ON CPAP: ICD-10-CM

## 2018-08-29 DIAGNOSIS — G31.84 AMNESTIC MCI (MILD COGNITIVE IMPAIRMENT WITH MEMORY LOSS): ICD-10-CM

## 2018-08-29 DIAGNOSIS — E11.9 TYPE 2 DIABETES MELLITUS WITHOUT COMPLICATION, WITHOUT LONG-TERM CURRENT USE OF INSULIN: Primary | ICD-10-CM

## 2018-08-29 DIAGNOSIS — E11.69 DIABETES MELLITUS TYPE 2 IN OBESE: ICD-10-CM

## 2018-08-29 DIAGNOSIS — E66.9 DIABETES MELLITUS TYPE 2 IN OBESE: ICD-10-CM

## 2018-08-29 PROBLEM — R29.898 WEAKNESS OF BOTH LOWER EXTREMITIES: Status: RESOLVED | Noted: 2018-05-23 | Resolved: 2018-08-29

## 2018-08-29 PROCEDURE — 99214 OFFICE O/P EST MOD 30 MIN: CPT | Mod: S$GLB,,, | Performed by: INTERNAL MEDICINE

## 2018-08-29 PROCEDURE — 99999 PR PBB SHADOW E&M-EST. PATIENT-LVL IV: CPT | Mod: PBBFAC,,, | Performed by: INTERNAL MEDICINE

## 2018-08-29 RX ORDER — METFORMIN HYDROCHLORIDE 500 MG/1
TABLET ORAL
Qty: 180 TABLET | Refills: 3 | Status: SHIPPED | OUTPATIENT
Start: 2018-08-29 | End: 2019-06-05 | Stop reason: SDUPTHER

## 2018-08-31 NOTE — PROGRESS NOTES
Subjective:       Patient ID: Elan Yepez is a 78 y.o. male.    Chief Complaint: Follow-up (3 mth f/u)   see last visit  Physical therapy helped him a great deal  He is having less weakness in his legs  He has become more active  He is gardening  He has a lot of vegetables coming in now  His memory is stable  He has no symptoms of hyperglycemia  He enjoys eating and eats healthy but is not dieting and does not wish to diet  He is compliant with CPAP  HPI  Review of Systems   Constitutional: Negative for activity change, chills and fever.   HENT: Negative for congestion, dental problem, hearing loss, tinnitus and trouble swallowing.    Eyes: Negative for visual disturbance.   Respiratory: Negative for cough, shortness of breath and wheezing.    Cardiovascular: Negative for chest pain, palpitations and leg swelling.   Genitourinary: Negative for dysuria, frequency and urgency.   Musculoskeletal: Negative for back pain and neck pain.   Neurological: Negative for dizziness, weakness and headaches.   Psychiatric/Behavioral: Negative for dysphoric mood and sleep disturbance. The patient is not nervous/anxious.        Objective:      Physical Exam   Constitutional: He is oriented to person, place, and time. He appears well-developed and well-nourished. No distress.   HENT:   Head: Atraumatic.   Mouth/Throat: No oropharyngeal exudate.   Eyes: Conjunctivae are normal. No scleral icterus.   Cardiovascular: Normal rate, regular rhythm and normal heart sounds.   Pulmonary/Chest: Effort normal and breath sounds normal.   Abdominal: Soft. There is no tenderness.   Musculoskeletal: He exhibits no edema.   Lymphadenopathy:     He has no cervical adenopathy.   Neurological: He is alert and oriented to person, place, and time.   Skin: Skin is warm and dry.   Psychiatric: He has a normal mood and affect. His behavior is normal.       Assessment:       1. Type 2 diabetes mellitus without complication, without long-term current use of  insulin    2. Amnestic MCI (mild cognitive impairment with memory loss)    3. Diabetes mellitus type 2 in obese    4. BMI 40.0-44.9, adult    5. JESSICA on CPAP        Plan:   Elan was seen today for follow-up.    Diagnoses and all orders for this visit:    Type 2 diabetes mellitus without complication, without long-term current use of insulin  -     Ambulatory referral to Optometry; Future  -     Microalbumin/creatinine urine ratio; Future  -     Comprehensive metabolic panel; Future  -     Hemoglobin A1c; Future    Amnestic MCI (mild cognitive impairment with memory loss)    Diabetes mellitus type 2 in obese    BMI 40.0-44.9, adult    JESSICA on CPAP    Other orders  -     metFORMIN (GLUCOPHAGE) 500 MG tablet; Take two tablets at bedtime      Medication List with Changes/Refills   Current Medications    ASPIRIN (ECOTRIN) 81 MG EC TABLET    Take 1 tablet (81 mg total) by mouth every evening.    CHOLECALCIFEROL, VITAMIN D3, 5,000 UNIT CAPSULE    Take 1 capsule by mouth Daily.    DONEPEZIL (ARICEPT) 10 MG TABLET    Take 1 tablet (10 mg total) by mouth every evening.    LANCETS (SOFT TOUCH LANCETS) MISC    1 Device by Misc.(Non-Drug; Combo Route) route once daily.    MULTIVITS-MINERALS/FA/LYCOPENE (ONE-A-DAY MEN'S ORAL)    Take 1 tablet by mouth Daily.    SERTRALINE (ZOLOFT) 50 MG TABLET    Take 1 tablet (50 mg total) by mouth every morning. Increase energy and boost mood    TADALAFIL (CIALIS) 20 MG TAB    Take 1 tablet (20 mg total) by mouth daily as needed. take as directed    TRUE METRIX GLUCOSE METER MISC        TRUE METRIX GLUCOSE TEST STRIP STRP    USE STRIP TO CHECK GLUCOSE ONCE DAILY   Changed and/or Refilled Medications    Modified Medication Previous Medication    METFORMIN (GLUCOPHAGE) 500 MG TABLET metFORMIN (GLUCOPHAGE) 500 MG tablet       Take two tablets at bedtime    Take two tablets at bedtime       Follow-up in about 6 months (around 2/28/2019) for aFTER labs for PE.

## 2018-09-20 ENCOUNTER — OFFICE VISIT (OUTPATIENT)
Dept: OPTOMETRY | Facility: CLINIC | Age: 78
End: 2018-09-20
Payer: MEDICARE

## 2018-09-20 ENCOUNTER — OFFICE VISIT (OUTPATIENT)
Dept: PODIATRY | Facility: CLINIC | Age: 78
End: 2018-09-20
Payer: MEDICARE

## 2018-09-20 VITALS
DIASTOLIC BLOOD PRESSURE: 84 MMHG | SYSTOLIC BLOOD PRESSURE: 139 MMHG | HEIGHT: 72 IN | HEART RATE: 78 BPM | RESPIRATION RATE: 18 BRPM | BODY MASS INDEX: 42.39 KG/M2 | WEIGHT: 313 LBS

## 2018-09-20 DIAGNOSIS — E11.9 TYPE 2 DIABETES MELLITUS WITHOUT COMPLICATION, WITHOUT LONG-TERM CURRENT USE OF INSULIN: Primary | ICD-10-CM

## 2018-09-20 DIAGNOSIS — B35.1 DERMATOPHYTOSIS, NAIL: ICD-10-CM

## 2018-09-20 DIAGNOSIS — Z79.84 LONG TERM CURRENT USE OF ORAL HYPOGLYCEMIC DRUG: ICD-10-CM

## 2018-09-20 DIAGNOSIS — H25.13 NUCLEAR SCLEROTIC CATARACT OF BOTH EYES: ICD-10-CM

## 2018-09-20 DIAGNOSIS — Z83.511 FAMILY HISTORY OF GLAUCOMA: ICD-10-CM

## 2018-09-20 DIAGNOSIS — H35.372 EPIRETINAL MEMBRANE (ERM) OF LEFT EYE: ICD-10-CM

## 2018-09-20 DIAGNOSIS — H40.033 NARROW ANGLE GLAUCOMA SUSPECT OF BOTH EYES: ICD-10-CM

## 2018-09-20 DIAGNOSIS — E11.49 TYPE 2 DIABETES MELLITUS WITH NEUROLOGICAL MANIFESTATIONS: Primary | ICD-10-CM

## 2018-09-20 PROCEDURE — 11721 DEBRIDE NAIL 6 OR MORE: CPT | Mod: Q9,S$PBB,, | Performed by: PODIATRIST

## 2018-09-20 PROCEDURE — 92250 FUNDUS PHOTOGRAPHY W/I&R: CPT | Mod: PBBFAC | Performed by: OPTOMETRIST

## 2018-09-20 PROCEDURE — 99499 UNLISTED E&M SERVICE: CPT | Mod: S$PBB,,, | Performed by: PODIATRIST

## 2018-09-20 PROCEDURE — 99213 OFFICE O/P EST LOW 20 MIN: CPT | Mod: PBBFAC,27,25 | Performed by: OPTOMETRIST

## 2018-09-20 PROCEDURE — 99213 OFFICE O/P EST LOW 20 MIN: CPT | Mod: PBBFAC,25 | Performed by: PODIATRIST

## 2018-09-20 PROCEDURE — 92014 COMPRE OPH EXAM EST PT 1/>: CPT | Mod: S$PBB,,, | Performed by: OPTOMETRIST

## 2018-09-20 PROCEDURE — 11721 DEBRIDE NAIL 6 OR MORE: CPT | Mod: Q9,PBBFAC | Performed by: PODIATRIST

## 2018-09-20 PROCEDURE — 99999 PR PBB SHADOW E&M-EST. PATIENT-LVL III: CPT | Mod: PBBFAC,,, | Performed by: OPTOMETRIST

## 2018-09-20 PROCEDURE — 99999 PR PBB SHADOW E&M-EST. PATIENT-LVL III: CPT | Mod: PBBFAC,,, | Performed by: PODIATRIST

## 2018-09-20 NOTE — Clinical Note
Dear Dr. Tijerina,Thank you for referring Mr. Yepez for a diabetic eye examination; there is no retinopathy and I will continue to monitor yearly. Please let me know if you have questions.Sincerely,Verena Haji OD

## 2018-09-20 NOTE — PROGRESS NOTES
Subjective:      Patient ID: Elan Yepez is a 78 y.o. male.    Chief Complaint: Diabetes (8/29/18 Lilliana)    Elan is a 78 y.o. male who presents to the clinic for evaluation and treatment of high risk feet. 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 The patient's chief complaint is long, thick toenails. This patient has documented high risk feet requiring routine maintenance secondary to diabetes mellitis and those secondary complications of diabetes, as mentioned..    PCP: Liane Jones MD    Date Last Seen by PCP:  Chief Complaint   Patient presents with    Diabetes     8/29/18 Lilliana         Current shoe gear:dm shoes   Hemoglobin A1C   Date Value Ref Range Status   08/22/2018 6.1 (H) 4.0 - 5.6 % Final     Comment:     ADA Screening Guidelines:  5.7-6.4%  Consistent with prediabetes  >or=6.5%  Consistent with diabetes  High levels of fetal hemoglobin interfere with the HbA1C  assay. Heterozygous hemoglobin variants (HbS, HgC, etc)do  not significantly interfere with this assay.   However, presence of multiple variants may affect accuracy.     02/13/2018 6.1 (H) 4.0 - 5.6 % Final     Comment:     According to ADA guidelines, hemoglobin A1c <7.0% represents  optimal control in non-pregnant diabetic patients. Different  metrics may apply to specific patient populations.   Standards of Medical Care in Diabetes-2016.  For the purpose of screening for the presence of diabetes:  <5.7%     Consistent with the absence of  diabetes  5.7-6.4%  Consistent with increasing risk for diabetes   (prediabetes)  >or=6.5%  Consistent with diabetes  Currently, no consensus exists for use of hemoglobin A1c  for diagnosis of diabetes for children.  This Hemoglobin A1c assay has significant interference with fetal   hemoglobin   (HbF). The results are invalid for patients with abnormal amounts of   HbF,   including those with known Hereditary Persistence   of Fetal Hemoglobin. Heterozygous hemoglobin variants (HbAS, HbAC,   HbAD, HbAE, HbA2) do not significantly interfere with this assay;   however, presence of multiple variants in a sample may impact the %   interference.     06/22/2017 6.3 (H) 4.0 - 5.6 % Final     Comment:     According to ADA guidelines, hemoglobin A1c <7.0% represents  optimal control in non-pregnant diabetic patients. Different  metrics may apply to specific patient populations.   Standards of Medical Care in Diabetes-2016.  For the purpose of screening for the presence of diabetes:  <5.7%     Consistent with the absence of diabetes  5.7-6.4%  Consistent with increasing risk for diabetes   (prediabetes)  >or=6.5%  Consistent with diabetes  Currently, no consensus exists for use of hemoglobin A1c  for diagnosis of diabetes for children.  This Hemoglobin A1c assay has significant interference with fetal   hemoglobin   (HbF). The results are invalid for patients with abnormal amounts of   HbF,   including those with known Hereditary Persistence   of Fetal Hemoglobin. Heterozygous hemoglobin variants (HbAS, HbAC,   HbAD, HbAE, HbA2) do not significantly interfere with this assay;   however, presence of multiple variants in a sample may impact the %   interference.         Review of Systems   Constitution: Negative for chills, decreased appetite and fever.   Cardiovascular: Negative for chest pain, claudication and leg swelling.   Respiratory: Negative for cough.    Skin: Positive for nail changes. Negative for color change, dry  skin, flushing and itching.   Musculoskeletal: Negative for arthritis, back pain, falls, gout, joint pain, joint swelling and myalgias.   Gastrointestinal: Negative for nausea and vomiting.   Neurological: Positive for numbness. Negative for loss of balance and paresthesias.           Objective:      Physical Exam   Constitutional: He is oriented to person, place, and time. He appears well-developed and well-nourished.   Cardiovascular:   Dorsalis pedis and posterior tibial pulses are diminished Bilaterally. Toes are cool to touch. Feet are warm proximally.There is decreased digital hair. Skin is atrophic, slightly hyperpigmented, and mildly edematous       Musculoskeletal: Normal range of motion. He exhibits no tenderness or deformity.   Adequate joint range of motion without pain, limitation, nor crepitation Bilateral feet and ankle joints. Muscle strength is 5/5 in all groups bilaterally.         Neurological: He is alert and oriented to person, place, and time.   Atwood-Marci 5.07 monofilamant testing is diminished Raffi feet. Sharp/dull sensation diminished Bilaterally. Light touch absent Bilaterally.       Skin: Skin is warm, dry and intact. No ecchymosis and no lesion noted. He is not diaphoretic. No erythema. No pallor.   Nails x10 are elongated by  2-5mm's, thickened by 2-4 mm's, dystrophic, and are darkened in  coloration . Xerosis Bilaterally. No open lesions noted.     Psychiatric: He has a normal mood and affect. His behavior is normal.   Nursing note and vitals reviewed.            Assessment:       Encounter Diagnoses   Name Primary?    Type 2 diabetes mellitus with neurological manifestations Yes    Dermatophytosis, nail          Plan:       Elan was seen today for diabetes.    Diagnoses and all orders for this visit:    Type 2 diabetes mellitus with neurological manifestations    Dermatophytosis, nail      I counseled the patient on his conditions, their implications and medical  management.        - Shoe inspection. Diabetic Foot Education. Patient reminded of the importance of good nutrition and blood sugar control to help prevent podiatric complications of diabetes. Patient instructed on proper foot hygeine. We discussed wearing proper shoe gear, daily foot inspections, never walking without protective shoe gear, never putting sharp instruments to feet, routine podiatric nail visits every 2-3 months.      - With patient's permission, nails were aggressively reduced and debrided x 10 to their soft tissue attachment mechanically and with electric , removing all offending nail and debris. Patient relates relief following the procedure. He will continue to monitor the areas daily, inspect his feet, wear protective shoe gear when ambulatory, moisturizer to maintain skin integrity and follow in this office in approximately 2-3 months, sooner p.r.n.

## 2018-09-20 NOTE — PATIENT INSTRUCTIONS
NARROW ANGLES    There is a part of your eyes called the angle, and this is where the fluid inside of your eye drains out. In your case it is a little narrow.    Please monitor for the following symptoms: eye pain, red eye, nausea. If you ever feel any of these symptoms please let us know immediately or go to Urgent Care.     ==============================================    CATARACT    Symptoms and Signs:  A cataract starts out small, and at first has little effect on your vision. You may notice that your vision is blurred a little, like looking through a cloudy piece of glass or viewing an impressionist painting. A cataract may make light from the sun or a lamp seem too bright or glaring. Or you may notice when you drive at night that the oncoming headlights cause more glare than before. Colors may not appear as bright as they once did.  The type of cataract you have will affect exactly which symptoms you experience and how soon they will occur. When a nuclear cataract first develops it can bring about a temporary improvement in your near vision, called second sight. Unfortunately, the improved vision is short-lived and will disappear as the cataract worsens. Meanwhile, a sub-capsular cataract may not produce any symptoms until it's well-developed.    Causes:  No one knows for sure why the eye's lens changes as we age, forming cataracts. Researchers are gradually identifying factors that may cause cataracts - and information that may help to prevent them.  Many studies suggest that exposure to ultraviolet light is associated with cataracts, so eye care practitioners recommend wearing sunglasses and a wide-brimmed hat to lessen your exposure.  Other studies suggest people with diabetes are at risk for developing a cataract.   Some eye care practitioners believe that a diet high in antioxidants, such as beta-carotene (vitamin A), selenium and vitamins C and E, may forestall cataracts.  The most important of these is  probably vitamin C; it might be helpful to supplement the diet with an extra Vitamin C tablet.  Meanwhile, eating a lot of salt may increase your risk.  Other risk factors include cigarette smoke, air pollution and heavy alcohol consumption.  We simply recommend that you be careful to use sunglasses and to take Vitamin C.    Treatment:  When symptoms begin to appear, we can improve your vision for a while using new glasses, strong bifocals, magnification, appropriate lighting or other visual aids.  This is true in your case; your cataract does not impact your vision very much at this time. If you experience any of the symptoms we described you can return at any time. Otherwise it is fine to see you in 1 year.

## 2018-09-20 NOTE — LETTER
September 20, 2018      Liane Tijerina MD  [address]  [facility] [address]           [facility]  [address]  [facility] [address]  Phone: [phone]          Patient: Elan Yepez   MR Number: [id number]   YOB: 1940   Date of Visit: 9/20/2018       Dear Dr. Liane Tijerina:    Thank you for referring Elan Yepez to me for evaluation. Attached you will find relevant portions of my assessment and plan of care.    If you have questions, please do not hesitate to call me. I look forward to following Elan Yepez along with you.    Sincerely,    Verena Haji, OD    Enclosure  CC:  No Recipients    If you would like to receive this communication electronically, please contact [email or web address] or [phone] to request more information on [employer] access.    For providers and/or their staff who would like to refer a patient to [facility], please contact us through our one-stop-shop provider referral line, [facility], at [phone].    If you feel you have received this communication in error or would no longer like to receive these types of communications, please e-mail [email or web address]

## 2018-09-20 NOTE — PROGRESS NOTES
HPI     Mr. Elan Yepez was referred by Liane Jones MD for a diabetic eye   exam.    Patient reports no complaints about eyes, vision, or glasses today.     Would patient like a refraction today? No, pt states he is happy with his   current bifocal glasses that he only uses for reading, states he will   return if he needs a refraction in the future.    (-)drops  (-)flashes  (-)floaters  (-)diplopia    Diabetic yes   Hemoglobin A1C       Date                     Value               Ref Range             Status           08/22/2018               6.1 (H)             4.0 - 5.6 %         Final  02/13/2018               6.1 (H)             4.0 - 5.6 %         Final  06/22/2017               6.3 (H)             4.0 - 5.6 %         Final      OCULAR HISTORY  Last Eye Exam: 06/22/17 with Dr. Haji   (-)eye surgery   Cataracts OU  Glaucoma suspect OD    * Based on cupping OD>OS and possible inferior notch OD, thin CCT, and   family history.   * Last HVF and RNFL OCT in 2017 normal OU.  Cataracts OU  ERM OS    FAMILY HISTORY  (+)Glaucoma: paternal grandmother         Last edited by Verena Haji, OD on 9/20/2018  3:08 PM. (History)            Assessment /Plan     For exam results, see Encounter Report.    Type 2 diabetes mellitus without complication, without long-term current use of insulin  Long term current use of oral hypoglycemic drug   No retinopathy noted OU. Monitor with yearly DFE.     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 Based on cupping OD>OS (c/d ratio stable since last year OU), thin CCT, family history (paternal grandmother).    Tmax (undilated) 18mmHg OU. Tmax (dilated) OD 24mmHg and OS 20mmHg. Last HVF/OCT in 2017 normal OU.    Stereo disc photos taken today.   Recheck in 1 year with gonioscopy. Consider repeat HVF/OCT in the future if changes noted in clinical appearance.    -     Color Fundus Photography - OU - Both  Eyes    Nuclear sclerotic cataract of both eyes   Slightly progression since last year. Cause of reduced best-corrected visual acuity OD and OS, but asymptomatic so surgery not yet indicated.    Epiretinal membrane (ERM) of left eye   As previously noted. Contributing to reduced best-corrected visual acuity OS. Intervention not indicated. Monitor.        RTC 1 year for annual dilated eye exam and gonioscopy

## 2018-12-10 ENCOUNTER — IMMUNIZATION (OUTPATIENT)
Dept: PHARMACY | Facility: CLINIC | Age: 78
End: 2018-12-10
Payer: MEDICARE

## 2018-12-10 ENCOUNTER — OFFICE VISIT (OUTPATIENT)
Dept: PODIATRY | Facility: CLINIC | Age: 78
End: 2018-12-10
Payer: MEDICARE

## 2018-12-10 VITALS
HEART RATE: 90 BPM | WEIGHT: 315 LBS | HEIGHT: 72 IN | BODY MASS INDEX: 42.66 KG/M2 | RESPIRATION RATE: 19 BRPM | DIASTOLIC BLOOD PRESSURE: 78 MMHG | SYSTOLIC BLOOD PRESSURE: 124 MMHG

## 2018-12-10 DIAGNOSIS — L84 CORN OR CALLUS: ICD-10-CM

## 2018-12-10 DIAGNOSIS — E11.49 TYPE 2 DIABETES MELLITUS WITH NEUROLOGICAL MANIFESTATIONS: Primary | ICD-10-CM

## 2018-12-10 DIAGNOSIS — B35.1 DERMATOPHYTOSIS, NAIL: ICD-10-CM

## 2018-12-10 PROCEDURE — 99499 UNLISTED E&M SERVICE: CPT | Mod: HCNC,S$GLB,, | Performed by: PODIATRIST

## 2018-12-10 PROCEDURE — 11056 PARNG/CUTG B9 HYPRKR LES 2-4: CPT | Mod: Q9,HCNC,S$GLB, | Performed by: PODIATRIST

## 2018-12-10 PROCEDURE — 99999 PR PBB SHADOW E&M-EST. PATIENT-LVL III: CPT | Mod: PBBFAC,HCNC,, | Performed by: PODIATRIST

## 2018-12-10 PROCEDURE — 11721 DEBRIDE NAIL 6 OR MORE: CPT | Mod: 59,Q9,HCNC,S$GLB | Performed by: PODIATRIST

## 2018-12-10 NOTE — PROGRESS NOTES
Subjective:      Patient ID: Elan Yepez is a 78 y.o. male.    Chief Complaint: PCP (Liane Tijerina MD 8/29/18); Diabetic Foot Exam; and Nail Care    Elan is a 78 y.o. male who presents to the clinic for evaluation and treatment of high risk feet. 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 The patient's chief complaint is long, thick toenails. This patient has documented high risk feet requiring routine maintenance secondary to diabetes mellitis and those secondary complications of diabetes, as mentioned..    PCP: Liane Tijerina MD    Date Last Seen by PCP:  Chief Complaint   Patient presents with    PCP     Liane Tijerina MD 8/29/18    Diabetic Foot Exam    Nail Care         Current shoe gear:dm shoes   Hemoglobin A1C   Date Value Ref Range Status   08/22/2018 6.1 (H) 4.0 - 5.6 % Final     Comment:     ADA Screening Guidelines:  5.7-6.4%  Consistent with prediabetes  >or=6.5%  Consistent with diabetes  High levels of fetal hemoglobin interfere with the HbA1C  assay. Heterozygous hemoglobin variants (HbS, HgC, etc)do  not significantly interfere with this assay.   However, presence of multiple variants may affect accuracy.     02/13/2018 6.1 (H) 4.0 - 5.6 % Final     Comment:     According to ADA guidelines, hemoglobin A1c <7.0% represents  optimal control in non-pregnant diabetic patients. Different  metrics may apply to specific patient populations.   Standards of Medical Care in Diabetes-2016.  For the  purpose of screening for the presence of diabetes:  <5.7%     Consistent with the absence of diabetes  5.7-6.4%  Consistent with increasing risk for diabetes   (prediabetes)  >or=6.5%  Consistent with diabetes  Currently, no consensus exists for use of hemoglobin A1c  for diagnosis of diabetes for children.  This Hemoglobin A1c assay has significant interference with fetal   hemoglobin   (HbF). The results are invalid for patients with abnormal amounts of   HbF,   including those with known Hereditary Persistence   of Fetal Hemoglobin. Heterozygous hemoglobin variants (HbAS, HbAC,   HbAD, HbAE, HbA2) do not significantly interfere with this assay;   however, presence of multiple variants in a sample may impact the %   interference.     06/22/2017 6.3 (H) 4.0 - 5.6 % Final     Comment:     According to ADA guidelines, hemoglobin A1c <7.0% represents  optimal control in non-pregnant diabetic patients. Different  metrics may apply to specific patient populations.   Standards of Medical Care in Diabetes-2016.  For the purpose of screening for the presence of diabetes:  <5.7%     Consistent with the absence of diabetes  5.7-6.4%  Consistent with increasing risk for diabetes   (prediabetes)  >or=6.5%  Consistent with diabetes  Currently, no consensus exists for use of hemoglobin A1c  for diagnosis of diabetes for children.  This Hemoglobin A1c assay has significant interference with fetal   hemoglobin   (HbF). The results are invalid for patients with abnormal amounts of   HbF,   including those with known Hereditary Persistence   of Fetal Hemoglobin. Heterozygous hemoglobin variants (HbAS, HbAC,   HbAD, HbAE, HbA2) do not significantly interfere with this assay;   however, presence of multiple variants in a sample may impact the %   interference.         Review of Systems   Constitution: Negative for chills, decreased appetite and fever.   Cardiovascular: Negative for chest pain, claudication and leg swelling.    Respiratory: Negative for cough.    Skin: Positive for nail changes. Negative for color change, dry skin, flushing, itching, poor wound healing and rash.   Musculoskeletal: Negative for arthritis, back pain, falls, gout, joint pain, joint swelling and myalgias.   Gastrointestinal: Negative for nausea and vomiting.   Neurological: Positive for numbness. Negative for loss of balance and paresthesias.           Objective:      Physical Exam   Constitutional: He is oriented to person, place, and time. He appears well-developed and well-nourished.   Cardiovascular:   Dorsalis pedis and posterior tibial pulses are diminished Bilaterally. Toes are cool to touch. Feet are warm proximally.There is decreased digital hair. Skin is atrophic, slightly hyperpigmented, and mildly edematous       Musculoskeletal: Normal range of motion. He exhibits no tenderness or deformity.   Adequate joint range of motion without pain, limitation, nor crepitation Bilateral feet and ankle joints. Muscle strength is 5/5 in all groups bilaterally.         Neurological: He is alert and oriented to person, place, and time.   [place]-Marci 5.07 monofilamant testing is diminished Raffi feet. Sharp/dull sensation diminished Bilaterally. Light touch absent Bilaterally.       Skin: Skin is warm, dry and intact. No abrasion, no bruising, no ecchymosis and no lesion noted. He is not diaphoretic. No erythema. No pallor.   Nails x10 are elongated by  2-7mm's, thickened by 2-4 mm's, dystrophic, and are darkened in  coloration . Xerosis Bilaterally. No open lesions noted.    Hyperkeratotic tissue noted to distal hallux b/l      Psychiatric: He has a normal mood and affect. His behavior is normal.   Nursing note and vitals reviewed.            Assessment:       Encounter Diagnoses   Name Primary?    Type 2 diabetes mellitus with neurological manifestations Yes    Dermatophytosis, nail     Corn or callus          Plan:       Elan was seen today for pcp,  diabetic foot exam and nail care.    Diagnoses and all orders for this visit:    Type 2 diabetes mellitus with neurological manifestations    Dermatophytosis, nail    Corn or callus      I counseled the patient on his conditions, their implications and medical management.        - Shoe inspection. Diabetic Foot Education. Patient reminded of the importance of good nutrition and blood sugar control to help prevent podiatric complications of diabetes. Patient instructed on proper foot hygeine. We discussed wearing proper shoe gear, daily foot inspections, never walking without protective shoe gear, never putting sharp instruments to feet, routine podiatric nail visits every 2-3 months.      - With patient's permission, nails were aggressively reduced and debrided x 10 to their soft tissue attachment mechanically and with electric , removing all offending nail and debris. Patient relates relief following the procedure. He will continue to monitor the areas daily, inspect his feet, wear protective shoe gear when ambulatory, moisturizer to maintain skin integrity and follow in this office in approximately 2-3 months, sooner p.r.n.    - After cleansing the  area w/ alcohol prep pad the above mentioned hyperkeratosis was trimmed utilizing No 15 scapel, to a smooth base with out incident. Patient tolerated this  well and reported comfort to the area of distal hallux b/l

## 2019-01-02 RX ORDER — CALCIUM CITRATE/VITAMIN D3 200MG-6.25
TABLET ORAL
Qty: 50 STRIP | Refills: 3 | Status: SHIPPED | OUTPATIENT
Start: 2019-01-02 | End: 2019-08-08 | Stop reason: SDUPTHER

## 2019-01-18 ENCOUNTER — TELEPHONE (OUTPATIENT)
Dept: INTERNAL MEDICINE | Facility: CLINIC | Age: 79
End: 2019-01-18

## 2019-01-18 NOTE — TELEPHONE ENCOUNTER
----- Message from Gudelia Janiekev Lynn sent at 1/14/2019  1:42 PM CST -----  Contact: [facility]      ----- Message -----  From: Gudelia Lynn  Sent: 1/14/2019   1:40 PM  To: Lilliana BELTRAN Staff    Prior Authorization Needed    Medication: TRUE METRIX GLUCOSE TEST STRIP [facility]    Pharmacy Info: [facility] - [place], [place] - [address]    Plan does not cover this medication. Please call plan at [phone] to initiate prior authorization or call/fax pharmacy to change medication. Patient ID#[id number] Group [id number]    Note chart when prior authorization has been submitted.    Please notify pharmacy when prior authorization has been approved.    Thank You

## 2019-01-18 NOTE — TELEPHONE ENCOUNTER
----- Message from Gudelia Janiekev Lynn sent at 1/14/2019  1:42 PM CST -----  Contact: [facility]      ----- Message -----  From: Gudelia Lynn  Sent: 1/14/2019   1:40 PM  To: Lilliana BELTRAN Staff    Prior Authorization Needed    Medication: TRUE METRIX GLUCOSE TEST STRIP [facility]    Pharmacy Info: [facility] - [place], [place] - [address]    Plan does not cover this medication. Please call plan at [phone] to initiate prior authorization or call/fax pharmacy to change medication. Patient ID#[id number] Group [id number]    Note chart when prior authorization has been submitted.    Please notify pharmacy when prior authorization has been approved.    Thank You

## 2019-01-22 ENCOUNTER — PES CALL (OUTPATIENT)
Dept: ADMINISTRATIVE | Facility: CLINIC | Age: 79
End: 2019-01-22

## 2019-02-07 ENCOUNTER — OFFICE VISIT (OUTPATIENT)
Dept: INTERNAL MEDICINE | Facility: CLINIC | Age: 79
End: 2019-02-07
Payer: MEDICARE

## 2019-02-07 VITALS
HEIGHT: 72 IN | BODY MASS INDEX: 41.8 KG/M2 | DIASTOLIC BLOOD PRESSURE: 78 MMHG | SYSTOLIC BLOOD PRESSURE: 122 MMHG | HEART RATE: 72 BPM | WEIGHT: 308.63 LBS

## 2019-02-07 DIAGNOSIS — Z77.090 ASBESTOS EXPOSURE: ICD-10-CM

## 2019-02-07 DIAGNOSIS — E11.40 TYPE 2 DIABETES MELLITUS WITH DIABETIC NEUROPATHY, WITHOUT LONG-TERM CURRENT USE OF INSULIN: ICD-10-CM

## 2019-02-07 DIAGNOSIS — G89.29 CHRONIC MIDLINE LOW BACK PAIN WITHOUT SCIATICA: ICD-10-CM

## 2019-02-07 DIAGNOSIS — Z85.46 HISTORY OF PROSTATE CANCER: ICD-10-CM

## 2019-02-07 DIAGNOSIS — G47.33 OSA ON CPAP: ICD-10-CM

## 2019-02-07 DIAGNOSIS — N18.2 STAGE 2 CHRONIC KIDNEY DISEASE: ICD-10-CM

## 2019-02-07 DIAGNOSIS — E11.22 TYPE 2 DIABETES MELLITUS WITH CHRONIC KIDNEY DISEASE, WITHOUT LONG-TERM CURRENT USE OF INSULIN, UNSPECIFIED CKD STAGE: ICD-10-CM

## 2019-02-07 DIAGNOSIS — I77.1 TORTUOUS AORTA: ICD-10-CM

## 2019-02-07 DIAGNOSIS — E11.42 POLYNEUROPATHY DUE TO TYPE 2 DIABETES MELLITUS: ICD-10-CM

## 2019-02-07 DIAGNOSIS — E11.69 DIABETES MELLITUS TYPE 2 IN OBESE: ICD-10-CM

## 2019-02-07 DIAGNOSIS — F33.42 RECURRENT MAJOR DEPRESSIVE DISORDER, IN FULL REMISSION: ICD-10-CM

## 2019-02-07 DIAGNOSIS — G31.84 AMNESTIC MCI (MILD COGNITIVE IMPAIRMENT WITH MEMORY LOSS): ICD-10-CM

## 2019-02-07 DIAGNOSIS — Z00.00 ENCOUNTER FOR PREVENTIVE HEALTH EXAMINATION: Primary | ICD-10-CM

## 2019-02-07 DIAGNOSIS — M54.50 CHRONIC MIDLINE LOW BACK PAIN WITHOUT SCIATICA: ICD-10-CM

## 2019-02-07 DIAGNOSIS — I70.0 AORTIC ATHEROSCLEROSIS: ICD-10-CM

## 2019-02-07 DIAGNOSIS — E66.9 DIABETES MELLITUS TYPE 2 IN OBESE: ICD-10-CM

## 2019-02-07 DIAGNOSIS — E78.5 HYPERLIPIDEMIA, UNSPECIFIED HYPERLIPIDEMIA TYPE: ICD-10-CM

## 2019-02-07 DIAGNOSIS — E66.01 MORBID OBESITY: ICD-10-CM

## 2019-02-07 DIAGNOSIS — M17.11 PRIMARY OSTEOARTHRITIS OF RIGHT KNEE: ICD-10-CM

## 2019-02-07 PROBLEM — E11.29 TYPE 2 DIABETES MELLITUS WITH KIDNEY COMPLICATION, WITHOUT LONG-TERM CURRENT USE OF INSULIN: Status: ACTIVE | Noted: 2019-02-07

## 2019-02-07 LAB
ALBUMIN/CREAT UR: 27.2 UG/MG
CREAT UR-MCNC: 173 MG/DL
MICROALBUMIN UR DL<=1MG/L-MCNC: 47 UG/ML

## 2019-02-07 PROCEDURE — 82043 UR ALBUMIN QUANTITATIVE: CPT | Mod: HCNC

## 2019-02-07 PROCEDURE — G0439 PR MEDICARE ANNUAL WELLNESS SUBSEQUENT VISIT: ICD-10-PCS | Mod: HCNC,S$GLB,, | Performed by: NURSE PRACTITIONER

## 2019-02-07 PROCEDURE — 99999 PR PBB SHADOW E&M-EST. PATIENT-LVL IV: CPT | Mod: PBBFAC,HCNC,, | Performed by: NURSE PRACTITIONER

## 2019-02-07 PROCEDURE — G0439 PPPS, SUBSEQ VISIT: HCPCS | Mod: HCNC,S$GLB,, | Performed by: NURSE PRACTITIONER

## 2019-02-07 PROCEDURE — 99999 PR PBB SHADOW E&M-EST. PATIENT-LVL IV: ICD-10-PCS | Mod: PBBFAC,HCNC,, | Performed by: NURSE PRACTITIONER

## 2019-02-07 NOTE — PROGRESS NOTES
Elan Yepez presented for a  Medicare AWV and comprehensive Health Risk Assessment today. The following components were reviewed and updated:    · Medical history  · Family History  · Social history  · Allergies and Current Medications  · Health Risk Assessment  · Health Maintenance  · Care Team     ** See Completed Assessments for Annual Wellness Visit within the encounter summary.**       The following assessments were completed:  · Living Situation  · CAGE  · Depression Screening  · Timed Get Up and Go  · Whisper Test  · Cognitive Function Screening - not administered - MCI diagnosis  · Nutrition Screening  · ADL Screening  · PAQ Screening    Vitals:    02/07/19 0809   BP: 122/78   BP Location: Left arm   Patient Position: Sitting   BP Method: X-Large (Manual)   Pulse: 72   Weight: (!) 140 kg (308 lb 10.3 oz)   Height: 6' (1.829 m)     Body mass index is 41.86 kg/m².  Physical Exam   Constitutional: He is oriented to person, place, and time.   Obese   HENT:   Head: Normocephalic.   Cardiovascular: Normal rate and regular rhythm.   Pulmonary/Chest: Effort normal and breath sounds normal.   Abdominal: Soft. Bowel sounds are normal.   Obese   Musculoskeletal: He exhibits no edema.   Gait antalgic   Neurological: He is alert and oriented to person, place, and time.   Skin: Skin is warm and dry.   Psychiatric: He has a normal mood and affect.   Nursing note and vitals reviewed.        Diagnoses and health risks identified today and associated recommendations/orders:    1. Encounter for preventive health examination  Here for Health Risk Assessment/Annual Wellness Visit.  Health maintenance reviewed and updated. Follow up in one year.  Declined TDAP, Shingrix    2. Aortic atherosclerosis  Chronic, stable. Noted CXR 11/17/15. Followed by PCP.    3. Tortuous aorta  Chronic, stable. Noted CXR 11/17/15. Followed by PCP.    4. Hyperlipidemia, unspecified hyperlipidemia type  Chronic, stable with diet. Statin intolerance.  Followed by PCP.    5. Diabetes mellitus type 2 in obese  Chronic, stable on current medication. Last A1C 6.1. Followed by PCP.  - Microalbumin/creatinine urine ratio    6. Type 2 diabetes mellitus with chronic kidney disease, without long-term current use of insulin, unspecified CKD stage  Chronic, stable on current medication. Followed by PCP.    7. Stage 2 chronic kidney disease  Chronic, mild, stable. GFR 83. . Followed by PCP.    8. Type 2 diabetes mellitus with diabetic neuropathy, without long-term current use of insulin  Chronic, stable on current medication. Followed by PCP.    9. Polyneuropathy due to type 2 diabetes mellitus  Chronic, stable on current medication. Followed by PCP.    10. Morbid obesity  Chronic, weight decreased 7 pounds from AWV/HRA 6/2018. Reinforced diet/exercise per PCP recommendations. Followed by PCP.    11. Amnestic MCI (mild cognitive impairment with memory loss)  Chronic, stable on current medication. Reports that he is driving without getting loss and able to manage own medications. Wife manages finances. Followed by PCP.    12. Recurrent major depressive disorder, in full remission  Chronic, stable on current medication. PHQ-2 score 0. Wife reports that he stays in bed all day. Followed by PCP.    13. Asbestos exposure  Chronic, stable. Followed by PCP, PUlmonology.    14. JESSICA on CPAP  Chronic, stable with CPAP. Followed by Pulmonology.    15. Primary osteoarthritis of right knee  Chronic, stable. Followed by PCP.    16. Chronic midline low back pain without sciatica  Chronic, stable. Followed by PCP.    17. History of prostate cancer  Stable. Followed by Urology.      Provided Elan with a 5-10 year written screening schedule and personal prevention plan. Recommendations were developed using the USPSTF age appropriate recommendations. Education, counseling, and referrals were provided as needed. After Visit Summary printed and given to patient which includes a list of  additional screenings\tests needed.    Follow-up in 7 weeks (on 3/27/2019).with PCP    Therese Arellano NP

## 2019-02-07 NOTE — PATIENT INSTRUCTIONS
Counseling and Referral of Other Preventative  (Italic type indicates deductible and co-insurance are waived)    Patient Name: Elan Yepez  Today's Date: 2/7/2019    Health Maintenance       Date Due Completion Date    Urine Microalbumin 06/09/2016 6/9/2015 - done today    Hemoglobin A1c 02/22/2019 8/22/2018    Foot Exam 05/23/2019 5/23/2018 (Done)    Override on 5/23/2018: Done    Override on 6/22/2017: Done    Override on 11/7/2016: Done    Override on 11/13/2013: Done    Lipid Panel 05/23/2019 5/23/2018 (ClinicallyNA)    Override on 5/23/2018: Not Clinically Appropriate    Eye Exam 09/20/2019 9/20/2018    TETANUS VACCINE 06/25/2028 6/25/2018 (Declined)    Override on 6/25/2018: Declined        No orders of the defined types were placed in this encounter.    The following information is provided to all patients.  This information is to help you find resources for any of the problems found today that may be affecting your health:                Living healthy guide: www.[facility].[place].gov      Understanding Diabetes: www.diabetes.org      Eating healthy: www.cdc.gov/healthyweight      CDC home safety checklist: www.cdc.gov/steadi/patient.html      Agency on Aging: www.goea.[place].gov      Alcoholics anonymous (AA): www.aa.org      Physical Activity: www.anup.nih.gov/kp5ocwl      Tobacco use: www.quitwithusla.org

## 2019-04-18 ENCOUNTER — TELEPHONE (OUTPATIENT)
Dept: INTERNAL MEDICINE | Facility: CLINIC | Age: 79
End: 2019-04-18

## 2019-04-18 NOTE — TELEPHONE ENCOUNTER
----- Message from Cherie Pennington sent at 4/18/2019 11:21 AM CDT -----  Contact: Mary Ellen/[facility]/[phone]/ [phone]/fax  Mary Ellen is calling in regards needing to get the patient A1C for patient upcoming procedure. Please call and advise thank you.

## 2019-04-18 NOTE — TELEPHONE ENCOUNTER
Hemoglobin A1C   Date Value Ref Range Status   08/22/2018 6.1 (H) 4.0 - 5.6 % Final     Comment:     ADA Screening Guidelines:  5.7-6.4%  Consistent with prediabetes  >or=6.5%  Consistent with diabetes  High levels of fetal hemoglobin interfere with the HbA1C  assay. Heterozygous hemoglobin variants (HbS, HgC, etc)do  not significantly interfere with this assay.   However, presence of multiple variants may affect accuracy.     02/13/2018 6.1 (H) 4.0 - 5.6 % Final     Comment:     According to ADA guidelines, hemoglobin A1c <7.0% represents  optimal control in non-pregnant diabetic patients. Different  metrics may apply to specific patient populations.   Standards of Medical Care in Diabetes-2016.  For the purpose of screening for the presence of diabetes:  <5.7%     Consistent with the absence of diabetes  5.7-6.4%  Consistent with increasing risk for diabetes   (prediabetes)  >or=6.5%  Consistent with diabetes  Currently, no consensus exists for use of hemoglobin A1c  for diagnosis of diabetes for children.  This Hemoglobin A1c assay has significant interference with fetal   hemoglobin   (HbF). The results are invalid for patients with abnormal amounts of   HbF,   including those with known Hereditary Persistence   of Fetal Hemoglobin. Heterozygous hemoglobin variants (HbAS, HbAC,   HbAD, HbAE, HbA2) do not significantly interfere with this assay;   however, presence of multiple variants in a sample may impact the %   interference.     06/22/2017 6.3 (H) 4.0 - 5.6 % Final     Comment:     According to ADA guidelines, hemoglobin A1c <7.0% represents  optimal control in non-pregnant diabetic patients. Different  metrics may apply to specific patient populations.   Standards of Medical Care in Diabetes-2016.  For the purpose of screening for the presence of diabetes:  <5.7%     Consistent with the absence of diabetes  5.7-6.4%  Consistent with increasing risk for diabetes   (prediabetes)  >or=6.5%  Consistent with  diabetes  Currently, no consensus exists for use of hemoglobin A1c  for diagnosis of diabetes for children.  This Hemoglobin A1c assay has significant interference with fetal   hemoglobin   (HbF). The results are invalid for patients with abnormal amounts of   HbF,   including those with known Hereditary Persistence   of Fetal Hemoglobin. Heterozygous hemoglobin variants (HbAS, HbAC,   HbAD, HbAE, HbA2) do not significantly interfere with this assay;   however, presence of multiple variants in a sample may impact the %   interference.     okay   Send  He should be okay

## 2019-05-07 ENCOUNTER — OFFICE VISIT (OUTPATIENT)
Dept: PODIATRY | Facility: CLINIC | Age: 79
End: 2019-05-07
Payer: MEDICARE

## 2019-05-07 VITALS
HEART RATE: 81 BPM | RESPIRATION RATE: 18 BRPM | HEIGHT: 72 IN | WEIGHT: 309 LBS | BODY MASS INDEX: 41.85 KG/M2 | SYSTOLIC BLOOD PRESSURE: 145 MMHG | DIASTOLIC BLOOD PRESSURE: 76 MMHG

## 2019-05-07 DIAGNOSIS — L84 CORN OR CALLUS: ICD-10-CM

## 2019-05-07 DIAGNOSIS — E11.49 TYPE 2 DIABETES MELLITUS WITH NEUROLOGICAL MANIFESTATIONS: Primary | ICD-10-CM

## 2019-05-07 DIAGNOSIS — B35.1 DERMATOPHYTOSIS, NAIL: ICD-10-CM

## 2019-05-07 PROCEDURE — 11056 PARNG/CUTG B9 HYPRKR LES 2-4: CPT | Mod: Q9,HCNC,S$GLB, | Performed by: PODIATRIST

## 2019-05-07 PROCEDURE — 99999 PR PBB SHADOW E&M-EST. PATIENT-LVL III: CPT | Mod: PBBFAC,HCNC,, | Performed by: PODIATRIST

## 2019-05-07 PROCEDURE — 11721 PR DEBRIDEMENT OF NAILS, 6 OR MORE: ICD-10-PCS | Mod: Q9,59,HCNC,S$GLB | Performed by: PODIATRIST

## 2019-05-07 PROCEDURE — 11721 DEBRIDE NAIL 6 OR MORE: CPT | Mod: Q9,59,HCNC,S$GLB | Performed by: PODIATRIST

## 2019-05-07 PROCEDURE — 99499 UNLISTED E&M SERVICE: CPT | Mod: HCNC,S$GLB,, | Performed by: PODIATRIST

## 2019-05-07 PROCEDURE — 99999 PR PBB SHADOW E&M-EST. PATIENT-LVL III: ICD-10-PCS | Mod: PBBFAC,HCNC,, | Performed by: PODIATRIST

## 2019-05-07 PROCEDURE — 11056 PR TRIM BENIGN HYPERKERATOTIC SKIN LESION,2-4: ICD-10-PCS | Mod: Q9,HCNC,S$GLB, | Performed by: PODIATRIST

## 2019-05-07 PROCEDURE — 99499 NO LOS: ICD-10-PCS | Mod: HCNC,S$GLB,, | Performed by: PODIATRIST

## 2019-05-07 NOTE — PROGRESS NOTES
Subjective:      Patient ID: Elan Yepez is a 79 y.o. male.    Chief Complaint: PCP (Therese Arellano, NP  2/07/19 ); Diabetic Foot Exam; Nail Problem; and Nail Care    Elan is a 79 y.o. male who presents to the clinic for evaluation and treatment of high risk feet. 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 The patient's chief complaint is long, thick toenails. This patient has documented high risk feet requiring routine maintenance secondary to diabetes mellitis and those secondary complications of diabetes, as mentioned..    PCP: Liane Jones MD    Date Last Seen by PCP:  Chief Complaint   Patient presents with    PCP     Therese Arellano NP  2/07/19     Diabetic Foot Exam    Nail Problem    Nail Care         Current shoe gear:dm shoes   Hemoglobin A1C   Date Value Ref Range Status   08/22/2018 6.1 (H) 4.0 - 5.6 % Final     Comment:     ADA Screening Guidelines:  5.7-6.4%  Consistent with prediabetes  >or=6.5%  Consistent with diabetes  High levels of fetal hemoglobin interfere with the HbA1C  assay. Heterozygous hemoglobin variants (HbS, HgC, etc)do  not significantly interfere with this assay.   However, presence of multiple variants may affect accuracy.     02/13/2018 6.1 (H) 4.0 - 5.6 % Final     Comment:     According to ADA guidelines, hemoglobin A1c <7.0% represents  optimal control in non-pregnant diabetic patients. Different  metrics may apply  to specific patient populations.   Standards of Medical Care in Diabetes-2016.  For the purpose of screening for the presence of diabetes:  <5.7%     Consistent with the absence of diabetes  5.7-6.4%  Consistent with increasing risk for diabetes   (prediabetes)  >or=6.5%  Consistent with diabetes  Currently, no consensus exists for use of hemoglobin A1c  for diagnosis of diabetes for children.  This Hemoglobin A1c assay has significant interference with fetal   hemoglobin   (HbF). The results are invalid for patients with abnormal amounts of   HbF,   including those with known Hereditary Persistence   of Fetal Hemoglobin. Heterozygous hemoglobin variants (HbAS, HbAC,   HbAD, HbAE, HbA2) do not significantly interfere with this assay;   however, presence of multiple variants in a sample may impact the %   interference.     06/22/2017 6.3 (H) 4.0 - 5.6 % Final     Comment:     According to ADA guidelines, hemoglobin A1c <7.0% represents  optimal control in non-pregnant diabetic patients. Different  metrics may apply to specific patient populations.   Standards of Medical Care in Diabetes-2016.  For the purpose of screening for the presence of diabetes:  <5.7%     Consistent with the absence of diabetes  5.7-6.4%  Consistent with increasing risk for diabetes   (prediabetes)  >or=6.5%  Consistent with diabetes  Currently, no consensus exists for use of hemoglobin A1c  for diagnosis of diabetes for children.  This Hemoglobin A1c assay has significant interference with fetal   hemoglobin   (HbF). The results are invalid for patients with abnormal amounts of   HbF,   including those with known Hereditary Persistence   of Fetal Hemoglobin. Heterozygous hemoglobin variants (HbAS, HbAC,   HbAD, HbAE, HbA2) do not significantly interfere with this assay;   however, presence of multiple variants in a sample may impact the %   interference.         Review of Systems   Constitution: Negative for chills, decreased appetite and  fever.   Cardiovascular: Negative for chest pain and claudication.   Respiratory: Negative for cough.    Skin: Positive for nail changes. Negative for color change, dry skin, flushing, itching, poor wound healing and rash.   Musculoskeletal: Negative for arthritis, back pain, falls, gout, joint pain, joint swelling and myalgias.   Gastrointestinal: Negative for nausea and vomiting.   Neurological: Positive for numbness. Negative for loss of balance and paresthesias.           Objective:      Physical Exam   Constitutional: He is oriented to person, place, and time. He appears well-developed and well-nourished.   Cardiovascular:   Dorsalis pedis and posterior tibial pulses are diminished Bilaterally. Toes are cool to touch. Feet are warm proximally.There is decreased digital hair. Skin is atrophic, slightly hyperpigmented, and mildly edematous       Musculoskeletal: Normal range of motion. He exhibits no tenderness or deformity.   Adequate joint range of motion without pain, limitation, nor crepitation Bilateral feet and ankle joints. Muscle strength is 5/5 in all groups bilaterally.         Neurological: He is alert and oriented to person, place, and time.   [place] 5.07 monofilamant testing is diminished Raffi feet. Sharp/dull sensation diminished Bilaterally. Light touch absent Bilaterally.       Skin: Skin is warm, dry and intact. No abrasion, no bruising, no burn, no ecchymosis and no lesion noted. He is not diaphoretic. No erythema. No pallor.   Nails x10 are elongated by  2-5mm's, thickened by 2-4 mm's, dystrophic, and are darkened in  coloration . Xerosis Bilaterally. No open lesions noted.    Hyperkeratotic tissue noted to distal hallux b/l      Psychiatric: He has a normal mood and affect. His behavior is normal.   Nursing note and vitals reviewed.            Assessment:       Encounter Diagnoses   Name Primary?    Type 2 diabetes mellitus with neurological manifestations Yes    Dermatophytosis,  nail     Corn or callus          Plan:       Elan was seen today for pcp, diabetic foot exam, nail problem and nail care.    Diagnoses and all orders for this visit:    Type 2 diabetes mellitus with neurological manifestations    Dermatophytosis, nail    Corn or callus      I counseled the patient on his conditions, their implications and medical management.        - Shoe inspection. Diabetic Foot Education. Patient reminded of the importance of good nutrition and blood sugar control to help prevent podiatric complications of diabetes. Patient instructed on proper foot hygeine. We discussed wearing proper shoe gear, daily foot inspections, never walking without protective shoe gear, never putting sharp instruments to feet, routine podiatric nail visits every 2-3 months.      - With patient's permission, nails were aggressively reduced and debrided x 10 to their soft tissue attachment mechanically and with electric , removing all offending nail and debris. Patient relates relief following the procedure. He will continue to monitor the areas daily, inspect his feet, wear protective shoe gear when ambulatory, moisturizer to maintain skin integrity and follow in this office in approximately 2-3 months, sooner p.r.n.    - After cleansing the  area w/ alcohol prep pad the above mentioned hyperkeratosis was trimmed utilizing No 15 scapel, to a smooth base with out incident. Patient tolerated this  well and reported comfort to the area of distal hallux b/l

## 2019-05-30 ENCOUNTER — LAB VISIT (OUTPATIENT)
Dept: LAB | Facility: HOSPITAL | Age: 79
End: 2019-05-30
Attending: INTERNAL MEDICINE
Payer: MEDICARE

## 2019-05-30 DIAGNOSIS — E11.9 TYPE 2 DIABETES MELLITUS WITHOUT COMPLICATION, WITHOUT LONG-TERM CURRENT USE OF INSULIN: ICD-10-CM

## 2019-05-30 LAB
ALBUMIN SERPL BCP-MCNC: 3.6 G/DL (ref 3.5–5.2)
ALP SERPL-CCNC: 80 U/L (ref 55–135)
ALT SERPL W/O P-5'-P-CCNC: 17 U/L (ref 10–44)
ANION GAP SERPL CALC-SCNC: 10 MMOL/L (ref 8–16)
AST SERPL-CCNC: 22 U/L (ref 10–40)
BILIRUB SERPL-MCNC: 0.4 MG/DL (ref 0.1–1)
BUN SERPL-MCNC: 15 MG/DL (ref 8–23)
CALCIUM SERPL-MCNC: 9.7 MG/DL (ref 8.7–10.5)
CHLORIDE SERPL-SCNC: 102 MMOL/L (ref 95–110)
CO2 SERPL-SCNC: 24 MMOL/L (ref 23–29)
CREAT SERPL-MCNC: 1 MG/DL (ref 0.5–1.4)
EST. GFR  (AFRICAN AMERICAN): >60 ML/MIN/1.73 M^2
EST. GFR  (NON AFRICAN AMERICAN): >60 ML/MIN/1.73 M^2
ESTIMATED AVG GLUCOSE: 128 MG/DL (ref 68–131)
GLUCOSE SERPL-MCNC: 111 MG/DL (ref 70–110)
HBA1C MFR BLD HPLC: 6.1 % (ref 4–5.6)
POTASSIUM SERPL-SCNC: 4.3 MMOL/L (ref 3.5–5.1)
PROT SERPL-MCNC: 7.3 G/DL (ref 6–8.4)
SODIUM SERPL-SCNC: 136 MMOL/L (ref 136–145)

## 2019-05-30 PROCEDURE — 80053 COMPREHEN METABOLIC PANEL: CPT | Mod: HCNC

## 2019-05-30 PROCEDURE — 36415 COLL VENOUS BLD VENIPUNCTURE: CPT | Mod: HCNC

## 2019-05-30 PROCEDURE — 83036 HEMOGLOBIN GLYCOSYLATED A1C: CPT | Mod: HCNC

## 2019-06-05 ENCOUNTER — OFFICE VISIT (OUTPATIENT)
Dept: INTERNAL MEDICINE | Facility: CLINIC | Age: 79
End: 2019-06-05
Payer: MEDICARE

## 2019-06-05 VITALS
OXYGEN SATURATION: 96 % | SYSTOLIC BLOOD PRESSURE: 128 MMHG | HEIGHT: 72 IN | HEART RATE: 76 BPM | BODY MASS INDEX: 42.4 KG/M2 | DIASTOLIC BLOOD PRESSURE: 68 MMHG | WEIGHT: 313.06 LBS

## 2019-06-05 DIAGNOSIS — E11.22 TYPE 2 DIABETES MELLITUS WITH CHRONIC KIDNEY DISEASE, WITHOUT LONG-TERM CURRENT USE OF INSULIN, UNSPECIFIED CKD STAGE: Primary | ICD-10-CM

## 2019-06-05 DIAGNOSIS — G31.84 AMNESTIC MCI (MILD COGNITIVE IMPAIRMENT WITH MEMORY LOSS): ICD-10-CM

## 2019-06-05 DIAGNOSIS — I70.0 AORTIC ATHEROSCLEROSIS: ICD-10-CM

## 2019-06-05 PROCEDURE — 99499 UNLISTED E&M SERVICE: CPT | Mod: HCNC,S$GLB,, | Performed by: INTERNAL MEDICINE

## 2019-06-05 PROCEDURE — 99999 PR PBB SHADOW E&M-EST. PATIENT-LVL IV: ICD-10-PCS | Mod: PBBFAC,HCNC,, | Performed by: INTERNAL MEDICINE

## 2019-06-05 PROCEDURE — 99499 RISK ADDL DX/OHS AUDIT: ICD-10-PCS | Mod: HCNC,S$GLB,, | Performed by: INTERNAL MEDICINE

## 2019-06-05 PROCEDURE — 99213 PR OFFICE/OUTPT VISIT, EST, LEVL III, 20-29 MIN: ICD-10-PCS | Mod: HCNC,S$GLB,, | Performed by: INTERNAL MEDICINE

## 2019-06-05 PROCEDURE — 99999 PR PBB SHADOW E&M-EST. PATIENT-LVL IV: CPT | Mod: PBBFAC,HCNC,, | Performed by: INTERNAL MEDICINE

## 2019-06-05 PROCEDURE — 1101F PR PT FALLS ASSESS DOC 0-1 FALLS W/OUT INJ PAST YR: ICD-10-PCS | Mod: HCNC,CPTII,S$GLB, | Performed by: INTERNAL MEDICINE

## 2019-06-05 PROCEDURE — 99213 OFFICE O/P EST LOW 20 MIN: CPT | Mod: HCNC,S$GLB,, | Performed by: INTERNAL MEDICINE

## 2019-06-05 PROCEDURE — 1101F PT FALLS ASSESS-DOCD LE1/YR: CPT | Mod: HCNC,CPTII,S$GLB, | Performed by: INTERNAL MEDICINE

## 2019-06-05 RX ORDER — METFORMIN HYDROCHLORIDE 500 MG/1
500 TABLET ORAL 2 TIMES DAILY WITH MEALS
Qty: 180 TABLET | Refills: 3 | Status: SHIPPED | OUTPATIENT
Start: 2019-06-05 | End: 2019-12-31 | Stop reason: SDUPTHER

## 2019-06-05 RX ORDER — SERTRALINE HYDROCHLORIDE 50 MG/1
50 TABLET, FILM COATED ORAL EVERY MORNING
Qty: 90 TABLET | Refills: 3 | Status: SHIPPED | OUTPATIENT
Start: 2019-06-05 | End: 2019-12-31 | Stop reason: SDUPTHER

## 2019-06-05 RX ORDER — DONEPEZIL HYDROCHLORIDE 10 MG/1
10 TABLET, FILM COATED ORAL NIGHTLY
Qty: 90 TABLET | Refills: 3 | Status: SHIPPED | OUTPATIENT
Start: 2019-06-05 | End: 2019-12-31 | Stop reason: SDUPTHER

## 2019-06-05 NOTE — PROGRESS NOTES
Subjective:       Patient ID: Elan Yepez is a 79 y.o. male.    Chief Complaint: Follow-up (6 month f/u) and Fatigue (extreme fatigue)   He presents to the office today with his wife  He is complaining of sleeping a lot.  He uses his CPAP machine during the day and also at night.  He complains of feeling like he does not want to do anything.  He has a garden and he just does not want to get out there and work.  He denies depression.  He lives with his wife and his sister.  He reports compliance with his medication.  Results for orders placed or performed in visit on 05/30/19   Comprehensive metabolic panel   Result Value Ref Range    Sodium 136 136 - 145 mmol/L    Potassium 4.3 3.5 - 5.1 mmol/L    Chloride 102 95 - 110 mmol/L    CO2 24 23 - 29 mmol/L    Glucose 111 (H) 70 - 110 mg/dL    BUN, Bld 15 8 - 23 mg/dL    Creatinine 1.0 0.5 - 1.4 mg/dL    Calcium 9.7 8.7 - 10.5 mg/dL    Total Protein 7.3 6.0 - 8.4 g/dL    Albumin 3.6 3.5 - 5.2 g/dL    Total Bilirubin 0.4 0.1 - 1.0 mg/dL    Alkaline Phosphatase 80 55 - 135 U/L    AST 22 10 - 40 U/L    ALT 17 10 - 44 U/L    Anion Gap 10 8 - 16 mmol/L    eGFR if African American >60.0 >60 mL/min/1.73 m^2    eGFR if non African American >60.0 >60 mL/min/1.73 m^2   Hemoglobin A1c   Result Value Ref Range    Hemoglobin A1C 6.1 (H) 4.0 - 5.6 %    Estimated Avg Glucose 128 68 - 131 mg/dL       HPI  Review of Systems   Constitutional: Positive for activity change and fatigue. Negative for chills, fever and unexpected weight change.   HENT: Negative for congestion.    Eyes: Negative for visual disturbance.   Respiratory: Negative for cough, chest tightness, shortness of breath and wheezing.    Cardiovascular: Negative for chest pain, palpitations and leg swelling.   Gastrointestinal: Negative for abdominal pain.   Genitourinary: Negative for dysuria.   Musculoskeletal: Negative for back pain.   Psychiatric/Behavioral: Positive for sleep disturbance. Negative for dysphoric mood. The  patient is not nervous/anxious.        Objective:      Physical Exam   Constitutional: He is oriented to person, place, and time. No distress.   HENT:   Head: Atraumatic.   Eyes: Conjunctivae are normal. No scleral icterus.   Neck: Neck supple.   Cardiovascular: Normal rate and regular rhythm.   Pulmonary/Chest: Effort normal and breath sounds normal.   Abdominal: Soft. There is no tenderness.   Musculoskeletal: He exhibits no edema.   Lymphadenopathy:     He has no cervical adenopathy.   Neurological: He is alert and oriented to person, place, and time.   Skin: Skin is warm and dry.   Psychiatric: He has a normal mood and affect. His behavior is normal.   Vitals reviewed.      Assessment:       1. Type 2 diabetes mellitus with chronic kidney disease, without long-term current use of insulin, unspecified CKD stage    2. Amnestic MCI (mild cognitive impairment with memory loss)    3. Aortic atherosclerosis        Plan:   Elan was seen today for follow-up and fatigue.    Diagnoses and all orders for this visit:    Type 2 diabetes mellitus with chronic kidney disease, without long-term current use of insulin, unspecified CKD stage  -     Hemoglobin A1c; Future  -     Comprehensive metabolic panel; Future    Amnestic MCI (mild cognitive impairment with memory loss).  NOT sure what's going on.  He seems stable without any obvious change in mood, behavior or intellectual function.  His wife does not articulate any specific concerns.  Encouraged to make a list of things that need to be done and try to do at least one thing every day.    Aortic atherosclerosis    Other orders  -     donepezil (ARICEPT) 10 MG tablet; Take 1 tablet (10 mg total) by mouth every evening.  -     sertraline (ZOLOFT) 50 MG tablet; Take 1 tablet (50 mg total) by mouth every morning. Increase energy and boost mood  -     metFORMIN (GLUCOPHAGE) 500 MG tablet; Take 1 tablet (500 mg total) by mouth 2 (two) times daily with meals.    Follow up in  about 6 months (around 12/5/2019) for for physical.    Medication List with Changes/Refills   Current Medications    ASPIRIN (ECOTRIN) 81 MG EC TABLET    Take 1 tablet (81 mg total) by mouth every evening.    CHOLECALCIFEROL, VITAMIN D3, 5,000 UNIT CAPSULE    Take 1 capsule by mouth Daily.    LANCETS (SOFT TOUCH LANCETS) MISC    1 Device by Misc.(Non-Drug; Combo Route) route once daily.    MULTIVITS-MINERALS/FA/LYCOPENE (ONE-A-DAY MEN'S ORAL)    Take 1 tablet by mouth Daily.    TADALAFIL (CIALIS) 20 MG TAB    Take 1 tablet (20 mg total) by mouth daily as needed. take as directed    TRUE METRIX GLUCOSE METER MISC        TRUE METRIX GLUCOSE TEST STRIP STRP    CHECK GLUCOSE ONCE DAILY   Changed and/or Refilled Medications    Modified Medication Previous Medication    DONEPEZIL (ARICEPT) 10 MG TABLET donepezil (ARICEPT) 10 MG tablet       Take 1 tablet (10 mg total) by mouth every evening.    Take 1 tablet (10 mg total) by mouth every evening.    METFORMIN (GLUCOPHAGE) 500 MG TABLET metFORMIN (GLUCOPHAGE) 500 MG tablet       Take 1 tablet (500 mg total) by mouth 2 (two) times daily with meals.    Take two tablets at bedtime    SERTRALINE (ZOLOFT) 50 MG TABLET sertraline (ZOLOFT) 50 MG tablet       Take 1 tablet (50 mg total) by mouth every morning. Increase energy and boost mood    Take 1 tablet (50 mg total) by mouth every morning. Increase energy and boost mood

## 2019-06-26 ENCOUNTER — OFFICE VISIT (OUTPATIENT)
Dept: SLEEP MEDICINE | Facility: CLINIC | Age: 79
End: 2019-06-26
Payer: MEDICARE

## 2019-06-26 VITALS
SYSTOLIC BLOOD PRESSURE: 123 MMHG | HEIGHT: 72 IN | OXYGEN SATURATION: 94 % | WEIGHT: 313.06 LBS | DIASTOLIC BLOOD PRESSURE: 70 MMHG | BODY MASS INDEX: 42.4 KG/M2 | HEART RATE: 93 BPM | RESPIRATION RATE: 18 BRPM

## 2019-06-26 DIAGNOSIS — G47.33 OSA ON CPAP: Primary | ICD-10-CM

## 2019-06-26 DIAGNOSIS — E66.01 MORBID OBESITY: ICD-10-CM

## 2019-06-26 PROCEDURE — 99999 PR PBB SHADOW E&M-EST. PATIENT-LVL IV: CPT | Mod: PBBFAC,HCNC,, | Performed by: NURSE PRACTITIONER

## 2019-06-26 PROCEDURE — 1101F PT FALLS ASSESS-DOCD LE1/YR: CPT | Mod: HCNC,CPTII,S$GLB, | Performed by: NURSE PRACTITIONER

## 2019-06-26 PROCEDURE — 99499 UNLISTED E&M SERVICE: CPT | Mod: HCNC,S$GLB,, | Performed by: NURSE PRACTITIONER

## 2019-06-26 PROCEDURE — 1101F PR PT FALLS ASSESS DOC 0-1 FALLS W/OUT INJ PAST YR: ICD-10-PCS | Mod: HCNC,CPTII,S$GLB, | Performed by: NURSE PRACTITIONER

## 2019-06-26 PROCEDURE — 99213 OFFICE O/P EST LOW 20 MIN: CPT | Mod: HCNC,S$GLB,, | Performed by: NURSE PRACTITIONER

## 2019-06-26 PROCEDURE — 99213 PR OFFICE/OUTPT VISIT, EST, LEVL III, 20-29 MIN: ICD-10-PCS | Mod: HCNC,S$GLB,, | Performed by: NURSE PRACTITIONER

## 2019-06-26 PROCEDURE — 99999 PR PBB SHADOW E&M-EST. PATIENT-LVL IV: ICD-10-PCS | Mod: PBBFAC,HCNC,, | Performed by: NURSE PRACTITIONER

## 2019-06-26 PROCEDURE — 99499 RISK ADDL DX/OHS AUDIT: ICD-10-PCS | Mod: HCNC,S$GLB,, | Performed by: NURSE PRACTITIONER

## 2019-06-26 NOTE — PROGRESS NOTES
Subjective:      Patient ID: Elan Yepez is a 79 y.o. male.    Chief Complaint: Sleep Apnea and Shortness of Breath    HPI  Patient presents to the office today for evaluation of CPAP.  He states he wears nightly and benefits from use.  He continues to have a problem with his mask leaking.  Patient Active Problem List   Diagnosis    Hyperlipemia    Impotence of organic origin    History of prostate cancer    Morbid obesity    Aortic atherosclerosis    [place] (hard of hearing)    Type 2 diabetes mellitus with diabetic neuropathy    Tortuous aorta    JESSICA on CPAP    Asbestos exposure    Midline low back pain without sciatica    Amnestic MCI (mild cognitive impairment with memory loss)    Primary osteoarthritis of right knee    URRUTIA (dyspnea on exertion)    Statin myopathy    Diabetes mellitus type 2 in obese    Type 2 diabetes mellitus with kidney complication, without long-term current use of insulin    Stage 2 chronic kidney disease    Recurrent major depressive disorder, in full remission    Polyneuropathy due to type 2 diabetes mellitus       /70   Pulse 93   Resp 18   Ht 6' (1.829 m)   Wt (!) 142 kg (313 lb 0.9 oz)   SpO2 (!) 94%   BMI 42.46 kg/m²   Body mass index is 42.46 kg/m².    Review of Systems   Constitutional: Negative.    HENT: Negative.    Respiratory: Negative.    Cardiovascular: Negative.    Musculoskeletal: Negative.    Gastrointestinal: Negative.    Neurological: Negative.    Psychiatric/Behavioral: Negative.      Objective:      Physical Exam   Constitutional: He is oriented to person, place, and time. He appears well-developed and well-nourished.   obese   HENT:   Head: Normocephalic and atraumatic.   Nose: Nose normal.   Mouth/Throat: Uvula is midline and oropharynx is clear and moist.   Neck: Trachea normal and normal range of motion. Neck supple. No thyroid mass and no thyromegaly present.   Cardiovascular: Normal rate, regular rhythm and normal heart sounds.    Pulmonary/Chest: Effort normal and breath sounds normal. He has no wheezes. He has no rhonchi. He has no rales. Chest wall is not dull to percussion.   Abdominal: Soft. He exhibits no mass. There is no hepatosplenomegaly or splenomegaly. There is no tenderness.   Musculoskeletal: Normal range of motion. He exhibits no edema.   Neurological: He is alert and oriented to person, place, and time.   Skin: Skin is warm and dry.   Psychiatric: He has a normal mood and affect.     Personal Diagnostic Review  CPAP download shows large leak 3 hr and 58 min nightly on average with a AHI of 14 events per hour.  He needs a new mask fit or instructions on how to adequately fit to his face.        Assessment:       1. JESSICA on CPAP        Outpatient Encounter Medications as of 6/26/2019   Medication Sig Dispense Refill    aspirin (ECOTRIN) 81 MG EC tablet Take 1 tablet (81 mg total) by mouth every evening. 100 tablet 3    cholecalciferol, vitamin D3, 5,000 unit capsule Take 1 capsule by mouth Daily.      donepezil (ARICEPT) 10 MG tablet Take 1 tablet (10 mg total) by mouth every evening. 90 tablet 3    lancets (SOFT TOUCH LANCETS) Misc 1 Device by Misc.(Non-Drug; Combo Route) route once daily. 100 each 11    metFORMIN (GLUCOPHAGE) 500 MG tablet Take 1 tablet (500 mg total) by mouth 2 (two) times daily with meals. 180 tablet 3    MULTIVITS-MINERALS/FA/LYCOPENE (ONE-A-DAY MEN'S ORAL) Take 1 tablet by mouth Daily.      sertraline (ZOLOFT) 50 MG tablet Take 1 tablet (50 mg total) by mouth every morning. Increase energy and boost mood 90 tablet 3    tadalafil (CIALIS) 20 MG Tab Take 1 tablet (20 mg total) by mouth daily as needed. take as directed 10 tablet 5    TRUE METRIX GLUCOSE METER Misc       TRUE METRIX GLUCOSE TEST STRIP Strp CHECK GLUCOSE ONCE DAILY 50 strip 3     No facility-administered encounter medications on file as of 6/26/2019.      Orders Placed This Encounter   Procedures    CPAP/BIPAP SUPPLIES     Order  Specific Question:   Type of mask:     Answer:   FFM     Order Specific Question:   Headgear?     Answer:   Yes     Order Specific Question:   Tubing?     Answer:   Yes     Order Specific Question:   Humidifier chamber?     Answer:   Yes     Order Specific Question:   Chin strap?     Answer:   Yes     Order Specific Question:   Filters?     Answer:   Yes     Order Specific Question:   Cushions?     Answer:   Yes     Order Specific Question:   Length of need (1-99 months):     Answer:   99    CPAP/BIPAP SUPPLIES     Order Specific Question:   Type of mask:     Answer:   Nasal     Order Specific Question:   Headgear?     Answer:   Yes     Order Specific Question:   Tubing?     Answer:   Yes     Order Specific Question:   Humidifier chamber?     Answer:   Yes     Order Specific Question:   Chin strap?     Answer:   Yes     Order Specific Question:   Filters?     Answer:   Yes     Order Specific Question:   Cushions?     Answer:   Yes     Order Specific Question:   Length of need (1-99 months):     Answer:   99     Plan:        Problem List Items Addressed This Visit        Other    JESSICA on CPAP - Primary    Relevant Orders    CPAP/BIPAP SUPPLIES    CPAP/BIPAP SUPPLIES

## 2019-07-15 ENCOUNTER — HOSPITAL ENCOUNTER (EMERGENCY)
Facility: HOSPITAL | Age: 79
Discharge: HOME | End: 2019-07-16
Attending: EMERGENCY MEDICINE
Payer: MEDICARE

## 2019-07-15 DIAGNOSIS — E80.6 HYPERBILIRUBINEMIA: ICD-10-CM

## 2019-07-15 DIAGNOSIS — R79.89 ELEVATED LFTS: Primary | ICD-10-CM

## 2019-07-15 DIAGNOSIS — R07.9 CHEST PAIN: ICD-10-CM

## 2019-07-15 DIAGNOSIS — R10.13 EPIGASTRIC PAIN: ICD-10-CM

## 2019-07-15 LAB
ALBUMIN SERPL BCP-MCNC: 3.7 G/DL (ref 3.5–5.2)
ALP SERPL-CCNC: 149 U/L (ref 55–135)
ALT SERPL W/O P-5'-P-CCNC: 309 U/L (ref 10–44)
ANION GAP SERPL CALC-SCNC: 12 MMOL/L (ref 8–16)
AST SERPL-CCNC: 446 U/L (ref 10–40)
BASOPHILS # BLD AUTO: 0.02 K/UL (ref 0–0.2)
BASOPHILS NFR BLD: 0.2 % (ref 0–1.9)
BILIRUB SERPL-MCNC: 2.7 MG/DL (ref 0.1–1)
BUN SERPL-MCNC: 15 MG/DL (ref 8–23)
CALCIUM SERPL-MCNC: 9.6 MG/DL (ref 8.7–10.5)
CHLORIDE SERPL-SCNC: 105 MMOL/L (ref 95–110)
CO2 SERPL-SCNC: 23 MMOL/L (ref 23–29)
CREAT SERPL-MCNC: 1.1 MG/DL (ref 0.5–1.4)
DIFFERENTIAL METHOD: ABNORMAL
EOSINOPHIL # BLD AUTO: 0 K/UL (ref 0–0.5)
EOSINOPHIL NFR BLD: 0.1 % (ref 0–8)
ERYTHROCYTE [DISTWIDTH] IN BLOOD BY AUTOMATED COUNT: 13.7 % (ref 11.5–14.5)
EST. GFR  (AFRICAN AMERICAN): >60 ML/MIN/1.73 M^2
EST. GFR  (NON AFRICAN AMERICAN): >60 ML/MIN/1.73 M^2
GLUCOSE SERPL-MCNC: 151 MG/DL (ref 70–110)
HCT VFR BLD AUTO: 40.1 % (ref 40–54)
HGB BLD-MCNC: 13.2 G/DL (ref 14–18)
LIPASE SERPL-CCNC: 10 U/L (ref 4–60)
LYMPHOCYTES # BLD AUTO: 1.5 K/UL (ref 1–4.8)
LYMPHOCYTES NFR BLD: 16.1 % (ref 18–48)
MCH RBC QN AUTO: 30.8 PG (ref 27–31)
MCHC RBC AUTO-ENTMCNC: 32.9 G/DL (ref 32–36)
MCV RBC AUTO: 94 FL (ref 82–98)
MONOCYTES # BLD AUTO: 1 K/UL (ref 0.3–1)
MONOCYTES NFR BLD: 10.4 % (ref 4–15)
NEUTROPHILS # BLD AUTO: 6.9 K/UL (ref 1.8–7.7)
NEUTROPHILS NFR BLD: 73.2 % (ref 38–73)
PLATELET # BLD AUTO: 207 K/UL (ref 150–350)
PMV BLD AUTO: 10.9 FL (ref 9.2–12.9)
POTASSIUM SERPL-SCNC: 4 MMOL/L (ref 3.5–5.1)
PROT SERPL-MCNC: 7.6 G/DL (ref 6–8.4)
RBC # BLD AUTO: 4.29 M/UL (ref 4.6–6.2)
SODIUM SERPL-SCNC: 140 MMOL/L (ref 136–145)
TROPONIN I SERPL DL<=0.01 NG/ML-MCNC: <0.006 NG/ML (ref 0–0.03)
WBC # BLD AUTO: 9.4 K/UL (ref 3.9–12.7)

## 2019-07-15 PROCEDURE — 93005 ELECTROCARDIOGRAM TRACING: CPT | Mod: HCNC

## 2019-07-15 PROCEDURE — 85025 COMPLETE CBC W/AUTO DIFF WBC: CPT | Mod: HCNC

## 2019-07-15 PROCEDURE — 25000003 PHARM REV CODE 250: Mod: HCNC | Performed by: EMERGENCY MEDICINE

## 2019-07-15 PROCEDURE — 99284 EMERGENCY DEPT VISIT MOD MDM: CPT | Mod: HCNC

## 2019-07-15 PROCEDURE — 93010 EKG 12-LEAD: ICD-10-PCS | Mod: HCNC,,, | Performed by: INTERNAL MEDICINE

## 2019-07-15 PROCEDURE — 80053 COMPREHEN METABOLIC PANEL: CPT | Mod: HCNC

## 2019-07-15 PROCEDURE — 84484 ASSAY OF TROPONIN QUANT: CPT | Mod: 91,HCNC

## 2019-07-15 PROCEDURE — 83690 ASSAY OF LIPASE: CPT | Mod: HCNC

## 2019-07-15 PROCEDURE — 93010 ELECTROCARDIOGRAM REPORT: CPT | Mod: HCNC,,, | Performed by: INTERNAL MEDICINE

## 2019-07-15 RX ORDER — ASPIRIN 325 MG
325 TABLET ORAL
Status: COMPLETED | OUTPATIENT
Start: 2019-07-15 | End: 2019-07-15

## 2019-07-15 RX ADMIN — ASPIRIN 325 MG ORAL TABLET 325 MG: 325 PILL ORAL at 09:07

## 2019-07-16 ENCOUNTER — TELEPHONE (OUTPATIENT)
Dept: INTERNAL MEDICINE | Facility: CLINIC | Age: 79
End: 2019-07-16

## 2019-07-16 VITALS
BODY MASS INDEX: 42.19 KG/M2 | SYSTOLIC BLOOD PRESSURE: 143 MMHG | HEIGHT: 72 IN | TEMPERATURE: 98 F | HEART RATE: 72 BPM | OXYGEN SATURATION: 96 % | WEIGHT: 311.5 LBS | DIASTOLIC BLOOD PRESSURE: 79 MMHG | RESPIRATION RATE: 18 BRPM

## 2019-07-16 DIAGNOSIS — R10.13 EPIGASTRIC PAIN: Primary | ICD-10-CM

## 2019-07-16 DIAGNOSIS — K80.01 CALCULUS OF GALLBLADDER WITH ACUTE CHOLECYSTITIS AND OBSTRUCTION: ICD-10-CM

## 2019-07-16 LAB
HAV IGM SERPL QL IA: NEGATIVE
HBV CORE IGM SERPL QL IA: NEGATIVE
HBV SURFACE AG SERPL QL IA: NEGATIVE
HCV AB SERPL QL IA: NEGATIVE
INR PPP: 1 (ref 0.8–1.2)
PROTHROMBIN TIME: 10.6 SEC (ref 9–12.5)
TROPONIN I SERPL DL<=0.01 NG/ML-MCNC: 0.01 NG/ML (ref 0–0.03)

## 2019-07-16 PROCEDURE — 80074 ACUTE HEPATITIS PANEL: CPT | Mod: HCNC

## 2019-07-16 PROCEDURE — 85610 PROTHROMBIN TIME: CPT | Mod: HCNC

## 2019-07-16 PROCEDURE — 36415 COLL VENOUS BLD VENIPUNCTURE: CPT | Mod: HCNC

## 2019-07-16 RX ORDER — ONDANSETRON 4 MG/1
4 TABLET, FILM COATED ORAL EVERY 6 HOURS
Qty: 12 TABLET | Refills: 0 | Status: SHIPPED | OUTPATIENT
Start: 2019-07-16 | End: 2019-08-27

## 2019-07-16 NOTE — TELEPHONE ENCOUNTER
Dr Fields is a GI doc in [place] per Internet search.  I put in a referral to GI and please set up appt with one of the GI docs here.  Also set up appt with one of Dr. Tijerina's partners asap as she is out on medical leave.

## 2019-07-16 NOTE — ED PROVIDER NOTES
SCRIBE #1 NOTE: I, Cici Deleon, am scribing for, and in the presence of, Clarke Lopez MD. I have scribed the entire note.       History     Chief Complaint   Patient presents with    Chest Pain     reports pain across chest began today     Review of patient's allergies indicates:   Allergen Reactions    No known drug allergies          History of Present Illness     HPI    7/15/2019, 8:32 PM  History obtained from the patient      History of Present Illness: Elan Yepez is a 79 y.o. male patient with a PMHx of DM, HLD, and JESSICA who presents to the Emergency Department for evaluation of sharp epigastric CP which onset suddenly this morning around 9 AM following breakfast. Pain radiated towards his back. Pt states that he is unsure if he was having heartburn or became overheated from mowing the law in the heat for 3 hours earlier today. Pt reports that when he came inside from mowing the lawn he came emetic. Pt is CP free at this time, but wants to be evaluated to rule out a cardiac problem. Pt does not remember when his last stress test was performed. Symptoms are intermittent and moderate in severity. No exacerbating factors reported. Associated sxs include emesis. Pt has a family hx of cardiac disease. Patient denies any similar symptoms, hx of JORDAN, tobacco use, or pancreatis, palpations, BLE edema, SOB, diarrhea, cough, fever, congestion, HA, dizziness, numbness, speech difficulty, lightheadedness, and all other sxs at this time.No further complaints or concerns at this time.     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  Began Aricept 10 mg at bedtime.    BMI 40.0-44.9, adult     Diabetes mellitus     diet management    Dysmetabolic syndrome X 10/2/2012     Hyperlipidemia     Hypertension     diet managemen    Joint pain     MDD (major depressive disorder) 4/7/2015    Midline low back pain without sciatica 5/5/2016    Primary osteoarthritis of right knee 2/20/2018    Prostate CA     Renal manifestation of secondary diabetes mellitus     Sleep apnea     cipap    Trouble in sleeping     Type II or unspecified type diabetes mellitus with neurological manifestations, not stated as uncontrolled(250.60)        Past Surgical History:  Past Surgical History:   Procedure Laterality Date    COLONOSCOPY  02/04/2014    COLONOSCOPY N/A 2/4/2014    Performed by [facility]omeli [facility] at [facility] (4TH FLR)    NASAL SEPTUM SURGERY      PROSTATE SURGERY      removal         Family History:  Family History   Problem Relation Age of Onset    Heart disease Mother     Diabetes Mother     Heart disease Father     Hypertension Father     Diabetes Sister     Allergies Brother     Thyroid disease Brother     Dementia Brother     No Known Problems Son     Cancer Sister     No Known Problems Sister     Diabetes Sister     Hypertension Brother     Glaucoma Paternal Grandmother     Melanoma Neg Hx        Social History:  Social History     Tobacco Use    Smoking status: Never Smoker    Smokeless tobacco: Never Used   Substance and Sexual Activity    Alcohol use: No    Drug use: No    Sexual activity: Not Currently     Partners: Female        Review of Systems     Review of Systems   Constitutional: Negative for chills and fever.   HENT: Negative for congestion, rhinorrhea and sore throat.    Respiratory: Negative for cough and shortness of breath.    Cardiovascular: Positive for chest pain. Negative for palpitations and leg swelling (BLE).   Gastrointestinal: Positive for vomiting. Negative for abdominal pain, diarrhea and nausea.   Genitourinary: Negative for dysuria and hematuria.   Musculoskeletal: Negative for back pain and neck pain.   Skin: Negative for rash  and wound.   Neurological: Negative for dizziness, speech difficulty, weakness, light-headedness, numbness and headaches.   Hematological: Does not bruise/bleed easily.   All other systems reviewed and are negative.     Physical Exam     Initial Vitals [07/15/19 1832]   BP Pulse Resp Temp SpO2   (!) 195/86 94 20 98.1 °F (36.7 °C) 96 %      MAP       --          Physical Exam  Nursing Notes and Vital Signs Reviewed.  Constitutional: Patient is in no acute distress. Well-developed and well-nourished.  Head: Atraumatic. Normocephalic.  Eyes: PERRL. EOM intact. Conjunctivae are not pale. No scleral icterus.  ENT: Mucous membranes are moist. Oropharynx is clear and symmetric.    Neck: Supple. Full ROM. No lymphadenopathy.  Cardiovascular: Regular rate. Regular rhythm. No murmurs, rubs, or gallops. Distal pulses are 2+ and symmetric.  Pulmonary/Chest: No respiratory distress. Clear to auscultation bilaterally. No wheezing or rales.  Abdominal: Soft and non-distended.  There is no tenderness.  No rebound, guarding, or rigidity. Good bowel sounds.  Genitourinary: No CVA tenderness  Musculoskeletal: Moves all extremities. No obvious deformities. No edema. No calf tenderness.  Skin: Warm and dry.  Neurological:  Alert, awake, and appropriate.  Normal speech.  No acute focal neurological deficits are appreciated.  Psychiatric: Normal affect. Good eye contact. Appropriate in content.     ED Course   Procedures  ED Vital Signs:  Vitals:    07/15/19 1832 07/15/19 2100 07/15/19 2132 07/15/19 2200   BP: (!) 195/86 (!) 168/74 (!) 140/65 133/66   Pulse: 94 98 81 79   Resp: 20 18 20 20   Temp: 98.1 °F (36.7 °C)      TempSrc: Oral      SpO2: 96% 97% 96% 98%   Weight: (!) 141.3 kg (311 lb 8.2 oz)      Height: 6' (1.829 m)       07/15/19 2300 07/15/19 2332 07/16/19 0032 07/16/19 0132   BP: (!) 143/67 132/68 (!) 141/79 (!) 157/76   Pulse: 77 70 69 69   Resp: 20 19 20 20   Temp:  98 °F (36.7 °C)     TempSrc:  Oral     SpO2: 96% 98% 97% 96%    Weight:       Height:        07/16/19 0153   BP: (!) 143/79   Pulse: 72   Resp: 18   Temp: 98 °F (36.7 °C)   TempSrc: Oral   SpO2: 96%   Weight:    Height:        Abnormal Lab Results:  Labs Reviewed   CBC W/ AUTO DIFFERENTIAL - Abnormal; Notable for the following components:       Result Value    RBC 4.29 (*)     Hemoglobin 13.2 (*)     Gran% 73.2 (*)     Lymph% 16.1 (*)     All other components within normal limits   COMPREHENSIVE METABOLIC PANEL - Abnormal; Notable for the following components:    Glucose 151 (*)     Total Bilirubin 2.7 (*)     Alkaline Phosphatase 149 (*)      (*)      (*)     All other components within normal limits   TROPONIN I   LIPASE   TROPONIN I   PROTIME-INR   HEPATITIS PANEL, ACUTE   PROTIME-INR        All Lab Results:  Results for orders placed or performed during the hospital encounter of 07/15/19   CBC auto differential   Result Value Ref Range    WBC 9.40 3.90 - 12.70 K/uL    RBC 4.29 (L) 4.60 - 6.20 M/uL    Hemoglobin 13.2 (L) 14.0 - 18.0 g/dL    Hematocrit 40.1 40.0 - 54.0 %    Mean Corpuscular Volume 94 82 - 98 fL    Mean Corpuscular Hemoglobin 30.8 27.0 - 31.0 pg    Mean Corpuscular Hemoglobin Conc 32.9 32.0 - 36.0 g/dL    RDW 13.7 11.5 - 14.5 %    Platelets 207 150 - 350 K/uL    MPV 10.9 9.2 - 12.9 fL    Gran # (ANC) 6.9 1.8 - 7.7 K/uL    Lymph # 1.5 1.0 - 4.8 K/uL    Mono # 1.0 0.3 - 1.0 K/uL    Eos # 0.0 0.0 - 0.5 K/uL    Baso # 0.02 0.00 - 0.20 K/uL    Gran% 73.2 (H) 38.0 - 73.0 %    Lymph% 16.1 (L) 18.0 - 48.0 %    Mono% 10.4 4.0 - 15.0 %    Eosinophil% 0.1 0.0 - 8.0 %    Basophil% 0.2 0.0 - 1.9 %    Differential Method Automated    Comprehensive metabolic panel   Result Value Ref Range    Sodium 140 136 - 145 mmol/L    Potassium 4.0 3.5 - 5.1 mmol/L    Chloride 105 95 - 110 mmol/L    CO2 23 23 - 29 mmol/L    Glucose 151 (H) 70 - 110 mg/dL    BUN, Bld 15 8 - 23 mg/dL    Creatinine 1.1 0.5 - 1.4 mg/dL    Calcium 9.6 8.7 - 10.5 mg/dL    Total Protein 7.6 6.0 -  8.4 g/dL    Albumin 3.7 3.5 - 5.2 g/dL    Total Bilirubin 2.7 (H) 0.1 - 1.0 mg/dL    Alkaline Phosphatase 149 (H) 55 - 135 U/L     (H) 10 - 40 U/L     (H) 10 - 44 U/L    Anion Gap 12 8 - 16 mmol/L    eGFR if African American >60 >60 mL/min/1.73 m^2    eGFR if non African American >60 >60 mL/min/1.73 m^2   Troponin I   Result Value Ref Range    Troponin I <0.006 0.000 - 0.026 ng/mL   Lipase   Result Value Ref Range    Lipase 10 4 - 60 U/L   Troponin I   Result Value Ref Range    Troponin I 0.006 0.000 - 0.026 ng/mL   Hepatitis panel, acute   Result Value Ref Range    Hepatitis B Surface Ag Negative     Hep B C IgM Negative     Hep A IgM Negative     Hepatitis C Ab Negative    Protime-INR   Result Value Ref Range    Prothrombin Time 10.6 9.0 - 12.5 sec    INR 1.0 0.8 - 1.2         Imaging Results:  Imaging Results          US Abdomen Limited (Gallbladder) (Final result)  Result time 07/16/19 06:40:19    Final result by Papo Cazares MD (07/16/19 06:40:19)                 Impression:      Gallstones with gallbladder wall thickening.  Negative sonographic Elizalde sign.  HIDA scan can be obtained if there is additional clinical concern for acute cholecystitis.    Hepatic steatosis      Electronically signed by: Papo Cazares MD  Date:    07/16/2019  Time:    06:40             Narrative:    EXAMINATION:  US ABDOMEN LIMITED    CLINICAL HISTORY:  Epigastric pain    COMPARISON:  None    FINDINGS:  Limited right upper quadrant ultrasound performed.  The liver measures 15.7 cm in length and demonstrates increased attenuation consistent with hepatic steatosis.  Portal vein is patent..  Common bile duct is within normal limits at 6  mm..  Mobile gallstones are noted with mild wall thickening measuring up to 3.6 mm.  Negative sonographic Elizalde sign.  Pancreas is obscured by bowel gas.  Spleen is unremarkable.  Incidental note is made of 3 cysts within the right kidney measuring 19, 22 and 19 mm.                                X-Ray Chest AP Portable (Final result)  Result time 07/15/19 21:02:53    Final result by Anabella Newell MD (07/15/19 21:02:53)                 Impression:      No cardiopulmonary process noted.      Electronically signed by: Anabella Newell  Date:    07/15/2019  Time:    21:02             Narrative:    EXAMINATION:  XR CHEST AP PORTABLE    CLINICAL HISTORY:  chest pain;    COMPARISON:  Two thousand eighteen    FINDINGS:  No infiltrate or effusion identified.  Cardiomediastinal silhouette is within normal limits.                               1:45 AM: Per ED physician, pt's US Abdomen (Gallbladder) results: Fatty liver noted. No Cholecystitis.    The EKG was ordered, reviewed, and independently interpreted by the ED provider.  Interpretation time: 18:39  Rate: 95 BPM  Rhythm: normal sinus rhythm  Interpretation: RBBB. Possible left atrial enlargement. Abnormal ECG. No STEMI.           The Emergency Provider reviewed the vital signs and test results, which are outlined above.     ED Discussion     10:40 PM: Re-evaluated pt. Pt is resting comfortably and is in no acute distress.  Pt states that symptoms are completely gone.  D/w pt all pertinent results. D/w pt any concerns expressed at this time. Answered all questions. Pt expresses understanding at this time.    12:15 AM: Pt's Tbili, LFTs elevated. Pt know that I am ordering an ultrasound. D/w pt any concerns expressed at this time. Answered all questions. Pt expresses understanding at this time. Remains asymptomatic.    1:45 AM: Reassessed pt at this time.  No definitive cholecystitis on GB US and patient is afebrile, w/ normal WBC, normal VS, and he is asymptomatic. Therefore discussed strict return precautions, especially returning to ED with any abdominal pain/jaundice/vomiting/fever for reevaluation and consideration of surgical consult at that time, otherwise close f/u with GI and PCP.  He voiced understanding and has good resources  for follow up.  Fatty liver noted and of note, pt's symptoms onset after fatty meal for breakfast. Discussed with pt all pertinent ED information and results. Discussed pt dx and plan of tx. Gave pt all f/u and return to the ED instructions. All questions and concerns were addressed at this time. Pt expresses understanding of information and instructions, and is comfortable with plan to discharge. Pt is stable for discharge.    I have discussed with patient and/or family/caretaker chest pain precautions, specifically to return for worsening chest pain, shortness of breath, fever, or any concern.  I have low suspicion for cardiopulmonary, vascular, infectious, respiratory, or other emergent medical condition based on my evaluation in the ED.    I discussed with patient and/or family/caretaker that evaluation in the ED does not suggest any emergent or life threatening medical conditions requiring immediate intervention beyond what was provided in the ED, and I believe patient is safe for discharge.  Regardless, an unremarkable evaluation in the ED does not preclude the development or presence of a serious of life threatening condition. As such, patient was instructed to return immediately for any worsening or change in current symptoms.          ED Medication(s):  Medications   aspirin tablet 325 mg (325 mg Oral Given 7/15/19 2106)       Discharge Medication List as of 7/16/2019  1:46 AM      START taking these medications    Details   ondansetron (ZOFRAN) 4 MG tablet Take 1 tablet (4 mg total) by mouth every 6 (six) hours., Starting Tue 7/16/2019, Print             Follow-up Information     Melody Fields MD In 1 day.    Specialties:  Gastroenterology, Hepatology  Contact information:  [address]  [place] [address]  [phone]             [facility] - .    Specialty:  Emergency Medicine  Why:  As needed, If symptoms worsen  Contact information:  [address]  [facility]  [address]  [phone]           Schedule an appointment as soon as possible for a visit  with Liane Jones MD.    Specialty:  Internal Medicine  Contact information:  [address]  [facility] [address] [phone]                         Medical Decision Making:   Clinical Tests:   Lab Tests: Ordered and Reviewed  Radiological Study: Ordered and Reviewed  Medical Tests: Ordered and Reviewed             Scribe Attestation:   Scribe #1: I performed the above scribed service and the documentation accurately describes the services I performed. I attest to the accuracy of the note.     Attending:   Physician Attestation Statement for Scribe #1: I, Clarke Lopez MD, personally performed the services described in this documentation, as scribed by Cici Deleon, in my presence, and it is both accurate and complete.           Clinical Impression       ICD-10-CM ICD-9-CM   1. Elevated LFTs R94.5 790.6   2. Chest pain R07.9 786.50   3. Epigastric pain R10.13 789.06   4. Hyperbilirubinemia E80.6 782.4       Disposition:   Disposition: Discharged  Condition: Stable         Clarke Lopez MD  07/16/19 6914

## 2019-07-16 NOTE — TELEPHONE ENCOUNTER
Contact: [phone]  Patient's wife is requesting a call from the office regarding the patient's urine.  Patient urine was a brown color.    Please advise, thank you

## 2019-07-16 NOTE — TELEPHONE ENCOUNTER
I spoke to Mrs. Yepez on the telephone   her  is comfortable now   but she is worried because his urine is dark.  (because his bilirubin is high)   look at his chart. he has gallstones and he had presented to the emergency room last night, with severe abdominal pain and vomiting.    While he was in the emergency room, his pain resolved and he was discharged home.  His bilirubin and liver enzymes are elevated.  I have placed an urgent General Surgery consultation with Dr. Phong Beltran.  Can you please try to get him in to be seen when the Thursday or Friday of this week. This is urgent.  I have instructed his wife that should his abdominal pain return, vomiting or fever then he must return to the emergency room immediately.

## 2019-07-16 NOTE — TELEPHONE ENCOUNTER
Spoke with the pts wife, he was taken to the ER in [place] last night he had pain in his stomach radiating to his back and he was vomiting. His liver enzymes were elevated a little and suggested he see Dr. Fields in [place], but she was not in network with their insurance. He also needs a follow up with Dr.McCormick PIMENTEL. Please advise on referral and appt. His wife stated he is doing well today. Mag Hayes

## 2019-07-23 ENCOUNTER — TELEPHONE (OUTPATIENT)
Dept: SURGERY | Facility: CLINIC | Age: 79
End: 2019-07-23

## 2019-07-23 ENCOUNTER — OFFICE VISIT (OUTPATIENT)
Dept: SURGERY | Facility: CLINIC | Age: 79
End: 2019-07-23
Payer: MEDICARE

## 2019-07-23 ENCOUNTER — TELEPHONE (OUTPATIENT)
Dept: INTERNAL MEDICINE | Facility: CLINIC | Age: 79
End: 2019-07-23

## 2019-07-23 VITALS
BODY MASS INDEX: 42.37 KG/M2 | TEMPERATURE: 98 F | SYSTOLIC BLOOD PRESSURE: 147 MMHG | WEIGHT: 312.81 LBS | HEIGHT: 72 IN | HEART RATE: 79 BPM | DIASTOLIC BLOOD PRESSURE: 72 MMHG

## 2019-07-23 DIAGNOSIS — E11.42 POLYNEUROPATHY DUE TO TYPE 2 DIABETES MELLITUS: Primary | ICD-10-CM

## 2019-07-23 DIAGNOSIS — K80.20 GALLSTONES: ICD-10-CM

## 2019-07-23 DIAGNOSIS — K80.50 CHOLEDOCHOLITHIASIS: Primary | ICD-10-CM

## 2019-07-23 DIAGNOSIS — G47.33 OSA ON CPAP: ICD-10-CM

## 2019-07-23 PROCEDURE — 99204 OFFICE O/P NEW MOD 45 MIN: CPT | Mod: HCNC,S$GLB,, | Performed by: SURGERY

## 2019-07-23 PROCEDURE — 99204 PR OFFICE/OUTPT VISIT, NEW, LEVL IV, 45-59 MIN: ICD-10-PCS | Mod: HCNC,S$GLB,, | Performed by: SURGERY

## 2019-07-23 PROCEDURE — 99999 PR PBB SHADOW E&M-EST. PATIENT-LVL III: ICD-10-PCS | Mod: PBBFAC,HCNC,, | Performed by: SURGERY

## 2019-07-23 PROCEDURE — 1101F PR PT FALLS ASSESS DOC 0-1 FALLS W/OUT INJ PAST YR: ICD-10-PCS | Mod: HCNC,CPTII,S$GLB, | Performed by: SURGERY

## 2019-07-23 PROCEDURE — 99999 PR PBB SHADOW E&M-EST. PATIENT-LVL III: CPT | Mod: PBBFAC,HCNC,, | Performed by: SURGERY

## 2019-07-23 PROCEDURE — 1101F PT FALLS ASSESS-DOCD LE1/YR: CPT | Mod: HCNC,CPTII,S$GLB, | Performed by: SURGERY

## 2019-07-23 NOTE — Clinical Note
July 23, 2019      Liane Tijerina MD  [address]  [place] [address]           [facility] General Surgery  [address]  [place] [address]  Phone: [phone]          Patient: Elan Yepez   MR Number: [id number]   YOB: 1940   Date of Visit: 7/23/2019       Dear Dr. Liane Tijerina:    Thank you for referring Elan Yepez to me for evaluation. Attached you will find relevant portions of my assessment and plan of care.    If you have questions, please do not hesitate to call me. I look forward to following Elan Yepez along with you.    Sincerely,    Phong Beltran MD    Enclosure  CC:  No Recipients    If you would like to receive this communication electronically, please contact [email or web address] or [phone] to request more information on [employer] access.    For providers and/or their staff who would like to refer a patient to [facility], please contact us through our one-stop-shop provider referral line, [facility] , at [phone].    If you feel you have received this communication in error or would no longer like to receive these types of communications, please e-mail [email or web address]

## 2019-07-23 NOTE — PROGRESS NOTES
I have seen the patient, reviewed the Resident's history and physical, assessment and plan. I have personally interviewed and examined the patient at bedside and: agree with the findings.     Likely choledocholithiasis.  Repeat lfts.  For lap vinayak with ioc.  Pcp clearance and 23 hour stay.  Bariatric liquid diet for 2 weeks.  Morbid obesity with comorbidities.  We discussed weight loss and he can follow up with his pcp.

## 2019-07-23 NOTE — TELEPHONE ENCOUNTER
----- Message from Francisco Love MA sent at 7/23/2019  1:26 PM CDT -----  Patient is needing PCP clearance for lap vinayak procedure scheduled on 8/12/19. Thanks in advance,John

## 2019-07-23 NOTE — LETTER
[facility] - General Surgery  [address]  [place] [address]  Phone: [phone] July 23, 2019      Liane Tijerina MD  [address]  [place] [address]    Patient: Elan Yepez   MR Number: [id number]   YOB: 1940   Date of Visit: 7/23/2019     Dear Dr. Tijerina:    Thank you for referring Elan Yepez to me for evaluation. Attached you will find relevant portions of my assessment and plan of care.    Mr. Yepez likely has choledocholithiasis.  We will repeat LFTs.      We will plan for laparoscopic cholecystectomy with IOC. We will need PCP clearance.  The patient has morbid obesity with comorbidities.  We discussed weight loss and he can follow up with his PCP.    If you have questions, please do not hesitate to call me. I look forward to following Elan Yepez along with you.    Sincerely,    Phong Beltran MD  Professor, [employer]  Section Head, General Surgery  [facility]    [employer]
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
## 2019-07-29 ENCOUNTER — OFFICE VISIT (OUTPATIENT)
Dept: PODIATRY | Facility: CLINIC | Age: 79
End: 2019-07-29
Payer: MEDICARE

## 2019-07-29 VITALS
RESPIRATION RATE: 18 BRPM | HEART RATE: 82 BPM | SYSTOLIC BLOOD PRESSURE: 145 MMHG | HEIGHT: 72 IN | WEIGHT: 312 LBS | DIASTOLIC BLOOD PRESSURE: 86 MMHG | BODY MASS INDEX: 42.26 KG/M2

## 2019-07-29 DIAGNOSIS — B35.1 DERMATOPHYTOSIS, NAIL: ICD-10-CM

## 2019-07-29 DIAGNOSIS — E11.49 TYPE 2 DIABETES MELLITUS WITH NEUROLOGICAL MANIFESTATIONS: Primary | ICD-10-CM

## 2019-07-29 DIAGNOSIS — L84 CORN OR CALLUS: ICD-10-CM

## 2019-07-29 PROCEDURE — 11721 DEBRIDE NAIL 6 OR MORE: CPT | Mod: Q9,59,HCNC,S$GLB | Performed by: PODIATRIST

## 2019-07-29 PROCEDURE — 99999 PR PBB SHADOW E&M-EST. PATIENT-LVL III: CPT | Mod: PBBFAC,HCNC,, | Performed by: PODIATRIST

## 2019-07-29 PROCEDURE — 99499 UNLISTED E&M SERVICE: CPT | Mod: HCNC,S$GLB,, | Performed by: PODIATRIST

## 2019-07-29 PROCEDURE — 11056 PARNG/CUTG B9 HYPRKR LES 2-4: CPT | Mod: Q9,HCNC,S$GLB, | Performed by: PODIATRIST

## 2019-07-29 PROCEDURE — 99999 PR PBB SHADOW E&M-EST. PATIENT-LVL III: ICD-10-PCS | Mod: PBBFAC,HCNC,, | Performed by: PODIATRIST

## 2019-07-29 PROCEDURE — 11056 PR TRIM BENIGN HYPERKERATOTIC SKIN LESION,2-4: ICD-10-PCS | Mod: Q9,HCNC,S$GLB, | Performed by: PODIATRIST

## 2019-07-29 PROCEDURE — 11721 PR DEBRIDEMENT OF NAILS, 6 OR MORE: ICD-10-PCS | Mod: Q9,59,HCNC,S$GLB | Performed by: PODIATRIST

## 2019-07-29 PROCEDURE — 99499 RISK ADDL DX/OHS AUDIT: ICD-10-PCS | Mod: HCNC,S$GLB,, | Performed by: PODIATRIST

## 2019-07-29 NOTE — PROGRESS NOTES
Subjective:      Patient ID: Elan Yepez is a 79 y.o. male.    Chief Complaint: PCP (Liane Tijerina MD 6/05/19); Diabetic Foot Exam; Nail Problem; and Nail Care    Elan is a 79 y.o. male who presents to the clinic for evaluation and treatment of high risk feet. 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 The patient's chief complaint is long, thick toenails. This patient has documented high risk feet requiring routine maintenance secondary to diabetes mellitis and those secondary complications of diabetes, as mentioned..    PCP: Liane Tijerina MD    Date Last Seen by PCP:  Chief Complaint   Patient presents with    PCP     Liane Tijerina MD 6/05/19    Diabetic Foot Exam    Nail Problem    Nail Care         Current shoe gear:dm shoes   Hemoglobin A1C   Date Value Ref Range Status   05/30/2019 6.1 (H) 4.0 - 5.6 % Final     Comment:     ADA Screening Guidelines:  5.7-6.4%  Consistent with prediabetes  >or=6.5%  Consistent with diabetes  High levels of fetal hemoglobin interfere with the HbA1C  assay. Heterozygous hemoglobin variants (HbS, HgC, etc)do  not significantly interfere with this assay.   However, presence of multiple variants may affect accuracy.     08/22/2018 6.1 (H) 4.0 - 5.6 % Final     Comment:     ADA Screening Guidelines:  5.7-6.4%  Consistent with prediabetes  >or=6.5%  Consistent with diabetes  High levels of fetal  hemoglobin interfere with the HbA1C  assay. Heterozygous hemoglobin variants (HbS, HgC, etc)do  not significantly interfere with this assay.   However, presence of multiple variants may affect accuracy.     02/13/2018 6.1 (H) 4.0 - 5.6 % Final     Comment:     According to ADA guidelines, hemoglobin A1c <7.0% represents  optimal control in non-pregnant diabetic patients. Different  metrics may apply to specific patient populations.   Standards of Medical Care in Diabetes-2016.  For the purpose of screening for the presence of diabetes:  <5.7%     Consistent with the absence of diabetes  5.7-6.4%  Consistent with increasing risk for diabetes   (prediabetes)  >or=6.5%  Consistent with diabetes  Currently, no consensus exists for use of hemoglobin A1c  for diagnosis of diabetes for children.  This Hemoglobin A1c assay has significant interference with fetal   hemoglobin   (HbF). The results are invalid for patients with abnormal amounts of   HbF,   including those with known Hereditary Persistence   of Fetal Hemoglobin. Heterozygous hemoglobin variants (HbAS, HbAC,   HbAD, HbAE, HbA2) do not significantly interfere with this assay;   however, presence of multiple variants in a sample may impact the %   interference.         Review of Systems   Constitution: Negative for chills, decreased appetite and fever.   Cardiovascular: Negative for chest pain and claudication.   Respiratory: Negative for cough.    Skin: Positive for nail changes. Negative for color change, dry skin, flushing, itching, poor wound healing and rash.   Musculoskeletal: Negative for arthritis, back pain, falls, gout, joint pain, joint swelling and myalgias.   Gastrointestinal: Negative for nausea and vomiting.   Neurological: Positive for numbness. Negative for loss of balance and paresthesias.           Objective:      Physical Exam   Constitutional: He is oriented to person, place, and time. He appears well-developed and well-nourished.    Cardiovascular:   Dorsalis pedis and posterior tibial pulses are diminished Bilaterally. Toes are cool to touch. Feet are warm proximally.There is decreased digital hair. Skin is atrophic, slightly hyperpigmented, and mildly edematous       Musculoskeletal: Normal range of motion. He exhibits no tenderness or deformity.   Adequate joint range of motion without pain, limitation, nor crepitation Bilateral feet and ankle joints. Muscle strength is 5/5 in all groups bilaterally.         Neurological: He is alert and oriented to person, place, and time.   [place] 5.07 monofilamant testing is diminished Raffi feet. Sharp/dull sensation diminished Bilaterally. Light touch absent Bilaterally.       Skin: Skin is warm, dry and intact. No abrasion, no bruising, no burn, no ecchymosis and no lesion noted. He is not diaphoretic. No erythema. No pallor.   Nails x10 are elongated by  3-6mm's, thickened by 2-4 mm's, dystrophic, and are darkened in  coloration . Xerosis Bilaterally. No open lesions noted.    Hyperkeratotic tissue noted to distal hallux b/l      Psychiatric: He has a normal mood and affect. His behavior is normal.   Nursing note and vitals reviewed.            Assessment:       Encounter Diagnoses   Name Primary?    Type 2 diabetes mellitus with neurological manifestations Yes    Dermatophytosis, nail     Corn or callus          Plan:       Elan was seen today for pcp, diabetic foot exam, nail problem and nail care.    Diagnoses and all orders for this visit:    Type 2 diabetes mellitus with neurological manifestations    Dermatophytosis, nail    Corn or callus      I counseled the patient on his conditions, their implications and medical management.        - Shoe inspection. Diabetic Foot Education. Patient reminded of the importance of good nutrition and blood sugar control to help prevent podiatric complications of diabetes. Patient instructed on proper foot hygeine. We discussed wearing proper shoe  gear, daily foot inspections, never walking without protective shoe gear, never putting sharp instruments to feet, routine podiatric nail visits every 2-3 months.      - With patient's permission, nails were aggressively reduced and debrided x 10 to their soft tissue attachment mechanically and with electric , removing all offending nail and debris. Patient relates relief following the procedure. He will continue to monitor the areas daily, inspect his feet, wear protective shoe gear when ambulatory, moisturizer to maintain skin integrity and follow in this office in approximately 2-3 months, sooner p.r.n.    - After cleansing the  area w/ alcohol prep pad the above mentioned hyperkeratosis was trimmed utilizing No 15 scapel, to a smooth base with out incident. Patient tolerated this  well and reported comfort to the area of distal hallux b/l

## 2019-08-06 ENCOUNTER — OFFICE VISIT (OUTPATIENT)
Dept: INTERNAL MEDICINE | Facility: CLINIC | Age: 79
End: 2019-08-06
Payer: MEDICARE

## 2019-08-06 VITALS
SYSTOLIC BLOOD PRESSURE: 110 MMHG | BODY MASS INDEX: 40.55 KG/M2 | WEIGHT: 299.38 LBS | HEIGHT: 72 IN | HEART RATE: 88 BPM | DIASTOLIC BLOOD PRESSURE: 70 MMHG

## 2019-08-06 DIAGNOSIS — Z01.818 PRE-OP EVALUATION: Primary | ICD-10-CM

## 2019-08-06 PROCEDURE — 1101F PT FALLS ASSESS-DOCD LE1/YR: CPT | Mod: HCNC,CPTII,GC,S$GLB | Performed by: STUDENT IN AN ORGANIZED HEALTH CARE EDUCATION/TRAINING PROGRAM

## 2019-08-06 PROCEDURE — 99999 PR PBB SHADOW E&M-EST. PATIENT-LVL III: ICD-10-PCS | Mod: PBBFAC,HCNC,GC, | Performed by: STUDENT IN AN ORGANIZED HEALTH CARE EDUCATION/TRAINING PROGRAM

## 2019-08-06 PROCEDURE — 1101F PR PT FALLS ASSESS DOC 0-1 FALLS W/OUT INJ PAST YR: ICD-10-PCS | Mod: HCNC,CPTII,GC,S$GLB | Performed by: STUDENT IN AN ORGANIZED HEALTH CARE EDUCATION/TRAINING PROGRAM

## 2019-08-06 PROCEDURE — 99999 PR PBB SHADOW E&M-EST. PATIENT-LVL III: CPT | Mod: PBBFAC,HCNC,GC, | Performed by: STUDENT IN AN ORGANIZED HEALTH CARE EDUCATION/TRAINING PROGRAM

## 2019-08-06 PROCEDURE — 99213 PR OFFICE/OUTPT VISIT, EST, LEVL III, 20-29 MIN: ICD-10-PCS | Mod: HCNC,GC,S$GLB, | Performed by: STUDENT IN AN ORGANIZED HEALTH CARE EDUCATION/TRAINING PROGRAM

## 2019-08-06 PROCEDURE — 99213 OFFICE O/P EST LOW 20 MIN: CPT | Mod: HCNC,GC,S$GLB, | Performed by: STUDENT IN AN ORGANIZED HEALTH CARE EDUCATION/TRAINING PROGRAM
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
## 2019-08-06 NOTE — PATIENT INSTRUCTIONS
Please stop taking Aspirin at least 5 days before procedure  Stop taking Metformin 24 hours before procedure  Stop Cialis at least 24 hours before procedure

## 2019-08-09 ENCOUNTER — TELEPHONE (OUTPATIENT)
Dept: SURGERY | Facility: CLINIC | Age: 79
End: 2019-08-09

## 2019-08-09 RX ORDER — CALCIUM CITRATE/VITAMIN D3 200MG-6.25
TABLET ORAL
Qty: 50 STRIP | Refills: 3 | Status: SHIPPED | OUTPATIENT
Start: 2019-08-09 | End: 2020-02-17

## 2019-08-09 NOTE — TELEPHONE ENCOUNTER
Spoke with pt about sx arrival time where to come and npo after midnight also to wash with antibacterial  soap

## 2019-08-11 ENCOUNTER — ANESTHESIA EVENT (OUTPATIENT)
Dept: SURGERY | Facility: HOSPITAL | Age: 79
End: 2019-08-11
Payer: MEDICARE
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
## 2019-08-12 ENCOUNTER — ANESTHESIA (OUTPATIENT)
Dept: SURGERY | Facility: HOSPITAL | Age: 79
End: 2019-08-12
Payer: MEDICARE

## 2019-08-12 PROBLEM — K80.20 CHOLELITHIASIS: Status: ACTIVE | Noted: 2019-08-12

## 2019-08-12 PROCEDURE — 63600175 PHARM REV CODE 636 W HCPCS: Mod: HCNC | Performed by: STUDENT IN AN ORGANIZED HEALTH CARE EDUCATION/TRAINING PROGRAM

## 2019-08-12 PROCEDURE — 25000003 PHARM REV CODE 250: Mod: HCNC | Performed by: STUDENT IN AN ORGANIZED HEALTH CARE EDUCATION/TRAINING PROGRAM

## 2019-08-12 PROCEDURE — D9220A PRA ANESTHESIA: Mod: HCNC,,, | Performed by: ANESTHESIOLOGY

## 2019-08-12 PROCEDURE — 63600175 PHARM REV CODE 636 W HCPCS: Mod: HCNC | Performed by: SURGERY

## 2019-08-12 PROCEDURE — D9220A PRA ANESTHESIA: ICD-10-PCS | Mod: HCNC,,, | Performed by: ANESTHESIOLOGY

## 2019-08-12 RX ORDER — ACETAMINOPHEN 10 MG/ML
INJECTION, SOLUTION INTRAVENOUS
Status: DISCONTINUED | OUTPATIENT
Start: 2019-08-12 | End: 2019-08-12

## 2019-08-12 RX ORDER — DEXAMETHASONE SODIUM PHOSPHATE 4 MG/ML
INJECTION, SOLUTION INTRA-ARTICULAR; INTRALESIONAL; INTRAMUSCULAR; INTRAVENOUS; SOFT TISSUE
Status: DISCONTINUED | OUTPATIENT
Start: 2019-08-12 | End: 2019-08-12

## 2019-08-12 RX ORDER — GLYCOPYRROLATE 0.2 MG/ML
INJECTION INTRAMUSCULAR; INTRAVENOUS
Status: DISCONTINUED | OUTPATIENT
Start: 2019-08-12 | End: 2019-08-12

## 2019-08-12 RX ORDER — ONDANSETRON 2 MG/ML
INJECTION INTRAMUSCULAR; INTRAVENOUS
Status: DISCONTINUED | OUTPATIENT
Start: 2019-08-12 | End: 2019-08-12

## 2019-08-12 RX ORDER — LIDOCAINE HCL/PF 100 MG/5ML
SYRINGE (ML) INTRAVENOUS
Status: DISCONTINUED | OUTPATIENT
Start: 2019-08-12 | End: 2019-08-12

## 2019-08-12 RX ORDER — EPHEDRINE SULFATE 50 MG/ML
INJECTION, SOLUTION INTRAVENOUS
Status: DISCONTINUED | OUTPATIENT
Start: 2019-08-12 | End: 2019-08-12

## 2019-08-12 RX ORDER — NEOSTIGMINE METHYLSULFATE 1 MG/ML
INJECTION, SOLUTION INTRAVENOUS
Status: DISCONTINUED | OUTPATIENT
Start: 2019-08-12 | End: 2019-08-12

## 2019-08-12 RX ORDER — PROPOFOL 10 MG/ML
VIAL (ML) INTRAVENOUS
Status: DISCONTINUED | OUTPATIENT
Start: 2019-08-12 | End: 2019-08-12

## 2019-08-12 RX ORDER — ROCURONIUM BROMIDE 10 MG/ML
INJECTION, SOLUTION INTRAVENOUS
Status: DISCONTINUED | OUTPATIENT
Start: 2019-08-12 | End: 2019-08-12

## 2019-08-12 RX ORDER — KETAMINE HYDROCHLORIDE 10 MG/ML
INJECTION, SOLUTION INTRAMUSCULAR; INTRAVENOUS
Status: DISCONTINUED | OUTPATIENT
Start: 2019-08-12 | End: 2019-08-12

## 2019-08-12 RX ORDER — FENTANYL CITRATE 50 UG/ML
INJECTION, SOLUTION INTRAMUSCULAR; INTRAVENOUS
Status: DISCONTINUED | OUTPATIENT
Start: 2019-08-12 | End: 2019-08-12

## 2019-08-12 RX ORDER — CEFAZOLIN SODIUM 1 G/3ML
INJECTION, POWDER, FOR SOLUTION INTRAMUSCULAR; INTRAVENOUS
Status: DISCONTINUED | OUTPATIENT
Start: 2019-08-12 | End: 2019-08-12

## 2019-08-12 RX ORDER — PHENYLEPHRINE HYDROCHLORIDE 10 MG/ML
INJECTION INTRAVENOUS
Status: DISCONTINUED | OUTPATIENT
Start: 2019-08-12 | End: 2019-08-12

## 2019-08-12 RX ADMIN — LIDOCAINE HYDROCHLORIDE 60 MG: 20 INJECTION, SOLUTION INTRAVENOUS at 11:08

## 2019-08-12 RX ADMIN — PHENYLEPHRINE HYDROCHLORIDE 100 MCG: 10 INJECTION INTRAVENOUS at 12:08

## 2019-08-12 RX ADMIN — PROPOFOL 200 MG: 10 INJECTION, EMULSION INTRAVENOUS at 11:08

## 2019-08-12 RX ADMIN — ACETAMINOPHEN 1000 MG: 10 INJECTION, SOLUTION INTRAVENOUS at 12:08

## 2019-08-12 RX ADMIN — PHENYLEPHRINE HYDROCHLORIDE 200 MCG: 10 INJECTION INTRAVENOUS at 12:08

## 2019-08-12 RX ADMIN — CEFAZOLIN 3 G: 330 INJECTION, POWDER, FOR SOLUTION INTRAMUSCULAR; INTRAVENOUS at 11:08

## 2019-08-12 RX ADMIN — SODIUM CHLORIDE: 0.9 INJECTION, SOLUTION INTRAVENOUS at 11:08

## 2019-08-12 RX ADMIN — KETAMINE HYDROCHLORIDE 30 MG: 10 INJECTION, SOLUTION INTRAMUSCULAR; INTRAVENOUS at 12:08

## 2019-08-12 RX ADMIN — GLYCOPYRROLATE 0.6 MG: 0.2 INJECTION, SOLUTION INTRAMUSCULAR; INTRAVENOUS at 01:08

## 2019-08-12 RX ADMIN — NEOSTIGMINE METHYLSULFATE 5 MG: 1 INJECTION INTRAVENOUS at 01:08

## 2019-08-12 RX ADMIN — ROCURONIUM BROMIDE 20 MG: 10 INJECTION, SOLUTION INTRAVENOUS at 12:08

## 2019-08-12 RX ADMIN — EPHEDRINE SULFATE 10 MG: 50 INJECTION, SOLUTION INTRAMUSCULAR; INTRAVENOUS; SUBCUTANEOUS at 12:08

## 2019-08-12 RX ADMIN — ROCURONIUM BROMIDE 10 MG: 10 INJECTION, SOLUTION INTRAVENOUS at 12:08

## 2019-08-12 RX ADMIN — FENTANYL CITRATE 50 MCG: 50 INJECTION, SOLUTION INTRAMUSCULAR; INTRAVENOUS at 12:08

## 2019-08-12 RX ADMIN — ROCURONIUM BROMIDE 40 MG: 10 INJECTION, SOLUTION INTRAVENOUS at 11:08

## 2019-08-12 RX ADMIN — ONDANSETRON 4 MG: 2 INJECTION INTRAMUSCULAR; INTRAVENOUS at 01:08

## 2019-08-12 RX ADMIN — PROPOFOL 40 MG: 10 INJECTION, EMULSION INTRAVENOUS at 12:08

## 2019-08-12 RX ADMIN — PROPOFOL 20 MG: 10 INJECTION, EMULSION INTRAVENOUS at 01:08

## 2019-08-12 RX ADMIN — DEXAMETHASONE SODIUM PHOSPHATE 4 MG: 4 INJECTION, SOLUTION INTRAMUSCULAR; INTRAVENOUS at 11:08

## 2019-08-12 RX ADMIN — SODIUM CHLORIDE, SODIUM GLUCONATE, SODIUM ACETATE, POTASSIUM CHLORIDE, MAGNESIUM CHLORIDE, SODIUM PHOSPHATE, DIBASIC, AND POTASSIUM PHOSPHATE: .53; .5; .37; .037; .03; .012; .00082 INJECTION, SOLUTION INTRAVENOUS at 12:08

## 2019-08-12 NOTE — TRANSFER OF CARE
Anesthesia Transfer of Care Note    Patient: Elan Yepez    Procedure(s) Performed: Procedure(s) (LRB):  CHOLECYSTECTOMY, LAPAROSCOPIC, WITH CHOLANGIOGRAM (N/A)    Patient location: PACU    Anesthesia Type: general    Transport from OR: Transported from OR on 6-10 L/min O2 by face mask with adequate spontaneous ventilation    Post pain: adequate analgesia    Post assessment: no apparent anesthetic complications    Post vital signs: stable    Level of consciousness: alert, awake and responds to stimulation    Nausea/Vomiting: no nausea/vomiting    Complications: none    Transfer of care protocol was followed      Last vitals:   Visit Vitals  BP (!) 141/75   Pulse 82   Temp 36.3 °C (97.3 °F) (Temporal)   Resp 20   Ht 6' (1.829 m)   Wt 135.6 kg (299 lb)   SpO2 99%   BMI 40.55 kg/m²

## 2019-08-14 ENCOUNTER — ANESTHESIA (OUTPATIENT)
Dept: ENDOSCOPY | Facility: HOSPITAL | Age: 79
End: 2019-08-14
Payer: MEDICARE

## 2019-08-14 ENCOUNTER — ANESTHESIA EVENT (OUTPATIENT)
Dept: ENDOSCOPY | Facility: HOSPITAL | Age: 79
End: 2019-08-14
Payer: MEDICARE

## 2019-08-14 PROBLEM — K83.1 BILIARY OBSTRUCTION: Status: ACTIVE | Noted: 2019-08-14

## 2019-08-14 PROCEDURE — D9220A PRA ANESTHESIA: ICD-10-PCS | Mod: HCNC,ANES,, | Performed by: ANESTHESIOLOGY

## 2019-08-14 PROCEDURE — 25000003 PHARM REV CODE 250: Mod: HCNC | Performed by: NURSE ANESTHETIST, CERTIFIED REGISTERED

## 2019-08-14 PROCEDURE — 63600175 PHARM REV CODE 636 W HCPCS: Mod: HCNC | Performed by: NURSE ANESTHETIST, CERTIFIED REGISTERED

## 2019-08-14 PROCEDURE — D9220A PRA ANESTHESIA: Mod: HCNC,ANES,, | Performed by: ANESTHESIOLOGY

## 2019-08-14 RX ORDER — FENTANYL CITRATE 50 UG/ML
INJECTION, SOLUTION INTRAMUSCULAR; INTRAVENOUS
Status: DISCONTINUED | OUTPATIENT
Start: 2019-08-14 | End: 2019-08-14

## 2019-08-14 RX ORDER — PROPOFOL 10 MG/ML
VIAL (ML) INTRAVENOUS
Status: DISCONTINUED | OUTPATIENT
Start: 2019-08-14 | End: 2019-08-14

## 2019-08-14 RX ORDER — KETAMINE HCL IN 0.9 % NACL 50 MG/5 ML
SYRINGE (ML) INTRAVENOUS
Status: DISCONTINUED | OUTPATIENT
Start: 2019-08-14 | End: 2019-08-14

## 2019-08-14 RX ORDER — PROPOFOL 10 MG/ML
VIAL (ML) INTRAVENOUS CONTINUOUS PRN
Status: DISCONTINUED | OUTPATIENT
Start: 2019-08-14 | End: 2019-08-14

## 2019-08-14 RX ORDER — LIDOCAINE HCL/PF 100 MG/5ML
SYRINGE (ML) INTRAVENOUS
Status: DISCONTINUED | OUTPATIENT
Start: 2019-08-14 | End: 2019-08-14

## 2019-08-14 RX ORDER — GLYCOPYRROLATE 0.2 MG/ML
INJECTION INTRAMUSCULAR; INTRAVENOUS
Status: DISCONTINUED | OUTPATIENT
Start: 2019-08-14 | End: 2019-08-14

## 2019-08-14 RX ADMIN — GLYCOPYRROLATE 0.2 MG: 0.2 INJECTION, SOLUTION INTRAMUSCULAR; INTRAVENOUS at 09:08

## 2019-08-14 RX ADMIN — FENTANYL CITRATE 25 MCG: 50 INJECTION, SOLUTION INTRAMUSCULAR; INTRAVENOUS at 10:08

## 2019-08-14 RX ADMIN — Medication 30 MG: at 10:08

## 2019-08-14 RX ADMIN — LIDOCAINE HYDROCHLORIDE 20 MG: 20 INJECTION, SOLUTION INTRAVENOUS at 10:08

## 2019-08-14 RX ADMIN — PROPOFOL 20 MG: 10 INJECTION, EMULSION INTRAVENOUS at 10:08

## 2019-08-14 RX ADMIN — PROPOFOL 150 MCG/KG/MIN: 10 INJECTION, EMULSION INTRAVENOUS at 10:08

## 2019-08-14 RX ADMIN — PROPOFOL 30 MG: 10 INJECTION, EMULSION INTRAVENOUS at 10:08
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
## 2019-08-14 NOTE — TRANSFER OF CARE
Anesthesia Transfer of Care Note    Patient: Elan Yepez    Procedure(s) Performed: Procedure(s) (LRB):  ULTRASOUND, UPPER GI TRACT, ENDOSCOPIC (N/A)    Patient location: [facility]    Anesthesia Type: general    Transport from OR: Transported from OR on 2-3 L/min O2 by NC with adequate spontaneous ventilation    Post pain: adequate analgesia    Post assessment: no apparent anesthetic complications and tolerated procedure well    Post vital signs: stable    Level of consciousness: awake, alert and oriented    Nausea/Vomiting: no nausea/vomiting    Complications: none    Transfer of care protocol was followed      Last vitals: 08/14/19 1030  Visit Vitals  BP (!) 141/84   Pulse 89   Temp 36.7 °C (98.1 °F) (Oral)   Resp 16   Ht 6' (1.829 m)   Wt 135.6 kg (299 lb)   SpO2 100%   BMI 40.55 kg/m²

## 2019-08-14 NOTE — ANESTHESIA POSTPROCEDURE EVALUATION
Anesthesia Post Evaluation    Patient: Elan Yepez    Procedure(s) Performed: Procedure(s) (LRB):  CHOLECYSTECTOMY, LAPAROSCOPIC, WITH CHOLANGIOGRAM (N/A)    Final Anesthesia Type: general  Patient location during evaluation: PACU  Patient participation: Yes- Able to Participate  Level of consciousness: awake and alert and oriented  Post-procedure vital signs: reviewed and stable  Pain management: adequate  Airway patency: patent  PONV status at discharge: No PONV  Anesthetic complications: no      Cardiovascular status: blood pressure returned to baseline  Respiratory status: unassisted, spontaneous ventilation and room air  Hydration status: euvolemic  Follow-up not needed.          Vitals Value Taken Time   /75 8/14/2019  4:14 AM   Temp 36.3 °C (97.3 °F) 8/14/2019  4:14 AM   Pulse 72 8/14/2019  4:14 AM   Resp 18 8/14/2019  4:14 AM   SpO2 95 % 8/14/2019  4:14 AM         Event Time     Out of Recovery 14:30:00          Pain/Lynda Score: No data recorded

## 2019-08-14 NOTE — ANESTHESIA POSTPROCEDURE EVALUATION
Anesthesia Post Evaluation    Patient: Elan Yepez    Procedure(s) Performed: Procedure(s) (LRB):  ULTRASOUND, UPPER GI TRACT, ENDOSCOPIC (N/A)    Final Anesthesia Type: general (Natural airway)  Patient location during evaluation: PACU  Patient participation: Yes- Able to Participate  Level of consciousness: awake and alert  Post-procedure vital signs: reviewed and stable  Pain management: adequate  Airway patency: patent  PONV status at discharge: No PONV  Anesthetic complications: no      Cardiovascular status: hemodynamically stable  Respiratory status: unassisted  Hydration status: euvolemic  Follow-up not needed.          Vitals Value Taken Time   /79 8/14/2019 11:01 AM   Temp 36.6 °C (97.9 °F) 8/14/2019 10:59 AM   Pulse 74 8/14/2019 11:01 AM   Resp 16 8/14/2019 10:59 AM   SpO2 97 % 8/14/2019 11:00 AM   Vitals shown include unvalidated device data.      No case tracking events are documented in the log.      Pain/Lynda Score: Lynda Score: 9 (8/14/2019 10:29 AM)

## 2019-08-27 ENCOUNTER — OFFICE VISIT (OUTPATIENT)
Dept: SURGERY | Facility: CLINIC | Age: 79
End: 2019-08-27
Payer: MEDICARE

## 2019-08-27 VITALS
HEART RATE: 83 BPM | WEIGHT: 296 LBS | DIASTOLIC BLOOD PRESSURE: 76 MMHG | BODY MASS INDEX: 40.09 KG/M2 | SYSTOLIC BLOOD PRESSURE: 133 MMHG | HEIGHT: 72 IN

## 2019-08-27 DIAGNOSIS — Z09 POSTOP CHECK: Primary | ICD-10-CM

## 2019-08-27 PROBLEM — K80.20 CHOLELITHIASIS: Status: RESOLVED | Noted: 2019-08-12 | Resolved: 2019-08-27

## 2019-08-27 PROBLEM — K80.20 GALLSTONES: Status: RESOLVED | Noted: 2019-07-23 | Resolved: 2019-08-27

## 2019-08-27 PROBLEM — K83.1 BILIARY OBSTRUCTION: Status: RESOLVED | Noted: 2019-08-14 | Resolved: 2019-08-27

## 2019-08-27 PROCEDURE — 99024 PR POST-OP FOLLOW-UP VISIT: ICD-10-PCS | Mod: HCNC,S$GLB,, | Performed by: SURGERY

## 2019-08-27 PROCEDURE — 99999 PR PBB SHADOW E&M-EST. PATIENT-LVL III: ICD-10-PCS | Mod: PBBFAC,HCNC,, | Performed by: SURGERY

## 2019-08-27 PROCEDURE — 99999 PR PBB SHADOW E&M-EST. PATIENT-LVL III: CPT | Mod: PBBFAC,HCNC,, | Performed by: SURGERY

## 2019-08-27 PROCEDURE — 99024 POSTOP FOLLOW-UP VISIT: CPT | Mod: HCNC,S$GLB,, | Performed by: SURGERY

## 2019-08-27 NOTE — LETTER
[facility] General [facility]  [address]  [place] [address]  Phone: [phone] August 27, 2019      Liane Tijerina MD  [address]  [place] [address]    Patient: Elan Yepez   MR Number: [id number]   YOB: 1940   Date of Visit: 8/27/2019     Dear Dr. Tijeirna:    Thank you for referring Elan Yepez to me for evaluation. Attached you will find relevant portions of my assessment and plan of care.    Mr. Yepez is status post laparoscopic cholecystectomy with attempted IOC, normal EUS and normalizing LFTs.  He is doing well and with no complaints.  He may resume regular duty and return to the clinic as needed.    If you have questions, please do not hesitate to call me. I look forward to following Elan Yepez along with you.    Sincerely,      Phong Beltran MD  Professor, [employer]  Section Head, General Surgery  [facility]    WSR/rosalinda

## 2019-08-27 NOTE — PROGRESS NOTES
I have seen the patient, reviewed the Resident's history and physical, assessment and plan. I have personally interviewed and examined the patient at bedside and: agree with the findings.     S/p lap vinayak with attempted ioc, normal eus and normalizing lfts.  Doing well and with no complaints.  Regular duty and rtc prn.

## 2019-08-27 NOTE — PROGRESS NOTES
Subjective:       Elan Yepez presents to the clinic 2 weeks following laparoscopic cholecystectomy + Intraoperative colangiogram. Eating a regular diet without difficulty. Bowel movements are Normal.  The patient is not having any pain.. Incisions healing well, no discharge, no redness, no tenderness. Previous abdominal pain has resolved, patient did no need any pain medication after he was discharged, no diarrhea, no constipation, no abdominal distention, no fever.      Objective:      /76   Pulse 83   Ht 6' (1.829 m)   Wt 134.3 kg (296 lb)   BMI 40.14 kg/m²     General:  alert, appears stated age and cooperative   Abdomen: soft, bowel sounds active, non-tender   Incision:   healing well, no drainage, no erythema, no hernia, no seroma, no swelling, no dehiscence, incision well approximated       Assessment:     73 y.o male s/p 8/12/19 laparoscopic colectomy + intraoperative cholangiogram  Doing well postoperatively.    Pathology: Chronic inflammation of the gallbladder, cholelithiasis      Plan:      1. Continue any current medications.  2. Wound care discussed.  3. Follow up PRN

## 2019-10-17 ENCOUNTER — PATIENT OUTREACH (OUTPATIENT)
Dept: ADMINISTRATIVE | Facility: OTHER | Age: 79
End: 2019-10-17

## 2019-10-22 ENCOUNTER — OFFICE VISIT (OUTPATIENT)
Dept: OPTOMETRY | Facility: CLINIC | Age: 79
End: 2019-10-22
Payer: COMMERCIAL

## 2019-10-22 DIAGNOSIS — H40.013 OAG (OPEN ANGLE GLAUCOMA) SUSPECT, LOW RISK, BILATERAL: ICD-10-CM

## 2019-10-22 DIAGNOSIS — H25.13 NUCLEAR SCLEROSIS OF BOTH EYES: ICD-10-CM

## 2019-10-22 DIAGNOSIS — H52.4 MYOPIA WITH ASTIGMATISM AND PRESBYOPIA, BILATERAL: Primary | ICD-10-CM

## 2019-10-22 DIAGNOSIS — H52.203 MYOPIA WITH ASTIGMATISM AND PRESBYOPIA, BILATERAL: Primary | ICD-10-CM

## 2019-10-22 DIAGNOSIS — H52.13 MYOPIA WITH ASTIGMATISM AND PRESBYOPIA, BILATERAL: Primary | ICD-10-CM

## 2019-10-22 DIAGNOSIS — E11.9 TYPE 2 DIABETES MELLITUS WITHOUT RETINOPATHY: ICD-10-CM

## 2019-10-22 PROCEDURE — 92014 PR EYE EXAM, EST PATIENT,COMPREHESV: ICD-10-PCS | Mod: S$GLB,,, | Performed by: OPTOMETRIST

## 2019-10-22 PROCEDURE — 92015 DETERMINE REFRACTIVE STATE: CPT | Mod: S$GLB,,, | Performed by: OPTOMETRIST

## 2019-10-22 PROCEDURE — 92014 COMPRE OPH EXAM EST PT 1/>: CPT | Mod: S$GLB,,, | Performed by: OPTOMETRIST

## 2019-10-22 PROCEDURE — 92015 PR REFRACTION: ICD-10-PCS | Mod: S$GLB,,, | Performed by: OPTOMETRIST

## 2019-10-22 PROCEDURE — 99999 PR PBB SHADOW E&M-EST. PATIENT-LVL II: ICD-10-PCS | Mod: PBBFAC,,, | Performed by: OPTOMETRIST

## 2019-10-22 PROCEDURE — 99999 PR PBB SHADOW E&M-EST. PATIENT-LVL II: CPT | Mod: PBBFAC,,, | Performed by: OPTOMETRIST

## 2019-10-23 ENCOUNTER — IMMUNIZATION (OUTPATIENT)
Dept: PHARMACY | Facility: CLINIC | Age: 79
End: 2019-10-23
Payer: MEDICARE

## 2019-10-23 NOTE — PROGRESS NOTES
HPI     PATRIA:09/2018  Glasses? Yes   Contacts? no  H/o eye surgery, injections or laser: no  H/o eye injury: no  Known eye conditions? no  Family h/o eye conditions? no  Eye gtts?no    (-) Flashes (-) Floaters (-) Mucous   (-) Tearing (-) Itching (-) Burning   (-) Headaches (-) Eye Pain/discomfort (-) Irritation   (-) Redness (-) Double vision (-) Blurry vision    Diabetic? (yes )  A1c?  (Hemoglobin A1C       Date                     Value               Ref Range             Status                05/30/2019               6.1 (H)             4.0 - 5.6 %           Final              Comment:    ADA Screening Guidelines:  5.7-6.4%  Consistent with   prediabetes  >or=6.5%  Consistent with diabetes  High levels of fetal   hemoglobin interfere with the HbA1C  assay. Heterozygous hemoglobin   variants (HbS, HgC, etc)do  not significantly interfere with this assay.     However, presence of multiple variants may affect accuracy.         08/22/2018               6.1 (H)             4.0 - 5.6 %           Final              Comment:    ADA Screening Guidelines:  5.7-6.4%  Consistent with   prediabetes  >or=6.5%  Consistent with diabetes  High levels of fetal   hemoglobin interfere with the HbA1C  assay. Heterozygous hemoglobin   variants (HbS, HgC, etc)do  not significantly interfere with this assay.     However, presence of multiple variants may affect accuracy.         02/13/2018               6.1 (H)             4.0 - 5.6 %           Final              Comment:    According to ADA guidelines, hemoglobin A1c <7.0% represents  optimal   control in non-pregnant diabetic patients. Different  metrics may apply to   specific patient populations.   Standards of Medical Care in   Diabetes-2016.  For the purpose of screening for the presence of   diabetes:  <5.7%     Consistent with the absence of diabetes  5.7-6.4%    Consistent with increasing risk for diabetes   (prediabetes)  >or=6.5%    Consistent with diabetes  Currently, no  consensus exists for use of   hemoglobin A1c  for diagnosis of diabetes for children.  This Hemoglobin   A1c assay has significant interference with fetal   hemoglobin   (HbF).   The results are invalid for patients with abnormal amounts of   HbF,     including those with known Hereditary Persistence   of Fetal Hemoglobin.   Heterozygous hemoglobin variants (HbAS, HbAC,   HbAD, HbAE, HbA2) do not   significantly interfere with this assay;   however, presence of multiple   variants in a sample may impact the %   interference.    ----------)        Last edited by Verna Joy on 10/22/2019  2:54 PM. (History)        ROS     Negative for: Constitutional, Gastrointestinal, Neurological, Skin,   Genitourinary, Musculoskeletal, HENT, Endocrine, Cardiovascular, Eyes,   Respiratory, Psychiatric, Allergic/Imm, Heme/Lymph    Last edited by Clay Booker, OD on 10/22/2019  3:15 PM. (History)        Assessment /Plan     For exam results, see Encounter Report.    Myopia with astigmatism and presbyopia, bilateral    OAG (open angle glaucoma) suspect, low risk, bilateral  -     Posterior Segment OCT Optic Nerve- Both eyes; Future  -     Kumar Visual Field - OU - Extended - Both Eyes; Future    Nuclear sclerosis of both eyes    Type 2 diabetes mellitus without retinopathy      1. SRx released to patient. Patient educated on lens options. Normal ocular health. RTC 1 year for routine exam.   2. (-) Fx. IOP 10 OD< OS. C/d 0.5 OD, 0.35 OS. Prev pt of Dr Haji. Tmax 18. Last HVF/OCT 2017 and were normal. PHotos on 9/20/18. RTC 1 mo IOP//OCT/HVf.   3. Nuclear sclerotic cataract - not visually significant. Observe.  4. BS control. No signs of diabetic retinopathy. Monitor with annual exam.

## 2019-11-13 ENCOUNTER — CLINICAL SUPPORT (OUTPATIENT)
Dept: OPHTHALMOLOGY | Facility: CLINIC | Age: 79
End: 2019-11-13
Payer: MEDICARE

## 2019-11-13 ENCOUNTER — OFFICE VISIT (OUTPATIENT)
Dept: OPTOMETRY | Facility: CLINIC | Age: 79
End: 2019-11-13
Payer: MEDICARE

## 2019-11-13 DIAGNOSIS — H40.013 OAG (OPEN ANGLE GLAUCOMA) SUSPECT, LOW RISK, BILATERAL: ICD-10-CM

## 2019-11-13 PROCEDURE — 92083 HUMPHREY VISUAL FIELD - OU - BOTH EYES: ICD-10-PCS | Mod: HCNC,S$GLB,, | Performed by: OPTOMETRIST

## 2019-11-13 PROCEDURE — 92133 POSTERIOR SEGMENT OCT OPTIC NERVE(OCULAR COHERENCE TOMOGRAPHY) - OU - BOTH EYES: ICD-10-PCS | Mod: HCNC,S$GLB,, | Performed by: OPTOMETRIST

## 2019-11-13 PROCEDURE — 92012 PR EYE EXAM, EST PATIENT,INTERMED: ICD-10-PCS | Mod: HCNC,S$GLB,, | Performed by: OPTOMETRIST

## 2019-11-13 PROCEDURE — 92012 INTRM OPH EXAM EST PATIENT: CPT | Mod: HCNC,S$GLB,, | Performed by: OPTOMETRIST

## 2019-11-13 PROCEDURE — 99999 PR PBB SHADOW E&M-EST. PATIENT-LVL II: CPT | Mod: PBBFAC,HCNC,, | Performed by: OPTOMETRIST

## 2019-11-13 PROCEDURE — 99999 PR PBB SHADOW E&M-EST. PATIENT-LVL II: ICD-10-PCS | Mod: PBBFAC,HCNC,, | Performed by: OPTOMETRIST

## 2019-11-13 PROCEDURE — 92133 CPTRZD OPH DX IMG PST SGM ON: CPT | Mod: HCNC,S$GLB,, | Performed by: OPTOMETRIST

## 2019-11-13 PROCEDURE — 92083 EXTENDED VISUAL FIELD XM: CPT | Mod: HCNC,S$GLB,, | Performed by: OPTOMETRIST

## 2019-11-13 NOTE — LETTER
November 15, 2019      Liane Tijerina MD  [address]  [facility] [address]           [facility]  [address]  [facility] [address]  Phone: [phone]  Fax: [phone]          Patient: Elan Yepez   MR Number: [id number]   YOB: 1940   Date of Visit: 11/13/2019       Dear Dr. Liane Tijerina:    Thank you for referring Elan Yepez to me for evaluation. Attached you will find relevant portions of my assessment and plan of care.    If you have questions, please do not hesitate to call me. I look forward to following Elan Yepez along with you.    Sincerely,    Clay Booker, OD    Enclosure  CC:  No Recipients    If you would like to receive this communication electronically, please contact [email or web address] or [phone] to request more information on [employer] access.    For providers and/or their staff who would like to refer a patient to [facility], please contact us through our one-stop-shop provider referral line, [facility], at [phone].    If you feel you have received this communication in error or would no longer like to receive these types of communications, please e-mail [email or web address]

## 2019-11-13 NOTE — PROGRESS NOTES
HPI     Pt is coming in for glaucoma w/u     Last edited by Verna Joy on 11/13/2019  2:01 PM. (History)            Assessment /Plan     For exam results, see Encounter Report.    OAG (open angle glaucoma) suspect, low risk, bilateral  -     Posterior Segment OCT Optic Nerve- Both eyes      (-) Fx. IOP 13 OD< 10 OS. Last IOP 10 OD,OS. C/d 0.5 OD, 0.35 OS. Prev pt of Dr Haji. Tmax 18. Last HVF/OCT 2017 and were normal. PHotos on 9/20/18.   11/13/19 OCT WNL OU  11/13/19 HVF OD low reliability, borderline OS low reliability, ONL w/o specific defects  Educated pt on findings.   No e/o glaucoma.   RTC 1 year. Repeat testing in 1 year.

## 2019-11-13 NOTE — PROGRESS NOTES
HVF done;rel/fix/OD Poor OS good;coop. Good ou/chart checked for latex allergy.-[facility]

## 2019-11-15 PROBLEM — H40.013 OAG (OPEN ANGLE GLAUCOMA) SUSPECT, LOW RISK, BILATERAL: Status: ACTIVE | Noted: 2019-11-15

## 2019-12-17 ENCOUNTER — LAB VISIT (OUTPATIENT)
Dept: LAB | Facility: HOSPITAL | Age: 79
End: 2019-12-17
Attending: INTERNAL MEDICINE
Payer: MEDICARE

## 2019-12-17 DIAGNOSIS — E11.22 TYPE 2 DIABETES MELLITUS WITH CHRONIC KIDNEY DISEASE, WITHOUT LONG-TERM CURRENT USE OF INSULIN, UNSPECIFIED CKD STAGE: ICD-10-CM

## 2019-12-17 LAB
ALBUMIN SERPL BCP-MCNC: 3.9 G/DL (ref 3.5–5.2)
ALP SERPL-CCNC: 81 U/L (ref 55–135)
ALT SERPL W/O P-5'-P-CCNC: 18 U/L (ref 10–44)
ANION GAP SERPL CALC-SCNC: 10 MMOL/L (ref 8–16)
AST SERPL-CCNC: 20 U/L (ref 10–40)
BILIRUB SERPL-MCNC: 0.6 MG/DL (ref 0.1–1)
BUN SERPL-MCNC: 18 MG/DL (ref 8–23)
CALCIUM SERPL-MCNC: 9.6 MG/DL (ref 8.7–10.5)
CHLORIDE SERPL-SCNC: 101 MMOL/L (ref 95–110)
CO2 SERPL-SCNC: 28 MMOL/L (ref 23–29)
CREAT SERPL-MCNC: 1 MG/DL (ref 0.5–1.4)
EST. GFR  (AFRICAN AMERICAN): >60 ML/MIN/1.73 M^2
EST. GFR  (NON AFRICAN AMERICAN): >60 ML/MIN/1.73 M^2
ESTIMATED AVG GLUCOSE: 128 MG/DL (ref 68–131)
GLUCOSE SERPL-MCNC: 111 MG/DL (ref 70–110)
HBA1C MFR BLD HPLC: 6.1 % (ref 4–5.6)
POTASSIUM SERPL-SCNC: 4.5 MMOL/L (ref 3.5–5.1)
PROT SERPL-MCNC: 7.9 G/DL (ref 6–8.4)
SODIUM SERPL-SCNC: 139 MMOL/L (ref 136–145)

## 2019-12-17 PROCEDURE — 36415 COLL VENOUS BLD VENIPUNCTURE: CPT | Mod: HCNC

## 2019-12-17 PROCEDURE — 80053 COMPREHEN METABOLIC PANEL: CPT | Mod: HCNC

## 2019-12-17 PROCEDURE — 83036 HEMOGLOBIN GLYCOSYLATED A1C: CPT | Mod: HCNC

## 2019-12-20 ENCOUNTER — PES CALL (OUTPATIENT)
Dept: ADMINISTRATIVE | Facility: CLINIC | Age: 79
End: 2019-12-20

## 2019-12-31 ENCOUNTER — OFFICE VISIT (OUTPATIENT)
Dept: PODIATRY | Facility: CLINIC | Age: 79
End: 2019-12-31
Payer: MEDICARE

## 2019-12-31 ENCOUNTER — HOSPITAL ENCOUNTER (OUTPATIENT)
Dept: RADIOLOGY | Facility: HOSPITAL | Age: 79
Discharge: HOME | End: 2019-12-31
Attending: INTERNAL MEDICINE
Payer: MEDICARE

## 2019-12-31 ENCOUNTER — OFFICE VISIT (OUTPATIENT)
Dept: INTERNAL MEDICINE | Facility: CLINIC | Age: 79
End: 2019-12-31
Payer: MEDICARE

## 2019-12-31 VITALS
WEIGHT: 302.94 LBS | HEIGHT: 72 IN | BODY MASS INDEX: 41.03 KG/M2 | DIASTOLIC BLOOD PRESSURE: 82 MMHG | HEART RATE: 93 BPM | SYSTOLIC BLOOD PRESSURE: 134 MMHG | OXYGEN SATURATION: 98 %

## 2019-12-31 VITALS
HEART RATE: 78 BPM | DIASTOLIC BLOOD PRESSURE: 85 MMHG | BODY MASS INDEX: 42.01 KG/M2 | SYSTOLIC BLOOD PRESSURE: 140 MMHG | HEIGHT: 72 IN | WEIGHT: 310.19 LBS

## 2019-12-31 DIAGNOSIS — G47.33 OSA ON CPAP: ICD-10-CM

## 2019-12-31 DIAGNOSIS — E66.01 MORBID OBESITY: ICD-10-CM

## 2019-12-31 DIAGNOSIS — N18.2 STAGE 2 CHRONIC KIDNEY DISEASE: ICD-10-CM

## 2019-12-31 DIAGNOSIS — E11.22 TYPE 2 DIABETES MELLITUS WITH CHRONIC KIDNEY DISEASE, WITHOUT LONG-TERM CURRENT USE OF INSULIN, UNSPECIFIED CKD STAGE: Primary | ICD-10-CM

## 2019-12-31 DIAGNOSIS — L84 CORN OR CALLUS: ICD-10-CM

## 2019-12-31 DIAGNOSIS — E11.40 TYPE 2 DIABETES MELLITUS WITH DIABETIC NEUROPATHY, WITHOUT LONG-TERM CURRENT USE OF INSULIN: ICD-10-CM

## 2019-12-31 DIAGNOSIS — R06.09 DOE (DYSPNEA ON EXERTION): ICD-10-CM

## 2019-12-31 DIAGNOSIS — T46.6X5A STATIN MYOPATHY: ICD-10-CM

## 2019-12-31 DIAGNOSIS — E11.49 TYPE 2 DIABETES MELLITUS WITH NEUROLOGICAL MANIFESTATIONS: Primary | ICD-10-CM

## 2019-12-31 DIAGNOSIS — Z77.090 ASBESTOS EXPOSURE: ICD-10-CM

## 2019-12-31 DIAGNOSIS — F33.42 RECURRENT MAJOR DEPRESSIVE DISORDER, IN FULL REMISSION: ICD-10-CM

## 2019-12-31 DIAGNOSIS — B35.1 DERMATOPHYTOSIS, NAIL: ICD-10-CM

## 2019-12-31 DIAGNOSIS — E78.5 HYPERLIPIDEMIA, UNSPECIFIED HYPERLIPIDEMIA TYPE: ICD-10-CM

## 2019-12-31 DIAGNOSIS — G72.0 STATIN MYOPATHY: ICD-10-CM

## 2019-12-31 PROCEDURE — 99999 PR PBB SHADOW E&M-EST. PATIENT-LVL IV: CPT | Mod: PBBFAC,HCNC,, | Performed by: INTERNAL MEDICINE

## 2019-12-31 PROCEDURE — 1159F MED LIST DOCD IN RCRD: CPT | Mod: HCNC,S$GLB,, | Performed by: INTERNAL MEDICINE

## 2019-12-31 PROCEDURE — 1159F PR MEDICATION LIST DOCUMENTED IN MEDICAL RECORD: ICD-10-PCS | Mod: HCNC,S$GLB,, | Performed by: INTERNAL MEDICINE

## 2019-12-31 PROCEDURE — 71046 XR CHEST PA AND LATERAL: ICD-10-PCS | Mod: 26,HCNC,, | Performed by: RADIOLOGY

## 2019-12-31 PROCEDURE — 1101F PT FALLS ASSESS-DOCD LE1/YR: CPT | Mod: HCNC,CPTII,S$GLB, | Performed by: INTERNAL MEDICINE

## 2019-12-31 PROCEDURE — 99499 UNLISTED E&M SERVICE: CPT | Mod: HCNC,S$GLB,, | Performed by: PODIATRIST

## 2019-12-31 PROCEDURE — 1101F PR PT FALLS ASSESS DOC 0-1 FALLS W/OUT INJ PAST YR: ICD-10-PCS | Mod: HCNC,CPTII,S$GLB, | Performed by: INTERNAL MEDICINE

## 2019-12-31 PROCEDURE — 1126F AMNT PAIN NOTED NONE PRSNT: CPT | Mod: HCNC,S$GLB,, | Performed by: INTERNAL MEDICINE

## 2019-12-31 PROCEDURE — 99999 PR PBB SHADOW E&M-EST. PATIENT-LVL III: ICD-10-PCS | Mod: PBBFAC,HCNC,, | Performed by: PODIATRIST

## 2019-12-31 PROCEDURE — 11721 DEBRIDE NAIL 6 OR MORE: CPT | Mod: Q9,59,HCNC,S$GLB | Performed by: PODIATRIST

## 2019-12-31 PROCEDURE — 99499 RISK ADDL DX/OHS AUDIT: ICD-10-PCS | Mod: HCNC,S$GLB,, | Performed by: INTERNAL MEDICINE

## 2019-12-31 PROCEDURE — 99499 NO LOS: ICD-10-PCS | Mod: HCNC,S$GLB,, | Performed by: PODIATRIST

## 2019-12-31 PROCEDURE — 99999 PR PBB SHADOW E&M-EST. PATIENT-LVL IV: ICD-10-PCS | Mod: PBBFAC,HCNC,, | Performed by: INTERNAL MEDICINE

## 2019-12-31 PROCEDURE — 11056 PARNG/CUTG B9 HYPRKR LES 2-4: CPT | Mod: Q9,HCNC,S$GLB, | Performed by: PODIATRIST

## 2019-12-31 PROCEDURE — 71046 X-RAY EXAM CHEST 2 VIEWS: CPT | Mod: TC,HCNC

## 2019-12-31 PROCEDURE — 11721 PR DEBRIDEMENT OF NAILS, 6 OR MORE: ICD-10-PCS | Mod: Q9,59,HCNC,S$GLB | Performed by: PODIATRIST

## 2019-12-31 PROCEDURE — 1126F PR PAIN SEVERITY QUANTIFIED, NO PAIN PRESENT: ICD-10-PCS | Mod: HCNC,S$GLB,, | Performed by: INTERNAL MEDICINE

## 2019-12-31 PROCEDURE — 99214 PR OFFICE/OUTPT VISIT, EST, LEVL IV, 30-39 MIN: ICD-10-PCS | Mod: HCNC,S$GLB,, | Performed by: INTERNAL MEDICINE

## 2019-12-31 PROCEDURE — 99999 PR PBB SHADOW E&M-EST. PATIENT-LVL III: CPT | Mod: PBBFAC,HCNC,, | Performed by: PODIATRIST

## 2019-12-31 PROCEDURE — 71046 X-RAY EXAM CHEST 2 VIEWS: CPT | Mod: 26,HCNC,, | Performed by: RADIOLOGY

## 2019-12-31 PROCEDURE — 99499 UNLISTED E&M SERVICE: CPT | Mod: HCNC,S$GLB,, | Performed by: INTERNAL MEDICINE

## 2019-12-31 PROCEDURE — 11056 PR TRIM BENIGN HYPERKERATOTIC SKIN LESION,2-4: ICD-10-PCS | Mod: Q9,HCNC,S$GLB, | Performed by: PODIATRIST

## 2019-12-31 PROCEDURE — 99214 OFFICE O/P EST MOD 30 MIN: CPT | Mod: HCNC,S$GLB,, | Performed by: INTERNAL MEDICINE

## 2019-12-31 RX ORDER — DONEPEZIL HYDROCHLORIDE 10 MG/1
10 TABLET, FILM COATED ORAL NIGHTLY
Qty: 90 TABLET | Refills: 3 | Status: SHIPPED | OUTPATIENT
Start: 2019-12-31 | End: 2020-09-08 | Stop reason: SDUPTHER

## 2019-12-31 RX ORDER — METFORMIN HYDROCHLORIDE 500 MG/1
500 TABLET ORAL 2 TIMES DAILY WITH MEALS
Qty: 180 TABLET | Refills: 3 | Status: SHIPPED | OUTPATIENT
Start: 2019-12-31 | End: 2020-09-08 | Stop reason: SDUPTHER

## 2019-12-31 RX ORDER — SERTRALINE HYDROCHLORIDE 50 MG/1
50 TABLET, FILM COATED ORAL EVERY MORNING
Qty: 90 TABLET | Refills: 3 | Status: SHIPPED | OUTPATIENT
Start: 2019-12-31 | End: 2020-09-08 | Stop reason: SDUPTHER

## 2019-12-31 NOTE — PATIENT INSTRUCTIONS
Results for orders placed or performed in visit on 12/17/19   Hemoglobin A1c   Result Value Ref Range    Hemoglobin A1C 6.1 (H) 4.0 - 5.6 %    Estimated Avg Glucose 128 68 - 131 mg/dL   Comprehensive metabolic panel   Result Value Ref Range    Sodium 139 136 - 145 mmol/L    Potassium 4.5 3.5 - 5.1 mmol/L    Chloride 101 95 - 110 mmol/L    CO2 28 23 - 29 mmol/L    Glucose 111 (H) 70 - 110 mg/dL    BUN, Bld 18 8 - 23 mg/dL    Creatinine 1.0 0.5 - 1.4 mg/dL    Calcium 9.6 8.7 - 10.5 mg/dL    Total Protein 7.9 6.0 - 8.4 g/dL    Albumin 3.9 3.5 - 5.2 g/dL    Total Bilirubin 0.6 0.1 - 1.0 mg/dL    Alkaline Phosphatase 81 55 - 135 U/L    AST 20 10 - 40 U/L    ALT 18 10 - 44 U/L    Anion Gap 10 8 - 16 mmol/L    eGFR if African American >60 >60 mL/min/1.73 m^2    eGFR if non African American >60 >60 mL/min/1.73 m^2

## 2019-12-31 NOTE — PROGRESS NOTES
Subjective:      Patient ID: Elan Yepez is a 79 y.o. male.    Chief Complaint: Diabetes Mellitus (6 /5/19 dr felicity whiteside) and Nail Care    Elan is a 79 y.o. male who presents to the clinic for evaluation and treatment of high risk feet. 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 The patient's chief complaint is long, thick toenails. This patient has documented high risk feet requiring routine maintenance secondary to diabetes mellitis and those secondary complications of diabetes, as mentioned..    PCP: Felicity Whiteside MD    Date Last Seen by PCP:  Chief Complaint   Patient presents with    Diabetes Mellitus     6 /5/19 dr felicity whiteside    Nail Care         Current shoe gear:dm shoes   Hemoglobin A1C   Date Value Ref Range Status   12/17/2019 6.1 (H) 4.0 - 5.6 % Final     Comment:     ADA Screening Guidelines:  5.7-6.4%  Consistent with prediabetes  >or=6.5%  Consistent with diabetes  High levels of fetal hemoglobin interfere with the HbA1C  assay. Heterozygous hemoglobin variants (HbS, HgC, etc)do  not significantly interfere with this assay.   However, presence of multiple variants may affect accuracy.     05/30/2019 6.1 (H) 4.0 - 5.6 % Final     Comment:     ADA Screening Guidelines:  5.7-6.4%  Consistent with prediabetes  >or=6.5%  Consistent with diabetes  High levels of fetal hemoglobin interfere with the HbA1C  assay. Heterozygous  hemoglobin variants (HbS, HgC, etc)do  not significantly interfere with this assay.   However, presence of multiple variants may affect accuracy.     08/22/2018 6.1 (H) 4.0 - 5.6 % Final     Comment:     ADA Screening Guidelines:  5.7-6.4%  Consistent with prediabetes  >or=6.5%  Consistent with diabetes  High levels of fetal hemoglobin interfere with the HbA1C  assay. Heterozygous hemoglobin variants (HbS, HgC, etc)do  not significantly interfere with this assay.   However, presence of multiple variants may affect accuracy.         Review of Systems   Constitution: Negative for chills, decreased appetite and fever.   Cardiovascular: Negative for chest pain and claudication.   Respiratory: Negative for cough.    Skin: Positive for nail changes. Negative for color change, dry skin, flushing, itching, poor wound healing and rash.   Musculoskeletal: Negative for arthritis, back pain, falls, gout, joint pain, joint swelling and myalgias.   Gastrointestinal: Negative for nausea and vomiting.   Neurological: Positive for numbness. Negative for loss of balance and paresthesias.           Objective:      Physical Exam   Constitutional: He is oriented to person, place, and time. He appears well-developed and well-nourished.   Cardiovascular:   Dorsalis pedis and posterior tibial pulses are diminished Bilaterally. Toes are cool to touch. Feet are warm proximally.There is decreased digital hair. Skin is atrophic, slightly hyperpigmented, and mildly edematous       Musculoskeletal: Normal range of motion. He exhibits no tenderness or deformity.   Adequate joint range of motion without pain, limitation, nor crepitation Bilateral feet and ankle joints. Muscle strength is 5/5 in all groups bilaterally.         Neurological: He is alert and oriented to person, place, and time.   [place] 5.07 monofilamant testing is diminished Raffi feet. Sharp/dull sensation diminished Bilaterally. Light touch absent Bilaterally.       Skin:  Skin is warm, dry and intact. No abrasion, no bruising, no burn, no ecchymosis, no lesion and no petechiae noted. He is not diaphoretic. No erythema. No pallor.   Nails x10 are elongated by  2-5mm's, thickened by 2-4 mm's, dystrophic, and are darkened in  coloration . Xerosis Bilaterally. No open lesions noted.    Hyperkeratotic tissue noted to distal hallux b/l      Psychiatric: He has a normal mood and affect. His behavior is normal.   Nursing note and vitals reviewed.            Assessment:       Encounter Diagnoses   Name Primary?    Type 2 diabetes mellitus with neurological manifestations Yes    Dermatophytosis, nail     Corn or callus          Plan:       Elan was seen today for diabetes mellitus and nail care.    Diagnoses and all orders for this visit:    Type 2 diabetes mellitus with neurological manifestations    Dermatophytosis, nail    Corn or callus      I counseled the patient on his conditions, their implications and medical management.        - Shoe inspection. Diabetic Foot Education. Patient reminded of the importance of good nutrition and blood sugar control to help prevent podiatric complications of diabetes. Patient instructed on proper foot hygeine. We discussed wearing proper shoe gear, daily foot inspections, never walking without protective shoe gear, never putting sharp instruments to feet, routine podiatric nail visits every 2-3 months.      - With patient's permission, nails were aggressively reduced and debrided x 10 to their soft tissue attachment mechanically and with electric , removing all offending nail and debris. Patient relates relief following the procedure. He will continue to monitor the areas daily, inspect his feet, wear protective shoe gear when ambulatory, moisturizer to maintain skin integrity and follow in this office in approximately 2-3 months, sooner p.r.n.    - After cleansing the  area w/ alcohol prep pad the above mentioned hyperkeratosis was trimmed  utilizing No 15 scapel, to a smooth base with out incident. Patient tolerated this  well and reported comfort to the area of distal hallux b/l

## 2019-12-31 NOTE — PROGRESS NOTES
Subjective:       Patient ID: Elan Yepez is a 79 y.o. male.    Chief Complaint: Follow-up (6 month f/u ) and Knee Pain (both knees pain when standing and walking)   He has been spending a lot of time in bed  He does not want to get out of bed in the morning.  He is not depressed  His brother  in his sleep  His wife thinks that he is just tired and she is not particularly worried about any specific problem    He was once evaluated for asbestos and said to have some evidence of asbestosis on his chest x-ray.    Diabetes Management Status    Statin: Not taking  ACE/ARB: Not taking    Screening or Prevention Patient's value Goal Complete/Controlled?   HgA1C Testing and Control   Lab Results   Component Value Date    HGBA1C 6.1 (H) 2019      Annually/Less than 8% Yes   Lipid profile : 2017 Annually No   LDL control Lab Results   Component Value Date    LDLCALC 58.0 (L) 2017    Annually/Less than 100 mg/dl  No   Nephropathy screening Lab Results   Component Value Date    LABMICR 47.0 2019     Lab Results   Component Value Date    PROTEINUA Negative 2013    Annually Yes   Blood pressure BP Readings from Last 1 Encounters:   19 134/82    Less than 140/90 Yes   Dilated retinal exam : 2019 Annually Yes   Foot exam   : 12/10/2018 Annually No       HPI  Review of Systems   Constitutional: Negative for activity change, appetite change, chills, fatigue, fever and unexpected weight change.   HENT: Negative for dental problem, hearing loss, tinnitus, trouble swallowing and voice change.    Eyes: Negative for visual disturbance.   Respiratory: Positive for shortness of breath. Negative for cough, chest tightness and wheezing.    Cardiovascular: Negative for chest pain, palpitations and leg swelling.   Gastrointestinal: Negative for abdominal pain, blood in stool, constipation, diarrhea, nausea and vomiting.   Genitourinary: Negative for difficulty urinating, dysuria, flank pain,  frequency and urgency.   Musculoskeletal: Positive for arthralgias, back pain and gait problem. Negative for joint swelling, myalgias, neck pain and neck stiffness.   Skin: Negative for rash.   Neurological: Negative for tremors, light-headedness, numbness and headaches.   Psychiatric/Behavioral: Positive for sleep disturbance. Negative for dysphoric mood. The patient is not nervous/anxious.        Objective:      Physical Exam   Constitutional: He is oriented to person, place, and time. He appears well-developed and well-nourished. No distress.   HENT:   Head: Atraumatic.   Mouth/Throat: No oropharyngeal exudate.   Eyes: Conjunctivae are normal. No scleral icterus.   Cardiovascular: Normal rate, regular rhythm and normal heart sounds.   Pulmonary/Chest: Effort normal and breath sounds normal.   Abdominal: Soft. There is no tenderness.   Musculoskeletal: He exhibits no edema.   Lymphadenopathy:     He has no cervical adenopathy.   Neurological: He is alert and oriented to person, place, and time.   Skin: Skin is warm and dry.   Psychiatric: He has a normal mood and affect. His behavior is normal.   Nursing note and vitals reviewed.      Assessment:       1. Type 2 diabetes mellitus with chronic kidney disease, without long-term current use of insulin, unspecified CKD stage    2. Type 2 diabetes mellitus with diabetic neuropathy, without long-term current use of insulin    3. Statin myopathy    4. Stage 2 chronic kidney disease    5. Recurrent major depressive disorder, in full remission    6. JESSICA on CPAP    7. Morbid obesity    8. Hyperlipidemia, unspecified hyperlipidemia type    9. URRUTIA (dyspnea on exertion)    10. BMI 40.0-44.9, adult    11. Asbestos exposure        Plan:   Elan was seen today for follow-up and knee pain.    Diagnoses and all orders for this visit:    Type 2 diabetes mellitus with chronic kidney disease, without long-term current use of insulin, unspecified CKD stage  -     Comprehensive  metabolic panel; Future  -     Hemoglobin A1c; Future    Type 2 diabetes mellitus with diabetic neuropathy, without long-term current use of insulin    Statin myopathy    Stage 2 chronic kidney disease    Recurrent major depressive disorder, in full remission    JESSICA on CPAP    Morbid obesity    Hyperlipidemia, unspecified hyperlipidemia type    URRUTIA (dyspnea on exertion)  -     X-Ray Chest PA And Lateral; Future    BMI 40.0-44.9, adult    Asbestos exposure  -     X-Ray Chest PA And Lateral; Future    Other orders  -     metFORMIN (GLUCOPHAGE) 500 MG tablet; Take 1 tablet (500 mg total) by mouth 2 (two) times daily with meals.  -     sertraline (ZOLOFT) 50 MG tablet; Take 1 tablet (50 mg total) by mouth every morning. Increase energy and boost mood  -     donepezil (ARICEPT) 10 MG tablet; Take 1 tablet (10 mg total) by mouth every evening.    Follow up in about 6 months (around 6/30/2020).

## 2020-01-02 ENCOUNTER — TELEPHONE (OUTPATIENT)
Dept: INTERNAL MEDICINE | Facility: CLINIC | Age: 80
End: 2020-01-02

## 2020-01-02 DIAGNOSIS — Z77.090 ASBESTOS EXPOSURE: Primary | ICD-10-CM

## 2020-01-02 DIAGNOSIS — R93.89 ABNORMAL CHEST X-RAY: ICD-10-CM

## 2020-01-02 DIAGNOSIS — R06.02 SHORTNESS OF BREATH: ICD-10-CM

## 2020-01-02 NOTE — TELEPHONE ENCOUNTER
Please schedule CT scan of the chest with contrast  I spoke to his wife on the phone  She would be the 1 to speak to in terms of scheduling

## 2020-01-08 ENCOUNTER — TELEPHONE (OUTPATIENT)
Dept: RADIOLOGY | Facility: HOSPITAL | Age: 80
End: 2020-01-08

## 2020-01-09 ENCOUNTER — HOSPITAL ENCOUNTER (OUTPATIENT)
Dept: RADIOLOGY | Facility: HOSPITAL | Age: 80
Discharge: HOME | End: 2020-01-09
Attending: INTERNAL MEDICINE
Payer: MEDICARE

## 2020-01-09 DIAGNOSIS — R06.02 SHORTNESS OF BREATH: ICD-10-CM

## 2020-01-09 PROCEDURE — 71260 CT THORAX DX C+: CPT | Mod: 26,HCNC,, | Performed by: RADIOLOGY

## 2020-01-09 PROCEDURE — 71260 CT CHEST WITH CONTRAST: ICD-10-PCS | Mod: 26,HCNC,, | Performed by: RADIOLOGY

## 2020-01-09 PROCEDURE — 71260 CT THORAX DX C+: CPT | Mod: TC,HCNC

## 2020-01-09 PROCEDURE — 25500020 PHARM REV CODE 255: Mod: HCNC | Performed by: INTERNAL MEDICINE

## 2020-01-09 RX ADMIN — IOHEXOL 75 ML: 350 INJECTION, SOLUTION INTRAVENOUS at 10:01

## 2020-01-15 ENCOUNTER — TELEPHONE (OUTPATIENT)
Dept: INTERNAL MEDICINE | Facility: CLINIC | Age: 80
End: 2020-01-15

## 2020-01-16 ENCOUNTER — OFFICE VISIT (OUTPATIENT)
Dept: INTERNAL MEDICINE | Facility: CLINIC | Age: 80
End: 2020-01-16
Payer: MEDICARE

## 2020-01-16 VITALS
WEIGHT: 302 LBS | SYSTOLIC BLOOD PRESSURE: 134 MMHG | DIASTOLIC BLOOD PRESSURE: 80 MMHG | HEART RATE: 42 BPM | TEMPERATURE: 98 F | OXYGEN SATURATION: 95 % | HEIGHT: 72 IN | BODY MASS INDEX: 40.9 KG/M2

## 2020-01-16 DIAGNOSIS — E78.2 MIXED HYPERLIPIDEMIA: ICD-10-CM

## 2020-01-16 DIAGNOSIS — E11.21 TYPE 2 DIABETES MELLITUS WITH DIABETIC NEPHROPATHY, WITHOUT LONG-TERM CURRENT USE OF INSULIN: ICD-10-CM

## 2020-01-16 DIAGNOSIS — H90.0 CONDUCTIVE HEARING LOSS, BILATERAL: ICD-10-CM

## 2020-01-16 DIAGNOSIS — N18.2 STAGE 2 CHRONIC KIDNEY DISEASE: ICD-10-CM

## 2020-01-16 DIAGNOSIS — E66.01 MORBID OBESITY: ICD-10-CM

## 2020-01-16 DIAGNOSIS — I70.0 AORTIC ATHEROSCLEROSIS: ICD-10-CM

## 2020-01-16 DIAGNOSIS — Z00.00 ENCOUNTER FOR PREVENTIVE HEALTH EXAMINATION: Primary | ICD-10-CM

## 2020-01-16 DIAGNOSIS — G31.84 AMNESTIC MCI (MILD COGNITIVE IMPAIRMENT WITH MEMORY LOSS): ICD-10-CM

## 2020-01-16 DIAGNOSIS — E66.9 DIABETES MELLITUS TYPE 2 IN OBESE: ICD-10-CM

## 2020-01-16 DIAGNOSIS — I77.1 TORTUOUS AORTA: ICD-10-CM

## 2020-01-16 DIAGNOSIS — F33.42 RECURRENT MAJOR DEPRESSIVE DISORDER, IN FULL REMISSION: ICD-10-CM

## 2020-01-16 DIAGNOSIS — G47.33 OSA ON CPAP: ICD-10-CM

## 2020-01-16 DIAGNOSIS — Z85.46 HISTORY OF PROSTATE CANCER: ICD-10-CM

## 2020-01-16 DIAGNOSIS — E11.42 POLYNEUROPATHY DUE TO TYPE 2 DIABETES MELLITUS: ICD-10-CM

## 2020-01-16 DIAGNOSIS — E11.69 DIABETES MELLITUS TYPE 2 IN OBESE: ICD-10-CM

## 2020-01-16 PROCEDURE — 99999 PR PBB SHADOW E&M-EST. PATIENT-LVL III: ICD-10-PCS | Mod: PBBFAC,HCNC,, | Performed by: NURSE PRACTITIONER

## 2020-01-16 PROCEDURE — 99999 PR PBB SHADOW E&M-EST. PATIENT-LVL III: CPT | Mod: PBBFAC,HCNC,, | Performed by: NURSE PRACTITIONER

## 2020-01-16 PROCEDURE — G0439 PR MEDICARE ANNUAL WELLNESS SUBSEQUENT VISIT: ICD-10-PCS | Mod: HCNC,S$GLB,, | Performed by: NURSE PRACTITIONER

## 2020-01-16 PROCEDURE — G0439 PPPS, SUBSEQ VISIT: HCPCS | Mod: HCNC,S$GLB,, | Performed by: NURSE PRACTITIONER

## 2020-01-16 NOTE — TELEPHONE ENCOUNTER
I do not know if you can tell if the letter with the results of the CT scan of his chest January 12 was mailed.  Please call patient or his wife to let them know CT scan is normal  May be mailed a letter a 2nd time if not sure

## 2020-01-16 NOTE — PATIENT INSTRUCTIONS
Counseling and Referral of Other Preventative  (Italic type indicates deductible and co-insurance are waived)    Patient Name: Elan Yepez  Today's Date: 1/16/2020    Health Maintenance       Date Due Completion Date    Shingles Vaccine (2 of 3) 07/28/2010--There is currently a national back order for the new shingles vaccine, Shingrix.  It is not available yet at the [facility] pharmacy.  However, many local pharmacies now have it in stock.  Please inquire at your local pharmacy, if they do not have it we can get you set up for it at [facility] at your next visit.    6/2/2010    Lipid Panel 05/23/2019--deferred to PCP 5/23/2018 (ClinicallyNA)    Override on 5/23/2018: Not Clinically Appropriate    Foot Exam 12/10/2019--scheduled 3-26-20 12/10/2018 (Done)    Override on 12/10/2018: Done    Override on 5/23/2018: Done    Override on 6/22/2017: Done    Override on 11/7/2016: Done    Override on 11/13/2013: Done    Urine Microalbumin 02/07/2020 2/7/2019    Override on 2/7/2019: Done    Hemoglobin A1c 06/17/2020 12/17/2019    Eye Exam 11/13/2020 11/13/2019    TETANUS VACCINE 06/25/2028 6/25/2018 (Declined)    Override on 6/25/2018: Declined        No orders of the defined types were placed in this encounter.    The following information is provided to all patients.  This information is to help you find resources for any of the problems found today that may be affecting your health:                Living healthy guide: www.[facility].[place].gov      Understanding Diabetes: www.diabetes.org      Eating healthy: www.cdc.gov/healthyweight      CDC home safety checklist: www.cdc.gov/steadi/patient.html      Agency on Aging: www.goea[facility].gov      Alcoholics anonymous (AA): www.aa.org      Physical Activity: www.anup.nih.gov/oz2mkpg      Tobacco use: www.quitwithusla.org

## 2020-01-16 NOTE — PROGRESS NOTES
Elan Yepez presented for a  Medicare AWV and comprehensive Health Risk Assessment today. The following components were reviewed and updated:    · Medical history  · Family History  · Social history  · Allergies and Current Medications  · Health Risk Assessment  · Health Maintenance  · Care Team     ** See Completed Assessments for Annual Wellness Visit within the encounter summary.**       The following assessments were completed:  · Living Situation  · CAGE  · Depression Screening  · Timed Get Up and Go  · Whisper Test--not done, hard of hearing.  · Cognitive Function Screening--not done memory loss  · Nutrition Screening  · ADL Screening  · PAQ Screening    Vitals:    01/16/20 1035   BP: 134/80   BP Location: Right arm   Patient Position: Sitting   BP Method: Large (Manual)   Pulse: (!) 42   Temp: 98.2 °F (36.8 °C)   SpO2: 95%   Weight: (!) 137 kg (302 lb 0.5 oz)   Height: 6' (1.829 m)     Body mass index is 40.96 kg/m².  Physical Exam   Constitutional: He is oriented to person, place, and time. He appears well-developed and well-nourished.   Morbidly obese   HENT:   Head: Normocephalic.   Eyes: Pupils are equal, round, and reactive to light. Conjunctivae, EOM and lids are normal. Lids are everted and swept, no foreign bodies found.   Neck: Trachea normal, normal range of motion and full passive range of motion without pain. Neck supple. No JVD present. Carotid bruit is not present.   Cardiovascular: Regular rhythm, intact distal pulses and normal pulses. Bradycardia present. Exam reveals no gallop and no friction rub.   No murmur heard.  Pulmonary/Chest: Effort normal and breath sounds normal.   Abdominal: Soft. Normal appearance and bowel sounds are normal. There is no hepatosplenomegaly. There is no CVA tenderness.   obese   Musculoskeletal: Normal range of motion.   Neurological: He is alert and oriented to person, place, and time.   Skin: Skin is warm, dry and intact. Capillary refill takes less than 2  seconds.   Psychiatric: He has a normal mood and affect. His speech is normal and behavior is normal. Judgment and thought content normal. Cognition and memory are normal.   Nursing note and vitals reviewed.        Diagnoses and health risks identified today and associated recommendations/orders:    1. Encounter for preventive health examination  Exam done    Health Maintenance updated    Records reviewed    2. Recurrent major depressive disorder, in full remission  Chronic, followed by PCP    3. Polyneuropathy due to type 2 diabetes mellitus  Chronic, followed by PCP    4. Conductive hearing loss, bilateral  Chronic, followed by PCP    5. Aortic atherosclerosis  Chronic, followed by PCP    6. Mixed hyperlipidemia  Stable, followed by PCP    Follow low fat, low carb, high fiber diet and exercise.      7. Tortuous aorta  Chronic, followed by PCP    8. Amnestic MCI (mild cognitive impairment with memory loss)  Chronic, followed by PCP    9. History of prostate cancer  Resolved    10. Stage 2 chronic kidney disease  Chronic, followed by PCP    Goal BP < 140/80, LDL goal < 100, low salt diet, avoid otc NSAIDs.    11. Type 2 diabetes mellitus with diabetic nephropathy, without long-term current use of insulin  Chronic, followed by PCP    A1C goal < 7.0, BP goal < 140/80, LDL goal < 100.    Adhere to ADA diet.    Take meds as prescribed, check BS daily. Notify if sugars are out of range discussed during visit.    Yearly eye exam discussed.    Yearly foot exam discussed.    12. BMI 40.0-44.9, adult  BMI reviewed    13. Morbid obesity  BMI reviewed.    Diet and exercise to lose weight.    14. Diabetes mellitus type 2 in obese  Chronic, followed by PCP    A1C goal < 7.0, BP goal < 140/80, LDL goal < 100.    Adhere to ADA diet.    Take meds as prescribed, check BS daily. Notify if sugars are out of range discussed during visit.    Yearly eye exam discussed.    Yearly foot exam discussed.    15. JESSICA on CPAP  Chronic, followed  by PCP        Provided Elan with a 5-10 year written screening schedule and personal prevention plan. Recommendations were developed using the USPSTF age appropriate recommendations. Education, counseling, and referrals were provided as needed. After Visit Summary printed and given to patient which includes a list of additional screenings\tests needed.    Follow up in about 5 months (around 6/30/2020), or with PCP Dr. Tijerina as scheduled.    Lillian Gillespie, BRADY  I offered to discuss end of life issues, including information on how to make advance directives that the patient could use to name someone who would make medical decisions on their behalf if they became too ill to make themselves.    ___Patient declined  _X_Patient is interested, I provided paper work and offered to discuss.

## 2020-02-13 DIAGNOSIS — E11.40 TYPE 2 DIABETES MELLITUS WITH DIABETIC NEUROPATHY, WITHOUT LONG-TERM CURRENT USE OF INSULIN: Primary | ICD-10-CM

## 2020-02-17 RX ORDER — CALCIUM CITRATE/VITAMIN D3 200MG-6.25
TABLET ORAL
Qty: 100 EACH | Refills: 3 | Status: SHIPPED | OUTPATIENT
Start: 2020-02-17 | End: 2021-02-22

## 2020-02-17 RX ORDER — LANCETS
1 EACH MISCELLANEOUS DAILY
Qty: 100 EACH | Refills: 3 | Status: SHIPPED | OUTPATIENT
Start: 2020-02-17

## 2020-02-17 NOTE — PROGRESS NOTES
Refill Authorization Note     is requesting a refill authorization.    Brief assessment and rationale for refill: APPROVE: prr                                         Comments:     Requested Prescriptions   Signed Prescriptions Disp Refills    TRUE METRIX GLUCOSE TEST STRIP Strp 100 each 3     Sig: CHECK GLUCOSE ONCE DAILY       Endocrinology: Diabetes - Supplies Passed - 2/13/2020  5:15 PM        Passed - Patient is at least 18 years old        Passed - Office visit in past 12 months or future 90 days.     Recent Outpatient Visits            1 month ago Encounter for preventive health examination    [facility] Medicine Lillian Gillespie DNP    1 month ago Type 2 diabetes mellitus with neurological manifestations    [facility] - Podiatrsita Lomeli DPM    1 month ago Type 2 diabetes mellitus with chronic kidney disease, without long-term current use of insulin, unspecified CKD stage    [facility] - Internal Medicine Liane Tijerina MD    3 months ago OAG (open angle glaucoma) suspect, low risk, bilateral    [facility] - Optgrady Booker, OD    3 months ago Myopia with astigmatism and presbyopia, bilateral    [facility] - Optometry Clay Booker, OD          Future Appointments              In 1 month MICKEY [address]    In 4 months LAB, SAME DAY [facility], [facility]    In 4 months [facility]    In 4 months MD [facility]  Internal Medicine, [facility]                Passed - HBA1C is 7.9 or below and within 180 days     Hemoglobin A1C   Date Value Ref Range Status   12/17/2019 6.1 (H) 4.0 - 5.6 % Final     Comment:     ADA Screening Guidelines:  5.7-6.4%  Consistent with prediabetes  >or=6.5%  Consistent with diabetes  High levels of fetal hemoglobin interfere with the HbA1C  assay. Heterozygous hemoglobin variants (HbS, HgC,  etc)do  not significantly interfere with this assay.   However, presence of multiple variants may affect accuracy.     2019 6.1 (H) 4.0 - 5.6 % Final     Comment:     ADA Screening Guidelines:  5.7-6.4%  Consistent with prediabetes  >or=6.5%  Consistent with diabetes  High levels of fetal hemoglobin interfere with the HbA1C  assay. Heterozygous hemoglobin variants (HbS, HgC, etc)do  not significantly interfere with this assay.   However, presence of multiple variants may affect accuracy.     2018 6.1 (H) 4.0 - 5.6 % Final     Comment:     ADA Screening Guidelines:  5.7-6.4%  Consistent with prediabetes  >or=6.5%  Consistent with diabetes  High levels of fetal hemoglobin interfere with the HbA1C  assay. Heterozygous hemoglobin variants (HbS, HgC, etc)do  not significantly interfere with this assay.   However, presence of multiple variants may affect accuracy.               lancets (SOFT TOUCH LANCETS) Misc 100 each 3     Si Device by Misc.(Non-Drug; Combo Route) route once daily.       There is no refill protocol information for this order         Appointments  past 12m or future 3m with PCP    Date Provider   Last Visit   2019 Liane Tijerina MD   Next Visit   2020 Liane Tijerina MD   .  ED visits in past 90 days: 0       Note composed:3:40 PM 2020

## 2020-05-05 ENCOUNTER — TELEPHONE (OUTPATIENT)
Dept: AUDIOLOGY | Facility: CLINIC | Age: 80
End: 2020-05-05

## 2020-05-05 DIAGNOSIS — H90.3 SENSORY HEARING LOSS, BILATERAL: Primary | ICD-10-CM

## 2020-06-02 ENCOUNTER — PATIENT OUTREACH (OUTPATIENT)
Dept: ADMINISTRATIVE | Facility: OTHER | Age: 80
End: 2020-06-02

## 2020-06-02 ENCOUNTER — OFFICE VISIT (OUTPATIENT)
Dept: OTOLARYNGOLOGY | Facility: CLINIC | Age: 80
End: 2020-06-02
Payer: MEDICARE

## 2020-06-02 VITALS
BODY MASS INDEX: 41.32 KG/M2 | WEIGHT: 304.69 LBS | SYSTOLIC BLOOD PRESSURE: 134 MMHG | DIASTOLIC BLOOD PRESSURE: 76 MMHG | HEART RATE: 90 BPM

## 2020-06-02 DIAGNOSIS — Z01.10 ENCOUNTER FOR HEARING EXAMINATION, UNSPECIFIED WHETHER ABNORMAL FINDINGS: Primary | ICD-10-CM

## 2020-06-02 DIAGNOSIS — H61.23 BILATERAL IMPACTED CERUMEN: ICD-10-CM

## 2020-06-02 DIAGNOSIS — H60.313 CHRONIC DIFFUSE OTITIS EXTERNA OF BOTH EARS: ICD-10-CM

## 2020-06-02 PROCEDURE — 69210 REMOVE IMPACTED EAR WAX UNI: CPT | Mod: HCNC,S$GLB,, | Performed by: NURSE PRACTITIONER

## 2020-06-02 PROCEDURE — 69210 EAR CERUMEN REMOVAL: ICD-10-PCS | Mod: HCNC,S$GLB,, | Performed by: NURSE PRACTITIONER

## 2020-06-02 PROCEDURE — 99999 PR PBB SHADOW E&M-EST. PATIENT-LVL III: CPT | Mod: PBBFAC,HCNC,, | Performed by: NURSE PRACTITIONER

## 2020-06-02 PROCEDURE — 99999 PR PBB SHADOW E&M-EST. PATIENT-LVL III: ICD-10-PCS | Mod: PBBFAC,HCNC,, | Performed by: NURSE PRACTITIONER

## 2020-06-02 PROCEDURE — 99499 UNLISTED E&M SERVICE: CPT | Mod: HCNC,S$GLB,, | Performed by: NURSE PRACTITIONER

## 2020-06-02 PROCEDURE — 99499 NO LOS: ICD-10-PCS | Mod: HCNC,S$GLB,, | Performed by: NURSE PRACTITIONER

## 2020-06-02 RX ORDER — ACETIC ACID 20.65 MG/ML
4 SOLUTION AURICULAR (OTIC) 3 TIMES DAILY
Qty: 15 ML | Refills: 0 | Status: SHIPPED | OUTPATIENT
Start: 2020-06-02 | End: 2020-06-10

## 2020-06-02 NOTE — PROCEDURES
Ear Cerumen Removal  Date/Time: 6/2/2020 11:30 AM  Performed by: Kristie Carnes NP  Authorized by: Kristie Carnes NP     Location details:  Both ears  Procedure type: curette    Cerumen  Removal Results:  Cerumen completely removed  Patient tolerance:  Patient tolerated the procedure well with no immediate complications     Procedure Note:    The patient was brought to the minor procedure room and placed under the operating microscope of the left ear canal which was cleaned of ceruminous debris. Using a combination of suction, curettes and cup forceps the patient's cerumen impaction was removed. The tympanic membrane was evaluated and was unremarkable. The patient tolerated the procedure well. There were no complications.  Procedure Note:    Patient was brought to the minor procedure room and using the operating microscope of the right ear canal which was cleaned of ceruminous debris. There was a significant cerumen impaction.  Using a combination of suction, curettes and cup forceps the patient's cerumen impaction was removed. Tympanic membrane intact. Pt tolerated well. There were no complications.

## 2020-06-02 NOTE — LETTER
June 2, 2020      RAMAN Chan  [address]  [facility] [address]           [facility] - Otorhinolaryngology  [address]  [facility] [address]  Phone: [phone]  Fax: [phone]          Patient: Elan Yepez   MR Number: [id number]   YOB: 1940   Date of Visit: 6/2/2020       Dear Imtiaz Shukla:    Thank you for referring Elan Yepez to me for evaluation. Attached you will find relevant portions of my assessment and plan of care.    If you have questions, please do not hesitate to call me. I look forward to following Elan Yepez along with you.    Sincerely,    Kristie Carnes, NP    Enclosure  CC:  No Recipients    If you would like to receive this communication electronically, please contact [email or web address] or [phone] to request more information on [employer] Link access.    For providers and/or their staff who would like to refer a patient to [facility], please contact us through our one-stop-shop provider referral line, [facility], at [phone].    If you feel you have received this communication in error or would no longer like to receive these types of communications, please e-mail [email or web address]

## 2020-06-09 ENCOUNTER — PATIENT OUTREACH (OUTPATIENT)
Dept: ADMINISTRATIVE | Facility: OTHER | Age: 80
End: 2020-06-09

## 2020-06-10 ENCOUNTER — OFFICE VISIT (OUTPATIENT)
Dept: SLEEP MEDICINE | Facility: CLINIC | Age: 80
End: 2020-06-10
Payer: MEDICARE

## 2020-06-10 VITALS
RESPIRATION RATE: 19 BRPM | HEART RATE: 105 BPM | WEIGHT: 304 LBS | DIASTOLIC BLOOD PRESSURE: 90 MMHG | SYSTOLIC BLOOD PRESSURE: 140 MMHG | HEIGHT: 72 IN | BODY MASS INDEX: 41.17 KG/M2 | OXYGEN SATURATION: 98 %

## 2020-06-10 DIAGNOSIS — G47.33 OSA (OBSTRUCTIVE SLEEP APNEA): Primary | ICD-10-CM

## 2020-06-10 DIAGNOSIS — R91.1 PULMONARY NODULE: ICD-10-CM

## 2020-06-10 DIAGNOSIS — G47.33 OSA ON CPAP: ICD-10-CM

## 2020-06-10 PROCEDURE — 99999 PR PBB SHADOW E&M-EST. PATIENT-LVL IV: CPT | Mod: PBBFAC,HCNC,, | Performed by: NURSE PRACTITIONER

## 2020-06-10 PROCEDURE — 99214 OFFICE O/P EST MOD 30 MIN: CPT | Mod: HCNC,S$GLB,, | Performed by: NURSE PRACTITIONER

## 2020-06-10 PROCEDURE — 99499 RISK ADDL DX/OHS AUDIT: ICD-10-PCS | Mod: HCNC,S$GLB,, | Performed by: NURSE PRACTITIONER

## 2020-06-10 PROCEDURE — 1101F PR PT FALLS ASSESS DOC 0-1 FALLS W/OUT INJ PAST YR: ICD-10-PCS | Mod: HCNC,CPTII,S$GLB, | Performed by: NURSE PRACTITIONER

## 2020-06-10 PROCEDURE — 1159F MED LIST DOCD IN RCRD: CPT | Mod: HCNC,S$GLB,, | Performed by: NURSE PRACTITIONER

## 2020-06-10 PROCEDURE — 1159F PR MEDICATION LIST DOCUMENTED IN MEDICAL RECORD: ICD-10-PCS | Mod: HCNC,S$GLB,, | Performed by: NURSE PRACTITIONER

## 2020-06-10 PROCEDURE — 99999 PR PBB SHADOW E&M-EST. PATIENT-LVL IV: ICD-10-PCS | Mod: PBBFAC,HCNC,, | Performed by: NURSE PRACTITIONER

## 2020-06-10 PROCEDURE — 99499 UNLISTED E&M SERVICE: CPT | Mod: HCNC,S$GLB,, | Performed by: NURSE PRACTITIONER

## 2020-06-10 PROCEDURE — 1101F PT FALLS ASSESS-DOCD LE1/YR: CPT | Mod: HCNC,CPTII,S$GLB, | Performed by: NURSE PRACTITIONER

## 2020-06-10 PROCEDURE — 99214 PR OFFICE/OUTPT VISIT, EST, LEVL IV, 30-39 MIN: ICD-10-PCS | Mod: HCNC,S$GLB,, | Performed by: NURSE PRACTITIONER

## 2020-06-10 NOTE — PROGRESS NOTES
Chart reviewed.   Immunizations: Triggered Imm Registry     Orders placed: n/a  Upcoming appts to satisfy KAROLINE topics: n/a

## 2020-06-10 NOTE — PROGRESS NOTES
Subjective:      Patient ID: Elan Yepez is a 80 y.o. male.    Chief Complaint: Sleep Apnea    HPI  Presents to office for review of CPAP therapy. Patient states improved symptoms with use of CPAP. Sleeping more soundly. Waking up feeling more refreshed. Improved daytime sleepiness. Patient states he is benefiting from use of the CPAP. Wife states CPAP mask making a loud noise from leaking.   BMI 41.   URRUTIA stable    Patient Active Problem List   Diagnosis    Hyperlipemia    Impotence of organic origin    History of prostate cancer    Morbid obesity    Aortic atherosclerosis    [place] (hard of hearing)    Type 2 diabetes mellitus with diabetic neuropathy    Tortuous aorta    JESSICA on CPAP    Asbestos exposure    Midline low back pain without sciatica    Amnestic MCI (mild cognitive impairment with memory loss)    Primary osteoarthritis of right knee    URRUTIA (dyspnea on exertion)    Statin myopathy    Diabetes mellitus type 2 in obese    Type 2 diabetes mellitus with kidney complication, without long-term current use of insulin    Stage 2 chronic kidney disease    Recurrent major depressive disorder, in full remission    Polyneuropathy due to type 2 diabetes mellitus    BMI 40.0-44.9, adult    OAG (open angle glaucoma) suspect, low risk, bilateral    Abnormal chest x-ray    Pulmonary nodule         BP (!) 140/90   Pulse 105   Resp 19   Ht 6' (1.829 m)   Wt (!) 137.9 kg (304 lb 0.2 oz)   SpO2 98%   BMI 41.23 kg/m²   Body mass index is 41.23 kg/m².    Review of Systems   Constitutional: Negative.    HENT: Negative.    Respiratory: Positive for dyspnea on extertion.    Cardiovascular: Negative.    Musculoskeletal: Negative.    Gastrointestinal: Negative.    Neurological: Negative.    Psychiatric/Behavioral: Positive for sleep disturbance.     Objective:      Physical Exam   Constitutional: He is oriented to person, place, and time. He appears well-developed and well-nourished.   obese   HENT:    Head: Normocephalic and atraumatic.   Mouth/Throat: Uvula is midline.   Neck: Trachea normal and normal range of motion. Neck supple. No thyroid mass and no thyromegaly present.   Cardiovascular: Normal rate, regular rhythm and normal heart sounds.   Pulmonary/Chest: Effort normal and breath sounds normal. He has no wheezes. He has no rhonchi. He has no rales. Chest wall is not dull to percussion.   Abdominal: Soft. He exhibits no mass. There is no hepatosplenomegaly or splenomegaly. There is no tenderness.   Musculoskeletal: Normal range of motion. He exhibits no edema.   Neurological: He is alert and oriented to person, place, and time.   Skin: Skin is warm and dry.   Psychiatric: He has a normal mood and affect.     Personal Diagnostic Review        Assessment:       1. JESSICA (obstructive sleep apnea)    2. JESSICA on CPAP    3. BMI 40.0-44.9, adult    4. Pulmonary nodule        Outpatient Encounter Medications as of 6/10/2020   Medication Sig Dispense Refill    acetic acid (VOSOL) 2 % otic solution Place 4 drops into both ears 3 (three) times daily. for 7 days 15 mL 0    aspirin (ECOTRIN) 81 MG EC tablet Take 1 tablet (81 mg total) by mouth every evening. 100 tablet 3    cholecalciferol, vitamin D3, 5,000 unit capsule Take 1 capsule by mouth Daily.      donepezil (ARICEPT) 10 MG tablet Take 1 tablet (10 mg total) by mouth every evening. 90 tablet 3    lancets (SOFT TOUCH LANCETS) Misc 1 Device by Misc.(Non-Drug; Combo Route) route once daily. 100 each 3    metFORMIN (GLUCOPHAGE) 500 MG tablet Take 1 tablet (500 mg total) by mouth 2 (two) times daily with meals. 180 tablet 3    MULTIVITS-MINERALS/FA/LYCOPENE (ONE-A-DAY MEN'S ORAL) Take 1 tablet by mouth Daily.      sertraline (ZOLOFT) 50 MG tablet Take 1 tablet (50 mg total) by mouth every morning. Increase energy and boost mood 90 tablet 3    TRUE METRIX GLUCOSE METER Misc       TRUE METRIX GLUCOSE TEST STRIP Strp CHECK GLUCOSE ONCE DAILY 100 each 3     No  facility-administered encounter medications on file as of 6/10/2020.      Orders Placed This Encounter   Procedures    CPAP/BIPAP SUPPLIES     90 day supply with 3 refills.     Order Specific Question:   Type of mask:     Answer:   FFM     Order Specific Question:   Headgear?     Answer:   Yes     Order Specific Question:   Tubing?     Answer:   Yes     Order Specific Question:   Humidifier chamber?     Answer:   Yes     Order Specific Question:   Chin strap?     Answer:   Yes     Order Specific Question:   Filters?     Answer:   Yes     Order Specific Question:   Cushions?     Answer:   Yes     Order Specific Question:   Length of need (1-99 months):     Answer:   99     Plan:        Problem List Items Addressed This Visit        Pulmonary    Pulmonary nodule    Overview     01/2020 CT There are stable subcentimeter noncalcified pulmonary nodules the largest of which is located within the left upper lobe anteriorly and measures approximately 6 mm.. Stable to 2011            Endocrine    BMI 40.0-44.9, adult    Current Assessment & Plan     Weight loss            Other    JESSICA on CPAP    Current Assessment & Plan     Compliant. Continues to have a large leak. 5 hr/night. New mask fit today with instructions. This should improve AHI.            Other Visit Diagnoses     JESSICA (obstructive sleep apnea)    -  Primary    Relevant Orders    CPAP/BIPAP SUPPLIES

## 2020-06-10 NOTE — ASSESSMENT & PLAN NOTE
Compliant. Continues to have a large leak. 5 hr/night. New mask fit today with instructions. This should improve AHI.

## 2020-07-10 ENCOUNTER — TELEPHONE (OUTPATIENT)
Dept: ENDOSCOPY | Facility: HOSPITAL | Age: 80
End: 2020-07-10

## 2020-07-10 DIAGNOSIS — K83.8 DILATED BILE DUCT: Primary | ICD-10-CM

## 2020-07-13 ENCOUNTER — TELEPHONE (OUTPATIENT)
Dept: ENDOSCOPY | Facility: HOSPITAL | Age: 80
End: 2020-07-13

## 2020-07-13 NOTE — TELEPHONE ENCOUNTER
Pravin Myrick MD  to Me          7/10/20 5:12 PM  LFT's are normal. If patient asymptomatic no need for MRI.   Tariq       No answer

## 2020-07-15 ENCOUNTER — TELEPHONE (OUTPATIENT)
Dept: ENDOSCOPY | Facility: HOSPITAL | Age: 80
End: 2020-07-15

## 2020-07-17 ENCOUNTER — TELEPHONE (OUTPATIENT)
Dept: ENDOSCOPY | Facility: HOSPITAL | Age: 80
End: 2020-07-17

## 2020-07-31 ENCOUNTER — PATIENT OUTREACH (OUTPATIENT)
Dept: ADMINISTRATIVE | Facility: OTHER | Age: 80
End: 2020-07-31

## 2020-08-05 ENCOUNTER — LAB VISIT (OUTPATIENT)
Dept: LAB | Facility: HOSPITAL | Age: 80
End: 2020-08-05
Attending: INTERNAL MEDICINE
Payer: MEDICARE

## 2020-08-05 ENCOUNTER — OFFICE VISIT (OUTPATIENT)
Dept: PODIATRY | Facility: CLINIC | Age: 80
End: 2020-08-05
Payer: MEDICARE

## 2020-08-05 VITALS
WEIGHT: 308.63 LBS | DIASTOLIC BLOOD PRESSURE: 89 MMHG | BODY MASS INDEX: 41.8 KG/M2 | HEART RATE: 82 BPM | RESPIRATION RATE: 18 BRPM | HEIGHT: 72 IN | SYSTOLIC BLOOD PRESSURE: 142 MMHG

## 2020-08-05 DIAGNOSIS — B35.1 DERMATOPHYTOSIS, NAIL: ICD-10-CM

## 2020-08-05 DIAGNOSIS — E11.22 TYPE 2 DIABETES MELLITUS WITH CHRONIC KIDNEY DISEASE, WITHOUT LONG-TERM CURRENT USE OF INSULIN, UNSPECIFIED CKD STAGE: ICD-10-CM

## 2020-08-05 DIAGNOSIS — E11.49 TYPE 2 DIABETES MELLITUS WITH NEUROLOGICAL MANIFESTATIONS: Primary | ICD-10-CM

## 2020-08-05 LAB
ALBUMIN SERPL BCP-MCNC: 3.8 G/DL (ref 3.5–5.2)
ALP SERPL-CCNC: 80 U/L (ref 55–135)
ALT SERPL W/O P-5'-P-CCNC: 15 U/L (ref 10–44)
ANION GAP SERPL CALC-SCNC: 9 MMOL/L (ref 8–16)
AST SERPL-CCNC: 20 U/L (ref 10–40)
BILIRUB SERPL-MCNC: 0.5 MG/DL (ref 0.1–1)
BUN SERPL-MCNC: 17 MG/DL (ref 8–23)
CALCIUM SERPL-MCNC: 9.4 MG/DL (ref 8.7–10.5)
CHLORIDE SERPL-SCNC: 104 MMOL/L (ref 95–110)
CO2 SERPL-SCNC: 27 MMOL/L (ref 23–29)
CREAT SERPL-MCNC: 1 MG/DL (ref 0.5–1.4)
EST. GFR  (AFRICAN AMERICAN): >60 ML/MIN/1.73 M^2
EST. GFR  (NON AFRICAN AMERICAN): >60 ML/MIN/1.73 M^2
ESTIMATED AVG GLUCOSE: 134 MG/DL (ref 68–131)
GLUCOSE SERPL-MCNC: 116 MG/DL (ref 70–110)
HBA1C MFR BLD HPLC: 6.3 % (ref 4–5.6)
POTASSIUM SERPL-SCNC: 3.9 MMOL/L (ref 3.5–5.1)
PROT SERPL-MCNC: 7.6 G/DL (ref 6–8.4)
SODIUM SERPL-SCNC: 140 MMOL/L (ref 136–145)

## 2020-08-05 PROCEDURE — 99499 UNLISTED E&M SERVICE: CPT | Mod: HCNC,S$GLB,, | Performed by: PODIATRIST

## 2020-08-05 PROCEDURE — 36415 COLL VENOUS BLD VENIPUNCTURE: CPT | Mod: HCNC

## 2020-08-05 PROCEDURE — 99999 PR PBB SHADOW E&M-EST. PATIENT-LVL III: ICD-10-PCS | Mod: PBBFAC,HCNC,, | Performed by: PODIATRIST

## 2020-08-05 PROCEDURE — 80053 COMPREHEN METABOLIC PANEL: CPT | Mod: HCNC

## 2020-08-05 PROCEDURE — 11721 DEBRIDE NAIL 6 OR MORE: CPT | Mod: Q9,HCNC,S$GLB, | Performed by: PODIATRIST

## 2020-08-05 PROCEDURE — 83036 HEMOGLOBIN GLYCOSYLATED A1C: CPT | Mod: HCNC

## 2020-08-05 PROCEDURE — 99499 RISK ADDL DX/OHS AUDIT: ICD-10-PCS | Mod: HCNC,S$GLB,, | Performed by: PODIATRIST

## 2020-08-05 PROCEDURE — 11721 PR DEBRIDEMENT OF NAILS, 6 OR MORE: ICD-10-PCS | Mod: Q9,HCNC,S$GLB, | Performed by: PODIATRIST

## 2020-08-05 PROCEDURE — 99999 PR PBB SHADOW E&M-EST. PATIENT-LVL III: CPT | Mod: PBBFAC,HCNC,, | Performed by: PODIATRIST

## 2020-08-05 NOTE — PROGRESS NOTES
Subjective:      Patient ID: Elan Yepez is a 80 y.o. male.    Chief Complaint: PCP (Liane Tijerina / Lillian Gillespie, BRADY 1/16/20), Diabetes Mellitus, and Nail Care (long nails )    Elan is a 80 y.o. male who presents to the clinic for evaluation and treatment of high risk feet. Elan has a past medical history of Amnestic MCI (mild cognitive impairment with memory loss) (3/21/2017), BMI 40.0-44.9, adult, Colon polyp, Diabetes mellitus, Diabetes mellitus, type 2, Dysmetabolic syndrome X (10/2/2012), Hyperlipidemia, Hypertension, Joint pain, MDD (major depressive disorder) (4/7/2015), Midline low back pain without sciatica (5/5/2016), Primary osteoarthritis of right knee (2/20/2018), Prostate CA, Renal manifestation of secondary diabetes mellitus, Sleep apnea, Trouble in sleeping, and Type II or unspecified type diabetes mellitus with neurological manifestations, not stated as uncontrolled(250.60). The patient's chief complaint is long, thick toenails. This patient has documented high risk feet requiring routine maintenance secondary to diabetes mellitis and those secondary complications of diabetes, as mentioned..    PCP: Liane Tijerina MD    Date Last Seen by PCP:  Chief Complaint   Patient presents with    PCP     Liane Tijerina / Lillian Gillespie, BRADY 1/16/20    Diabetes Mellitus    Nail Care     long nails          Current shoe gear:dm shoes   Hemoglobin A1C   Date Value Ref Range Status   12/17/2019 6.1 (H) 4.0 - 5.6 % Final     Comment:     ADA Screening Guidelines:  5.7-6.4%  Consistent with prediabetes  >or=6.5%  Consistent with diabetes  High levels of fetal hemoglobin interfere with the HbA1C  assay. Heterozygous hemoglobin variants (HbS, HgC, etc)do  not significantly interfere with this assay.   However, presence of multiple variants may affect accuracy.     05/30/2019 6.1 (H) 4.0 - 5.6 % Final     Comment:     ADA Screening Guidelines:  5.7-6.4%  Consistent with prediabetes  >or=6.5%   Consistent with diabetes  High levels of fetal hemoglobin interfere with the HbA1C  assay. Heterozygous hemoglobin variants (HbS, HgC, etc)do  not significantly interfere with this assay.   However, presence of multiple variants may affect accuracy.     08/22/2018 6.1 (H) 4.0 - 5.6 % Final     Comment:     ADA Screening Guidelines:  5.7-6.4%  Consistent with prediabetes  >or=6.5%  Consistent with diabetes  High levels of fetal hemoglobin interfere with the HbA1C  assay. Heterozygous hemoglobin variants (HbS, HgC, etc)do  not significantly interfere with this assay.   However, presence of multiple variants may affect accuracy.         Review of Systems   Constitution: Negative for chills, decreased appetite and fever.   Cardiovascular: Negative for chest pain and claudication.   Respiratory: Negative for cough.    Skin: Positive for nail changes. Negative for color change, dry skin, flushing, itching, poor wound healing, rash, skin cancer and suspicious lesions.   Musculoskeletal: Negative for arthritis, back pain, falls, gout, joint pain, joint swelling and myalgias.   Gastrointestinal: Negative for nausea and vomiting.   Neurological: Positive for numbness. Negative for loss of balance and paresthesias.           Objective:       Vitals:    08/05/20 0850   BP: (!) 142/89   Pulse: 82   Resp: 18   Weight: (!) 140 kg (308 lb 10.3 oz)   Height: 6' (1.829 m)   PainSc: 0-No pain        Physical Exam  Vitals signs and nursing note reviewed.   Constitutional:       Appearance: He is well-developed. He is not diaphoretic.   Cardiovascular:      Comments: Dorsalis pedis and posterior tibial pulses are diminished Bilaterally. Toes are cool to touch. Feet are warm proximally.There is decreased digital hair. Skin is atrophic, slightly hyperpigmented, and mildly edematous      Musculoskeletal: Normal range of motion.         General: No tenderness or deformity.      Comments: Adequate joint range of motion without pain,  limitation, nor crepitation Bilateral feet and ankle joints. Muscle strength is 5/5 in all groups bilaterally.         Skin:     General: Skin is warm and dry.      Coloration: Skin is not pale.      Findings: No abrasion, bruising, burn, ecchymosis, erythema, lesion or petechiae.      Comments: Nails x10 are elongated by  4-7mm's, thickened by 2-4 mm's, dystrophic, and are darkened in  coloration . Xerosis Bilaterally. No open lesions noted.     Neurological:      Mental Status: He is alert and oriented to person, place, and time.      Comments: [place] 5.07 monofilamant testing is diminished Raffi feet. Sharp/dull sensation diminished Bilaterally. Light touch absent Bilaterally.       Psychiatric:         Behavior: Behavior normal.               Assessment:       Encounter Diagnoses   Name Primary?    Type 2 diabetes mellitus with neurological manifestations Yes    Dermatophytosis, nail          Plan:       Elan was seen today for pcp, diabetes mellitus and nail care.    Diagnoses and all orders for this visit:    Type 2 diabetes mellitus with neurological manifestations    Dermatophytosis, nail      I counseled the patient on his conditions, their implications and medical management.        - Shoe inspection. Diabetic Foot Education. Patient reminded of the importance of good nutrition and blood sugar control to help prevent podiatric complications of diabetes. Patient instructed on proper foot hygeine. We discussed wearing proper shoe gear, daily foot inspections, never walking without protective shoe gear, never putting sharp instruments to feet, routine podiatric nail visits every 2-3 months.      - With patient's permission, nails were aggressively reduced and debrided x 10 to their soft tissue attachment mechanically and with electric , removing all offending nail and debris. Patient relates relief following the procedure. He will continue to monitor the areas daily, inspect his feet, wear  protective shoe gear when ambulatory, moisturizer to maintain skin integrity and follow in this office in approximately 2-3 months, sooner p.r.n.

## 2020-09-08 ENCOUNTER — OFFICE VISIT (OUTPATIENT)
Dept: INTERNAL MEDICINE | Facility: CLINIC | Age: 80
End: 2020-09-08
Payer: MEDICARE

## 2020-09-08 VITALS
DIASTOLIC BLOOD PRESSURE: 74 MMHG | SYSTOLIC BLOOD PRESSURE: 120 MMHG | HEART RATE: 78 BPM | HEIGHT: 72 IN | OXYGEN SATURATION: 95 % | WEIGHT: 305.31 LBS | BODY MASS INDEX: 41.35 KG/M2

## 2020-09-08 DIAGNOSIS — G31.84 AMNESTIC MCI (MILD COGNITIVE IMPAIRMENT WITH MEMORY LOSS): ICD-10-CM

## 2020-09-08 DIAGNOSIS — G47.33 OSA ON CPAP: ICD-10-CM

## 2020-09-08 DIAGNOSIS — H40.013 OAG (OPEN ANGLE GLAUCOMA) SUSPECT, LOW RISK, BILATERAL: ICD-10-CM

## 2020-09-08 DIAGNOSIS — M17.11 PRIMARY OSTEOARTHRITIS OF RIGHT KNEE: ICD-10-CM

## 2020-09-08 DIAGNOSIS — E11.40 TYPE 2 DIABETES MELLITUS WITH DIABETIC NEUROPATHY, WITHOUT LONG-TERM CURRENT USE OF INSULIN: Primary | ICD-10-CM

## 2020-09-08 DIAGNOSIS — G72.0 STATIN MYOPATHY: ICD-10-CM

## 2020-09-08 DIAGNOSIS — N18.2 STAGE 2 CHRONIC KIDNEY DISEASE: ICD-10-CM

## 2020-09-08 DIAGNOSIS — T46.6X5A STATIN MYOPATHY: ICD-10-CM

## 2020-09-08 PROBLEM — R93.89 ABNORMAL CHEST X-RAY: Status: RESOLVED | Noted: 2020-01-02 | Resolved: 2020-09-08

## 2020-09-08 PROBLEM — R91.1 PULMONARY NODULE: Status: RESOLVED | Noted: 2020-06-10 | Resolved: 2020-09-08

## 2020-09-08 PROCEDURE — 99999 PR PBB SHADOW E&M-EST. PATIENT-LVL IV: CPT | Mod: PBBFAC,HCNC,, | Performed by: INTERNAL MEDICINE

## 2020-09-08 PROCEDURE — 99214 PR OFFICE/OUTPT VISIT, EST, LEVL IV, 30-39 MIN: ICD-10-PCS | Mod: HCNC,S$GLB,, | Performed by: INTERNAL MEDICINE

## 2020-09-08 PROCEDURE — 1101F PT FALLS ASSESS-DOCD LE1/YR: CPT | Mod: HCNC,CPTII,S$GLB, | Performed by: INTERNAL MEDICINE

## 2020-09-08 PROCEDURE — 1126F AMNT PAIN NOTED NONE PRSNT: CPT | Mod: HCNC,S$GLB,, | Performed by: INTERNAL MEDICINE

## 2020-09-08 PROCEDURE — 1101F PR PT FALLS ASSESS DOC 0-1 FALLS W/OUT INJ PAST YR: ICD-10-PCS | Mod: HCNC,CPTII,S$GLB, | Performed by: INTERNAL MEDICINE

## 2020-09-08 PROCEDURE — 1159F PR MEDICATION LIST DOCUMENTED IN MEDICAL RECORD: ICD-10-PCS | Mod: HCNC,S$GLB,, | Performed by: INTERNAL MEDICINE

## 2020-09-08 PROCEDURE — 1159F MED LIST DOCD IN RCRD: CPT | Mod: HCNC,S$GLB,, | Performed by: INTERNAL MEDICINE

## 2020-09-08 PROCEDURE — 99214 OFFICE O/P EST MOD 30 MIN: CPT | Mod: HCNC,S$GLB,, | Performed by: INTERNAL MEDICINE

## 2020-09-08 PROCEDURE — 99999 PR PBB SHADOW E&M-EST. PATIENT-LVL IV: ICD-10-PCS | Mod: PBBFAC,HCNC,, | Performed by: INTERNAL MEDICINE

## 2020-09-08 PROCEDURE — 1126F PR PAIN SEVERITY QUANTIFIED, NO PAIN PRESENT: ICD-10-PCS | Mod: HCNC,S$GLB,, | Performed by: INTERNAL MEDICINE

## 2020-09-08 RX ORDER — SERTRALINE HYDROCHLORIDE 50 MG/1
50 TABLET, FILM COATED ORAL EVERY MORNING
Qty: 90 TABLET | Refills: 3 | Status: SHIPPED | OUTPATIENT
Start: 2020-09-08 | End: 2021-09-10 | Stop reason: SDUPTHER

## 2020-09-08 RX ORDER — METFORMIN HYDROCHLORIDE 500 MG/1
500 TABLET ORAL NIGHTLY
Qty: 90 TABLET | Refills: 3 | Status: SHIPPED | OUTPATIENT
Start: 2020-09-08 | End: 2021-09-10 | Stop reason: SDUPTHER

## 2020-09-08 RX ORDER — DONEPEZIL HYDROCHLORIDE 10 MG/1
10 TABLET, FILM COATED ORAL NIGHTLY
Qty: 90 TABLET | Refills: 3 | Status: SHIPPED | OUTPATIENT
Start: 2020-09-08 | End: 2021-09-10 | Stop reason: SDUPTHER

## 2020-09-08 NOTE — PROGRESS NOTES
Subjective:       Patient ID: Elan Yepez is a 80 y.o. male.    Chief Complaint: Follow-up    This dictation was performed using using MModal.   Labs before this visit are reviewed and are all looking good.    The main problem that he is having his with his right knee.    Overall, he is doing well.  His wife accompanies him to this visit and she is pleased overall with how he is doing and does not have any concerns a his cognitive abilities are declining.  He seems very sharp today and very able to provide a history today.  HPI  Review of Systems  Review of systems is negative unless noted.  Objective:      Physical Exam    Assessment:       1. Type 2 diabetes mellitus with diabetic neuropathy, without long-term current use of insulin    2. JESSICA on CPAP    3. Primary osteoarthritis of right knee    4. Stage 2 chronic kidney disease    5. OAG (open angle glaucoma) suspect, low risk, bilateral    6. Amnestic MCI (mild cognitive impairment with memory loss)    7. Statin myopathy        Plan:   Elan was seen today for follow-up.    Diagnoses and all orders for this visit:    Type 2 diabetes mellitus with diabetic neuropathy, without long-term current use of insulin, continue to monitor  -     CBC auto differential; Future  -     Comprehensive metabolic panel; Future  -     Hemoglobin A1C; Future  -     Lipid Panel; Future    JESSICA on CPAP  -     CBC auto differential; Future    Primary osteoarthritis of right knee, I am concerned that he could be developing a flexion contracture.  He does not wish to return to Orthopedic surgery or physical therapy.  He does have the exercises he was given by physical therapy and he will begin doing them on a daily basis.  He is also asked to consider using a cane    Stage 2 chronic kidney disease, monitor stay well hydrated in no nonsteroidal anti-inflammatory medications.  -     Comprehensive metabolic panel; Future    OAG (open angle glaucoma) suspect, low risk, bilateral  -      Ambulatory referral/consult to Optometry; Future    Amnestic MCI (mild cognitive impairment with memory loss), he seems to be doing very well with no apparent cognitive decline.    Statin myopathy  -     Lipid Panel; Future    Other orders  -     donepeziL (ARICEPT) 10 MG tablet; Take 1 tablet (10 mg total) by mouth every evening.  -     sertraline (ZOLOFT) 50 MG tablet; Take 1 tablet (50 mg total) by mouth every morning. Increase energy and boost mood  -     metFORMIN (GLUCOPHAGE) 500 MG tablet; Take 1 tablet (500 mg total) by mouth nightly.    Follow up in about 6 months (around 3/8/2021), or after above labs.

## 2020-11-10 ENCOUNTER — PATIENT OUTREACH (OUTPATIENT)
Dept: ADMINISTRATIVE | Facility: OTHER | Age: 80
End: 2020-11-10

## 2020-11-10 NOTE — PROGRESS NOTES
LINKS immunization registry not responding  Care Everywhere updated  Health Maintenance updated  Chart reviewed for overdue Proactive [facility] Encounters (KAROLINE) health maintenance testing (CRS, Breast Ca, Diabetic Eye Exam)   Orders entered:N/A

## 2020-11-11 ENCOUNTER — OFFICE VISIT (OUTPATIENT)
Dept: PODIATRY | Facility: CLINIC | Age: 80
End: 2020-11-11
Payer: MEDICARE

## 2020-11-11 VITALS
DIASTOLIC BLOOD PRESSURE: 74 MMHG | HEART RATE: 77 BPM | RESPIRATION RATE: 19 BRPM | SYSTOLIC BLOOD PRESSURE: 136 MMHG | BODY MASS INDEX: 41.8 KG/M2 | WEIGHT: 308.63 LBS | HEIGHT: 72 IN

## 2020-11-11 DIAGNOSIS — L84 CORN OR CALLUS: ICD-10-CM

## 2020-11-11 DIAGNOSIS — B35.1 DERMATOPHYTOSIS, NAIL: ICD-10-CM

## 2020-11-11 DIAGNOSIS — E11.49 TYPE 2 DIABETES MELLITUS WITH NEUROLOGICAL MANIFESTATIONS: Primary | ICD-10-CM

## 2020-11-11 PROCEDURE — 99499 NO LOS: ICD-10-PCS | Mod: HCNC,S$GLB,, | Performed by: PODIATRIST

## 2020-11-11 PROCEDURE — 11721 PR DEBRIDEMENT OF NAILS, 6 OR MORE: ICD-10-PCS | Mod: Q9,HCNC,S$GLB, | Performed by: PODIATRIST

## 2020-11-11 PROCEDURE — 99999 PR PBB SHADOW E&M-EST. PATIENT-LVL III: ICD-10-PCS | Mod: PBBFAC,HCNC,, | Performed by: PODIATRIST

## 2020-11-11 PROCEDURE — 1157F PR ADVANCE CARE PLAN OR EQUIV PRESENT IN MEDICAL RECORD: ICD-10-PCS | Mod: HCNC,S$GLB,, | Performed by: PODIATRIST

## 2020-11-11 PROCEDURE — 11721 DEBRIDE NAIL 6 OR MORE: CPT | Mod: Q9,HCNC,S$GLB, | Performed by: PODIATRIST

## 2020-11-11 PROCEDURE — 99499 UNLISTED E&M SERVICE: CPT | Mod: HCNC,S$GLB,, | Performed by: PODIATRIST

## 2020-11-11 PROCEDURE — 1157F ADVNC CARE PLAN IN RCRD: CPT | Mod: HCNC,S$GLB,, | Performed by: PODIATRIST

## 2020-11-11 PROCEDURE — 1126F AMNT PAIN NOTED NONE PRSNT: CPT | Mod: HCNC,S$GLB,, | Performed by: PODIATRIST

## 2020-11-11 PROCEDURE — 1126F PR PAIN SEVERITY QUANTIFIED, NO PAIN PRESENT: ICD-10-PCS | Mod: HCNC,S$GLB,, | Performed by: PODIATRIST

## 2020-11-11 PROCEDURE — 99999 PR PBB SHADOW E&M-EST. PATIENT-LVL III: CPT | Mod: PBBFAC,HCNC,, | Performed by: PODIATRIST

## 2020-11-17 NOTE — PROGRESS NOTES
Subjective:      Patient ID: Elan Yepez is a 80 y.o. male.    Chief Complaint: PCP (Liane Tijerina MD 9/08/20), Diabetes Mellitus, Nail Care, and Callouses    Elan is a 80 y.o. male who presents to the clinic for evaluation and treatment of high risk feet. Elan has a past medical history of Amnestic MCI (mild cognitive impairment with memory loss) (3/21/2017), BMI 40.0-44.9, adult, Colon polyp, Diabetes mellitus, Diabetes mellitus, type 2, Dysmetabolic syndrome X (10/2/2012), Hyperlipidemia, Hypertension, Joint pain, MDD (major depressive disorder) (4/7/2015), Midline low back pain without sciatica (5/5/2016), Midline low back pain without sciatica (5/5/2016), Primary osteoarthritis of right knee (2/20/2018), Prostate CA, Pulmonary nodule, stable to 2011 (6/10/2020), Renal manifestation of secondary diabetes mellitus, Sleep apnea, Trouble in sleeping, and Type II or unspecified type diabetes mellitus with neurological manifestations, not stated as uncontrolled(250.60). The patient's chief complaint is long, thick toenails. This patient has documented high risk feet requiring routine maintenance secondary to diabetes mellitis and those secondary complications of diabetes, as mentioned..    PCP: Liane Tijerina MD    Date Last Seen by PCP:  Chief Complaint   Patient presents with    PCP     Liane Tijerina MD 9/08/20    Diabetes Mellitus    Nail Care    Callouses         Current shoe gear:dm shoes   Hemoglobin A1C   Date Value Ref Range Status   08/05/2020 6.3 (H) 4.0 - 5.6 % Final     Comment:     ADA Screening Guidelines:  5.7-6.4%  Consistent with prediabetes  >or=6.5%  Consistent with diabetes  High levels of fetal hemoglobin interfere with the HbA1C  assay. Heterozygous hemoglobin variants (HbS, HgC, etc)do  not significantly interfere with this assay.   However, presence of multiple variants may affect accuracy.     12/17/2019 6.1 (H) 4.0 - 5.6 % Final     Comment:     ADA Screening  Guidelines:  5.7-6.4%  Consistent with prediabetes  >or=6.5%  Consistent with diabetes  High levels of fetal hemoglobin interfere with the HbA1C  assay. Heterozygous hemoglobin variants (HbS, HgC, etc)do  not significantly interfere with this assay.   However, presence of multiple variants may affect accuracy.     05/30/2019 6.1 (H) 4.0 - 5.6 % Final     Comment:     ADA Screening Guidelines:  5.7-6.4%  Consistent with prediabetes  >or=6.5%  Consistent with diabetes  High levels of fetal hemoglobin interfere with the HbA1C  assay. Heterozygous hemoglobin variants (HbS, HgC, etc)do  not significantly interfere with this assay.   However, presence of multiple variants may affect accuracy.         Review of Systems   Constitution: Negative for chills, decreased appetite and fever.   Cardiovascular: Negative for chest pain and claudication.   Respiratory: Negative for cough and shortness of breath.    Skin: Positive for nail changes. Negative for color change, dry skin, flushing, itching, poor wound healing, rash, skin cancer and suspicious lesions.   Musculoskeletal: Negative for arthritis, back pain, falls, gout, joint pain, joint swelling and myalgias.   Gastrointestinal: Negative for nausea and vomiting.   Neurological: Positive for numbness. Negative for loss of balance and paresthesias.           Objective:       Vitals:    11/11/20 0930   BP: 136/74   Pulse: 77   Resp: 19   Weight: (!) 140 kg (308 lb 10.3 oz)   Height: 6' (1.829 m)   PainSc: 0-No pain        Physical Exam  Vitals signs and nursing note reviewed.   Constitutional:       Appearance: He is well-developed. He is not diaphoretic.   Cardiovascular:      Comments: Dorsalis pedis and posterior tibial pulses are diminished Bilaterally. Toes are cool to touch. Feet are warm proximally.There is decreased digital hair. Skin is atrophic, slightly hyperpigmented, and mildly edematous      Musculoskeletal: Normal range of motion.         General: No tenderness  or deformity.      Comments: Adequate joint range of motion without pain, limitation, nor crepitation Bilateral feet and ankle joints. Muscle strength is 5/5 in all groups bilaterally.         Skin:     General: Skin is warm and dry.      Coloration: Skin is not pale.      Findings: No abrasion, bruising, burn, ecchymosis, erythema, lesion or petechiae.      Comments: Nails x10 are elongated by 9-11 mm's, thickened by 2-4 mm's, dystrophic, and are darkened in  coloration . Xerosis Bilaterally. No open lesions noted.     Neurological:      Mental Status: He is alert and oriented to person, place, and time.      Comments: [place] 5.07 monofilamant testing is diminished Raffi feet. Sharp/dull sensation diminished Bilaterally. Light touch absent Bilaterally.       Psychiatric:         Behavior: Behavior normal.               Assessment:       Encounter Diagnoses   Name Primary?    Type 2 diabetes mellitus with neurological manifestations Yes    Dermatophytosis, nail     Corn or callus          Plan:       Elan was seen today for pcp, diabetes mellitus, nail care and callouses.    Diagnoses and all orders for this visit:    Type 2 diabetes mellitus with neurological manifestations    Dermatophytosis, nail    Corn or callus      I counseled the patient on his conditions, their implications and medical management.        - Shoe inspection. Diabetic Foot Education. Patient reminded of the importance of good nutrition and blood sugar control to help prevent podiatric complications of diabetes. Patient instructed on proper foot hygeine. We discussed wearing proper shoe gear, daily foot inspections, never walking without protective shoe gear, never putting sharp instruments to feet, routine podiatric nail visits every 2-3 months.      - With patient's permission, nails were aggressively reduced and debrided x 10 to their soft tissue attachment mechanically and with electric , removing all offending nail and  debris. Patient relates relief following the procedure. He will continue to monitor the areas daily, inspect his feet, wear protective shoe gear when ambulatory, moisturizer to maintain skin integrity and follow in this office in approximately 2-3 months, sooner p.r.n.

## 2020-11-25 ENCOUNTER — TELEPHONE (OUTPATIENT)
Dept: OPTOMETRY | Facility: CLINIC | Age: 80
End: 2020-11-25

## 2020-11-27 ENCOUNTER — TELEPHONE (OUTPATIENT)
Dept: OPTOMETRY | Facility: CLINIC | Age: 80
End: 2020-11-27

## 2020-11-27 NOTE — TELEPHONE ENCOUNTER
Scheduled routine and vf testing 01/13 @[address] [employer] location. Let pts wife know about $35 reftaction charge due to vf. Mrs Yepez verbalized understanding by saying ok.

## 2020-11-30 DIAGNOSIS — H40.013 OAG (OPEN ANGLE GLAUCOMA) SUSPECT, LOW RISK, BILATERAL: Primary | ICD-10-CM

## 2020-12-07 ENCOUNTER — PES CALL (OUTPATIENT)
Dept: ADMINISTRATIVE | Facility: CLINIC | Age: 80
End: 2020-12-07

## 2021-01-12 ENCOUNTER — PATIENT OUTREACH (OUTPATIENT)
Dept: ADMINISTRATIVE | Facility: OTHER | Age: 81
End: 2021-01-12

## 2021-01-13 ENCOUNTER — CLINICAL SUPPORT (OUTPATIENT)
Dept: OPHTHALMOLOGY | Facility: CLINIC | Age: 81
End: 2021-01-13
Payer: MEDICARE

## 2021-01-13 ENCOUNTER — OFFICE VISIT (OUTPATIENT)
Dept: OPTOMETRY | Facility: CLINIC | Age: 81
End: 2021-01-13
Payer: COMMERCIAL

## 2021-01-13 DIAGNOSIS — E11.9 TYPE 2 DIABETES MELLITUS WITHOUT RETINOPATHY: ICD-10-CM

## 2021-01-13 DIAGNOSIS — H40.013 OAG (OPEN ANGLE GLAUCOMA) SUSPECT, LOW RISK, BILATERAL: ICD-10-CM

## 2021-01-13 DIAGNOSIS — H25.13 SENILE NUCLEAR SCLEROSIS, BILATERAL: ICD-10-CM

## 2021-01-13 DIAGNOSIS — H52.4 MYOPIA WITH ASTIGMATISM AND PRESBYOPIA, BILATERAL: ICD-10-CM

## 2021-01-13 DIAGNOSIS — H52.203 MYOPIA WITH ASTIGMATISM AND PRESBYOPIA, BILATERAL: ICD-10-CM

## 2021-01-13 DIAGNOSIS — E11.36 TYPE 2 DIABETES MELLITUS WITH CATARACT: ICD-10-CM

## 2021-01-13 DIAGNOSIS — H40.013 OAG (OPEN ANGLE GLAUCOMA) SUSPECT, LOW RISK, BILATERAL: Primary | ICD-10-CM

## 2021-01-13 DIAGNOSIS — H52.13 MYOPIA WITH ASTIGMATISM AND PRESBYOPIA, BILATERAL: ICD-10-CM

## 2021-01-13 PROCEDURE — 92014 PR EYE EXAM, EST PATIENT,COMPREHESV: ICD-10-PCS | Mod: S$GLB,,, | Performed by: OPTOMETRIST

## 2021-01-13 PROCEDURE — 92014 COMPRE OPH EXAM EST PT 1/>: CPT | Mod: S$GLB,,, | Performed by: OPTOMETRIST

## 2021-01-13 PROCEDURE — 99999 PR PBB SHADOW E&M-EST. PATIENT-LVL II: CPT | Mod: PBBFAC,,, | Performed by: OPTOMETRIST

## 2021-01-13 PROCEDURE — 99999 PR PBB SHADOW E&M-EST. PATIENT-LVL II: ICD-10-PCS | Mod: PBBFAC,,, | Performed by: OPTOMETRIST

## 2021-01-13 PROCEDURE — 92015 PR REFRACTION: ICD-10-PCS | Mod: S$GLB,,, | Performed by: OPTOMETRIST

## 2021-01-13 PROCEDURE — 92015 DETERMINE REFRACTIVE STATE: CPT | Mod: S$GLB,,, | Performed by: OPTOMETRIST

## 2021-02-05 ENCOUNTER — TELEPHONE (OUTPATIENT)
Dept: INTERNAL MEDICINE | Facility: CLINIC | Age: 81
End: 2021-02-05

## 2021-02-05 DIAGNOSIS — N52.9 ERECTILE DYSFUNCTION, UNSPECIFIED ERECTILE DYSFUNCTION TYPE: ICD-10-CM

## 2021-02-07 PROBLEM — N52.9 ERECTILE DYSFUNCTION: Status: ACTIVE | Noted: 2021-02-07

## 2021-02-07 RX ORDER — TADALAFIL 20 MG/1
TABLET ORAL
Qty: 30 TABLET | Refills: 2 | Status: SHIPPED | OUTPATIENT
Start: 2021-02-07 | End: 2022-03-16

## 2021-02-09 ENCOUNTER — OFFICE VISIT (OUTPATIENT)
Dept: PODIATRY | Facility: CLINIC | Age: 81
End: 2021-02-09
Payer: MEDICARE

## 2021-02-09 VITALS
HEIGHT: 72 IN | WEIGHT: 315 LBS | BODY MASS INDEX: 42.66 KG/M2 | SYSTOLIC BLOOD PRESSURE: 138 MMHG | HEART RATE: 88 BPM | RESPIRATION RATE: 18 BRPM | DIASTOLIC BLOOD PRESSURE: 86 MMHG

## 2021-02-09 DIAGNOSIS — B35.1 DERMATOPHYTOSIS, NAIL: ICD-10-CM

## 2021-02-09 DIAGNOSIS — E11.49 TYPE 2 DIABETES MELLITUS WITH NEUROLOGICAL MANIFESTATIONS: Primary | ICD-10-CM

## 2021-02-09 PROCEDURE — 11721 PR DEBRIDEMENT OF NAILS, 6 OR MORE: ICD-10-PCS | Mod: Q9,S$GLB,, | Performed by: PODIATRIST

## 2021-02-09 PROCEDURE — 99999 PR PBB SHADOW E&M-EST. PATIENT-LVL III: CPT | Mod: PBBFAC,,, | Performed by: PODIATRIST

## 2021-02-09 PROCEDURE — 99999 PR PBB SHADOW E&M-EST. PATIENT-LVL III: ICD-10-PCS | Mod: PBBFAC,,, | Performed by: PODIATRIST

## 2021-02-09 PROCEDURE — 1126F AMNT PAIN NOTED NONE PRSNT: CPT | Mod: S$GLB,,, | Performed by: PODIATRIST

## 2021-02-09 PROCEDURE — 1157F ADVNC CARE PLAN IN RCRD: CPT | Mod: S$GLB,,, | Performed by: PODIATRIST

## 2021-02-09 PROCEDURE — 1157F PR ADVANCE CARE PLAN OR EQUIV PRESENT IN MEDICAL RECORD: ICD-10-PCS | Mod: S$GLB,,, | Performed by: PODIATRIST

## 2021-02-09 PROCEDURE — 99499 UNLISTED E&M SERVICE: CPT | Mod: S$GLB,,, | Performed by: PODIATRIST

## 2021-02-09 PROCEDURE — 99499 NO LOS: ICD-10-PCS | Mod: S$GLB,,, | Performed by: PODIATRIST

## 2021-02-09 PROCEDURE — 11721 DEBRIDE NAIL 6 OR MORE: CPT | Mod: Q9,S$GLB,, | Performed by: PODIATRIST

## 2021-02-09 PROCEDURE — 1126F PR PAIN SEVERITY QUANTIFIED, NO PAIN PRESENT: ICD-10-PCS | Mod: S$GLB,,, | Performed by: PODIATRIST

## 2021-03-03 ENCOUNTER — LAB VISIT (OUTPATIENT)
Dept: LAB | Facility: HOSPITAL | Age: 81
End: 2021-03-03
Attending: INTERNAL MEDICINE
Payer: MEDICARE

## 2021-03-03 DIAGNOSIS — T46.6X5A STATIN MYOPATHY: ICD-10-CM

## 2021-03-03 DIAGNOSIS — N18.2 STAGE 2 CHRONIC KIDNEY DISEASE: ICD-10-CM

## 2021-03-03 DIAGNOSIS — G72.0 STATIN MYOPATHY: ICD-10-CM

## 2021-03-03 DIAGNOSIS — E11.40 TYPE 2 DIABETES MELLITUS WITH DIABETIC NEUROPATHY, WITHOUT LONG-TERM CURRENT USE OF INSULIN: ICD-10-CM

## 2021-03-03 DIAGNOSIS — G47.33 OSA ON CPAP: ICD-10-CM

## 2021-03-03 LAB
ALBUMIN SERPL BCP-MCNC: 3.6 G/DL (ref 3.5–5.2)
ALP SERPL-CCNC: 91 U/L (ref 55–135)
ALT SERPL W/O P-5'-P-CCNC: 15 U/L (ref 10–44)
ANION GAP SERPL CALC-SCNC: 10 MMOL/L (ref 8–16)
AST SERPL-CCNC: 18 U/L (ref 10–40)
BASOPHILS # BLD AUTO: 0.01 K/UL (ref 0–0.2)
BASOPHILS NFR BLD: 0.1 % (ref 0–1.9)
BILIRUB SERPL-MCNC: 0.5 MG/DL (ref 0.1–1)
BUN SERPL-MCNC: 14 MG/DL (ref 8–23)
CALCIUM SERPL-MCNC: 9.5 MG/DL (ref 8.7–10.5)
CHLORIDE SERPL-SCNC: 102 MMOL/L (ref 95–110)
CHOLEST SERPL-MCNC: 214 MG/DL (ref 120–199)
CHOLEST/HDLC SERPL: 4.6 {RATIO} (ref 2–5)
CO2 SERPL-SCNC: 27 MMOL/L (ref 23–29)
CREAT SERPL-MCNC: 1 MG/DL (ref 0.5–1.4)
DIFFERENTIAL METHOD: ABNORMAL
EOSINOPHIL # BLD AUTO: 0 K/UL (ref 0–0.5)
EOSINOPHIL NFR BLD: 0.4 % (ref 0–8)
ERYTHROCYTE [DISTWIDTH] IN BLOOD BY AUTOMATED COUNT: 13.3 % (ref 11.5–14.5)
EST. GFR  (AFRICAN AMERICAN): >60 ML/MIN/1.73 M^2
EST. GFR  (NON AFRICAN AMERICAN): >60 ML/MIN/1.73 M^2
GLUCOSE SERPL-MCNC: 121 MG/DL (ref 70–110)
HCT VFR BLD AUTO: 43.6 % (ref 40–54)
HDLC SERPL-MCNC: 47 MG/DL (ref 40–75)
HDLC SERPL: 22 % (ref 20–50)
HGB BLD-MCNC: 13.7 G/DL (ref 14–18)
IMM GRANULOCYTES # BLD AUTO: 0.06 K/UL (ref 0–0.04)
IMM GRANULOCYTES NFR BLD AUTO: 0.8 % (ref 0–0.5)
LDLC SERPL CALC-MCNC: 147.6 MG/DL (ref 63–159)
LYMPHOCYTES # BLD AUTO: 2.3 K/UL (ref 1–4.8)
LYMPHOCYTES NFR BLD: 31.9 % (ref 18–48)
MCH RBC QN AUTO: 30.3 PG (ref 27–31)
MCHC RBC AUTO-ENTMCNC: 31.4 G/DL (ref 32–36)
MCV RBC AUTO: 97 FL (ref 82–98)
MONOCYTES # BLD AUTO: 0.6 K/UL (ref 0.3–1)
MONOCYTES NFR BLD: 8.1 % (ref 4–15)
NEUTROPHILS # BLD AUTO: 4.1 K/UL (ref 1.8–7.7)
NEUTROPHILS NFR BLD: 58.7 % (ref 38–73)
NONHDLC SERPL-MCNC: 167 MG/DL
NRBC BLD-RTO: 0 /100 WBC
PLATELET # BLD AUTO: 188 K/UL (ref 150–350)
PMV BLD AUTO: 12.2 FL (ref 9.2–12.9)
POTASSIUM SERPL-SCNC: 4.1 MMOL/L (ref 3.5–5.1)
PROT SERPL-MCNC: 7.7 G/DL (ref 6–8.4)
RBC # BLD AUTO: 4.52 M/UL (ref 4.6–6.2)
SODIUM SERPL-SCNC: 139 MMOL/L (ref 136–145)
TRIGL SERPL-MCNC: 97 MG/DL (ref 30–150)
WBC # BLD AUTO: 7.06 K/UL (ref 3.9–12.7)

## 2021-03-03 PROCEDURE — 85025 COMPLETE CBC W/AUTO DIFF WBC: CPT | Performed by: INTERNAL MEDICINE

## 2021-03-03 PROCEDURE — 80053 COMPREHEN METABOLIC PANEL: CPT | Performed by: INTERNAL MEDICINE

## 2021-03-03 PROCEDURE — 80061 LIPID PANEL: CPT | Performed by: INTERNAL MEDICINE

## 2021-03-03 PROCEDURE — 36415 COLL VENOUS BLD VENIPUNCTURE: CPT | Mod: PO

## 2021-03-03 PROCEDURE — 83036 HEMOGLOBIN GLYCOSYLATED A1C: CPT | Performed by: INTERNAL MEDICINE

## 2021-03-04 LAB
ESTIMATED AVG GLUCOSE: 131 MG/DL (ref 68–131)
HBA1C MFR BLD: 6.2 % (ref 4–5.6)

## 2021-03-10 ENCOUNTER — OFFICE VISIT (OUTPATIENT)
Dept: INTERNAL MEDICINE | Facility: CLINIC | Age: 81
End: 2021-03-10
Payer: MEDICARE

## 2021-03-10 VITALS
SYSTOLIC BLOOD PRESSURE: 128 MMHG | HEIGHT: 72 IN | RESPIRATION RATE: 18 BRPM | WEIGHT: 314.63 LBS | OXYGEN SATURATION: 99 % | HEART RATE: 87 BPM | DIASTOLIC BLOOD PRESSURE: 78 MMHG | BODY MASS INDEX: 42.61 KG/M2 | TEMPERATURE: 97 F

## 2021-03-10 DIAGNOSIS — E78.5 HYPERLIPIDEMIA, UNSPECIFIED HYPERLIPIDEMIA TYPE: ICD-10-CM

## 2021-03-10 DIAGNOSIS — M17.11 PRIMARY OSTEOARTHRITIS OF RIGHT KNEE: ICD-10-CM

## 2021-03-10 DIAGNOSIS — I70.0 ATHEROSCLEROSIS OF AORTA: ICD-10-CM

## 2021-03-10 DIAGNOSIS — N18.2 STAGE 2 CHRONIC KIDNEY DISEASE: ICD-10-CM

## 2021-03-10 DIAGNOSIS — E11.21 TYPE 2 DIABETES MELLITUS WITH DIABETIC NEPHROPATHY, WITHOUT LONG-TERM CURRENT USE OF INSULIN: Primary | ICD-10-CM

## 2021-03-10 DIAGNOSIS — G47.33 OSA ON CPAP: ICD-10-CM

## 2021-03-10 DIAGNOSIS — F33.42 RECURRENT MAJOR DEPRESSIVE DISORDER, IN FULL REMISSION: ICD-10-CM

## 2021-03-10 PROCEDURE — 1157F ADVNC CARE PLAN IN RCRD: CPT | Mod: S$GLB,,, | Performed by: PHYSICIAN ASSISTANT

## 2021-03-10 PROCEDURE — 1159F PR MEDICATION LIST DOCUMENTED IN MEDICAL RECORD: ICD-10-PCS | Mod: S$GLB,,, | Performed by: PHYSICIAN ASSISTANT

## 2021-03-10 PROCEDURE — 99214 PR OFFICE/OUTPT VISIT, EST, LEVL IV, 30-39 MIN: ICD-10-PCS | Mod: S$GLB,,, | Performed by: PHYSICIAN ASSISTANT

## 2021-03-10 PROCEDURE — 3288F PR FALLS RISK ASSESSMENT DOCUMENTED: ICD-10-PCS | Mod: CPTII,S$GLB,, | Performed by: PHYSICIAN ASSISTANT

## 2021-03-10 PROCEDURE — 3288F FALL RISK ASSESSMENT DOCD: CPT | Mod: CPTII,S$GLB,, | Performed by: PHYSICIAN ASSISTANT

## 2021-03-10 PROCEDURE — 99999 PR PBB SHADOW E&M-EST. PATIENT-LVL IV: CPT | Mod: PBBFAC,,, | Performed by: PHYSICIAN ASSISTANT

## 2021-03-10 PROCEDURE — 1159F MED LIST DOCD IN RCRD: CPT | Mod: S$GLB,,, | Performed by: PHYSICIAN ASSISTANT

## 2021-03-10 PROCEDURE — 1126F PR PAIN SEVERITY QUANTIFIED, NO PAIN PRESENT: ICD-10-PCS | Mod: S$GLB,,, | Performed by: PHYSICIAN ASSISTANT

## 2021-03-10 PROCEDURE — 1101F PT FALLS ASSESS-DOCD LE1/YR: CPT | Mod: CPTII,S$GLB,, | Performed by: PHYSICIAN ASSISTANT

## 2021-03-10 PROCEDURE — 1126F AMNT PAIN NOTED NONE PRSNT: CPT | Mod: S$GLB,,, | Performed by: PHYSICIAN ASSISTANT

## 2021-03-10 PROCEDURE — 99999 PR PBB SHADOW E&M-EST. PATIENT-LVL IV: ICD-10-PCS | Mod: PBBFAC,,, | Performed by: PHYSICIAN ASSISTANT

## 2021-03-10 PROCEDURE — 99499 UNLISTED E&M SERVICE: CPT | Mod: S$GLB,,, | Performed by: PHYSICIAN ASSISTANT

## 2021-03-10 PROCEDURE — 99499 RISK ADDL DX/OHS AUDIT: ICD-10-PCS | Mod: S$GLB,,, | Performed by: PHYSICIAN ASSISTANT

## 2021-03-10 PROCEDURE — 82570 ASSAY OF URINE CREATININE: CPT | Performed by: PHYSICIAN ASSISTANT

## 2021-03-10 PROCEDURE — 82043 UR ALBUMIN QUANTITATIVE: CPT | Performed by: PHYSICIAN ASSISTANT

## 2021-03-10 PROCEDURE — 99214 OFFICE O/P EST MOD 30 MIN: CPT | Mod: S$GLB,,, | Performed by: PHYSICIAN ASSISTANT

## 2021-03-10 PROCEDURE — 1157F PR ADVANCE CARE PLAN OR EQUIV PRESENT IN MEDICAL RECORD: ICD-10-PCS | Mod: S$GLB,,, | Performed by: PHYSICIAN ASSISTANT

## 2021-03-10 PROCEDURE — 1101F PR PT FALLS ASSESS DOC 0-1 FALLS W/OUT INJ PAST YR: ICD-10-PCS | Mod: CPTII,S$GLB,, | Performed by: PHYSICIAN ASSISTANT

## 2021-03-11 LAB
ALBUMIN/CREAT UR: 72.3 UG/MG (ref 0–30)
CREAT UR-MCNC: 173 MG/DL (ref 23–375)
MICROALBUMIN UR DL<=1MG/L-MCNC: 125 UG/ML

## 2021-03-18 PROBLEM — I25.10 CORONARY ARTERY CALCIFICATION: Status: ACTIVE | Noted: 2021-03-18

## 2021-03-18 PROBLEM — I25.84 CORONARY ARTERY CALCIFICATION: Status: ACTIVE | Noted: 2021-03-18

## 2021-03-19 ENCOUNTER — OFFICE VISIT (OUTPATIENT)
Dept: INTERNAL MEDICINE | Facility: CLINIC | Age: 81
End: 2021-03-19
Payer: MEDICARE

## 2021-03-19 VITALS
WEIGHT: 314.63 LBS | BODY MASS INDEX: 42.61 KG/M2 | HEIGHT: 72 IN | SYSTOLIC BLOOD PRESSURE: 114 MMHG | DIASTOLIC BLOOD PRESSURE: 66 MMHG

## 2021-03-19 DIAGNOSIS — G47.33 OSA ON CPAP: ICD-10-CM

## 2021-03-19 DIAGNOSIS — E66.01 MORBID OBESITY WITH BMI OF 40.0-44.9, ADULT: ICD-10-CM

## 2021-03-19 DIAGNOSIS — I25.10 CORONARY ARTERY CALCIFICATION: ICD-10-CM

## 2021-03-19 DIAGNOSIS — E78.2 MIXED HYPERLIPIDEMIA: ICD-10-CM

## 2021-03-19 DIAGNOSIS — H40.013 OAG (OPEN ANGLE GLAUCOMA) SUSPECT, LOW RISK, BILATERAL: ICD-10-CM

## 2021-03-19 DIAGNOSIS — E11.21 TYPE 2 DIABETES MELLITUS WITH DIABETIC NEPHROPATHY, WITHOUT LONG-TERM CURRENT USE OF INSULIN: ICD-10-CM

## 2021-03-19 DIAGNOSIS — I25.84 CORONARY ARTERY CALCIFICATION: ICD-10-CM

## 2021-03-19 DIAGNOSIS — E11.42 POLYNEUROPATHY DUE TO TYPE 2 DIABETES MELLITUS: ICD-10-CM

## 2021-03-19 DIAGNOSIS — Z00.00 ENCOUNTER FOR PREVENTIVE HEALTH EXAMINATION: Primary | ICD-10-CM

## 2021-03-19 DIAGNOSIS — I70.0 AORTIC ATHEROSCLEROSIS: ICD-10-CM

## 2021-03-19 DIAGNOSIS — Z85.46 HISTORY OF PROSTATE CANCER: ICD-10-CM

## 2021-03-19 DIAGNOSIS — E66.9 DIABETES MELLITUS TYPE 2 IN OBESE: ICD-10-CM

## 2021-03-19 DIAGNOSIS — F33.42 RECURRENT MAJOR DEPRESSIVE DISORDER, IN FULL REMISSION: ICD-10-CM

## 2021-03-19 DIAGNOSIS — E11.40 TYPE 2 DIABETES MELLITUS WITH DIABETIC NEUROPATHY, WITHOUT LONG-TERM CURRENT USE OF INSULIN: ICD-10-CM

## 2021-03-19 DIAGNOSIS — N18.2 STAGE 2 CHRONIC KIDNEY DISEASE: ICD-10-CM

## 2021-03-19 DIAGNOSIS — G31.84 AMNESTIC MCI (MILD COGNITIVE IMPAIRMENT WITH MEMORY LOSS): ICD-10-CM

## 2021-03-19 DIAGNOSIS — R26.9 ABNORMALITY OF GAIT AND MOBILITY: ICD-10-CM

## 2021-03-19 DIAGNOSIS — E11.69 DIABETES MELLITUS TYPE 2 IN OBESE: ICD-10-CM

## 2021-03-19 PROCEDURE — 1123F PR ADV CARE PLAN DISCUSSED, PLAN OR SURROGATE DOCUMENTED: ICD-10-PCS | Mod: S$GLB,,, | Performed by: PHYSICIAN ASSISTANT

## 2021-03-19 PROCEDURE — G0439 PR MEDICARE ANNUAL WELLNESS SUBSEQUENT VISIT: ICD-10-PCS | Mod: S$GLB,,, | Performed by: PHYSICIAN ASSISTANT

## 2021-03-19 PROCEDURE — 3288F PR FALLS RISK ASSESSMENT DOCUMENTED: ICD-10-PCS | Mod: CPTII,S$GLB,, | Performed by: PHYSICIAN ASSISTANT

## 2021-03-19 PROCEDURE — 3288F FALL RISK ASSESSMENT DOCD: CPT | Mod: CPTII,S$GLB,, | Performed by: PHYSICIAN ASSISTANT

## 2021-03-19 PROCEDURE — 1126F PR PAIN SEVERITY QUANTIFIED, NO PAIN PRESENT: ICD-10-PCS | Mod: S$GLB,,, | Performed by: PHYSICIAN ASSISTANT

## 2021-03-19 PROCEDURE — 1101F PR PT FALLS ASSESS DOC 0-1 FALLS W/OUT INJ PAST YR: ICD-10-PCS | Mod: CPTII,S$GLB,, | Performed by: PHYSICIAN ASSISTANT

## 2021-03-19 PROCEDURE — 99999 PR PBB SHADOW E&M-EST. PATIENT-LVL III: CPT | Mod: PBBFAC,,, | Performed by: PHYSICIAN ASSISTANT

## 2021-03-19 PROCEDURE — 99999 PR PBB SHADOW E&M-EST. PATIENT-LVL III: ICD-10-PCS | Mod: PBBFAC,,, | Performed by: PHYSICIAN ASSISTANT

## 2021-03-19 PROCEDURE — 1123F ACP DISCUSS/DSCN MKR DOCD: CPT | Mod: S$GLB,,, | Performed by: PHYSICIAN ASSISTANT

## 2021-03-19 PROCEDURE — 1126F AMNT PAIN NOTED NONE PRSNT: CPT | Mod: S$GLB,,, | Performed by: PHYSICIAN ASSISTANT

## 2021-03-19 PROCEDURE — 1101F PT FALLS ASSESS-DOCD LE1/YR: CPT | Mod: CPTII,S$GLB,, | Performed by: PHYSICIAN ASSISTANT

## 2021-03-19 PROCEDURE — G0439 PPPS, SUBSEQ VISIT: HCPCS | Mod: S$GLB,,, | Performed by: PHYSICIAN ASSISTANT

## 2021-03-23 ENCOUNTER — TELEPHONE (OUTPATIENT)
Dept: INTERNAL MEDICINE | Facility: CLINIC | Age: 81
End: 2021-03-23

## 2021-03-23 DIAGNOSIS — M17.11 PRIMARY OSTEOARTHRITIS OF RIGHT KNEE: Primary | ICD-10-CM

## 2021-05-11 ENCOUNTER — OFFICE VISIT (OUTPATIENT)
Dept: PODIATRY | Facility: CLINIC | Age: 81
End: 2021-05-11
Payer: MEDICARE

## 2021-05-11 VITALS
RESPIRATION RATE: 18 BRPM | HEIGHT: 72 IN | HEART RATE: 84 BPM | DIASTOLIC BLOOD PRESSURE: 85 MMHG | WEIGHT: 314 LBS | BODY MASS INDEX: 42.53 KG/M2 | SYSTOLIC BLOOD PRESSURE: 148 MMHG

## 2021-05-11 DIAGNOSIS — B35.1 DERMATOPHYTOSIS, NAIL: ICD-10-CM

## 2021-05-11 DIAGNOSIS — E11.49 TYPE 2 DIABETES MELLITUS WITH NEUROLOGICAL MANIFESTATIONS: Primary | ICD-10-CM

## 2021-05-11 PROCEDURE — 1126F PR PAIN SEVERITY QUANTIFIED, NO PAIN PRESENT: ICD-10-PCS | Mod: S$GLB,,, | Performed by: PODIATRIST

## 2021-05-11 PROCEDURE — 99499 UNLISTED E&M SERVICE: CPT | Mod: HCNC,S$GLB,, | Performed by: PODIATRIST

## 2021-05-11 PROCEDURE — 11721 DEBRIDE NAIL 6 OR MORE: CPT | Mod: Q9,S$GLB,, | Performed by: PODIATRIST

## 2021-05-11 PROCEDURE — 11721 PR DEBRIDEMENT OF NAILS, 6 OR MORE: ICD-10-PCS | Mod: Q9,S$GLB,, | Performed by: PODIATRIST

## 2021-05-11 PROCEDURE — 99999 PR PBB SHADOW E&M-EST. PATIENT-LVL IV: ICD-10-PCS | Mod: PBBFAC,,, | Performed by: PODIATRIST

## 2021-05-11 PROCEDURE — 99499 UNLISTED E&M SERVICE: CPT | Mod: S$GLB,,, | Performed by: PODIATRIST

## 2021-05-11 PROCEDURE — 1157F ADVNC CARE PLAN IN RCRD: CPT | Mod: S$GLB,,, | Performed by: PODIATRIST

## 2021-05-11 PROCEDURE — 99499 NO LOS: ICD-10-PCS | Mod: S$GLB,,, | Performed by: PODIATRIST

## 2021-05-11 PROCEDURE — 1126F AMNT PAIN NOTED NONE PRSNT: CPT | Mod: S$GLB,,, | Performed by: PODIATRIST

## 2021-05-11 PROCEDURE — 99999 PR PBB SHADOW E&M-EST. PATIENT-LVL IV: CPT | Mod: PBBFAC,,, | Performed by: PODIATRIST

## 2021-05-11 PROCEDURE — 1157F PR ADVANCE CARE PLAN OR EQUIV PRESENT IN MEDICAL RECORD: ICD-10-PCS | Mod: S$GLB,,, | Performed by: PODIATRIST

## 2021-05-17 ENCOUNTER — DOCUMENTATION ONLY (OUTPATIENT)
Dept: PULMONOLOGY | Facility: CLINIC | Age: 81
End: 2021-05-17

## 2021-06-22 ENCOUNTER — TELEPHONE (OUTPATIENT)
Dept: PULMONOLOGY | Facility: CLINIC | Age: 81
End: 2021-06-22

## 2021-08-13 ENCOUNTER — PATIENT OUTREACH (OUTPATIENT)
Dept: ADMINISTRATIVE | Facility: OTHER | Age: 81
End: 2021-08-13

## 2021-09-10 ENCOUNTER — LAB VISIT (OUTPATIENT)
Dept: LAB | Facility: HOSPITAL | Age: 81
End: 2021-09-10
Attending: INTERNAL MEDICINE
Payer: MEDICARE

## 2021-09-10 ENCOUNTER — TELEPHONE (OUTPATIENT)
Dept: OPHTHALMOLOGY | Facility: CLINIC | Age: 81
End: 2021-09-10

## 2021-09-10 ENCOUNTER — OFFICE VISIT (OUTPATIENT)
Dept: INTERNAL MEDICINE | Facility: CLINIC | Age: 81
End: 2021-09-10
Payer: MEDICARE

## 2021-09-10 VITALS
SYSTOLIC BLOOD PRESSURE: 136 MMHG | DIASTOLIC BLOOD PRESSURE: 80 MMHG | HEART RATE: 72 BPM | BODY MASS INDEX: 42.66 KG/M2 | OXYGEN SATURATION: 99 % | HEIGHT: 72 IN | WEIGHT: 315 LBS

## 2021-09-10 DIAGNOSIS — E66.01 MORBID OBESITY WITH BMI OF 40.0-44.9, ADULT: ICD-10-CM

## 2021-09-10 DIAGNOSIS — E11.21 TYPE 2 DIABETES MELLITUS WITH DIABETIC NEPHROPATHY, WITHOUT LONG-TERM CURRENT USE OF INSULIN: ICD-10-CM

## 2021-09-10 DIAGNOSIS — Z00.00 ANNUAL PHYSICAL EXAM: Primary | ICD-10-CM

## 2021-09-10 DIAGNOSIS — F33.42 RECURRENT MAJOR DEPRESSIVE DISORDER, IN FULL REMISSION: ICD-10-CM

## 2021-09-10 DIAGNOSIS — C61 PROSTATE CANCER: ICD-10-CM

## 2021-09-10 DIAGNOSIS — M25.561 ACUTE PAIN OF RIGHT KNEE: ICD-10-CM

## 2021-09-10 DIAGNOSIS — G31.84 AMNESTIC MCI (MILD COGNITIVE IMPAIRMENT WITH MEMORY LOSS): ICD-10-CM

## 2021-09-10 DIAGNOSIS — H40.013 OAG (OPEN ANGLE GLAUCOMA) SUSPECT, LOW RISK, BILATERAL: ICD-10-CM

## 2021-09-10 DIAGNOSIS — N52.9 ERECTILE DYSFUNCTION, UNSPECIFIED ERECTILE DYSFUNCTION TYPE: ICD-10-CM

## 2021-09-10 DIAGNOSIS — M25.551 RIGHT HIP PAIN: ICD-10-CM

## 2021-09-10 DIAGNOSIS — M17.0 PRIMARY OSTEOARTHRITIS OF BOTH KNEES: ICD-10-CM

## 2021-09-10 DIAGNOSIS — G47.33 OSA ON CPAP: ICD-10-CM

## 2021-09-10 DIAGNOSIS — R06.09 DOE (DYSPNEA ON EXERTION): ICD-10-CM

## 2021-09-10 PROBLEM — I25.10 CORONARY ARTERY CALCIFICATION: Status: RESOLVED | Noted: 2021-03-18 | Resolved: 2021-09-10

## 2021-09-10 PROBLEM — I25.84 CORONARY ARTERY CALCIFICATION: Status: RESOLVED | Noted: 2021-03-18 | Resolved: 2021-09-10

## 2021-09-10 LAB
ALBUMIN SERPL BCP-MCNC: 3.7 G/DL (ref 3.5–5.2)
ALP SERPL-CCNC: 74 U/L (ref 55–135)
ALT SERPL W/O P-5'-P-CCNC: 14 U/L (ref 10–44)
ANION GAP SERPL CALC-SCNC: 11 MMOL/L (ref 8–16)
AST SERPL-CCNC: 16 U/L (ref 10–40)
BILIRUB SERPL-MCNC: 0.4 MG/DL (ref 0.1–1)
BUN SERPL-MCNC: 13 MG/DL (ref 8–23)
CALCIUM SERPL-MCNC: 9.4 MG/DL (ref 8.7–10.5)
CHLORIDE SERPL-SCNC: 105 MMOL/L (ref 95–110)
CO2 SERPL-SCNC: 23 MMOL/L (ref 23–29)
COMPLEXED PSA SERPL-MCNC: <0.01 NG/ML (ref 0–4)
CREAT SERPL-MCNC: 1 MG/DL (ref 0.5–1.4)
EST. GFR  (AFRICAN AMERICAN): >60 ML/MIN/1.73 M^2
EST. GFR  (NON AFRICAN AMERICAN): >60 ML/MIN/1.73 M^2
ESTIMATED AVG GLUCOSE: 134 MG/DL (ref 68–131)
GLUCOSE SERPL-MCNC: 110 MG/DL (ref 70–110)
HBA1C MFR BLD: 6.3 % (ref 4–5.6)
POTASSIUM SERPL-SCNC: 4 MMOL/L (ref 3.5–5.1)
PROT SERPL-MCNC: 7.4 G/DL (ref 6–8.4)
SODIUM SERPL-SCNC: 139 MMOL/L (ref 136–145)

## 2021-09-10 PROCEDURE — 1125F PR PAIN SEVERITY QUANTIFIED, PAIN PRESENT: ICD-10-PCS | Mod: CPTII,S$GLB,, | Performed by: INTERNAL MEDICINE

## 2021-09-10 PROCEDURE — 3288F PR FALLS RISK ASSESSMENT DOCUMENTED: ICD-10-PCS | Mod: CPTII,S$GLB,, | Performed by: INTERNAL MEDICINE

## 2021-09-10 PROCEDURE — 1157F ADVNC CARE PLAN IN RCRD: CPT | Mod: CPTII,S$GLB,, | Performed by: INTERNAL MEDICINE

## 2021-09-10 PROCEDURE — 36415 COLL VENOUS BLD VENIPUNCTURE: CPT | Performed by: INTERNAL MEDICINE

## 2021-09-10 PROCEDURE — 99499 RISK ADDL DX/OHS AUDIT: ICD-10-PCS | Mod: HCNC,S$GLB,, | Performed by: INTERNAL MEDICINE

## 2021-09-10 PROCEDURE — 99999 PR PBB SHADOW E&M-EST. PATIENT-LVL IV: ICD-10-PCS | Mod: PBBFAC,,, | Performed by: INTERNAL MEDICINE

## 2021-09-10 PROCEDURE — 1157F PR ADVANCE CARE PLAN OR EQUIV PRESENT IN MEDICAL RECORD: ICD-10-PCS | Mod: CPTII,S$GLB,, | Performed by: INTERNAL MEDICINE

## 2021-09-10 PROCEDURE — 1101F PR PT FALLS ASSESS DOC 0-1 FALLS W/OUT INJ PAST YR: ICD-10-PCS | Mod: CPTII,S$GLB,, | Performed by: INTERNAL MEDICINE

## 2021-09-10 PROCEDURE — 1159F PR MEDICATION LIST DOCUMENTED IN MEDICAL RECORD: ICD-10-PCS | Mod: CPTII,S$GLB,, | Performed by: INTERNAL MEDICINE

## 2021-09-10 PROCEDURE — 99999 PR PBB SHADOW E&M-EST. PATIENT-LVL IV: CPT | Mod: PBBFAC,,, | Performed by: INTERNAL MEDICINE

## 2021-09-10 PROCEDURE — 1125F AMNT PAIN NOTED PAIN PRSNT: CPT | Mod: CPTII,S$GLB,, | Performed by: INTERNAL MEDICINE

## 2021-09-10 PROCEDURE — 1160F RVW MEDS BY RX/DR IN RCRD: CPT | Mod: CPTII,S$GLB,, | Performed by: INTERNAL MEDICINE

## 2021-09-10 PROCEDURE — 1160F PR REVIEW ALL MEDS BY PRESCRIBER/CLIN PHARMACIST DOCUMENTED: ICD-10-PCS | Mod: CPTII,S$GLB,, | Performed by: INTERNAL MEDICINE

## 2021-09-10 PROCEDURE — 3075F PR MOST RECENT SYSTOLIC BLOOD PRESS GE 130-139MM HG: ICD-10-PCS | Mod: CPTII,S$GLB,, | Performed by: INTERNAL MEDICINE

## 2021-09-10 PROCEDURE — 1101F PT FALLS ASSESS-DOCD LE1/YR: CPT | Mod: CPTII,S$GLB,, | Performed by: INTERNAL MEDICINE

## 2021-09-10 PROCEDURE — 99397 PR PREVENTIVE VISIT,EST,65 & OVER: ICD-10-PCS | Mod: S$GLB,,, | Performed by: INTERNAL MEDICINE

## 2021-09-10 PROCEDURE — 99397 PER PM REEVAL EST PAT 65+ YR: CPT | Mod: S$GLB,,, | Performed by: INTERNAL MEDICINE

## 2021-09-10 PROCEDURE — 3075F SYST BP GE 130 - 139MM HG: CPT | Mod: CPTII,S$GLB,, | Performed by: INTERNAL MEDICINE

## 2021-09-10 PROCEDURE — 3079F PR MOST RECENT DIASTOLIC BLOOD PRESSURE 80-89 MM HG: ICD-10-PCS | Mod: CPTII,S$GLB,, | Performed by: INTERNAL MEDICINE

## 2021-09-10 PROCEDURE — 3079F DIAST BP 80-89 MM HG: CPT | Mod: CPTII,S$GLB,, | Performed by: INTERNAL MEDICINE

## 2021-09-10 PROCEDURE — 1159F MED LIST DOCD IN RCRD: CPT | Mod: CPTII,S$GLB,, | Performed by: INTERNAL MEDICINE

## 2021-09-10 PROCEDURE — 99499 UNLISTED E&M SERVICE: CPT | Mod: HCNC,S$GLB,, | Performed by: INTERNAL MEDICINE

## 2021-09-10 PROCEDURE — 84153 ASSAY OF PSA TOTAL: CPT | Performed by: INTERNAL MEDICINE

## 2021-09-10 PROCEDURE — 83036 HEMOGLOBIN GLYCOSYLATED A1C: CPT | Performed by: INTERNAL MEDICINE

## 2021-09-10 PROCEDURE — 80053 COMPREHEN METABOLIC PANEL: CPT | Performed by: INTERNAL MEDICINE

## 2021-09-10 PROCEDURE — 3288F FALL RISK ASSESSMENT DOCD: CPT | Mod: CPTII,S$GLB,, | Performed by: INTERNAL MEDICINE

## 2021-09-10 RX ORDER — DONEPEZIL HYDROCHLORIDE 10 MG/1
10 TABLET, FILM COATED ORAL NIGHTLY
Qty: 90 TABLET | Refills: 3 | Status: SHIPPED | OUTPATIENT
Start: 2021-09-10 | End: 2022-11-11 | Stop reason: SDUPTHER

## 2021-09-10 RX ORDER — SERTRALINE HYDROCHLORIDE 50 MG/1
50 TABLET, FILM COATED ORAL EVERY MORNING
Qty: 90 TABLET | Refills: 3 | Status: SHIPPED | OUTPATIENT
Start: 2021-09-10 | End: 2022-11-11 | Stop reason: SDUPTHER

## 2021-09-10 RX ORDER — METFORMIN HYDROCHLORIDE 500 MG/1
500 TABLET ORAL NIGHTLY
Qty: 90 TABLET | Refills: 3 | Status: SHIPPED | OUTPATIENT
Start: 2021-09-10 | End: 2022-11-11 | Stop reason: SDUPTHER

## 2021-10-07 ENCOUNTER — PATIENT OUTREACH (OUTPATIENT)
Dept: ADMINISTRATIVE | Facility: OTHER | Age: 81
End: 2021-10-07

## 2021-10-08 ENCOUNTER — OFFICE VISIT (OUTPATIENT)
Dept: PULMONOLOGY | Facility: CLINIC | Age: 81
End: 2021-10-08
Payer: MEDICARE

## 2021-10-08 VITALS
WEIGHT: 315 LBS | SYSTOLIC BLOOD PRESSURE: 132 MMHG | BODY MASS INDEX: 42.66 KG/M2 | HEART RATE: 93 BPM | DIASTOLIC BLOOD PRESSURE: 74 MMHG | RESPIRATION RATE: 20 BRPM | HEIGHT: 72 IN | OXYGEN SATURATION: 97 %

## 2021-10-08 DIAGNOSIS — G47.33 OSA ON CPAP: Primary | ICD-10-CM

## 2021-10-08 PROCEDURE — 1101F PR PT FALLS ASSESS DOC 0-1 FALLS W/OUT INJ PAST YR: ICD-10-PCS | Mod: HCNC,CPTII,S$GLB, | Performed by: NURSE PRACTITIONER

## 2021-10-08 PROCEDURE — 1157F ADVNC CARE PLAN IN RCRD: CPT | Mod: HCNC,CPTII,S$GLB, | Performed by: NURSE PRACTITIONER

## 2021-10-08 PROCEDURE — 1159F PR MEDICATION LIST DOCUMENTED IN MEDICAL RECORD: ICD-10-PCS | Mod: HCNC,CPTII,S$GLB, | Performed by: NURSE PRACTITIONER

## 2021-10-08 PROCEDURE — 99499 UNLISTED E&M SERVICE: CPT | Mod: S$GLB,,, | Performed by: NURSE PRACTITIONER

## 2021-10-08 PROCEDURE — 99999 PR PBB SHADOW E&M-EST. PATIENT-LVL IV: ICD-10-PCS | Mod: PBBFAC,HCNC,, | Performed by: NURSE PRACTITIONER

## 2021-10-08 PROCEDURE — 99499 RISK ADDL DX/OHS AUDIT: ICD-10-PCS | Mod: S$GLB,,, | Performed by: NURSE PRACTITIONER

## 2021-10-08 PROCEDURE — 99214 OFFICE O/P EST MOD 30 MIN: CPT | Mod: HCNC,S$GLB,, | Performed by: NURSE PRACTITIONER

## 2021-10-08 PROCEDURE — 1160F PR REVIEW ALL MEDS BY PRESCRIBER/CLIN PHARMACIST DOCUMENTED: ICD-10-PCS | Mod: HCNC,CPTII,S$GLB, | Performed by: NURSE PRACTITIONER

## 2021-10-08 PROCEDURE — 3075F SYST BP GE 130 - 139MM HG: CPT | Mod: HCNC,CPTII,S$GLB, | Performed by: NURSE PRACTITIONER

## 2021-10-08 PROCEDURE — 1101F PT FALLS ASSESS-DOCD LE1/YR: CPT | Mod: HCNC,CPTII,S$GLB, | Performed by: NURSE PRACTITIONER

## 2021-10-08 PROCEDURE — 3078F PR MOST RECENT DIASTOLIC BLOOD PRESSURE < 80 MM HG: ICD-10-PCS | Mod: HCNC,CPTII,S$GLB, | Performed by: NURSE PRACTITIONER

## 2021-10-08 PROCEDURE — 1159F MED LIST DOCD IN RCRD: CPT | Mod: HCNC,CPTII,S$GLB, | Performed by: NURSE PRACTITIONER

## 2021-10-08 PROCEDURE — 3288F FALL RISK ASSESSMENT DOCD: CPT | Mod: HCNC,CPTII,S$GLB, | Performed by: NURSE PRACTITIONER

## 2021-10-08 PROCEDURE — 1160F RVW MEDS BY RX/DR IN RCRD: CPT | Mod: HCNC,CPTII,S$GLB, | Performed by: NURSE PRACTITIONER

## 2021-10-08 PROCEDURE — 99999 PR PBB SHADOW E&M-EST. PATIENT-LVL IV: CPT | Mod: PBBFAC,HCNC,, | Performed by: NURSE PRACTITIONER

## 2021-10-08 PROCEDURE — 1157F PR ADVANCE CARE PLAN OR EQUIV PRESENT IN MEDICAL RECORD: ICD-10-PCS | Mod: HCNC,CPTII,S$GLB, | Performed by: NURSE PRACTITIONER

## 2021-10-08 PROCEDURE — 3075F PR MOST RECENT SYSTOLIC BLOOD PRESS GE 130-139MM HG: ICD-10-PCS | Mod: HCNC,CPTII,S$GLB, | Performed by: NURSE PRACTITIONER

## 2021-10-08 PROCEDURE — 3288F PR FALLS RISK ASSESSMENT DOCUMENTED: ICD-10-PCS | Mod: HCNC,CPTII,S$GLB, | Performed by: NURSE PRACTITIONER

## 2021-10-08 PROCEDURE — 3078F DIAST BP <80 MM HG: CPT | Mod: HCNC,CPTII,S$GLB, | Performed by: NURSE PRACTITIONER

## 2021-10-08 PROCEDURE — 99214 PR OFFICE/OUTPT VISIT, EST, LEVL IV, 30-39 MIN: ICD-10-PCS | Mod: HCNC,S$GLB,, | Performed by: NURSE PRACTITIONER

## 2021-11-11 ENCOUNTER — IMMUNIZATION (OUTPATIENT)
Dept: INTERNAL MEDICINE | Facility: CLINIC | Age: 81
End: 2021-11-11
Payer: MEDICARE

## 2021-11-11 DIAGNOSIS — Z23 NEED FOR VACCINATION: Primary | ICD-10-CM

## 2021-11-11 PROCEDURE — 91300 COVID-19, MRNA, LNP-S, PF, 30 MCG/0.3 ML DOSE VACCINE: CPT | Mod: HCNC,PBBFAC | Performed by: INTERNAL MEDICINE

## 2021-11-11 PROCEDURE — 0003A COVID-19, MRNA, LNP-S, PF, 30 MCG/0.3 ML DOSE VACCINE: CPT | Mod: HCNC,CV19,PBBFAC | Performed by: INTERNAL MEDICINE

## 2021-12-09 ENCOUNTER — TELEPHONE (OUTPATIENT)
Dept: INTERNAL MEDICINE | Facility: CLINIC | Age: 81
End: 2021-12-09
Payer: MEDICARE

## 2022-03-16 ENCOUNTER — LAB VISIT (OUTPATIENT)
Dept: LAB | Facility: HOSPITAL | Age: 82
End: 2022-03-16
Payer: MEDICARE

## 2022-03-16 ENCOUNTER — OFFICE VISIT (OUTPATIENT)
Dept: INTERNAL MEDICINE | Facility: CLINIC | Age: 82
End: 2022-03-16
Payer: MEDICARE

## 2022-03-16 VITALS
WEIGHT: 298.06 LBS | BODY MASS INDEX: 40.37 KG/M2 | HEART RATE: 85 BPM | HEIGHT: 72 IN | DIASTOLIC BLOOD PRESSURE: 78 MMHG | SYSTOLIC BLOOD PRESSURE: 124 MMHG

## 2022-03-16 DIAGNOSIS — Z85.46 HISTORY OF PROSTATE CANCER: ICD-10-CM

## 2022-03-16 DIAGNOSIS — E66.01 MORBID OBESITY WITH BMI OF 40.0-44.9, ADULT: ICD-10-CM

## 2022-03-16 DIAGNOSIS — E11.40 TYPE 2 DIABETES MELLITUS WITH DIABETIC NEUROPATHY, WITHOUT LONG-TERM CURRENT USE OF INSULIN: ICD-10-CM

## 2022-03-16 DIAGNOSIS — E78.2 MIXED HYPERLIPIDEMIA: ICD-10-CM

## 2022-03-16 DIAGNOSIS — H90.0 CONDUCTIVE HEARING LOSS, BILATERAL: ICD-10-CM

## 2022-03-16 DIAGNOSIS — I70.0 AORTIC ATHEROSCLEROSIS: ICD-10-CM

## 2022-03-16 DIAGNOSIS — C61 PROSTATE CANCER: ICD-10-CM

## 2022-03-16 DIAGNOSIS — N39.3 STRESS INCONTINENCE OF URINE: ICD-10-CM

## 2022-03-16 DIAGNOSIS — Z12.5 PROSTATE CANCER SCREENING: ICD-10-CM

## 2022-03-16 DIAGNOSIS — Z76.89 ENCOUNTER TO ESTABLISH CARE: Primary | ICD-10-CM

## 2022-03-16 DIAGNOSIS — Z77.090 HISTORY OF ASBESTOS EXPOSURE: ICD-10-CM

## 2022-03-16 DIAGNOSIS — R06.09 DOE (DYSPNEA ON EXERTION): ICD-10-CM

## 2022-03-16 LAB
ALBUMIN/CREAT UR: 43.3 UG/MG (ref 0–30)
ANION GAP SERPL CALC-SCNC: 14 MMOL/L (ref 8–16)
BASOPHILS # BLD AUTO: 0.02 K/UL (ref 0–0.2)
BASOPHILS NFR BLD: 0.2 % (ref 0–1.9)
BILIRUB UR QL STRIP: NEGATIVE
BUN SERPL-MCNC: 14 MG/DL (ref 8–23)
CALCIUM SERPL-MCNC: 9.3 MG/DL (ref 8.7–10.5)
CHLORIDE SERPL-SCNC: 105 MMOL/L (ref 95–110)
CHOLEST SERPL-MCNC: 217 MG/DL (ref 120–199)
CHOLEST/HDLC SERPL: 4.8 {RATIO} (ref 2–5)
CLARITY UR REFRACT.AUTO: CLEAR
CO2 SERPL-SCNC: 21 MMOL/L (ref 23–29)
COLOR UR AUTO: YELLOW
COMPLEXED PSA SERPL-MCNC: <0.01 NG/ML (ref 0–4)
CREAT SERPL-MCNC: 1 MG/DL (ref 0.5–1.4)
CREAT UR-MCNC: 141 MG/DL (ref 23–375)
DIFFERENTIAL METHOD: ABNORMAL
EOSINOPHIL # BLD AUTO: 0 K/UL (ref 0–0.5)
EOSINOPHIL NFR BLD: 0.4 % (ref 0–8)
ERYTHROCYTE [DISTWIDTH] IN BLOOD BY AUTOMATED COUNT: 13.9 % (ref 11.5–14.5)
EST. GFR  (AFRICAN AMERICAN): >60 ML/MIN/1.73 M^2
EST. GFR  (NON AFRICAN AMERICAN): >60 ML/MIN/1.73 M^2
ESTIMATED AVG GLUCOSE: 143 MG/DL (ref 68–131)
GLUCOSE SERPL-MCNC: 122 MG/DL (ref 70–110)
GLUCOSE UR QL STRIP: NEGATIVE
HBA1C MFR BLD: 6.6 % (ref 4–5.6)
HCT VFR BLD AUTO: 40.1 % (ref 40–54)
HDLC SERPL-MCNC: 45 MG/DL (ref 40–75)
HDLC SERPL: 20.7 % (ref 20–50)
HGB BLD-MCNC: 12.7 G/DL (ref 14–18)
HGB UR QL STRIP: NEGATIVE
IMM GRANULOCYTES # BLD AUTO: 0.04 K/UL (ref 0–0.04)
IMM GRANULOCYTES NFR BLD AUTO: 0.5 % (ref 0–0.5)
KETONES UR QL STRIP: NEGATIVE
LDLC SERPL CALC-MCNC: 139.6 MG/DL (ref 63–159)
LEUKOCYTE ESTERASE UR QL STRIP: NEGATIVE
LYMPHOCYTES # BLD AUTO: 2.6 K/UL (ref 1–4.8)
LYMPHOCYTES NFR BLD: 31.8 % (ref 18–48)
MCH RBC QN AUTO: 30.5 PG (ref 27–31)
MCHC RBC AUTO-ENTMCNC: 31.7 G/DL (ref 32–36)
MCV RBC AUTO: 96 FL (ref 82–98)
MICROALBUMIN UR DL<=1MG/L-MCNC: 61 UG/ML
MONOCYTES # BLD AUTO: 0.7 K/UL (ref 0.3–1)
MONOCYTES NFR BLD: 8.3 % (ref 4–15)
NEUTROPHILS # BLD AUTO: 4.8 K/UL (ref 1.8–7.7)
NEUTROPHILS NFR BLD: 58.8 % (ref 38–73)
NITRITE UR QL STRIP: NEGATIVE
NONHDLC SERPL-MCNC: 172 MG/DL
NRBC BLD-RTO: 0 /100 WBC
PH UR STRIP: 5 [PH] (ref 5–8)
PLATELET # BLD AUTO: 178 K/UL (ref 150–450)
PMV BLD AUTO: 11.7 FL (ref 9.2–12.9)
POTASSIUM SERPL-SCNC: 4.3 MMOL/L (ref 3.5–5.1)
PROT UR QL STRIP: NEGATIVE
RBC # BLD AUTO: 4.16 M/UL (ref 4.6–6.2)
SODIUM SERPL-SCNC: 140 MMOL/L (ref 136–145)
SP GR UR STRIP: 1.02 (ref 1–1.03)
TRIGL SERPL-MCNC: 162 MG/DL (ref 30–150)
TSH SERPL DL<=0.005 MIU/L-ACNC: 1.85 UIU/ML (ref 0.4–4)
URN SPEC COLLECT METH UR: NORMAL
WBC # BLD AUTO: 8.21 K/UL (ref 3.9–12.7)

## 2022-03-16 PROCEDURE — 3288F PR FALLS RISK ASSESSMENT DOCUMENTED: ICD-10-PCS | Mod: CPTII,S$GLB,, | Performed by: INTERNAL MEDICINE

## 2022-03-16 PROCEDURE — 1101F PT FALLS ASSESS-DOCD LE1/YR: CPT | Mod: CPTII,S$GLB,, | Performed by: INTERNAL MEDICINE

## 2022-03-16 PROCEDURE — 1157F PR ADVANCE CARE PLAN OR EQUIV PRESENT IN MEDICAL RECORD: ICD-10-PCS | Mod: CPTII,S$GLB,, | Performed by: INTERNAL MEDICINE

## 2022-03-16 PROCEDURE — 3078F DIAST BP <80 MM HG: CPT | Mod: CPTII,S$GLB,, | Performed by: INTERNAL MEDICINE

## 2022-03-16 PROCEDURE — 1126F PR PAIN SEVERITY QUANTIFIED, NO PAIN PRESENT: ICD-10-PCS | Mod: CPTII,S$GLB,, | Performed by: INTERNAL MEDICINE

## 2022-03-16 PROCEDURE — 84153 ASSAY OF PSA TOTAL: CPT | Performed by: INTERNAL MEDICINE

## 2022-03-16 PROCEDURE — 3074F SYST BP LT 130 MM HG: CPT | Mod: CPTII,S$GLB,, | Performed by: INTERNAL MEDICINE

## 2022-03-16 PROCEDURE — 1159F MED LIST DOCD IN RCRD: CPT | Mod: CPTII,S$GLB,, | Performed by: INTERNAL MEDICINE

## 2022-03-16 PROCEDURE — 3078F PR MOST RECENT DIASTOLIC BLOOD PRESSURE < 80 MM HG: ICD-10-PCS | Mod: CPTII,S$GLB,, | Performed by: INTERNAL MEDICINE

## 2022-03-16 PROCEDURE — 36415 COLL VENOUS BLD VENIPUNCTURE: CPT | Performed by: INTERNAL MEDICINE

## 2022-03-16 PROCEDURE — 3288F FALL RISK ASSESSMENT DOCD: CPT | Mod: CPTII,S$GLB,, | Performed by: INTERNAL MEDICINE

## 2022-03-16 PROCEDURE — 85025 COMPLETE CBC W/AUTO DIFF WBC: CPT | Performed by: INTERNAL MEDICINE

## 2022-03-16 PROCEDURE — 82043 UR ALBUMIN QUANTITATIVE: CPT | Performed by: INTERNAL MEDICINE

## 2022-03-16 PROCEDURE — 1101F PR PT FALLS ASSESS DOC 0-1 FALLS W/OUT INJ PAST YR: ICD-10-PCS | Mod: CPTII,S$GLB,, | Performed by: INTERNAL MEDICINE

## 2022-03-16 PROCEDURE — 3072F LOW RISK FOR RETINOPATHY: CPT | Mod: CPTII,S$GLB,, | Performed by: INTERNAL MEDICINE

## 2022-03-16 PROCEDURE — 99499 RISK ADDL DX/OHS AUDIT: ICD-10-PCS | Mod: S$GLB,,, | Performed by: INTERNAL MEDICINE

## 2022-03-16 PROCEDURE — 81003 URINALYSIS AUTO W/O SCOPE: CPT | Performed by: INTERNAL MEDICINE

## 2022-03-16 PROCEDURE — 80061 LIPID PANEL: CPT | Performed by: INTERNAL MEDICINE

## 2022-03-16 PROCEDURE — 80048 BASIC METABOLIC PNL TOTAL CA: CPT | Performed by: INTERNAL MEDICINE

## 2022-03-16 PROCEDURE — 84443 ASSAY THYROID STIM HORMONE: CPT | Performed by: INTERNAL MEDICINE

## 2022-03-16 PROCEDURE — 99999 PR PBB SHADOW E&M-EST. PATIENT-LVL IV: CPT | Mod: PBBFAC,,, | Performed by: INTERNAL MEDICINE

## 2022-03-16 PROCEDURE — 83036 HEMOGLOBIN GLYCOSYLATED A1C: CPT | Performed by: INTERNAL MEDICINE

## 2022-03-16 PROCEDURE — 1157F ADVNC CARE PLAN IN RCRD: CPT | Mod: CPTII,S$GLB,, | Performed by: INTERNAL MEDICINE

## 2022-03-16 PROCEDURE — 82570 ASSAY OF URINE CREATININE: CPT | Performed by: INTERNAL MEDICINE

## 2022-03-16 PROCEDURE — 99214 OFFICE O/P EST MOD 30 MIN: CPT | Mod: S$GLB,,, | Performed by: INTERNAL MEDICINE

## 2022-03-16 PROCEDURE — 1159F PR MEDICATION LIST DOCUMENTED IN MEDICAL RECORD: ICD-10-PCS | Mod: CPTII,S$GLB,, | Performed by: INTERNAL MEDICINE

## 2022-03-16 PROCEDURE — 99214 PR OFFICE/OUTPT VISIT, EST, LEVL IV, 30-39 MIN: ICD-10-PCS | Mod: S$GLB,,, | Performed by: INTERNAL MEDICINE

## 2022-03-16 PROCEDURE — 99499 UNLISTED E&M SERVICE: CPT | Mod: S$GLB,,, | Performed by: INTERNAL MEDICINE

## 2022-03-16 PROCEDURE — 1126F AMNT PAIN NOTED NONE PRSNT: CPT | Mod: CPTII,S$GLB,, | Performed by: INTERNAL MEDICINE

## 2022-03-16 PROCEDURE — 99999 PR PBB SHADOW E&M-EST. PATIENT-LVL IV: ICD-10-PCS | Mod: PBBFAC,,, | Performed by: INTERNAL MEDICINE

## 2022-03-16 PROCEDURE — 3074F PR MOST RECENT SYSTOLIC BLOOD PRESSURE < 130 MM HG: ICD-10-PCS | Mod: CPTII,S$GLB,, | Performed by: INTERNAL MEDICINE

## 2022-03-16 PROCEDURE — 3072F PR LOW RISK FOR RETINOPATHY: ICD-10-PCS | Mod: CPTII,S$GLB,, | Performed by: INTERNAL MEDICINE

## 2022-03-16 NOTE — PROGRESS NOTES
Subjective:       Patient ID: Elan Yepez is a 81 y.o. male.    Chief Complaint: Establish Care      Elan Yepez is a 81 y.o. year old male    HPI   81 y.o. with CAD, t2DM, HTN, HLD, asbestos exposure, prostate cancer, gait abnormality, amnestic MCI presents for establishment of care, previous pt of Dr. Tijerina. Reports progressively worsening dyspnea on exertion, gets winded after <200 feet of ambulation. Denies any falls. Rests for a few minutes and is able to ambulate again. Due to pandemic, patient has been less physically active over all. Did have URI symptoms in December but was never tested.    Review of Systems   Constitutional: Negative for activity change, appetite change, chills, fatigue, fever and unexpected weight change.   HENT: Negative for congestion, rhinorrhea and sore throat.    Eyes: Negative for visual disturbance.   Respiratory: Positive for shortness of breath (on exertion).    Cardiovascular: Negative for chest pain.   Gastrointestinal: Negative for abdominal pain, diarrhea, nausea and vomiting.   Genitourinary: Negative for difficulty urinating and dysuria.   Musculoskeletal: Negative for arthralgias, back pain and myalgias.   Skin: Negative for color change and rash.   Neurological: Negative for dizziness, weakness and headaches.         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  Began Aricept 10 mg at bedtime.    Asbestos exposure 5/5/2016    BMI 40.0-44.9, adult     Colon polyp     Coronary artery calcification 3/18/2021    Diabetes mellitus     diet management    Diabetes mellitus, type 2     Dysmetabolic syndrome X 10/2/2012    Hyperlipidemia     Hypertension     diet managemen    Joint pain     MDD (major depressive disorder) 4/7/2015    Midline low back pain without sciatica 5/5/2016    Midline low back pain without sciatica  5/5/2016    Polyneuropathy     Primary osteoarthritis of right knee 2/20/2018    Prostate CA     Pulmonary nodule, stable to 2011 6/10/2020    01/2020 CT There are stable subcentimeter noncalcified pulmonary nodules the largest of which is located within the left upper lobe anteriorly and measures approximately 6 mm.. Stable to 2011    Renal manifestation of secondary diabetes mellitus     Sleep apnea     cipap    Trouble in sleeping     Type II or unspecified type diabetes mellitus with neurological manifestations, not stated as uncontrolled(250.60)     Urinary incontinence         Prior to Admission medications    Medication Sig Start Date End Date Taking? Authorizing Provider   aspirin (ECOTRIN) 81 MG EC tablet Take 1 tablet (81 mg total) by mouth every evening. 6/22/17  Yes Liane Tijerina MD   cholecalciferol, vitamin D3, 5,000 unit capsule Take 1 capsule by mouth Daily.   Yes Historical Provider   donepeziL (ARICEPT) 10 MG tablet Take 1 tablet (10 mg total) by mouth every evening. 9/10/21 9/10/22 Yes Liane Tijerina MD   lancets (SOFT TOUCH LANCETS) Misc 1 Device by Misc.(Non-Drug; Combo Route) route once daily. 2/17/20  Yes Liane Tijerina MD   metFORMIN (GLUCOPHAGE) 500 MG tablet Take 1 tablet (500 mg total) by mouth nightly. 9/10/21  Yes Liane Tijerina MD   MULTIVITS-MINERALS/FA/LYCOPENE (ONE-A-DAY MEN'S ORAL) Take 1 tablet by mouth Daily.   Yes Historical Provider   sertraline (ZOLOFT) 50 MG tablet Take 1 tablet (50 mg total) by mouth every morning. Increase energy and boost mood 9/10/21 9/10/22 Yes Liane Tijerina MD   TRUE METRIX GLUCOSE METER [facility] [place]  9/16/16  Yes Historical Provider   TRUE METRIX GLUCOSE TEST STRIP Strp USE  STRIP TO CHECK GLUCOSE ONCE DAILY 2/22/21  Yes Liane Tijerina MD   tadalafiL (CIALIS) 20 MG Tab Take one tablet as needed for erectile dysfunction  Patient not taking: No sig reported 2/7/21 3/16/22  Liane Tijerina MD        Past medical history,  surgical history, and family medical history reviewed and updated as appropriate.    Medications and allergies reviewed.     Objective:          Vitals:    03/16/22 1424   BP: 124/78   BP Location: Right arm   Patient Position: Sitting   Pulse: 85   Weight: 135.2 kg (298 lb 1 oz)   Height: 6' (1.829 m)     Body mass index is 40.42 kg/m².  Physical Exam  Constitutional:       General: He is not in acute distress.     Appearance: He is well-developed.      Comments: Antalgic gait   HENT:      Head: Normocephalic and atraumatic.      Nose: Nose normal.   Eyes:      General: No scleral icterus.     Extraocular Movements: Extraocular movements intact.   Neck:      Thyroid: No thyromegaly.      Vascular: No JVD.      Trachea: No tracheal deviation.   Cardiovascular:      Rate and Rhythm: Normal rate and regular rhythm.      Heart sounds: Normal heart sounds. No murmur heard.    No friction rub. No gallop.   Pulmonary:      Effort: Pulmonary effort is normal. No respiratory distress.      Breath sounds: Normal breath sounds. No wheezing or rales.   Abdominal:      General: Bowel sounds are normal. There is no distension.      Palpations: Abdomen is soft. There is no mass.      Tenderness: There is no abdominal tenderness.   Musculoskeletal:         General: Tenderness present. Normal range of motion.      Cervical back: Normal range of motion and neck supple.   Lymphadenopathy:      Cervical: No cervical adenopathy.   Skin:     General: Skin is warm and dry.      Findings: No rash.   Neurological:      Mental Status: He is alert and oriented to person, place, and time.      Cranial Nerves: No cranial nerve deficit.      Deep Tendon Reflexes: Reflexes normal.   Psychiatric:         Behavior: Behavior normal.          Protective Sensation (w/ 10 gram monofilament):  Right: Intact  Left: Intact    Visual Inspection:  Normal -  Bilateral    Pedal Pulses:   Right: Present  Left: Present    Posterior tibialis:    Right:Present  Left: Present        Lab Results   Component Value Date    WBC 7.06 03/03/2021    HGB 13.7 (L) 03/03/2021    HCT 43.6 03/03/2021     03/03/2021    CHOL 214 (H) 03/03/2021    TRIG 97 03/03/2021    HDL 47 03/03/2021    ALT 14 09/10/2021    AST 16 09/10/2021     09/10/2021    K 4.0 09/10/2021     09/10/2021    CREATININE 1.0 09/10/2021    BUN 13 09/10/2021    CO2 23 09/10/2021    TSH 1.714 03/21/2017    PSA 0.02 03/13/2012    INR 1.0 08/14/2019    HGBA1C 6.3 (H) 09/10/2021       Assessment:       1. Encounter to establish care    2. URRUTIA (dyspnea on exertion)    3. Type 2 diabetes mellitus with diabetic neuropathy, without long-term current use of insulin    4. Mixed hyperlipidemia    5. History of prostate cancer    6. Aortic atherosclerosis    7. Prostate cancer    8. Body mass index 40.0-44.9, adult    9. Conductive hearing loss, bilateral    10. History of asbestos exposure    11. Morbid obesity with BMI of 40.0-44.9, adult          Plan:     Elan was seen today for establish care.    Diagnoses and all orders for this visit:    Encounter to establish care  Comments:  at baseline health, worsening dyspnea on exertion over time. last stress test and echo years ago, will repeat. obtain CXR    URRUTIA (dyspnea on exertion)  Comments:  worsening over several years, decreased motivation to be active; dyspnea after walking 200 feet, worsening over the last few months.  Orders:  -     Echo; Future  -     EKG 12-lead; Future  -     CBC Auto Differential; Future  -     Basic Metabolic Panel; Future  -     X-Ray Chest PA And Lateral; Future    Type 2 diabetes mellitus with diabetic neuropathy, without long-term current use of insulin  -     MICROALBUMIN / CREATININE RATIO URINE  -     Hemoglobin A1C; Future    Mixed hyperlipidemia  Comments:  chronic, diet controlled  Orders:  -     TSH; Future  -     Lipid Panel; Future    History of prostate cancer    Aortic atherosclerosis    Prostate  cancer    Body mass index 40.0-44.9, adult    Conductive hearing loss, bilateral  Comments:  stable, has hearing aids    History of asbestos exposure    Morbid obesity with BMI of 40.0-44.9, adult    Stress incontinence of urine  Comments:  referral to urology. obtain urinalysis. obtain PSA  Orders:  -     Ambulatory referral/consult to Urology; Future  -     Urinalysis, Reflex to Urine Culture Urine, Clean Catch    Prostate cancer screening  -     PSA, Screening; Future        Health maintenance reviewed with patient.     Follow up in about 6 months (around 9/16/2022).    Elan Espinoza MD  Internal Medicine / Primary Care  [facility]  3/16/2022

## 2022-03-16 NOTE — PATIENT INSTRUCTIONS
Labwork today  Chest X-ray today  EKG and 2D echo today    Schedule optometry appointment  Schedule urology appointment    Return to clinic in 6 months or sooner if needed

## 2022-03-22 ENCOUNTER — TELEPHONE (OUTPATIENT)
Dept: INTERNAL MEDICINE | Facility: CLINIC | Age: 82
End: 2022-03-22
Payer: MEDICARE

## 2022-03-22 ENCOUNTER — HOSPITAL ENCOUNTER (OUTPATIENT)
Dept: RADIOLOGY | Facility: HOSPITAL | Age: 82
Discharge: HOME | End: 2022-03-22
Attending: INTERNAL MEDICINE
Payer: MEDICARE

## 2022-03-22 DIAGNOSIS — R06.09 DOE (DYSPNEA ON EXERTION): ICD-10-CM

## 2022-03-22 PROCEDURE — 71046 X-RAY EXAM CHEST 2 VIEWS: CPT | Mod: TC

## 2022-03-22 PROCEDURE — 71046 XR CHEST PA AND LATERAL: ICD-10-PCS | Mod: 26,,, | Performed by: RADIOLOGY

## 2022-03-22 PROCEDURE — 71046 X-RAY EXAM CHEST 2 VIEWS: CPT | Mod: 26,,, | Performed by: RADIOLOGY

## 2022-03-22 NOTE — TELEPHONE ENCOUNTER
----- Message from Elan Espinoza MD sent at 3/22/2022  2:56 PM CDT -----  X-ray of your chest was normal

## 2022-03-28 ENCOUNTER — HOSPITAL ENCOUNTER (OUTPATIENT)
Dept: CARDIOLOGY | Facility: HOSPITAL | Age: 82
Discharge: HOME | End: 2022-03-28
Attending: INTERNAL MEDICINE
Payer: MEDICARE

## 2022-03-28 VITALS
WEIGHT: 298 LBS | BODY MASS INDEX: 40.36 KG/M2 | HEART RATE: 70 BPM | SYSTOLIC BLOOD PRESSURE: 136 MMHG | HEIGHT: 72 IN | DIASTOLIC BLOOD PRESSURE: 74 MMHG

## 2022-03-28 DIAGNOSIS — R06.09 DOE (DYSPNEA ON EXERTION): ICD-10-CM

## 2022-03-28 LAB
ASCENDING AORTA: 3.37 CM
AV INDEX (PROSTH): 0.88
AV MEAN GRADIENT: 4 MMHG
AV PEAK GRADIENT: 8 MMHG
AV VALVE AREA: 3.21 CM2
AV VELOCITY RATIO: 0.8
BSA FOR ECHO PROCEDURE: 2.62 M2
CV ECHO LV RWT: 0.36 CM
DOP CALC AO PEAK VEL: 1.38 M/S
DOP CALC AO VTI: 23.64 CM
DOP CALC LVOT AREA: 3.7 CM2
DOP CALC LVOT DIAMETER: 2.16 CM
DOP CALC LVOT PEAK VEL: 1.11 M/S
DOP CALC LVOT STROKE VOLUME: 75.85 CM3
DOP CALC MV VTI: 41.63 CM
DOP CALCLVOT PEAK VEL VTI: 20.71 CM
E WAVE DECELERATION TIME: 395.66 MSEC
E/A RATIO: 0.55
E/E' RATIO: 18.6 M/S
ECHO LV POSTERIOR WALL: 1.01 CM (ref 0.6–1.1)
EJECTION FRACTION: 65 %
FRACTIONAL SHORTENING: 32 % (ref 28–44)
HR MV ECHO: 75 BPM
INTERVENTRICULAR SEPTUM: 0.96 CM (ref 0.6–1.1)
IVRT: 85.63 MSEC
LA MAJOR: 6.62 CM
LA MINOR: 6.14 CM
LA WIDTH: 4.24 CM
LEFT ATRIUM SIZE: 4.34 CM
LEFT ATRIUM VOLUME INDEX MOD: 37.6 ML/M2
LEFT ATRIUM VOLUME INDEX: 39.5 ML/M2
LEFT ATRIUM VOLUME MOD: 94.69 CM3
LEFT ATRIUM VOLUME: 99.65 CM3
LEFT INTERNAL DIMENSION IN SYSTOLE: 3.82 CM (ref 2.1–4)
LEFT VENTRICLE DIASTOLIC VOLUME INDEX: 61.96 ML/M2
LEFT VENTRICLE DIASTOLIC VOLUME: 156.14 ML
LEFT VENTRICLE MASS INDEX: 87 G/M2
LEFT VENTRICLE SYSTOLIC VOLUME INDEX: 24.9 ML/M2
LEFT VENTRICLE SYSTOLIC VOLUME: 62.65 ML
LEFT VENTRICULAR INTERNAL DIMENSION IN DIASTOLE: 5.64 CM (ref 3.5–6)
LEFT VENTRICULAR MASS: 218.01 G
LV LATERAL E/E' RATIO: 15.5 M/S
LV SEPTAL E/E' RATIO: 23.25 M/S
MV A" WAVE DURATION": 10.85 MSEC
MV MEAN GRADIENT: 5 MMHG
MV PEAK A VEL: 1.69 M/S
MV PEAK E VEL: 0.93 M/S
MV PEAK GRADIENT: 12 MMHG
MV STENOSIS PRESSURE HALF TIME: 234.66 MS
MV VALVE AREA BY CONTINUITY EQUATION: 1.82 CM2
MV VALVE AREA P 1/2 METHOD: 0.94 CM2
PISA TR MAX VEL: 2.32 M/S
PULM VEIN S/D RATIO: 3.48
PV PEAK D VEL: 0.25 M/S
PV PEAK S VEL: 0.87 M/S
RA MAJOR: 5.39 CM
RA PRESSURE: 3 MMHG
RA WIDTH: 3.92 CM
RIGHT VENTRICULAR END-DIASTOLIC DIMENSION: 4.82 CM
RV TISSUE DOPPLER FREE WALL SYSTOLIC VELOCITY 1 (APICAL 4 CHAMBER VIEW): 10.12 CM/S
SINUS: 3.48 CM
STJ: 3.09 CM
TDI LATERAL: 0.06 M/S
TDI SEPTAL: 0.04 M/S
TDI: 0.05 M/S
TR MAX PG: 22 MMHG
TRICUSPID ANNULAR PLANE SYSTOLIC EXCURSION: 2.05 CM
TV REST PULMONARY ARTERY PRESSURE: 25 MMHG

## 2022-03-28 PROCEDURE — 93306 ECHO (CUPID ONLY): ICD-10-PCS | Mod: 26,,, | Performed by: INTERNAL MEDICINE

## 2022-03-28 PROCEDURE — 93306 TTE W/DOPPLER COMPLETE: CPT

## 2022-03-28 PROCEDURE — 93306 TTE W/DOPPLER COMPLETE: CPT | Mod: 26,,, | Performed by: INTERNAL MEDICINE

## 2022-04-20 ENCOUNTER — OFFICE VISIT (OUTPATIENT)
Dept: PODIATRY | Facility: CLINIC | Age: 82
End: 2022-04-20
Payer: MEDICARE

## 2022-04-20 VITALS
BODY MASS INDEX: 40.36 KG/M2 | SYSTOLIC BLOOD PRESSURE: 143 MMHG | HEIGHT: 72 IN | DIASTOLIC BLOOD PRESSURE: 85 MMHG | HEART RATE: 72 BPM | RESPIRATION RATE: 18 BRPM | WEIGHT: 298 LBS

## 2022-04-20 DIAGNOSIS — B35.1 DERMATOPHYTOSIS, NAIL: ICD-10-CM

## 2022-04-20 DIAGNOSIS — L84 CORN OR CALLUS: ICD-10-CM

## 2022-04-20 DIAGNOSIS — E11.49 TYPE 2 DIABETES MELLITUS WITH NEUROLOGICAL MANIFESTATIONS: Primary | ICD-10-CM

## 2022-04-20 PROCEDURE — 99999 PR PBB SHADOW E&M-EST. PATIENT-LVL III: ICD-10-PCS | Mod: PBBFAC,,, | Performed by: PODIATRIST

## 2022-04-20 PROCEDURE — 1126F PR PAIN SEVERITY QUANTIFIED, NO PAIN PRESENT: ICD-10-PCS | Mod: CPTII,S$GLB,, | Performed by: PODIATRIST

## 2022-04-20 PROCEDURE — 1157F PR ADVANCE CARE PLAN OR EQUIV PRESENT IN MEDICAL RECORD: ICD-10-PCS | Mod: CPTII,S$GLB,, | Performed by: PODIATRIST

## 2022-04-20 PROCEDURE — 3077F SYST BP >= 140 MM HG: CPT | Mod: CPTII,S$GLB,, | Performed by: PODIATRIST

## 2022-04-20 PROCEDURE — 99499 UNLISTED E&M SERVICE: CPT | Mod: S$GLB,,, | Performed by: PODIATRIST

## 2022-04-20 PROCEDURE — 3072F LOW RISK FOR RETINOPATHY: CPT | Mod: CPTII,S$GLB,, | Performed by: PODIATRIST

## 2022-04-20 PROCEDURE — 1159F MED LIST DOCD IN RCRD: CPT | Mod: CPTII,S$GLB,, | Performed by: PODIATRIST

## 2022-04-20 PROCEDURE — 1157F ADVNC CARE PLAN IN RCRD: CPT | Mod: CPTII,S$GLB,, | Performed by: PODIATRIST

## 2022-04-20 PROCEDURE — 11721 DEBRIDE NAIL 6 OR MORE: CPT | Mod: Q9,59,S$GLB, | Performed by: PODIATRIST

## 2022-04-20 PROCEDURE — 3079F DIAST BP 80-89 MM HG: CPT | Mod: CPTII,S$GLB,, | Performed by: PODIATRIST

## 2022-04-20 PROCEDURE — 99499 NO LOS: ICD-10-PCS | Mod: S$GLB,,, | Performed by: PODIATRIST

## 2022-04-20 PROCEDURE — 1126F AMNT PAIN NOTED NONE PRSNT: CPT | Mod: CPTII,S$GLB,, | Performed by: PODIATRIST

## 2022-04-20 PROCEDURE — 1159F PR MEDICATION LIST DOCUMENTED IN MEDICAL RECORD: ICD-10-PCS | Mod: CPTII,S$GLB,, | Performed by: PODIATRIST

## 2022-04-20 PROCEDURE — 11721 PR DEBRIDEMENT OF NAILS, 6 OR MORE: ICD-10-PCS | Mod: Q9,59,S$GLB, | Performed by: PODIATRIST

## 2022-04-20 PROCEDURE — 3072F PR LOW RISK FOR RETINOPATHY: ICD-10-PCS | Mod: CPTII,S$GLB,, | Performed by: PODIATRIST

## 2022-04-20 PROCEDURE — 11056 PARNG/CUTG B9 HYPRKR LES 2-4: CPT | Mod: Q9,S$GLB,, | Performed by: PODIATRIST

## 2022-04-20 PROCEDURE — 3077F PR MOST RECENT SYSTOLIC BLOOD PRESSURE >= 140 MM HG: ICD-10-PCS | Mod: CPTII,S$GLB,, | Performed by: PODIATRIST

## 2022-04-20 PROCEDURE — 3079F PR MOST RECENT DIASTOLIC BLOOD PRESSURE 80-89 MM HG: ICD-10-PCS | Mod: CPTII,S$GLB,, | Performed by: PODIATRIST

## 2022-04-20 PROCEDURE — 99999 PR PBB SHADOW E&M-EST. PATIENT-LVL III: CPT | Mod: PBBFAC,,, | Performed by: PODIATRIST

## 2022-04-20 PROCEDURE — 11056 PR TRIM BENIGN HYPERKERATOTIC SKIN LESION,2-4: ICD-10-PCS | Mod: Q9,S$GLB,, | Performed by: PODIATRIST

## 2022-04-20 NOTE — PROGRESS NOTES
Subjective:      Patient ID: Elan Yepez is a 82 y.o. male.    Chief Complaint: PCP (Elan Espinoza MD 3/16/22/), Diabetic Foot Exam, and Nail Care (Long nails )    Elan is a 82 y.o. male who presents to the clinic for evaluation and treatment of high risk feet. Elan has a past medical history of Amnestic MCI (mild cognitive impairment with memory loss) (3/21/2017), Asbestos exposure (5/5/2016), BMI 40.0-44.9, adult, Colon polyp, Coronary artery calcification (3/18/2021), Diabetes mellitus, Diabetes mellitus, type 2, Dysmetabolic syndrome X (10/2/2012), Hyperlipidemia, Hypertension, Joint pain, MDD (major depressive disorder) (4/7/2015), Midline low back pain without sciatica (5/5/2016), Midline low back pain without sciatica (5/5/2016), Polyneuropathy, Primary osteoarthritis of right knee (2/20/2018), Prostate CA, Pulmonary nodule, stable to 2011 (6/10/2020), Renal manifestation of secondary diabetes mellitus, Sleep apnea, Trouble in sleeping, Type II or unspecified type diabetes mellitus with neurological manifestations, not stated as uncontrolled(250.60), and Urinary incontinence. The patient's chief complaint is long, thick toenails. This patient has documented high risk feet requiring routine maintenance secondary to diabetes mellitis and those secondary complications of diabetes, as mentioned..    PCP: Elan Espinoza MD    Date Last Seen by PCP:  Chief Complaint   Patient presents with    PCP     Elan Espinoza MD 3/16/22      Diabetic Foot Exam    Nail Care     Long nails          Current shoe gear:dm shoes   Hemoglobin A1C   Date Value Ref Range Status   03/16/2022 6.6 (H) 4.0 - 5.6 % Final     Comment:     ADA Screening Guidelines:  5.7-6.4%  Consistent with prediabetes  >or=6.5%  Consistent with diabetes    High levels of fetal hemoglobin interfere with the HbA1C  assay. Heterozygous hemoglobin variants (HbS, HgC, etc)do  not significantly interfere with this assay.   However, presence of multiple  variants may affect accuracy.     09/10/2021 6.3 (H) 4.0 - 5.6 % Final     Comment:     ADA Screening Guidelines:  5.7-6.4%  Consistent with prediabetes  >or=6.5%  Consistent with diabetes    High levels of fetal hemoglobin interfere with the HbA1C  assay. Heterozygous hemoglobin variants (HbS, HgC, etc)do  not significantly interfere with this assay.   However, presence of multiple variants may affect accuracy.     03/03/2021 6.2 (H) 4.0 - 5.6 % Final     Comment:     ADA Screening Guidelines:  5.7-6.4%  Consistent with prediabetes  >or=6.5%  Consistent with diabetes    High levels of fetal hemoglobin interfere with the HbA1C  assay. Heterozygous hemoglobin variants (HbS, HgC, etc)do  not significantly interfere with this assay.   However, presence of multiple variants may affect accuracy.         Review of Systems   Constitutional: Negative for chills, decreased appetite and fever.   Cardiovascular: Negative for chest pain and claudication.   Respiratory: Negative for cough and shortness of breath.    Skin: Positive for nail changes. Negative for color change, dry skin, flushing, itching, poor wound healing, rash, skin cancer and suspicious lesions.   Musculoskeletal: Negative for arthritis, back pain, falls, gout, joint pain, joint swelling and myalgias.   Gastrointestinal: Negative for nausea and vomiting.   Neurological: Positive for numbness. Negative for loss of balance and paresthesias.           Objective:       Vitals:    04/20/22 0958   BP: (!) 143/85   Pulse: 72   Resp: 18   Weight: 135.2 kg (298 lb)   Height: 6' (1.829 m)   PainSc: 0-No pain        Physical Exam  Vitals and nursing note reviewed.   Constitutional:       Appearance: He is well-developed. He is not diaphoretic.   Cardiovascular:      Comments: Dorsalis pedis and posterior tibial pulses are diminished Bilaterally. Toes are cool to touch. Feet are warm proximally.There is decreased digital hair. Skin is atrophic, slightly hyperpigmented,  and mildly edematous      Musculoskeletal:         General: No tenderness, deformity or signs of injury. Normal range of motion.      Comments: Adequate joint range of motion without pain, limitation, nor crepitation Bilateral feet and ankle joints. Muscle strength is 5/5 in all groups bilaterally.         Skin:     General: Skin is warm and dry.      Coloration: Skin is not ashen, jaundiced or pale.      Findings: No abrasion, bruising, burn, ecchymosis, erythema, lesion or petechiae.      Comments: Nails x10 are elongated by 4-11 mm's, thickened by 2-4 mm's, dystrophic, and are darkened in  coloration . Xerosis Bilaterally. No open lesions noted.  Hyperkeratotic tissue noted to distal 2nd toe b/l      Neurological:      Mental Status: He is alert and oriented to person, place, and time.      Comments: [place]-Marci 5.07 monofilamant testing is diminished Raffi feet. Sharp/dull sensation diminished Bilaterally. Light touch absent Bilaterally.       Psychiatric:         Behavior: Behavior normal.               Assessment:       Encounter Diagnoses   Name Primary?    Type 2 diabetes mellitus with neurological manifestations Yes    Dermatophytosis, nail     Corn or callus          Plan:       Elan was seen today for pcp, diabetic foot exam and nail care.    Diagnoses and all orders for this visit:    Type 2 diabetes mellitus with neurological manifestations    Dermatophytosis, nail    Corn or callus      I counseled the patient on his conditions, their implications and medical management.        - Shoe inspection. Diabetic Foot Education. Patient reminded of the importance of good nutrition and blood sugar control to help prevent podiatric complications of diabetes. Patient instructed on proper foot hygeine. We discussed wearing proper shoe gear, daily foot inspections, never walking without protective shoe gear, never putting sharp instruments to feet, routine podiatric nail visits every 2-3 months.      - With  patient's permission, nails were aggressively reduced and debrided x 10 to their soft tissue attachment mechanically and with electric , removing all offending nail and debris. Patient relates relief following the procedure. He will continue to monitor the areas daily, inspect his feet, wear protective shoe gear when ambulatory, moisturizer to maintain skin integrity and follow in this office in approximately 2-3 months, sooner p.r.n.    - After cleansing the  area w/ alcohol prep pad the above mentioned hyperkeratosis was trimmed utilizing No 15 scapel, to a smooth base with out incident. Patient tolerated this  well and reported comfort to the area x2

## 2022-05-04 ENCOUNTER — IMMUNIZATION (OUTPATIENT)
Dept: INTERNAL MEDICINE | Facility: CLINIC | Age: 82
End: 2022-05-04
Payer: MEDICARE

## 2022-05-04 ENCOUNTER — OFFICE VISIT (OUTPATIENT)
Dept: UROLOGY | Facility: CLINIC | Age: 82
End: 2022-05-04
Payer: MEDICARE

## 2022-05-04 VITALS
SYSTOLIC BLOOD PRESSURE: 146 MMHG | BODY MASS INDEX: 42.66 KG/M2 | HEIGHT: 72 IN | HEART RATE: 76 BPM | DIASTOLIC BLOOD PRESSURE: 78 MMHG | WEIGHT: 315 LBS

## 2022-05-04 DIAGNOSIS — Z23 NEED FOR VACCINATION: Primary | ICD-10-CM

## 2022-05-04 DIAGNOSIS — N32.81 OAB (OVERACTIVE BLADDER): Primary | ICD-10-CM

## 2022-05-04 PROCEDURE — 99999 PR PBB SHADOW E&M-EST. PATIENT-LVL III: CPT | Mod: PBBFAC,,, | Performed by: UROLOGY

## 2022-05-04 PROCEDURE — 91300 COVID-19, MRNA, LNP-S, PF, 30 MCG/0.3 ML DOSE VACCINE: CPT | Mod: PBBFAC | Performed by: INTERNAL MEDICINE

## 2022-05-04 PROCEDURE — 3078F DIAST BP <80 MM HG: CPT | Mod: CPTII,S$GLB,, | Performed by: UROLOGY

## 2022-05-04 PROCEDURE — 1157F ADVNC CARE PLAN IN RCRD: CPT | Mod: CPTII,S$GLB,, | Performed by: UROLOGY

## 2022-05-04 PROCEDURE — 99204 OFFICE O/P NEW MOD 45 MIN: CPT | Mod: S$GLB,,, | Performed by: UROLOGY

## 2022-05-04 PROCEDURE — 3072F PR LOW RISK FOR RETINOPATHY: ICD-10-PCS | Mod: CPTII,S$GLB,, | Performed by: UROLOGY

## 2022-05-04 PROCEDURE — 1101F PT FALLS ASSESS-DOCD LE1/YR: CPT | Mod: CPTII,S$GLB,, | Performed by: UROLOGY

## 2022-05-04 PROCEDURE — 3288F PR FALLS RISK ASSESSMENT DOCUMENTED: ICD-10-PCS | Mod: CPTII,S$GLB,, | Performed by: UROLOGY

## 2022-05-04 PROCEDURE — 3077F PR MOST RECENT SYSTOLIC BLOOD PRESSURE >= 140 MM HG: ICD-10-PCS | Mod: CPTII,S$GLB,, | Performed by: UROLOGY

## 2022-05-04 PROCEDURE — 1159F MED LIST DOCD IN RCRD: CPT | Mod: CPTII,S$GLB,, | Performed by: UROLOGY

## 2022-05-04 PROCEDURE — 1101F PR PT FALLS ASSESS DOC 0-1 FALLS W/OUT INJ PAST YR: ICD-10-PCS | Mod: CPTII,S$GLB,, | Performed by: UROLOGY

## 2022-05-04 PROCEDURE — 3288F FALL RISK ASSESSMENT DOCD: CPT | Mod: CPTII,S$GLB,, | Performed by: UROLOGY

## 2022-05-04 PROCEDURE — 1157F PR ADVANCE CARE PLAN OR EQUIV PRESENT IN MEDICAL RECORD: ICD-10-PCS | Mod: CPTII,S$GLB,, | Performed by: UROLOGY

## 2022-05-04 PROCEDURE — 99204 PR OFFICE/OUTPT VISIT, NEW, LEVL IV, 45-59 MIN: ICD-10-PCS | Mod: S$GLB,,, | Performed by: UROLOGY

## 2022-05-04 PROCEDURE — 1126F PR PAIN SEVERITY QUANTIFIED, NO PAIN PRESENT: ICD-10-PCS | Mod: CPTII,S$GLB,, | Performed by: UROLOGY

## 2022-05-04 PROCEDURE — 99999 PR PBB SHADOW E&M-EST. PATIENT-LVL III: ICD-10-PCS | Mod: PBBFAC,,, | Performed by: UROLOGY

## 2022-05-04 PROCEDURE — 3072F LOW RISK FOR RETINOPATHY: CPT | Mod: CPTII,S$GLB,, | Performed by: UROLOGY

## 2022-05-04 PROCEDURE — 3077F SYST BP >= 140 MM HG: CPT | Mod: CPTII,S$GLB,, | Performed by: UROLOGY

## 2022-05-04 PROCEDURE — 3078F PR MOST RECENT DIASTOLIC BLOOD PRESSURE < 80 MM HG: ICD-10-PCS | Mod: CPTII,S$GLB,, | Performed by: UROLOGY

## 2022-05-04 PROCEDURE — 1126F AMNT PAIN NOTED NONE PRSNT: CPT | Mod: CPTII,S$GLB,, | Performed by: UROLOGY

## 2022-05-04 PROCEDURE — 1159F PR MEDICATION LIST DOCUMENTED IN MEDICAL RECORD: ICD-10-PCS | Mod: CPTII,S$GLB,, | Performed by: UROLOGY

## 2022-05-04 PROCEDURE — 1160F PR REVIEW ALL MEDS BY PRESCRIBER/CLIN PHARMACIST DOCUMENTED: ICD-10-PCS | Mod: CPTII,S$GLB,, | Performed by: UROLOGY

## 2022-05-04 PROCEDURE — 1160F RVW MEDS BY RX/DR IN RCRD: CPT | Mod: CPTII,S$GLB,, | Performed by: UROLOGY

## 2022-05-04 NOTE — PROGRESS NOTES
[facility]      New Overactive Bladder (OAB) Note    5/4/2022    Referred by:  Self, Aaareferral    HPI: Elan Yepez is a very pleasant 82 y.o. male who is a new patient to our department referred for evaluation of urinary incontinence of several years duration. He reports bother is associated without urinary daytime frequency (3-4x daily), with nocturia (3-4x per night) and with urgency that results in urinary incontinence 4 x daily. He reports no stress urinary incontinence associated with exertion. He requires daily pads (4 pads/day).  He has decreased overall fluid intake.  He reports urinary incontinence is equally bothersome during the day and night. Patient reports urgency is independent of position.     He denies symptoms of irritative voiding including dysuria. He denies symptoms of obstructive voiding including decreased stream, hesitancy, intermittency, post void dribbling and sense of incomplete emptying. Bladder scan PVR was 0 mL.  He denies all other prior pelvic surgeries or neurologic diagnoses.     He has a distant history of prostate cancer. Last PSA was undetectable.     Previous incontinence therapies:   No Previous Therapies    A review of 10+ systems was conducted with pertinent positive and negative findings documented in HPI with all other systems reviewed and negative.    Past medical, family, surgical and social history reviewed as documented in chart with pertinent positive medical, family, surgical and social history detailed in HPI.    Exam Findings:    Const: no acute distress, conversant and alert  Eyes: anicteric, extraocular muscles intact  ENMT: normocephalic, Nl oral membranes  Cardio: no cyanosis, nl cap refill  Pulm: no tachypnea; no resp distress  Musc: no laceration, no tenderness  Neuro: alert; oriented x 3  Skin: warm, dry; no petichiae  Psych: no anxiety; normal speech     Assessment/Plan:    Overactive Bladder with Urgency Incontinence (new, addt'l  workup): His history is suggestive of Overactive Bladder (OAB) with predominantly Urgency Urinary Incontinence (UUI). He denies any sort of obstructive symptoms at all to suggest stricture/stenosis. Of note, this seems to be predominantly UUI rather than BUBBA and is equally bothersome (perhaps more bothersome) at night. Will start empiric therapy with Myrbetriq. Anticholinergics would be contraindicated due to his age. Will also have him complete a 3-day voiding diary.

## 2022-08-04 ENCOUNTER — OFFICE VISIT (OUTPATIENT)
Dept: UROLOGY | Facility: CLINIC | Age: 82
End: 2022-08-04
Payer: MEDICARE

## 2022-08-04 DIAGNOSIS — N32.81 OAB (OVERACTIVE BLADDER): Primary | ICD-10-CM

## 2022-08-04 PROCEDURE — 1160F RVW MEDS BY RX/DR IN RCRD: CPT | Mod: CPTII,S$GLB,, | Performed by: UROLOGY

## 2022-08-04 PROCEDURE — 1101F PT FALLS ASSESS-DOCD LE1/YR: CPT | Mod: CPTII,S$GLB,, | Performed by: UROLOGY

## 2022-08-04 PROCEDURE — 1157F PR ADVANCE CARE PLAN OR EQUIV PRESENT IN MEDICAL RECORD: ICD-10-PCS | Mod: CPTII,S$GLB,, | Performed by: UROLOGY

## 2022-08-04 PROCEDURE — 1157F ADVNC CARE PLAN IN RCRD: CPT | Mod: CPTII,S$GLB,, | Performed by: UROLOGY

## 2022-08-04 PROCEDURE — 99214 OFFICE O/P EST MOD 30 MIN: CPT | Mod: S$GLB,,, | Performed by: UROLOGY

## 2022-08-04 PROCEDURE — 3288F FALL RISK ASSESSMENT DOCD: CPT | Mod: CPTII,S$GLB,, | Performed by: UROLOGY

## 2022-08-04 PROCEDURE — 3072F LOW RISK FOR RETINOPATHY: CPT | Mod: CPTII,S$GLB,, | Performed by: UROLOGY

## 2022-08-04 PROCEDURE — 3072F PR LOW RISK FOR RETINOPATHY: ICD-10-PCS | Mod: CPTII,S$GLB,, | Performed by: UROLOGY

## 2022-08-04 PROCEDURE — 1160F PR REVIEW ALL MEDS BY PRESCRIBER/CLIN PHARMACIST DOCUMENTED: ICD-10-PCS | Mod: CPTII,S$GLB,, | Performed by: UROLOGY

## 2022-08-04 PROCEDURE — 3288F PR FALLS RISK ASSESSMENT DOCUMENTED: ICD-10-PCS | Mod: CPTII,S$GLB,, | Performed by: UROLOGY

## 2022-08-04 PROCEDURE — 99999 PR PBB SHADOW E&M-EST. PATIENT-LVL II: ICD-10-PCS | Mod: PBBFAC,,, | Performed by: UROLOGY

## 2022-08-04 PROCEDURE — 99214 PR OFFICE/OUTPT VISIT, EST, LEVL IV, 30-39 MIN: ICD-10-PCS | Mod: S$GLB,,, | Performed by: UROLOGY

## 2022-08-04 PROCEDURE — 99999 PR PBB SHADOW E&M-EST. PATIENT-LVL II: CPT | Mod: PBBFAC,,, | Performed by: UROLOGY

## 2022-08-04 PROCEDURE — 1101F PR PT FALLS ASSESS DOC 0-1 FALLS W/OUT INJ PAST YR: ICD-10-PCS | Mod: CPTII,S$GLB,, | Performed by: UROLOGY

## 2022-08-04 PROCEDURE — 1159F PR MEDICATION LIST DOCUMENTED IN MEDICAL RECORD: ICD-10-PCS | Mod: CPTII,S$GLB,, | Performed by: UROLOGY

## 2022-08-04 PROCEDURE — 1159F MED LIST DOCD IN RCRD: CPT | Mod: CPTII,S$GLB,, | Performed by: UROLOGY

## 2022-08-04 NOTE — PROGRESS NOTES
[facility]        Overactive Bladder (OAB) Return Note     8/4/2022     Referred by:  Self, Aaareferral     HPI: Elan Yepez is a very pleasant 82 y.o. male who is a return patient to our department referred for evaluation of urinary incontinence of several years duration. He reports bother is associated without urinary daytime frequency (3-4x daily), with nocturia (2-3x per night) and with urgency that results in urinary incontinence 1-2 x daily. He reports today what may be stress-induced urgency. However, he feels that he is better on Myrbetriq 25mg.      He denies symptoms of irritative voiding including dysuria. He denies symptoms of obstructive voiding including decreased stream, hesitancy, intermittency, post void dribbling and sense of incomplete emptying. Bladder scan PVR was 0 mL.  He denies all other prior pelvic surgeries or neurologic diagnoses.      He has a distant history of prostate cancer. Last PSA was undetectable.      Previous incontinence therapies:  · No Previous Therapies     A review of 10+ systems was conducted with pertinent positive and negative findings documented in HPI with all other systems reviewed and negative.     Past medical, family, surgical and social history reviewed as documented in chart with pertinent positive medical, family, surgical and social history detailed in HPI.     Exam Findings:     Const: no acute distress, conversant and alert  Eyes: anicteric, extraocular muscles intact  ENMT: normocephalic, Nl oral membranes  Cardio: no cyanosis, nl cap refill  Pulm: no tachypnea; no resp distress  Musc: no laceration, no tenderness  Neuro: alert; oriented x 3  Skin: warm, dry; no petichiae  Psych: no anxiety; normal speech      Assessment/Plan:     Overactive Bladder with Urgency Incontinence (established,not meeting goals): Improved but not entirely satisfied with response to Myrbetriq 25 mg; will try increasing to 50 mg. Return in 3 months.

## 2022-10-14 ENCOUNTER — OFFICE VISIT (OUTPATIENT)
Dept: OPTOMETRY | Facility: CLINIC | Age: 82
End: 2022-10-14
Payer: COMMERCIAL

## 2022-10-14 DIAGNOSIS — H40.013 OAG (OPEN ANGLE GLAUCOMA) SUSPECT, LOW RISK, BILATERAL: ICD-10-CM

## 2022-10-14 DIAGNOSIS — H52.203 MYOPIA WITH ASTIGMATISM AND PRESBYOPIA, BILATERAL: Primary | ICD-10-CM

## 2022-10-14 DIAGNOSIS — H52.13 MYOPIA WITH ASTIGMATISM AND PRESBYOPIA, BILATERAL: Primary | ICD-10-CM

## 2022-10-14 DIAGNOSIS — H25.13 SENILE NUCLEAR SCLEROSIS, BILATERAL: ICD-10-CM

## 2022-10-14 DIAGNOSIS — H52.4 MYOPIA WITH ASTIGMATISM AND PRESBYOPIA, BILATERAL: Primary | ICD-10-CM

## 2022-10-14 DIAGNOSIS — E11.9 TYPE 2 DIABETES MELLITUS WITHOUT RETINOPATHY: ICD-10-CM

## 2022-10-14 DIAGNOSIS — E11.36 TYPE 2 DIABETES MELLITUS WITH CATARACT: ICD-10-CM

## 2022-10-14 PROCEDURE — 92015 PR REFRACTION: ICD-10-PCS | Mod: S$GLB,,, | Performed by: OPTOMETRIST

## 2022-10-14 PROCEDURE — 99999 PR PBB SHADOW E&M-EST. PATIENT-LVL III: ICD-10-PCS | Mod: PBBFAC,,, | Performed by: OPTOMETRIST

## 2022-10-14 PROCEDURE — 92014 COMPRE OPH EXAM EST PT 1/>: CPT | Mod: S$GLB,,, | Performed by: OPTOMETRIST

## 2022-10-14 PROCEDURE — 92015 DETERMINE REFRACTIVE STATE: CPT | Mod: S$GLB,,, | Performed by: OPTOMETRIST

## 2022-10-14 PROCEDURE — 92014 PR EYE EXAM, EST PATIENT,COMPREHESV: ICD-10-PCS | Mod: S$GLB,,, | Performed by: OPTOMETRIST

## 2022-10-14 PROCEDURE — 99999 PR PBB SHADOW E&M-EST. PATIENT-LVL III: CPT | Mod: PBBFAC,,, | Performed by: OPTOMETRIST

## 2022-10-14 NOTE — PROGRESS NOTES
HPI    PATRIA: 01/21  Chief complaint (CC): Patient is here for annual eye exam today.  Patient   hasn't noticed any vision changes since the last exam.  Glasses still seem   fine.  Glasses? + 4 yrs. old  Contacts? -  H/o eye surgery, injections or laser: -  H/o eye injury: -  Known eye conditions? See above  Family h/o eye conditions? -  Eye gtts? -      (-) Flashes (-)  Floaters (-) Mucous   (-)  Tearing (-) Itching (-) Burning   (-) Headaches (-) Eye Pain/discomfort (-) Irritation   (-)  Redness (-) Double vision (-) Blurry vision    Diabetic? +  yesterday  A1c? Hemoglobin A1C       Date                     Value               Ref Range             Status                03/16/2022               6.6 (H)             4.0 - 5.6 %           Final                 09/10/2021               6.3 (H)             4.0 - 5.6 %           Final                 03/03/2021               6.2 (H)             4.0 - 5.6 %           Final                  Last edited by Amarilis Oliva on 10/14/2022 10:15 AM.            Assessment /Plan     For exam results, see Encounter Report.      Myopia with astigmatism and presbyopia, bilateral  SRx released to patient. Patient educated on lens options. Normal ocular health. RTC 1 year for routine exam.     OAG (open angle glaucoma) suspect, low risk, bilateral  -     Posterior Segment OCT Optic Nerve- Both eyes; Future  -     Kumar Visual Field - OU - Extended - Both Eyes; Future  (-) HFx. IOP 18 OD, OS. Last 16 OD, OS. C/d 0.5 OD, 0.35 OS. Prev pt of Dr Haji. Tmax 18. Last HVF/OCT 2017 and were normal. PHotos on 9/20/18.   1/13/2021 OCT WNL OU  1/13/2021 HVF OD low reliability, borderline OS low reliability, borderline w/o specific defects  Educated pt on findings.   No e/o glaucoma.   RTC OCT/24-2 xander testing. Will call w/results.     Senile nuclear sclerosis, bilateral  Type 2 diabetes mellitus with cataract  Nuclear sclerotic cataract - not visually significant. Observe.    Type 2  diabetes mellitus without retinopathy  BS control. No signs of diabetic retinopathy. Monitor with annual exam.        11/8/2022   OCT WNL  HVF OD scattered inf defect, OS excessive high false positives w/central defects  No e/o glaucoma.   RTC 1 year

## 2022-10-27 ENCOUNTER — OFFICE VISIT (OUTPATIENT)
Dept: PULMONOLOGY | Facility: CLINIC | Age: 82
End: 2022-10-27
Payer: MEDICARE

## 2022-10-27 VITALS
HEART RATE: 56 BPM | HEIGHT: 72 IN | WEIGHT: 315 LBS | BODY MASS INDEX: 42.66 KG/M2 | RESPIRATION RATE: 20 BRPM | OXYGEN SATURATION: 98 % | DIASTOLIC BLOOD PRESSURE: 86 MMHG | SYSTOLIC BLOOD PRESSURE: 140 MMHG

## 2022-10-27 DIAGNOSIS — G47.33 OSA ON CPAP: Primary | ICD-10-CM

## 2022-10-27 PROCEDURE — 3288F PR FALLS RISK ASSESSMENT DOCUMENTED: ICD-10-PCS | Mod: CPTII,S$GLB,, | Performed by: NURSE PRACTITIONER

## 2022-10-27 PROCEDURE — 1101F PR PT FALLS ASSESS DOC 0-1 FALLS W/OUT INJ PAST YR: ICD-10-PCS | Mod: CPTII,S$GLB,, | Performed by: NURSE PRACTITIONER

## 2022-10-27 PROCEDURE — 99213 PR OFFICE/OUTPT VISIT, EST, LEVL III, 20-29 MIN: ICD-10-PCS | Mod: S$GLB,,, | Performed by: NURSE PRACTITIONER

## 2022-10-27 PROCEDURE — 99999 PR PBB SHADOW E&M-EST. PATIENT-LVL IV: CPT | Mod: PBBFAC,,, | Performed by: NURSE PRACTITIONER

## 2022-10-27 PROCEDURE — 3079F PR MOST RECENT DIASTOLIC BLOOD PRESSURE 80-89 MM HG: ICD-10-PCS | Mod: CPTII,S$GLB,, | Performed by: NURSE PRACTITIONER

## 2022-10-27 PROCEDURE — 3072F LOW RISK FOR RETINOPATHY: CPT | Mod: CPTII,S$GLB,, | Performed by: NURSE PRACTITIONER

## 2022-10-27 PROCEDURE — 1125F AMNT PAIN NOTED PAIN PRSNT: CPT | Mod: CPTII,S$GLB,, | Performed by: NURSE PRACTITIONER

## 2022-10-27 PROCEDURE — 1160F RVW MEDS BY RX/DR IN RCRD: CPT | Mod: CPTII,S$GLB,, | Performed by: NURSE PRACTITIONER

## 2022-10-27 PROCEDURE — 3288F FALL RISK ASSESSMENT DOCD: CPT | Mod: CPTII,S$GLB,, | Performed by: NURSE PRACTITIONER

## 2022-10-27 PROCEDURE — 1159F PR MEDICATION LIST DOCUMENTED IN MEDICAL RECORD: ICD-10-PCS | Mod: CPTII,S$GLB,, | Performed by: NURSE PRACTITIONER

## 2022-10-27 PROCEDURE — 1157F ADVNC CARE PLAN IN RCRD: CPT | Mod: CPTII,S$GLB,, | Performed by: NURSE PRACTITIONER

## 2022-10-27 PROCEDURE — 1101F PT FALLS ASSESS-DOCD LE1/YR: CPT | Mod: CPTII,S$GLB,, | Performed by: NURSE PRACTITIONER

## 2022-10-27 PROCEDURE — 99999 PR PBB SHADOW E&M-EST. PATIENT-LVL IV: ICD-10-PCS | Mod: PBBFAC,,, | Performed by: NURSE PRACTITIONER

## 2022-10-27 PROCEDURE — 1125F PR PAIN SEVERITY QUANTIFIED, PAIN PRESENT: ICD-10-PCS | Mod: CPTII,S$GLB,, | Performed by: NURSE PRACTITIONER

## 2022-10-27 PROCEDURE — 1159F MED LIST DOCD IN RCRD: CPT | Mod: CPTII,S$GLB,, | Performed by: NURSE PRACTITIONER

## 2022-10-27 PROCEDURE — 3077F SYST BP >= 140 MM HG: CPT | Mod: CPTII,S$GLB,, | Performed by: NURSE PRACTITIONER

## 2022-10-27 PROCEDURE — 3077F PR MOST RECENT SYSTOLIC BLOOD PRESSURE >= 140 MM HG: ICD-10-PCS | Mod: CPTII,S$GLB,, | Performed by: NURSE PRACTITIONER

## 2022-10-27 PROCEDURE — 3072F PR LOW RISK FOR RETINOPATHY: ICD-10-PCS | Mod: CPTII,S$GLB,, | Performed by: NURSE PRACTITIONER

## 2022-10-27 PROCEDURE — 1160F PR REVIEW ALL MEDS BY PRESCRIBER/CLIN PHARMACIST DOCUMENTED: ICD-10-PCS | Mod: CPTII,S$GLB,, | Performed by: NURSE PRACTITIONER

## 2022-10-27 PROCEDURE — 3079F DIAST BP 80-89 MM HG: CPT | Mod: CPTII,S$GLB,, | Performed by: NURSE PRACTITIONER

## 2022-10-27 PROCEDURE — 1157F PR ADVANCE CARE PLAN OR EQUIV PRESENT IN MEDICAL RECORD: ICD-10-PCS | Mod: CPTII,S$GLB,, | Performed by: NURSE PRACTITIONER

## 2022-10-27 PROCEDURE — 99213 OFFICE O/P EST LOW 20 MIN: CPT | Mod: S$GLB,,, | Performed by: NURSE PRACTITIONER

## 2022-10-27 NOTE — PROGRESS NOTES
Subjective:      Patient ID: Elan Yepez is a 82 y.o. male.    Chief Complaint: No chief complaint on file.    HPI  Presents to office for review of CPAP therapy. Patient states improved symptoms with use of CPAP. Sleeping more soundly. Waking up feeling more refreshed. Improved daytime sleepiness. Patient states he is benefiting from use of the CPAP.     Patient Active Problem List   Diagnosis    Hyperlipemia    History of prostate cancer    Morbid obesity with BMI of 40.0-44.9, adult    Aortic atherosclerosis    [place] (hard of hearing)    Type 2 diabetes mellitus with diabetic neuropathy    JESSICA on CPAP    Amnestic MCI (mild cognitive impairment with memory loss)    Primary osteoarthritis of right knee    URRUTIA (dyspnea on exertion)    Prostate cancer, 2002    Statin myopathy    Diabetes mellitus type 2 in obese    Type 2 diabetes mellitus with kidney complication, without long-term current use of insulin    Stage 2 chronic kidney disease    Recurrent major depressive disorder, in full remission    Polyneuropathy due to type 2 diabetes mellitus    OAG (open angle glaucoma) suspect, low risk, bilateral    Erectile dysfunction       BP (!) 140/86 (BP Location: Left arm, Patient Position: Sitting, BP Method: X-Large (Manual))   Pulse (!) 56   Resp 20   Ht 6' (1.829 m)   Wt (!) 143 kg (315 lb 4.1 oz)   SpO2 98%   BMI 42.76 kg/m²   Body mass index is 42.76 kg/m².    Review of Systems   Constitutional: Negative.    HENT: Negative.     Respiratory: Negative.     Cardiovascular: Negative.    Musculoskeletal:  Positive for arthralgias and gait problem.   Gastrointestinal: Negative.    Psychiatric/Behavioral: Negative.     Objective:      Physical Exam  Constitutional:       Appearance: Normal appearance.   HENT:      Head: Normocephalic and atraumatic.   Cardiovascular:      Rate and Rhythm: Normal rate and regular rhythm.   Pulmonary:      Effort: Pulmonary effort is normal.      Breath sounds: Normal breath sounds.    Abdominal:      Palpations: Abdomen is soft.   Musculoskeletal:         General: Normal range of motion.   Skin:     General: Skin is warm and dry.   Neurological:      General: No focal deficit present.      Mental Status: He is alert and oriented to person, place, and time.   Psychiatric:         Mood and Affect: Mood normal.         Behavior: Behavior normal.     Personal Diagnostic Review      Results for orders placed during the hospital encounter of 03/22/22    X-Ray Chest PA And Lateral    Narrative  EXAMINATION:  XR CHEST PA AND LATERAL    CLINICAL HISTORY:  Dyspnea, unspecified    TECHNIQUE:  PA and lateral views of the chest were performed.    COMPARISON:  12/21/2019    FINDINGS:  The cardiomediastinal contour is within normal limits.  The lungs are clear.  No pneumothorax.  No pleural effusions.    Impression  No acute findings.      Electronically signed by: Josh Yip MD  Date:    03/22/2022  Time:    12:24        Assessment:       1. JESSICA on CPAP    2. BMI 40.0-44.9, adult          Outpatient Encounter Medications as of 10/27/2022   Medication Sig Dispense Refill    aspirin (ECOTRIN) 81 MG EC tablet Take 1 tablet (81 mg total) by mouth every evening. 100 tablet 3    blood sugar diagnostic (TRUE METRIX GLUCOSE TEST STRIP) Strp Test blood glucose once daily 100 each 11    cholecalciferol, vitamin D3, 5,000 unit capsule Take 1 capsule by mouth Daily.      donepeziL (ARICEPT) 10 MG tablet Take 1 tablet (10 mg total) by mouth every evening. 90 tablet 3    lancets (SOFT TOUCH LANCETS) Misc 1 Device by Misc.(Non-Drug; Combo Route) route once daily. 100 each 3    metFORMIN (GLUCOPHAGE) 500 MG tablet Take 1 tablet (500 mg total) by mouth nightly. 90 tablet 3    mirabegron (MYRBETRIQ) 50 mg Tb24 Take 1 tablet (50 mg total) by mouth once daily. 30 tablet 11    MULTIVITS-MINERALS/FA/LYCOPENE (ONE-A-DAY MEN'S ORAL) Take 1 tablet by mouth Daily.      sertraline (ZOLOFT) 50 MG tablet Take 1 tablet (50 mg total) by  mouth every morning. Increase energy and boost mood 90 tablet 3    TRUE METRIX GLUCOSE METER [facility] [place]        No facility-administered encounter medications on file as of 10/27/2022.     Orders Placed This Encounter   Procedures    CPAP/BIPAP SUPPLIES     Order Specific Question:   Length of need (1-99 months):     Answer:   99     Order Specific Question:   Choose ONE mask type and its corresponding cushions and/or pillows:     Answer:    Full Face Mask, 1 per 90 days:  Full Face Cushion, (3 per 90 days)     Order Specific Question:   Choose EITHER Heated or Non-Heated Tubjing     Answer:    Non-Heated Tubing, 1 per 90 days     Order Specific Question:   All other supplies as needed as listed below:     Answer:    Headgear, 1 per 180 days     Order Specific Question:   All other supplies as needed as listed below:     Answer:    Disposable Filter, 6 per 90 days     Order Specific Question:   All other supplies as needed as listed below:     Answer:    Non-Disposable Filter, 1 per 180 days     Order Specific Question:   All other supplies as needed as listed below:     Answer:    Humidifier Chamber, 1 per 180 days     Plan:   Wears CPAP  nightly and benefits from use.   Discussed calorie reduction for weight loss.     Problem List Items Addressed This Visit          Other    JESSICA on CPAP - Primary    Relevant Orders    CPAP/BIPAP SUPPLIES     Other Visit Diagnoses       BMI 40.0-44.9, adult

## 2022-11-08 ENCOUNTER — CLINICAL SUPPORT (OUTPATIENT)
Dept: OPHTHALMOLOGY | Facility: CLINIC | Age: 82
End: 2022-11-08
Payer: MEDICARE

## 2022-11-08 DIAGNOSIS — H40.013 OAG (OPEN ANGLE GLAUCOMA) SUSPECT, LOW RISK, BILATERAL: ICD-10-CM

## 2022-11-08 NOTE — PROGRESS NOTES
Hvf done ou. Rel/fix/coop. Good ou./chart checked for latex allergy.--1.50 + 1.00 x 180/od -.75 + 1.50 x135/os-smh

## 2022-11-11 ENCOUNTER — OFFICE VISIT (OUTPATIENT)
Dept: INTERNAL MEDICINE | Facility: CLINIC | Age: 82
End: 2022-11-11
Payer: MEDICARE

## 2022-11-11 ENCOUNTER — IMMUNIZATION (OUTPATIENT)
Dept: INTERNAL MEDICINE | Facility: CLINIC | Age: 82
End: 2022-11-11
Payer: MEDICARE

## 2022-11-11 ENCOUNTER — LAB VISIT (OUTPATIENT)
Dept: LAB | Facility: HOSPITAL | Age: 82
End: 2022-11-11
Attending: INTERNAL MEDICINE
Payer: MEDICARE

## 2022-11-11 VITALS
HEIGHT: 72 IN | WEIGHT: 299.81 LBS | DIASTOLIC BLOOD PRESSURE: 60 MMHG | SYSTOLIC BLOOD PRESSURE: 110 MMHG | BODY MASS INDEX: 40.61 KG/M2 | OXYGEN SATURATION: 95 % | HEART RATE: 93 BPM

## 2022-11-11 DIAGNOSIS — I70.0 AORTIC ATHEROSCLEROSIS: ICD-10-CM

## 2022-11-11 DIAGNOSIS — E11.40 TYPE 2 DIABETES MELLITUS WITH DIABETIC NEUROPATHY, WITHOUT LONG-TERM CURRENT USE OF INSULIN: ICD-10-CM

## 2022-11-11 DIAGNOSIS — Z23 NEED FOR VACCINATION: Primary | ICD-10-CM

## 2022-11-11 DIAGNOSIS — F03.90 DEMENTIA WITHOUT BEHAVIORAL DISTURBANCE, PSYCHOTIC DISTURBANCE, MOOD DISTURBANCE, OR ANXIETY, UNSPECIFIED DEMENTIA SEVERITY, UNSPECIFIED DEMENTIA TYPE: ICD-10-CM

## 2022-11-11 DIAGNOSIS — H90.0 CONDUCTIVE HEARING LOSS, BILATERAL: ICD-10-CM

## 2022-11-11 DIAGNOSIS — G31.84 AMNESTIC MCI (MILD COGNITIVE IMPAIRMENT WITH MEMORY LOSS): ICD-10-CM

## 2022-11-11 DIAGNOSIS — E66.01 MORBID OBESITY WITH BMI OF 40.0-44.9, ADULT: Primary | ICD-10-CM

## 2022-11-11 DIAGNOSIS — F33.42 RECURRENT MAJOR DEPRESSIVE DISORDER, IN FULL REMISSION: ICD-10-CM

## 2022-11-11 DIAGNOSIS — C61 PROSTATE CANCER: ICD-10-CM

## 2022-11-11 LAB
ESTIMATED AVG GLUCOSE: 137 MG/DL (ref 68–131)
HBA1C MFR BLD: 6.4 % (ref 4–5.6)

## 2022-11-11 PROCEDURE — 36415 COLL VENOUS BLD VENIPUNCTURE: CPT | Performed by: INTERNAL MEDICINE

## 2022-11-11 PROCEDURE — 3288F FALL RISK ASSESSMENT DOCD: CPT | Mod: HCNC,CPTII,S$GLB, | Performed by: INTERNAL MEDICINE

## 2022-11-11 PROCEDURE — 3074F PR MOST RECENT SYSTOLIC BLOOD PRESSURE < 130 MM HG: ICD-10-PCS | Mod: HCNC,CPTII,S$GLB, | Performed by: INTERNAL MEDICINE

## 2022-11-11 PROCEDURE — 3072F PR LOW RISK FOR RETINOPATHY: ICD-10-PCS | Mod: HCNC,CPTII,S$GLB, | Performed by: INTERNAL MEDICINE

## 2022-11-11 PROCEDURE — 1157F PR ADVANCE CARE PLAN OR EQUIV PRESENT IN MEDICAL RECORD: ICD-10-PCS | Mod: HCNC,CPTII,S$GLB, | Performed by: INTERNAL MEDICINE

## 2022-11-11 PROCEDURE — 91312 COVID-19, MRNA, LNP-S, BIVALENT BOOSTER, PF, 30 MCG/0.3 ML DOSE: CPT | Mod: S$GLB,,, | Performed by: INTERNAL MEDICINE

## 2022-11-11 PROCEDURE — 99999 PR PBB SHADOW E&M-EST. PATIENT-LVL IV: ICD-10-PCS | Mod: PBBFAC,HCNC,, | Performed by: INTERNAL MEDICINE

## 2022-11-11 PROCEDURE — 3288F PR FALLS RISK ASSESSMENT DOCUMENTED: ICD-10-PCS | Mod: HCNC,CPTII,S$GLB, | Performed by: INTERNAL MEDICINE

## 2022-11-11 PROCEDURE — 3074F SYST BP LT 130 MM HG: CPT | Mod: HCNC,CPTII,S$GLB, | Performed by: INTERNAL MEDICINE

## 2022-11-11 PROCEDURE — 91312 COVID-19, MRNA, LNP-S, BIVALENT BOOSTER, PF, 30 MCG/0.3 ML DOSE: ICD-10-PCS | Mod: S$GLB,,, | Performed by: INTERNAL MEDICINE

## 2022-11-11 PROCEDURE — 1157F ADVNC CARE PLAN IN RCRD: CPT | Mod: HCNC,CPTII,S$GLB, | Performed by: INTERNAL MEDICINE

## 2022-11-11 PROCEDURE — 99214 OFFICE O/P EST MOD 30 MIN: CPT | Mod: HCNC,S$GLB,, | Performed by: INTERNAL MEDICINE

## 2022-11-11 PROCEDURE — 0124A COVID-19, MRNA, LNP-S, BIVALENT BOOSTER, PF, 30 MCG/0.3 ML DOSE: CPT | Mod: CV19,PBBFAC | Performed by: INTERNAL MEDICINE

## 2022-11-11 PROCEDURE — 99214 PR OFFICE/OUTPT VISIT, EST, LEVL IV, 30-39 MIN: ICD-10-PCS | Mod: HCNC,S$GLB,, | Performed by: INTERNAL MEDICINE

## 2022-11-11 PROCEDURE — 3078F PR MOST RECENT DIASTOLIC BLOOD PRESSURE < 80 MM HG: ICD-10-PCS | Mod: HCNC,CPTII,S$GLB, | Performed by: INTERNAL MEDICINE

## 2022-11-11 PROCEDURE — 1125F AMNT PAIN NOTED PAIN PRSNT: CPT | Mod: HCNC,CPTII,S$GLB, | Performed by: INTERNAL MEDICINE

## 2022-11-11 PROCEDURE — 1101F PR PT FALLS ASSESS DOC 0-1 FALLS W/OUT INJ PAST YR: ICD-10-PCS | Mod: HCNC,CPTII,S$GLB, | Performed by: INTERNAL MEDICINE

## 2022-11-11 PROCEDURE — 99499 UNLISTED E&M SERVICE: CPT | Mod: HCNC,S$GLB,, | Performed by: INTERNAL MEDICINE

## 2022-11-11 PROCEDURE — 1101F PT FALLS ASSESS-DOCD LE1/YR: CPT | Mod: HCNC,CPTII,S$GLB, | Performed by: INTERNAL MEDICINE

## 2022-11-11 PROCEDURE — 99999 PR PBB SHADOW E&M-EST. PATIENT-LVL IV: CPT | Mod: PBBFAC,HCNC,, | Performed by: INTERNAL MEDICINE

## 2022-11-11 PROCEDURE — 1125F PR PAIN SEVERITY QUANTIFIED, PAIN PRESENT: ICD-10-PCS | Mod: HCNC,CPTII,S$GLB, | Performed by: INTERNAL MEDICINE

## 2022-11-11 PROCEDURE — 3072F LOW RISK FOR RETINOPATHY: CPT | Mod: HCNC,CPTII,S$GLB, | Performed by: INTERNAL MEDICINE

## 2022-11-11 PROCEDURE — 83036 HEMOGLOBIN GLYCOSYLATED A1C: CPT | Performed by: INTERNAL MEDICINE

## 2022-11-11 PROCEDURE — 99499 RISK ADDL DX/OHS AUDIT: ICD-10-PCS | Mod: HCNC,S$GLB,, | Performed by: INTERNAL MEDICINE

## 2022-11-11 PROCEDURE — 3078F DIAST BP <80 MM HG: CPT | Mod: HCNC,CPTII,S$GLB, | Performed by: INTERNAL MEDICINE

## 2022-11-11 RX ORDER — SERTRALINE HYDROCHLORIDE 50 MG/1
50 TABLET, FILM COATED ORAL EVERY MORNING
Qty: 90 TABLET | Refills: 3 | Status: SHIPPED | OUTPATIENT
Start: 2022-11-11 | End: 2024-01-24 | Stop reason: SDUPTHER

## 2022-11-11 RX ORDER — DONEPEZIL HYDROCHLORIDE 10 MG/1
10 TABLET, FILM COATED ORAL NIGHTLY
Qty: 90 TABLET | Refills: 3 | Status: SHIPPED | OUTPATIENT
Start: 2022-11-11 | End: 2024-01-19 | Stop reason: SDUPTHER

## 2022-11-11 RX ORDER — METFORMIN HYDROCHLORIDE 500 MG/1
500 TABLET ORAL NIGHTLY
Qty: 90 TABLET | Refills: 3 | Status: SHIPPED | OUTPATIENT
Start: 2022-11-11 | End: 2023-05-12

## 2022-11-11 NOTE — PROGRESS NOTES
Subjective:       Patient ID: Elan Yepez is a 82 y.o. male.    Chief Complaint: Follow-up      HPI  Elan Yepez is a 82 y.o. year old male established patient with CAD, t2DM, HTN, HLD, asbestos exposure, prostate cancer, gait abnormality, amnestic MCI presents for follow up. Doing well since last visit, needs refills.     Review of Systems   Constitutional:  Negative for activity change, appetite change, chills, fatigue, fever and unexpected weight change.   HENT:  Negative for congestion, rhinorrhea and sore throat.    Eyes:  Negative for visual disturbance.   Respiratory:  Negative for shortness of breath.    Cardiovascular:  Negative for chest pain.   Gastrointestinal:  Negative for abdominal pain, diarrhea, nausea and vomiting.   Genitourinary:  Negative for difficulty urinating and dysuria.   Musculoskeletal:  Negative for arthralgias, back pain and myalgias.   Skin:  Negative for color change and rash.   Neurological:  Negative for dizziness, weakness and headaches.   Psychiatric/Behavioral:          Memory issues       Past Medical History:   Diagnosis Date    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  Began Aricept 10 mg at bedtime.    Asbestos exposure 5/5/2016    BMI 40.0-44.9, adult     Colon polyp     Coronary artery calcification 3/18/2021    Diabetes mellitus     diet management    Diabetes mellitus, type 2     Dysmetabolic syndrome X 10/2/2012    Hyperlipidemia     Hypertension     diet managemen    Joint pain     MDD (major depressive disorder) 4/7/2015    Midline low back pain without sciatica 5/5/2016    Midline low back pain without sciatica 5/5/2016    Polyneuropathy     Primary osteoarthritis of right knee 2/20/2018    Prostate CA     Pulmonary nodule, stable to 2011 6/10/2020    01/2020 CT There are stable subcentimeter noncalcified pulmonary nodules the largest of which is located within the  left upper lobe anteriorly and measures approximately 6 mm.. Stable to 2011    Renal manifestation of secondary diabetes mellitus     Sleep apnea     cipap    Trouble in sleeping     Type II or unspecified type diabetes mellitus with neurological manifestations, not stated as uncontrolled(250.60)     Urinary incontinence         Prior to Admission medications    Medication Sig Start Date End Date Taking? Authorizing Provider   aspirin (ECOTRIN) 81 MG EC tablet Take 1 tablet (81 mg total) by mouth every evening. 6/22/17  Yes Liane Tijerina MD   blood sugar diagnostic (TRUE METRIX GLUCOSE TEST STRIP) Strp Test blood glucose once daily 3/24/22  Yes Elan Espinoza MD   cholecalciferol, vitamin D3, 5,000 unit capsule Take 1 capsule by mouth Daily.   Yes Historical Provider   lancets (SOFT TOUCH LANCETS) Misc 1 Device by Misc.(Non-Drug; Combo Route) route once daily. 2/17/20  Yes Liane Tijerina MD   mirabegron (MYRBETRIQ) 50 mg Tb24 Take 1 tablet (50 mg total) by mouth once daily. 8/4/22 8/4/23 Yes Vijay Zapata MD   MULTIVITS-MINERALS/FA/LYCOPENE (ONE-A-DAY MEN'S ORAL) Take 1 tablet by mouth Daily.   Yes Historical Provider   TRUE METRIX GLUCOSE METER [facility] [place]  9/16/16  Yes Historical Provider   metFORMIN (GLUCOPHAGE) 500 MG tablet Take 1 tablet (500 mg total) by mouth nightly. 9/10/21 11/11/22 Yes Liane Tijerina MD   donepeziL (ARICEPT) 10 MG tablet Take 1 tablet (10 mg total) by mouth every evening. 11/11/22 11/11/23  Ealn Espinoza MD   metFORMIN (GLUCOPHAGE) 500 MG tablet Take 1 tablet (500 mg total) by mouth nightly. 11/11/22   Elan Espinoza MD   sertraline (ZOLOFT) 50 MG tablet Take 1 tablet (50 mg total) by mouth every morning. Increase energy and boost mood 11/11/22 11/11/23  Elan Espinoza MD   donepeziL (ARICEPT) 10 MG tablet Take 1 tablet (10 mg total) by mouth every evening. 9/10/21 11/11/22  Liane Tijerina MD   sertraline (ZOLOFT) 50 MG tablet Take 1 tablet (50 mg total) by mouth every morning.  Increase energy and boost mood 9/10/21 11/11/22  Liane Tijerina MD        Past medical history, surgical history, and family medical history reviewed and updated as appropriate.    Medications and allergies reviewed.     Objective:          Vitals:    11/11/22 1354   BP: 110/60   BP Location: Right arm   Patient Position: Sitting   Pulse: 93   SpO2: 95%   Weight: 136 kg (299 lb 13.2 oz)   Height: 6' (1.829 m)     Body mass index is 40.66 kg/m².  Physical Exam  Constitutional:       General: He is not in acute distress.     Appearance: He is well-developed.   HENT:      Head: Normocephalic and atraumatic.      Nose: Nose normal.   Eyes:      General: No scleral icterus.     Extraocular Movements: Extraocular movements intact.   Neck:      Thyroid: No thyromegaly.      Vascular: No JVD.      Trachea: No tracheal deviation.   Cardiovascular:      Rate and Rhythm: Normal rate and regular rhythm.      Heart sounds: Normal heart sounds. No murmur heard.    No friction rub. No gallop.   Pulmonary:      Effort: Pulmonary effort is normal. No respiratory distress.      Breath sounds: Normal breath sounds. No wheezing or rales.   Abdominal:      General: Bowel sounds are normal. There is no distension.      Palpations: Abdomen is soft. There is no mass.      Tenderness: There is no abdominal tenderness.   Musculoskeletal:         General: No tenderness. Normal range of motion.      Cervical back: Normal range of motion and neck supple.   Lymphadenopathy:      Cervical: No cervical adenopathy.   Skin:     General: Skin is warm and dry.      Findings: No rash.   Neurological:      Mental Status: He is alert and oriented to person, place, and time.      Cranial Nerves: No cranial nerve deficit.      Deep Tendon Reflexes: Reflexes normal.   Psychiatric:         Behavior: Behavior normal.       Lab Results   Component Value Date    WBC 8.21 03/16/2022    HGB 12.7 (L) 03/16/2022    HCT 40.1 03/16/2022     03/16/2022     CHOL 217 (H) 03/16/2022    TRIG 162 (H) 03/16/2022    HDL 45 03/16/2022    ALT 14 09/10/2021    AST 16 09/10/2021     03/16/2022    K 4.3 03/16/2022     03/16/2022    CREATININE 1.0 03/16/2022    BUN 14 03/16/2022    CO2 21 (L) 03/16/2022    TSH 1.855 03/16/2022    PSA <0.01 03/16/2022    INR 1.0 08/14/2019    HGBA1C 6.6 (H) 03/16/2022       Assessment:       1. Morbid obesity with BMI of 40.0-44.9, adult    2. Conductive hearing loss, bilateral    3. Type 2 diabetes mellitus with diabetic neuropathy, without long-term current use of insulin    4. Recurrent major depressive disorder, in full remission    5. Amnestic MCI (mild cognitive impairment with memory loss)          Plan:     Elan was seen today for follow-up.    Diagnoses and all orders for this visit:    Morbid obesity with BMI of 40.0-44.9, adult    Conductive hearing loss, bilateral  -     Ambulatory referral/consult to Audiology; Future    Type 2 diabetes mellitus with diabetic neuropathy, without long-term current use of insulin  Comments:  last a1c 6.6; on metformin, recheck a1c today.   Orders:  -     HEMOGLOBIN A1C; Future  -     metFORMIN (GLUCOPHAGE) 500 MG tablet; Take 1 tablet (500 mg total) by mouth nightly.    Recurrent major depressive disorder, in full remission  -     sertraline (ZOLOFT) 50 MG tablet; Take 1 tablet (50 mg total) by mouth every morning. Increase energy and boost mood    Amnestic MCI (mild cognitive impairment with memory loss)  -     donepeziL (ARICEPT) 10 MG tablet; Take 1 tablet (10 mg total) by mouth every evening.    Dementia without behavioral disturbance, psychotic disturbance, mood disturbance, or anxiety, unspecified dementia severity, unspecified dementia type    Prostate cancer  Comments:  2002; no LUTS, follow annual PSA    Aortic atherosclerosis    Declines colonsocopy.    Health maintenance reviewed with patient. Declines shingrix at this time.    Follow up in about 6 months (around  5/11/2023).    Elan Espinoza MD  Internal Medicine / Primary Care  [facility]  11/11/2022

## 2022-11-11 NOTE — PATIENT INSTRUCTIONS
Flu shot today  Labwork today    Schedule audiology appointment    Refills of your medications sent to [facility] in [place]    Return to clinic in 6 months or sooner if needed.

## 2022-11-14 ENCOUNTER — TELEPHONE (OUTPATIENT)
Dept: INTERNAL MEDICINE | Facility: CLINIC | Age: 82
End: 2022-11-14
Payer: MEDICARE

## 2022-11-14 ENCOUNTER — TELEPHONE (OUTPATIENT)
Dept: OPTOMETRY | Facility: CLINIC | Age: 82
End: 2022-11-14
Payer: MEDICARE

## 2022-11-14 NOTE — TELEPHONE ENCOUNTER
----- Message from Chen Orozco MA sent at 11/14/2022  8:28 AM CST -----    ----- Message -----  From: Elan Espinoza MD  Sent: 11/12/2022  11:02 AM CST  To: Alexis Ansari Staff    Labs reviewed, abnormalities noted but no changes required to management, follow up as scheduled.

## 2022-11-17 ENCOUNTER — OFFICE VISIT (OUTPATIENT)
Dept: UROLOGY | Facility: CLINIC | Age: 82
End: 2022-11-17
Payer: MEDICARE

## 2022-11-17 VITALS
SYSTOLIC BLOOD PRESSURE: 132 MMHG | WEIGHT: 313.06 LBS | HEIGHT: 72 IN | DIASTOLIC BLOOD PRESSURE: 79 MMHG | BODY MASS INDEX: 42.4 KG/M2 | HEART RATE: 80 BPM

## 2022-11-17 DIAGNOSIS — N32.81 OAB (OVERACTIVE BLADDER): Primary | ICD-10-CM

## 2022-11-17 PROCEDURE — 1157F PR ADVANCE CARE PLAN OR EQUIV PRESENT IN MEDICAL RECORD: ICD-10-PCS | Mod: CPTII,S$GLB,, | Performed by: UROLOGY

## 2022-11-17 PROCEDURE — 1159F MED LIST DOCD IN RCRD: CPT | Mod: CPTII,S$GLB,, | Performed by: UROLOGY

## 2022-11-17 PROCEDURE — 3078F DIAST BP <80 MM HG: CPT | Mod: CPTII,S$GLB,, | Performed by: UROLOGY

## 2022-11-17 PROCEDURE — 3288F PR FALLS RISK ASSESSMENT DOCUMENTED: ICD-10-PCS | Mod: CPTII,S$GLB,, | Performed by: UROLOGY

## 2022-11-17 PROCEDURE — 99999 PR PBB SHADOW E&M-EST. PATIENT-LVL III: CPT | Mod: PBBFAC,,, | Performed by: UROLOGY

## 2022-11-17 PROCEDURE — 1101F PR PT FALLS ASSESS DOC 0-1 FALLS W/OUT INJ PAST YR: ICD-10-PCS | Mod: CPTII,S$GLB,, | Performed by: UROLOGY

## 2022-11-17 PROCEDURE — 3078F PR MOST RECENT DIASTOLIC BLOOD PRESSURE < 80 MM HG: ICD-10-PCS | Mod: CPTII,S$GLB,, | Performed by: UROLOGY

## 2022-11-17 PROCEDURE — 1126F AMNT PAIN NOTED NONE PRSNT: CPT | Mod: CPTII,S$GLB,, | Performed by: UROLOGY

## 2022-11-17 PROCEDURE — 1157F ADVNC CARE PLAN IN RCRD: CPT | Mod: CPTII,S$GLB,, | Performed by: UROLOGY

## 2022-11-17 PROCEDURE — 3072F PR LOW RISK FOR RETINOPATHY: ICD-10-PCS | Mod: CPTII,S$GLB,, | Performed by: UROLOGY

## 2022-11-17 PROCEDURE — 1159F PR MEDICATION LIST DOCUMENTED IN MEDICAL RECORD: ICD-10-PCS | Mod: CPTII,S$GLB,, | Performed by: UROLOGY

## 2022-11-17 PROCEDURE — 1126F PR PAIN SEVERITY QUANTIFIED, NO PAIN PRESENT: ICD-10-PCS | Mod: CPTII,S$GLB,, | Performed by: UROLOGY

## 2022-11-17 PROCEDURE — 99213 PR OFFICE/OUTPT VISIT, EST, LEVL III, 20-29 MIN: ICD-10-PCS | Mod: S$GLB,,, | Performed by: UROLOGY

## 2022-11-17 PROCEDURE — 99999 PR PBB SHADOW E&M-EST. PATIENT-LVL III: ICD-10-PCS | Mod: PBBFAC,,, | Performed by: UROLOGY

## 2022-11-17 PROCEDURE — 3288F FALL RISK ASSESSMENT DOCD: CPT | Mod: CPTII,S$GLB,, | Performed by: UROLOGY

## 2022-11-17 PROCEDURE — 3072F LOW RISK FOR RETINOPATHY: CPT | Mod: CPTII,S$GLB,, | Performed by: UROLOGY

## 2022-11-17 PROCEDURE — 99213 OFFICE O/P EST LOW 20 MIN: CPT | Mod: S$GLB,,, | Performed by: UROLOGY

## 2022-11-17 PROCEDURE — 3075F SYST BP GE 130 - 139MM HG: CPT | Mod: CPTII,S$GLB,, | Performed by: UROLOGY

## 2022-11-17 PROCEDURE — 3075F PR MOST RECENT SYSTOLIC BLOOD PRESS GE 130-139MM HG: ICD-10-PCS | Mod: CPTII,S$GLB,, | Performed by: UROLOGY

## 2022-11-17 PROCEDURE — 1101F PT FALLS ASSESS-DOCD LE1/YR: CPT | Mod: CPTII,S$GLB,, | Performed by: UROLOGY

## 2022-11-17 NOTE — PROGRESS NOTES
[facility]      Overactive Bladder Return Note    11/17/2022    Patient Name: Elan Yepez  Referred by:No ref. provider found        Summary of OAB Findings:    Baseline Last Visit  Today   Treatment:  None Treatment:  Myrbetriq 25 mg Treatment: mirabegron (Myrbetriq) 50 mg   Side Effects: none Side Effects: none Side Effects: none   Daytime Frequency: 4-5x daily Daytime Frequency: 3-4x daily Daytime Frequency: 3-4x daily   Noturia: 3-4x Noturia: 3-4x Nocturia: 2-3x   UUI Episodes: 4 UUI Episodes: 2 UUI Episodes: 1   Daily Pads: 4/day Daily Pads: 2/day Daily Pads: 1/day   Voiding Difficulty: no Voiding Difficulty: no Voiding Difficulty: no   PVR:  0ml (scan) PVR:  0ml (scan) PVR: N/R     A review of 10+ systems was conducted with pertinent positive and negative findings documented in HPI with all other systems reviewed and negative.    Past medical, family, surgical and social history reviewed as documented in chart with pertinent positive medical, family, surgical and social history detailed in HPI.    Const: no acute distress, conversant and alert  Eyes: anicteric, extraocular muscles intact  ENMT: normocephalic, Nl oral membranes  Cardio: no cyanosis, nl cap refill  Pulm: no tachypnea; no resp distress  Abd: soft, no tenderness  Musc: no laceration, no tenderness  Neuro: alert; oriented x 3  Skin: warm, dry; no petichiae  Psych: no anxiety; normal speech     Assessment/Plan:    Overactive Bladder with Urgency Incontinence (estab,improved): His history is suggestive of Overactive Bladder (OAB) with predominantly Urgency Urinary Incontinence (UUI). He is significantly improved and is satisfied with the current treatment.      We will continue current therapy.   He is stable and improved on pharmacologic therapy. No need for additional urologic intervention. Follow with primary care for continued Myrbetriq 50 mg.

## 2022-11-22 ENCOUNTER — IMMUNIZATION (OUTPATIENT)
Dept: INTERNAL MEDICINE | Facility: CLINIC | Age: 82
End: 2022-11-22
Payer: MEDICARE

## 2022-11-22 PROCEDURE — G0008 ADMIN INFLUENZA VIRUS VAC: HCPCS | Mod: S$GLB,,, | Performed by: FAMILY MEDICINE

## 2022-11-22 PROCEDURE — 90694 FLU VACCINE - QUADRIVALENT - ADJUVANTED: ICD-10-PCS | Mod: S$GLB,,, | Performed by: FAMILY MEDICINE

## 2022-11-22 PROCEDURE — G0008 FLU VACCINE - QUADRIVALENT - ADJUVANTED: ICD-10-PCS | Mod: S$GLB,,, | Performed by: FAMILY MEDICINE

## 2022-11-22 PROCEDURE — 90694 VACC AIIV4 NO PRSRV 0.5ML IM: CPT | Mod: S$GLB,,, | Performed by: FAMILY MEDICINE

## 2022-11-25 ENCOUNTER — PES CALL (OUTPATIENT)
Dept: ADMINISTRATIVE | Facility: CLINIC | Age: 82
End: 2022-11-25
Payer: MEDICARE

## 2022-12-07 ENCOUNTER — OFFICE VISIT (OUTPATIENT)
Dept: INTERNAL MEDICINE | Facility: CLINIC | Age: 82
End: 2022-12-07
Payer: MEDICARE

## 2022-12-07 VITALS
HEIGHT: 72 IN | SYSTOLIC BLOOD PRESSURE: 126 MMHG | TEMPERATURE: 98 F | HEART RATE: 80 BPM | OXYGEN SATURATION: 97 % | BODY MASS INDEX: 42.66 KG/M2 | RESPIRATION RATE: 18 BRPM | DIASTOLIC BLOOD PRESSURE: 80 MMHG | WEIGHT: 315 LBS

## 2022-12-07 DIAGNOSIS — E66.01 MORBID OBESITY WITH BMI OF 40.0-44.9, ADULT: ICD-10-CM

## 2022-12-07 DIAGNOSIS — H40.013 OAG (OPEN ANGLE GLAUCOMA) SUSPECT, LOW RISK, BILATERAL: ICD-10-CM

## 2022-12-07 DIAGNOSIS — N32.81 OAB (OVERACTIVE BLADDER): ICD-10-CM

## 2022-12-07 DIAGNOSIS — H90.0 CONDUCTIVE HEARING LOSS, BILATERAL: ICD-10-CM

## 2022-12-07 DIAGNOSIS — E78.2 MIXED HYPERLIPIDEMIA: ICD-10-CM

## 2022-12-07 DIAGNOSIS — M17.11 PRIMARY OSTEOARTHRITIS OF RIGHT KNEE: ICD-10-CM

## 2022-12-07 DIAGNOSIS — I70.0 AORTIC ATHEROSCLEROSIS: ICD-10-CM

## 2022-12-07 DIAGNOSIS — I25.84 CORONARY ARTERY CALCIFICATION: ICD-10-CM

## 2022-12-07 DIAGNOSIS — E11.40 TYPE 2 DIABETES MELLITUS WITH DIABETIC NEUROPATHY, WITHOUT LONG-TERM CURRENT USE OF INSULIN: ICD-10-CM

## 2022-12-07 DIAGNOSIS — Z85.46 HISTORY OF PROSTATE CANCER: ICD-10-CM

## 2022-12-07 DIAGNOSIS — Z99.89 DEPENDENCE ON OTHER ENABLING MACHINES AND DEVICES: ICD-10-CM

## 2022-12-07 DIAGNOSIS — N52.9 ERECTILE DYSFUNCTION, UNSPECIFIED ERECTILE DYSFUNCTION TYPE: ICD-10-CM

## 2022-12-07 DIAGNOSIS — G31.84 AMNESTIC MCI (MILD COGNITIVE IMPAIRMENT WITH MEMORY LOSS): ICD-10-CM

## 2022-12-07 DIAGNOSIS — G47.33 OSA ON CPAP: ICD-10-CM

## 2022-12-07 DIAGNOSIS — F33.42 RECURRENT MAJOR DEPRESSIVE DISORDER, IN FULL REMISSION: ICD-10-CM

## 2022-12-07 DIAGNOSIS — E11.21 TYPE 2 DIABETES MELLITUS WITH DIABETIC NEPHROPATHY, WITHOUT LONG-TERM CURRENT USE OF INSULIN: ICD-10-CM

## 2022-12-07 DIAGNOSIS — Z00.00 ENCOUNTER FOR PREVENTIVE HEALTH EXAMINATION: Primary | ICD-10-CM

## 2022-12-07 DIAGNOSIS — I25.10 CORONARY ARTERY CALCIFICATION: ICD-10-CM

## 2022-12-07 PROCEDURE — 1160F RVW MEDS BY RX/DR IN RCRD: CPT | Mod: CPTII,S$GLB,, | Performed by: NURSE PRACTITIONER

## 2022-12-07 PROCEDURE — 1157F PR ADVANCE CARE PLAN OR EQUIV PRESENT IN MEDICAL RECORD: ICD-10-PCS | Mod: CPTII,S$GLB,, | Performed by: NURSE PRACTITIONER

## 2022-12-07 PROCEDURE — 3074F PR MOST RECENT SYSTOLIC BLOOD PRESSURE < 130 MM HG: ICD-10-PCS | Mod: CPTII,S$GLB,, | Performed by: NURSE PRACTITIONER

## 2022-12-07 PROCEDURE — 1101F PR PT FALLS ASSESS DOC 0-1 FALLS W/OUT INJ PAST YR: ICD-10-PCS | Mod: CPTII,S$GLB,, | Performed by: NURSE PRACTITIONER

## 2022-12-07 PROCEDURE — 1159F MED LIST DOCD IN RCRD: CPT | Mod: CPTII,S$GLB,, | Performed by: NURSE PRACTITIONER

## 2022-12-07 PROCEDURE — 1101F PT FALLS ASSESS-DOCD LE1/YR: CPT | Mod: CPTII,S$GLB,, | Performed by: NURSE PRACTITIONER

## 2022-12-07 PROCEDURE — 3288F PR FALLS RISK ASSESSMENT DOCUMENTED: ICD-10-PCS | Mod: CPTII,S$GLB,, | Performed by: NURSE PRACTITIONER

## 2022-12-07 PROCEDURE — 1170F FXNL STATUS ASSESSED: CPT | Mod: CPTII,S$GLB,, | Performed by: NURSE PRACTITIONER

## 2022-12-07 PROCEDURE — 99999 PR PBB SHADOW E&M-EST. PATIENT-LVL V: CPT | Mod: PBBFAC,,, | Performed by: NURSE PRACTITIONER

## 2022-12-07 PROCEDURE — 1126F AMNT PAIN NOTED NONE PRSNT: CPT | Mod: CPTII,S$GLB,, | Performed by: NURSE PRACTITIONER

## 2022-12-07 PROCEDURE — 3079F DIAST BP 80-89 MM HG: CPT | Mod: CPTII,S$GLB,, | Performed by: NURSE PRACTITIONER

## 2022-12-07 PROCEDURE — 3074F SYST BP LT 130 MM HG: CPT | Mod: CPTII,S$GLB,, | Performed by: NURSE PRACTITIONER

## 2022-12-07 PROCEDURE — 99499 RISK ADDL DX/OHS AUDIT: ICD-10-PCS | Mod: HCNC,S$GLB,, | Performed by: NURSE PRACTITIONER

## 2022-12-07 PROCEDURE — 99499 UNLISTED E&M SERVICE: CPT | Mod: HCNC,S$GLB,, | Performed by: NURSE PRACTITIONER

## 2022-12-07 PROCEDURE — 3079F PR MOST RECENT DIASTOLIC BLOOD PRESSURE 80-89 MM HG: ICD-10-PCS | Mod: CPTII,S$GLB,, | Performed by: NURSE PRACTITIONER

## 2022-12-07 PROCEDURE — 3072F PR LOW RISK FOR RETINOPATHY: ICD-10-PCS | Mod: CPTII,S$GLB,, | Performed by: NURSE PRACTITIONER

## 2022-12-07 PROCEDURE — 3072F LOW RISK FOR RETINOPATHY: CPT | Mod: CPTII,S$GLB,, | Performed by: NURSE PRACTITIONER

## 2022-12-07 PROCEDURE — G0439 PR MEDICARE ANNUAL WELLNESS SUBSEQUENT VISIT: ICD-10-PCS | Mod: S$GLB,,, | Performed by: NURSE PRACTITIONER

## 2022-12-07 PROCEDURE — G0439 PPPS, SUBSEQ VISIT: HCPCS | Mod: S$GLB,,, | Performed by: NURSE PRACTITIONER

## 2022-12-07 PROCEDURE — 1159F PR MEDICATION LIST DOCUMENTED IN MEDICAL RECORD: ICD-10-PCS | Mod: CPTII,S$GLB,, | Performed by: NURSE PRACTITIONER

## 2022-12-07 PROCEDURE — 99999 PR PBB SHADOW E&M-EST. PATIENT-LVL V: ICD-10-PCS | Mod: PBBFAC,,, | Performed by: NURSE PRACTITIONER

## 2022-12-07 PROCEDURE — 1170F PR FUNCTIONAL STATUS ASSESSED: ICD-10-PCS | Mod: CPTII,S$GLB,, | Performed by: NURSE PRACTITIONER

## 2022-12-07 PROCEDURE — 1157F ADVNC CARE PLAN IN RCRD: CPT | Mod: CPTII,S$GLB,, | Performed by: NURSE PRACTITIONER

## 2022-12-07 PROCEDURE — 1160F PR REVIEW ALL MEDS BY PRESCRIBER/CLIN PHARMACIST DOCUMENTED: ICD-10-PCS | Mod: CPTII,S$GLB,, | Performed by: NURSE PRACTITIONER

## 2022-12-07 PROCEDURE — 1126F PR PAIN SEVERITY QUANTIFIED, NO PAIN PRESENT: ICD-10-PCS | Mod: CPTII,S$GLB,, | Performed by: NURSE PRACTITIONER

## 2022-12-07 PROCEDURE — 3288F FALL RISK ASSESSMENT DOCD: CPT | Mod: CPTII,S$GLB,, | Performed by: NURSE PRACTITIONER

## 2022-12-07 NOTE — PATIENT INSTRUCTIONS
Counseling and Referral of Other Preventative  (Italic type indicates deductible and co-insurance are waived)    Patient Name: Elan Yepez  Today's Date: 12/7/2022    Health Maintenance       Date Due Completion Date    Shingles Vaccine (1 of 2) 07/28/2010 6/2/2010    Colonoscopy 02/04/2017 2/4/2014    Diabetes Urine Screening 03/16/2023 3/16/2022    Lipid Panel 03/16/2023 3/16/2022    Override on 5/23/2018: Not Clinically Appropriate    Hemoglobin A1c 05/11/2023 11/11/2022    Eye Exam 10/14/2023 10/14/2022    TETANUS VACCINE 06/25/2028 6/25/2018 (Declined)    Override on 6/25/2018: Declined        No orders of the defined types were placed in this encounter.      The following information is provided to all patients.  This information is to help you find resources for any of the problems found today that may be affecting your health:                Living healthy guide: www.[facility].[place].[facility]      Understanding Diabetes: www.diabetes.org      Eating healthy: www.cdc.gov/healthyweight      [facility] home safety checklist: www.cdc.gov/steadi/patient.html      Agency on Aging: www.goea.[place].[facility]      Alcoholics anonymous (AA): www.aa.org      Physical Activity: www.anup.nih.gov/hj0exzc      Tobacco use: www.quitwithusla.org

## 2022-12-07 NOTE — PROGRESS NOTES
Elan Yepez presented for a follow-up Medicare AWV today. The following components were reviewed and updated:    Medical history  Family History  Social history  Allergies and Current Medications  Health Risk Assessment  Health Maintenance  Care Team    **See Completed Assessments for Annual Wellness visit with in the encounter summary    The following assessments were completed:  Depression Screening  Cognitive function Screening  Timed Get Up Test  Whisper Test          Vitals:    12/07/22 1313   BP: 126/80   BP Location: Right arm   Pulse: 80   Resp: 18   Temp: 97.8 °F (36.6 °C)   TempSrc: Temporal   SpO2: 97%   Weight: (!) 144 kg (317 lb 7.4 oz)   Height: 6' (1.829 m)     Body mass index is 43.06 kg/m².   ]    Physical Exam  Vitals reviewed.   Constitutional:       Appearance: Normal appearance. He is obese.      Comments: Ambulating with cane.    HENT:      Head: Normocephalic and atraumatic.   Cardiovascular:      Rate and Rhythm: Normal rate and regular rhythm.      Pulses: Normal pulses.      Heart sounds: Normal heart sounds.   Pulmonary:      Effort: Pulmonary effort is normal.      Breath sounds: Normal breath sounds.   Abdominal:      General: Bowel sounds are normal.      Palpations: Abdomen is soft.   Musculoskeletal:      Right knee: Decreased range of motion.   Skin:     General: Skin is warm and dry.      Capillary Refill: Capillary refill takes less than 2 seconds.   Neurological:      General: No focal deficit present.      Mental Status: He is alert and oriented to person, place, and time.   Psychiatric:         Mood and Affect: Mood normal.         Behavior: Behavior normal.         Thought Content: Thought content normal.         Judgment: Judgment normal.       Diagnoses and health risks identified today and associated recommendations/orders:  1. Encounter for preventive health examination  Reviewed and discussed preventive health screenings and vaccinations with the patient.   Declined  colonoscopy - encouraged patient to further discuss with PCP     2. Type 2 diabetes mellitus with diabetic neuropathy, without long-term current use of insulin  Stable on diabetes regimen - metformin. Continue current treatment plan as previously prescribed with your PCP    Diabetes Management Status    Statin: Not taking  ACE/ARB: Not taking    Screening or Prevention Patient's value Goal Complete/Controlled?   HgA1C Testing and Control   Lab Results   Component Value Date    HGBA1C 6.4 (H) 11/11/2022      Annually/Less than 8% Yes     Lipid profile : 03/16/2022 Annually Yes     LDL control Lab Results   Component Value Date    LDLCALC 139.6 03/16/2022    Annually/Less than 100 mg/dl  No     Nephropathy screening Lab Results   Component Value Date    LABMICR 61.0 03/16/2022     Lab Results   Component Value Date    PROTEINUA Negative 03/16/2022    Annually Yes     Blood pressure BP Readings from Last 1 Encounters:   12/07/22 126/80    Less than 140/90 Yes     Dilated retinal exam : 10/14/2022 Annually Yes     Foot exam   Most Recent Foot Exam Date: Not Found Annually Yes       Lab Results   Component Value Date    HGBA1C 6.4 (H) 11/11/2022    HGBA1C 6.6 (H) 03/16/2022    HGBA1C 6.3 (H) 09/10/2021    MICALBCREAT 43.3 (H) 03/16/2022    LDLCALC 139.6 03/16/2022       3. Type 2 diabetes mellitus with diabetic nephropathy, without long-term current use of insulin  Microalbumin 43.3 on latest assessment 3/16/2022. Evaluated by PCP and not currently on ACE inhib or ARB with low-normal BP in geriatric patient. Continue current treatment plan as previously prescribed with your PCP    Lab Results   Component Value Date    CREATININE 1.0 03/16/2022    BUN 14 03/16/2022     03/16/2022    K 4.3 03/16/2022     03/16/2022    CO2 21 (L) 03/16/2022       4. Recurrent major depressive disorder, in full remission  Stable on Zoloft. Continue current treatment plan as previously prescribed with your     5. Morbid obesity  with BMI of 40.0-44.9, adult  Discussed comprehensive lifestyle interventions for obesity, including focusing on a diet that is low-fat/low-calorie with emphasis on fresh vegetables, fruits, and lean meats. Discussed increasing physical activity. Patient struggles with cravings. Strongly encouraged and offered referral [facility]. Patient declined referral at this time but will consider in the future.       Wt Readings from Last 3 Encounters:   12/07/22 1313 (!) 144 kg (317 lb 7.4 oz)   11/17/22 1120 (!) 142 kg (313 lb 0.9 oz)   11/11/22 1354 136 kg (299 lb 13.2 oz)     6. Aortic atherosclerosis  Noted on CT of chest 2020: Vascular calcification seen involving the aortic arch.    Stable/asymptomatic on ASA; patient is statin-intolerant. Continue evaluation and management per PCP    7. Coronary artery calcification  Noted CT of chest 2020: Fairly prominent coronary artery calcifications are also present.    Stable/asymptomatic on ASA; patient is statin-intolerant. Continue evaluation and management per PCP    8. Mixed hyperlipidemia  Cholesterol is mildly elevated; patient is statin-intolerant. Evaluated and managed by PCP.     Lab Results   Component Value Date    CHOL 217 (H) 03/16/2022    CHOL 214 (H) 03/03/2021    CHOL 112 (L) 03/21/2017     Lab Results   Component Value Date    HDL 45 03/16/2022    HDL 47 03/03/2021    HDL 37 (L) 03/21/2017     Lab Results   Component Value Date    LDLCALC 139.6 03/16/2022    LDLCALC 147.6 03/03/2021    LDLCALC 58.0 (L) 03/21/2017     Lab Results   Component Value Date    TRIG 162 (H) 03/16/2022    TRIG 97 03/03/2021    TRIG 85 03/21/2017     Lab Results   Component Value Date    CHOLHDL 20.7 03/16/2022    CHOLHDL 22.0 03/03/2021    CHOLHDL 33.0 03/21/2017       9. Conductive hearing loss, bilateral  Stable. Continue current treatment plan as previously prescribed with your PCP    10. Erectile dysfunction, unspecified erectile dysfunction type  Stable.  Continue current treatment plan as previously prescribed with your urologist.     11. History of prostate cancer  Stable, s/p prostatectomy and radiation. Followed by urology.     12. JESSICA on CPAP  Stable on CPAP. Continue current treatment plan as previously prescribed with your pulmonologist     13. Primary osteoarthritis of right knee  Stable. Continue current treatment plan as previously prescribed with your PCP    14. Amnestic MCI (mild cognitive impairment with memory loss)  Stable on Aricept. Continue current treatment plan as previously prescribed with your PCP    15. OAG (open angle glaucoma) suspect, low risk, bilateral  Stable. Continue current treatment plan as previously prescribed with your ophthalmologist.     16. OAB (overactive bladder)   Stable on mirabegron. Continue current treatment plan as previously prescribed with your urologist     17. Dependence on other enabling machines and devices  Ambulates with cane and rolling walker. Denies falls. Discussed fall prevention strategies.     Opioid screen: does not use opioids     Provided Elan with a 5-10 year written screening schedule and personal prevention plan. Recommendations were developed using the USPSTF age appropriate recommendations. Education, counseling, and referrals were provided as needed.  After Visit Summary printed and given to patient which includes a list of additional screenings\tests needed.    Follow up in about 1 year (around 12/7/2023).      Cici Lima NP    I offered to discuss advanced care planning, including how to pick a person who would make decisions for you if you were unable to make them for yourself, called a health care power of , and what kind of decisions you might make such as use of life sustaining treatments such as ventilators and tube feeding when faced with a life limiting illness recorded on a living will that they will need to know. (How you want to be cared for as you near the end of your  natural life)     X Patient is interested in learning more about how to make advanced directives.  I provided them paperwork and offered to discuss this with them.

## 2023-01-09 PROBLEM — F03.90 DEMENTIA WITHOUT BEHAVIORAL DISTURBANCE, PSYCHOTIC DISTURBANCE, MOOD DISTURBANCE, OR ANXIETY, UNSPECIFIED DEMENTIA SEVERITY, UNSPECIFIED DEMENTIA TYPE: Status: ACTIVE | Noted: 2023-01-09

## 2023-01-11 NOTE — PROGRESS NOTES
Patient, Elan Yepez (MRN #[id number]), presented with a recorded BMI of 40.66 kg/m^2 consistent with the definition of morbid obesity (ICD-10 E66.01). The patient's morbid obesity was monitored, evaluated, addressed and/or treated. This addendum to the medical record is made on 01/11/2023.

## 2023-01-12 ENCOUNTER — OFFICE VISIT (OUTPATIENT)
Dept: OTOLARYNGOLOGY | Facility: CLINIC | Age: 83
End: 2023-01-12
Payer: MEDICARE

## 2023-01-12 ENCOUNTER — CLINICAL SUPPORT (OUTPATIENT)
Dept: AUDIOLOGY | Facility: CLINIC | Age: 83
End: 2023-01-12
Payer: MEDICARE

## 2023-01-12 VITALS — SYSTOLIC BLOOD PRESSURE: 143 MMHG | DIASTOLIC BLOOD PRESSURE: 80 MMHG | HEART RATE: 74 BPM

## 2023-01-12 DIAGNOSIS — H90.3 SENSORINEURAL HEARING LOSS (SNHL) OF BOTH EARS: Primary | ICD-10-CM

## 2023-01-12 DIAGNOSIS — H90.3 SENSORINEURAL HEARING LOSS (SNHL), BILATERAL: Primary | ICD-10-CM

## 2023-01-12 PROCEDURE — 3077F PR MOST RECENT SYSTOLIC BLOOD PRESSURE >= 140 MM HG: ICD-10-PCS | Mod: HCNC,CPTII,S$GLB, | Performed by: NURSE PRACTITIONER

## 2023-01-12 PROCEDURE — 1157F ADVNC CARE PLAN IN RCRD: CPT | Mod: HCNC,CPTII,S$GLB, | Performed by: NURSE PRACTITIONER

## 2023-01-12 PROCEDURE — 1126F AMNT PAIN NOTED NONE PRSNT: CPT | Mod: HCNC,CPTII,S$GLB, | Performed by: NURSE PRACTITIONER

## 2023-01-12 PROCEDURE — 3079F PR MOST RECENT DIASTOLIC BLOOD PRESSURE 80-89 MM HG: ICD-10-PCS | Mod: HCNC,CPTII,S$GLB, | Performed by: NURSE PRACTITIONER

## 2023-01-12 PROCEDURE — 3079F DIAST BP 80-89 MM HG: CPT | Mod: HCNC,CPTII,S$GLB, | Performed by: NURSE PRACTITIONER

## 2023-01-12 PROCEDURE — 92567 TYMPANOMETRY: CPT | Mod: HCNC,S$GLB,,

## 2023-01-12 PROCEDURE — 3077F SYST BP >= 140 MM HG: CPT | Mod: HCNC,CPTII,S$GLB, | Performed by: NURSE PRACTITIONER

## 2023-01-12 PROCEDURE — 92557 COMPREHENSIVE HEARING TEST: CPT | Mod: HCNC,S$GLB,,

## 2023-01-12 PROCEDURE — 1126F PR PAIN SEVERITY QUANTIFIED, NO PAIN PRESENT: ICD-10-PCS | Mod: HCNC,CPTII,S$GLB, | Performed by: NURSE PRACTITIONER

## 2023-01-12 PROCEDURE — 92557 PR COMPREHENSIVE HEARING TEST: ICD-10-PCS | Mod: HCNC,S$GLB,,

## 2023-01-12 PROCEDURE — 99999 PR PBB SHADOW E&M-EST. PATIENT-LVL II: ICD-10-PCS | Mod: PBBFAC,HCNC,, | Performed by: NURSE PRACTITIONER

## 2023-01-12 PROCEDURE — 99999 PR PBB SHADOW E&M-EST. PATIENT-LVL I: ICD-10-PCS | Mod: PBBFAC,HCNC,,

## 2023-01-12 PROCEDURE — 3072F LOW RISK FOR RETINOPATHY: CPT | Mod: HCNC,CPTII,S$GLB, | Performed by: NURSE PRACTITIONER

## 2023-01-12 PROCEDURE — 99999 PR PBB SHADOW E&M-EST. PATIENT-LVL I: CPT | Mod: PBBFAC,HCNC,,

## 2023-01-12 PROCEDURE — 1157F PR ADVANCE CARE PLAN OR EQUIV PRESENT IN MEDICAL RECORD: ICD-10-PCS | Mod: HCNC,CPTII,S$GLB, | Performed by: NURSE PRACTITIONER

## 2023-01-12 PROCEDURE — 99999 PR PBB SHADOW E&M-EST. PATIENT-LVL II: CPT | Mod: PBBFAC,HCNC,, | Performed by: NURSE PRACTITIONER

## 2023-01-12 PROCEDURE — 99214 OFFICE O/P EST MOD 30 MIN: CPT | Mod: HCNC,S$GLB,, | Performed by: NURSE PRACTITIONER

## 2023-01-12 PROCEDURE — 92567 PR TYMPA2METRY: ICD-10-PCS | Mod: HCNC,S$GLB,,

## 2023-01-12 PROCEDURE — 3072F PR LOW RISK FOR RETINOPATHY: ICD-10-PCS | Mod: HCNC,CPTII,S$GLB, | Performed by: NURSE PRACTITIONER

## 2023-01-12 PROCEDURE — 99214 PR OFFICE/OUTPT VISIT, EST, LEVL IV, 30-39 MIN: ICD-10-PCS | Mod: HCNC,S$GLB,, | Performed by: NURSE PRACTITIONER

## 2023-01-12 NOTE — PROGRESS NOTES
Subjective:   Elan Yepez is a 82 y.o. male who was self-referred for hearing loss.    Elan Yepez presents to clinic for the evaluation of his hearing.  He has been seen in the ENT department before: most recently a cerumen impaction removal with MIYA Carnes and hearing evaluation with Dr. Wagner in 2017.  He was previously noted to have bilateral SNHL with an asymmetry, no imagining was obtained.  Today he confirms over 8 years of hearing loss.  He denies any difference in hearing between the left and the right ear.  He never obtained hearing amplification after his last audio.  He is now more bothered by his hearing loss.  He denies any otalgia, otorrhea, ear fullness/pressure, tinnitus or vertigo.  There is not a family history of hearing loss at a young age.  There is not a prior history of ear surgery.  There is not a prior history of ear infections.  There is not a history of ear trauma.  He admits to a history of significant noise exposure-worked in the [employer] for over 30 years (used hearing protection).  He does not wear hearing aids currently.  He has not had a hearing test recently.      Past Medical History  He has a past medical history of Amnestic MCI (mild cognitive impairment with memory loss), Asbestos exposure, BMI 40.0-44.9, adult, Colon polyp, Coronary artery calcification, Diabetes mellitus, Diabetes mellitus, type 2, Dysmetabolic syndrome X, Hyperlipidemia, Hypertension, Joint pain, MDD (major depressive disorder), Midline low back pain without sciatica, Midline low back pain without sciatica, Polyneuropathy, Primary osteoarthritis of right knee, Prostate CA, Pulmonary nodule, stable to 2011, Renal manifestation of secondary diabetes mellitus, Sleep apnea, Trouble in sleeping, Type II or unspecified type diabetes mellitus with neurological manifestations, not stated as uncontrolled(250.60), and Urinary incontinence.    Past Surgical History  He has a past surgical history  that includes Prostate surgery; Nasal septum surgery; Colonoscopy (02/04/2014); Laparoscopic cholecystectomy with cholangiography (N/A, 8/12/2019); and Endoscopic ultrasound of upper gastrointestinal tract (N/A, 8/14/2019).    Family History  His family history includes Allergies in his brother; Cancer in his sister; Dementia in his brother; Diabetes in his mother, sister, and sister; Glaucoma in his paternal grandmother; Heart disease in his father and mother; Hypertension in his brother and father; No Known Problems in his sister and son; Thyroid disease in his brother.    Social History  He reports that he has never smoked. He has never used smokeless tobacco. He reports that he does not drink alcohol and does not use drugs.    Allergies  He is allergic to no known drug allergies.    Medications  He has a current medication list which includes the following prescription(s): aspirin, blood sugar diagnostic, cholecalciferol (vitamin d3), donepezil, lancets, metformin, mirabegron, multivit-minerals/fa/lycopene, sertraline, and true metrix glucose meter.  Review of Systems     Constitutional: Positive for fatigue.  Negative for chills and fever.      HENT: Positive for hearing loss.  Negative for ear discharge, ear infection, ear pain and ringing in the ears.      Respiratory:  Negative for cough, shortness of breath and wheezing.      Gastrointestinal:  Negative for abdominal pain, diarrhea and vomiting.     Neurological: Positive for headaches. Negative for dizziness.        Objective:   BP (!) 143/80 (BP Location: Left arm, Patient Position: Sitting)   Pulse 74      Constitutional:   He is oriented to person, place, and time. He appears well-developed and well-nourished. He appears alert. He is cooperative.  Non-toxic appearance. He does not have a sickly appearance. He does not appear ill. Normal speech.      Head:  Normocephalic and atraumatic. Not macrocephalic and not microcephalic. Head is without raccoon's  eyes, without Tucker's sign, without abrasion, without laceration, without right periorbital erythema, without left periorbital erythema and without TMJ tenderness.     Ears:    Right Ear: No lacerations. No drainage, swelling or tenderness. No foreign bodies. No mastoid tenderness. Tympanic membrane is not injected, not scarred, not perforated, not erythematous, not retracted and not bulging. No middle ear effusion. No hemotympanum.   Left Ear: No lacerations. No drainage, swelling or tenderness. No foreign bodies. No mastoid tenderness. Tympanic membrane is not injected, not scarred, not perforated, not erythematous, not retracted and not bulging.  No middle ear effusion. No hemotympanum.   Ears:      Pulmonary/Chest:   Effort normal.     Psychiatric:   He has a normal mood and affect. His speech is normal and behavior is normal.     Neurological:   He is alert and oriented to person, place, and time. Gait (uses walker) abnormal. Coordination normal.   Procedure  None    Data Reviewed  WBC (K/uL)   Date Value   03/16/2022 8.21     Eosinophil % (%)   Date Value   03/16/2022 0.4     Eos # (K/uL)   Date Value   03/16/2022 0.0     Platelets (K/uL)   Date Value   03/16/2022 178     Glucose (mg/dL)   Date Value   03/16/2022 122 (H)     No results found for: IGE  Imaging  No pertinent imaging available.    Audiogram          I independently reviewed the tracings of the complete audiometric evaluation performed today.  I reviewed the audiogram with the patient as well.  Pertinent findings include binaural sloping sensorineural hearing loss.  Assessment:     1. Sensorineural hearing loss (SNHL) of both ears      Plan:   Sensorineural hearing loss (SNHL) of both ears  Audiometric testing interpretation consistent with sensorineural hearing loss.  Discussed the etiology of SNHL.  Medically cleared for hearing amplification, and will follow-up with Audiology if interested.  Hearing conservation in noisy environments.  Return  "to clinic every year for audiometric testing.    Patient also reported an episode of chest discomfort on Sunday that has mostly improved, but still somewhat bothersome.  He states he has felt "off" since Sunday.   He denies any acute CP or SOB at this visit.  I encouraged him to seek out emergency care if he is having chest pain, but should follow-up with his cardiologist or PCP if he is not feeling well.  Patient and wife verbalized understanding.       "

## 2023-01-12 NOTE — PROGRESS NOTES
Elan Yepez was seen today in the clinic for an audiologic evaluation.  Patient's main complaint was decreased hearing, bilaterally. Mr. Yepez reported a history of occupational noise exposure with consistent use of hearing protection in noise. Mr. Yepez denied tinnitus, otalgia, and aural fullness.    Tympanometry revealed Type A in the right ear and Type Ad in the left ear.     Audiogram results revealed a mild precipitously sloping to severe sensorineural hearing loss (SNHL), bilaterally.    Speech reception thresholds were noted at 40 dBHL in the right ear and 45 dBHL in the left ear.    Speech discrimination scores were 72% in the right ear and 76% in the left ear.    Today's results were discussed with the patient and hearing aids were recommended bilaterally. Mr. Yepez is interested in a hearing aid consultation and would like to utilize any insurance benefits he has for hearing aids through [employer]. At this time, our clinic is not able to utilize insurance benefits for hearing aid coverage. I recommended that Mr. Yepez contact his insurance provider to assess his benefits and request a list of providers who accept coverage for hearing aids through his planMr. Yepez expressed understanding of today's results and the plan.    Recommendations:  Otologic evaluation  Hearing aid consultation  Annual audiogram or sooner if change perceived  Hearing protection in noise

## 2023-05-08 DIAGNOSIS — E11.40 TYPE 2 DIABETES MELLITUS WITH DIABETIC NEUROPATHY, WITHOUT LONG-TERM CURRENT USE OF INSULIN: ICD-10-CM

## 2023-05-08 RX ORDER — DEXTROSE 4 G
TABLET,CHEWABLE ORAL
Refills: 0 | OUTPATIENT
Start: 2023-05-08

## 2023-05-08 RX ORDER — LANCETS 33 GAUGE
EACH MISCELLANEOUS
Qty: 200 EACH | Refills: 0 | OUTPATIENT
Start: 2023-05-08

## 2023-05-08 NOTE — TELEPHONE ENCOUNTER
Care Due:                  Date            Visit Type   Department     Provider  --------------------------------------------------------------------------------                                EP -                              PRIMARY      [facility] INTERNAL  Last Visit: 11-      CARE ([facility])   ISH Espinoza                               -                              PRIMARY      [facility] INTERNAL  Next Visit: 05-      CARE ([facility])   ISH Espinoza                                                            Last  Test          Frequency    Reason                     Performed    Due Date  --------------------------------------------------------------------------------    Cr..........  12 months..  metFORMIN................  03- 03-    HBA1C.......  6 months...  metFORMIN................  11- 05-    [facility] Embedded Care Due Messages. Reference number: [id number].   5/08/2023 4:11:17 PM CDT

## 2023-05-08 NOTE — TELEPHONE ENCOUNTER
Refill Decision Note   Elan Yepez  is requesting a refill authorization.  Brief Assessment and Rationale for Refill:  Quick Discontinue     Medication Therapy Plan: Pharmacy is requesting new scripts for the following medications without required information, (sig/ frequency/qty/etc)       Medication Reconciliation Completed: No   Comments: Pharmacies have been requesting medications for patients without required information, (sig, frequency, qty, etc.). In addition, requests are sent for medication(s) pt. are currently not taking, and medications patients have never taken.    We have spoken to the pharmacies about these request types and advised their teams previously that we are unable to assess these New Script requests and require all details for these requests. This is a known issue and has been reported.     No Care Gaps recommended.     Note composed:6:09 PM 05/08/2023

## 2023-05-12 ENCOUNTER — LAB VISIT (OUTPATIENT)
Dept: LAB | Facility: HOSPITAL | Age: 83
End: 2023-05-12
Attending: INTERNAL MEDICINE
Payer: MEDICARE

## 2023-05-12 ENCOUNTER — OFFICE VISIT (OUTPATIENT)
Dept: INTERNAL MEDICINE | Facility: CLINIC | Age: 83
End: 2023-05-12
Payer: MEDICARE

## 2023-05-12 VITALS
WEIGHT: 315 LBS | DIASTOLIC BLOOD PRESSURE: 88 MMHG | HEART RATE: 76 BPM | SYSTOLIC BLOOD PRESSURE: 138 MMHG | BODY MASS INDEX: 42.66 KG/M2 | HEIGHT: 72 IN | OXYGEN SATURATION: 95 %

## 2023-05-12 DIAGNOSIS — C61 PROSTATE CANCER: ICD-10-CM

## 2023-05-12 DIAGNOSIS — F03.90 DEMENTIA WITHOUT BEHAVIORAL DISTURBANCE, PSYCHOTIC DISTURBANCE, MOOD DISTURBANCE, OR ANXIETY, UNSPECIFIED DEMENTIA SEVERITY, UNSPECIFIED DEMENTIA TYPE: ICD-10-CM

## 2023-05-12 DIAGNOSIS — G72.0 STATIN MYOPATHY: Primary | ICD-10-CM

## 2023-05-12 DIAGNOSIS — E11.40 TYPE 2 DIABETES MELLITUS WITH DIABETIC NEUROPATHY, WITHOUT LONG-TERM CURRENT USE OF INSULIN: ICD-10-CM

## 2023-05-12 DIAGNOSIS — T46.6X5A STATIN MYOPATHY: Primary | ICD-10-CM

## 2023-05-12 DIAGNOSIS — E78.2 MIXED HYPERLIPIDEMIA: ICD-10-CM

## 2023-05-12 DIAGNOSIS — R54 FRAILTY: ICD-10-CM

## 2023-05-12 DIAGNOSIS — F33.42 RECURRENT MAJOR DEPRESSIVE DISORDER, IN FULL REMISSION: ICD-10-CM

## 2023-05-12 DIAGNOSIS — E66.01 MORBID OBESITY WITH BMI OF 40.0-44.9, ADULT: ICD-10-CM

## 2023-05-12 DIAGNOSIS — I70.0 AORTIC ATHEROSCLEROSIS: ICD-10-CM

## 2023-05-12 LAB
ALBUMIN SERPL BCP-MCNC: 3.6 G/DL (ref 3.5–5.2)
ALP SERPL-CCNC: 74 U/L (ref 55–135)
ALT SERPL W/O P-5'-P-CCNC: 17 U/L (ref 10–44)
ANION GAP SERPL CALC-SCNC: 12 MMOL/L (ref 8–16)
AST SERPL-CCNC: 19 U/L (ref 10–40)
BASOPHILS # BLD AUTO: 0.01 K/UL (ref 0–0.2)
BASOPHILS NFR BLD: 0.2 % (ref 0–1.9)
BILIRUB SERPL-MCNC: 0.4 MG/DL (ref 0.1–1)
BUN SERPL-MCNC: 13 MG/DL (ref 8–23)
CALCIUM SERPL-MCNC: 9.6 MG/DL (ref 8.7–10.5)
CHLORIDE SERPL-SCNC: 103 MMOL/L (ref 95–110)
CHOLEST SERPL-MCNC: 222 MG/DL (ref 120–199)
CHOLEST/HDLC SERPL: 4.9 {RATIO} (ref 2–5)
CO2 SERPL-SCNC: 23 MMOL/L (ref 23–29)
CREAT SERPL-MCNC: 1 MG/DL (ref 0.5–1.4)
DIFFERENTIAL METHOD: ABNORMAL
EOSINOPHIL # BLD AUTO: 0 K/UL (ref 0–0.5)
EOSINOPHIL NFR BLD: 0.2 % (ref 0–8)
ERYTHROCYTE [DISTWIDTH] IN BLOOD BY AUTOMATED COUNT: 13.8 % (ref 11.5–14.5)
EST. GFR  (NO RACE VARIABLE): >60 ML/MIN/1.73 M^2
ESTIMATED AVG GLUCOSE: 137 MG/DL (ref 68–131)
GLUCOSE SERPL-MCNC: 119 MG/DL (ref 70–110)
HBA1C MFR BLD: 6.4 % (ref 4–5.6)
HCT VFR BLD AUTO: 41.6 % (ref 40–54)
HDLC SERPL-MCNC: 45 MG/DL (ref 40–75)
HDLC SERPL: 20.3 % (ref 20–50)
HGB BLD-MCNC: 12.9 G/DL (ref 14–18)
IMM GRANULOCYTES # BLD AUTO: 0.03 K/UL (ref 0–0.04)
IMM GRANULOCYTES NFR BLD AUTO: 0.5 % (ref 0–0.5)
LDLC SERPL CALC-MCNC: 156 MG/DL (ref 63–159)
LYMPHOCYTES # BLD AUTO: 2 K/UL (ref 1–4.8)
LYMPHOCYTES NFR BLD: 36.2 % (ref 18–48)
MCH RBC QN AUTO: 29.3 PG (ref 27–31)
MCHC RBC AUTO-ENTMCNC: 31 G/DL (ref 32–36)
MCV RBC AUTO: 95 FL (ref 82–98)
MONOCYTES # BLD AUTO: 0.5 K/UL (ref 0.3–1)
MONOCYTES NFR BLD: 9.3 % (ref 4–15)
NEUTROPHILS # BLD AUTO: 2.9 K/UL (ref 1.8–7.7)
NEUTROPHILS NFR BLD: 53.6 % (ref 38–73)
NONHDLC SERPL-MCNC: 177 MG/DL
NRBC BLD-RTO: 0 /100 WBC
PLATELET # BLD AUTO: 171 K/UL (ref 150–450)
PMV BLD AUTO: 11.5 FL (ref 9.2–12.9)
POTASSIUM SERPL-SCNC: 4.3 MMOL/L (ref 3.5–5.1)
PROT SERPL-MCNC: 7.1 G/DL (ref 6–8.4)
RBC # BLD AUTO: 4.4 M/UL (ref 4.6–6.2)
SODIUM SERPL-SCNC: 138 MMOL/L (ref 136–145)
TRIGL SERPL-MCNC: 105 MG/DL (ref 30–150)
TSH SERPL DL<=0.005 MIU/L-ACNC: 1.85 UIU/ML (ref 0.4–4)
WBC # BLD AUTO: 5.49 K/UL (ref 3.9–12.7)

## 2023-05-12 PROCEDURE — 80061 LIPID PANEL: CPT | Performed by: INTERNAL MEDICINE

## 2023-05-12 PROCEDURE — 99214 PR OFFICE/OUTPT VISIT, EST, LEVL IV, 30-39 MIN: ICD-10-PCS | Mod: S$GLB,,, | Performed by: INTERNAL MEDICINE

## 2023-05-12 PROCEDURE — 84443 ASSAY THYROID STIM HORMONE: CPT | Performed by: INTERNAL MEDICINE

## 2023-05-12 PROCEDURE — 99999 PR PBB SHADOW E&M-EST. PATIENT-LVL IV: CPT | Mod: PBBFAC,,, | Performed by: INTERNAL MEDICINE

## 2023-05-12 PROCEDURE — 99214 OFFICE O/P EST MOD 30 MIN: CPT | Mod: S$GLB,,, | Performed by: INTERNAL MEDICINE

## 2023-05-12 PROCEDURE — 3079F DIAST BP 80-89 MM HG: CPT | Mod: CPTII,S$GLB,, | Performed by: INTERNAL MEDICINE

## 2023-05-12 PROCEDURE — 85025 COMPLETE CBC W/AUTO DIFF WBC: CPT | Performed by: INTERNAL MEDICINE

## 2023-05-12 PROCEDURE — 1159F PR MEDICATION LIST DOCUMENTED IN MEDICAL RECORD: ICD-10-PCS | Mod: CPTII,S$GLB,, | Performed by: INTERNAL MEDICINE

## 2023-05-12 PROCEDURE — 3288F PR FALLS RISK ASSESSMENT DOCUMENTED: ICD-10-PCS | Mod: CPTII,S$GLB,, | Performed by: INTERNAL MEDICINE

## 2023-05-12 PROCEDURE — 3075F SYST BP GE 130 - 139MM HG: CPT | Mod: CPTII,S$GLB,, | Performed by: INTERNAL MEDICINE

## 2023-05-12 PROCEDURE — 1125F AMNT PAIN NOTED PAIN PRSNT: CPT | Mod: CPTII,S$GLB,, | Performed by: INTERNAL MEDICINE

## 2023-05-12 PROCEDURE — 1101F PT FALLS ASSESS-DOCD LE1/YR: CPT | Mod: CPTII,S$GLB,, | Performed by: INTERNAL MEDICINE

## 2023-05-12 PROCEDURE — 1157F PR ADVANCE CARE PLAN OR EQUIV PRESENT IN MEDICAL RECORD: ICD-10-PCS | Mod: CPTII,S$GLB,, | Performed by: INTERNAL MEDICINE

## 2023-05-12 PROCEDURE — 83036 HEMOGLOBIN GLYCOSYLATED A1C: CPT | Performed by: INTERNAL MEDICINE

## 2023-05-12 PROCEDURE — 99999 PR PBB SHADOW E&M-EST. PATIENT-LVL IV: ICD-10-PCS | Mod: PBBFAC,,, | Performed by: INTERNAL MEDICINE

## 2023-05-12 PROCEDURE — 99499 RISK ADDL DX/OHS AUDIT: ICD-10-PCS | Mod: HCNC,S$GLB,, | Performed by: INTERNAL MEDICINE

## 2023-05-12 PROCEDURE — 36415 COLL VENOUS BLD VENIPUNCTURE: CPT | Performed by: INTERNAL MEDICINE

## 2023-05-12 PROCEDURE — 3075F PR MOST RECENT SYSTOLIC BLOOD PRESS GE 130-139MM HG: ICD-10-PCS | Mod: CPTII,S$GLB,, | Performed by: INTERNAL MEDICINE

## 2023-05-12 PROCEDURE — 1125F PR PAIN SEVERITY QUANTIFIED, PAIN PRESENT: ICD-10-PCS | Mod: CPTII,S$GLB,, | Performed by: INTERNAL MEDICINE

## 2023-05-12 PROCEDURE — 3072F LOW RISK FOR RETINOPATHY: CPT | Mod: CPTII,S$GLB,, | Performed by: INTERNAL MEDICINE

## 2023-05-12 PROCEDURE — 1157F ADVNC CARE PLAN IN RCRD: CPT | Mod: CPTII,S$GLB,, | Performed by: INTERNAL MEDICINE

## 2023-05-12 PROCEDURE — 3072F PR LOW RISK FOR RETINOPATHY: ICD-10-PCS | Mod: CPTII,S$GLB,, | Performed by: INTERNAL MEDICINE

## 2023-05-12 PROCEDURE — 80053 COMPREHEN METABOLIC PANEL: CPT | Performed by: INTERNAL MEDICINE

## 2023-05-12 PROCEDURE — 3288F FALL RISK ASSESSMENT DOCD: CPT | Mod: CPTII,S$GLB,, | Performed by: INTERNAL MEDICINE

## 2023-05-12 PROCEDURE — 1159F MED LIST DOCD IN RCRD: CPT | Mod: CPTII,S$GLB,, | Performed by: INTERNAL MEDICINE

## 2023-05-12 PROCEDURE — 3079F PR MOST RECENT DIASTOLIC BLOOD PRESSURE 80-89 MM HG: ICD-10-PCS | Mod: CPTII,S$GLB,, | Performed by: INTERNAL MEDICINE

## 2023-05-12 PROCEDURE — 99499 UNLISTED E&M SERVICE: CPT | Mod: HCNC,S$GLB,, | Performed by: INTERNAL MEDICINE

## 2023-05-12 PROCEDURE — 1101F PR PT FALLS ASSESS DOC 0-1 FALLS W/OUT INJ PAST YR: ICD-10-PCS | Mod: CPTII,S$GLB,, | Performed by: INTERNAL MEDICINE

## 2023-05-12 RX ORDER — PRAVASTATIN SODIUM 20 MG/1
20 TABLET ORAL DAILY
Qty: 90 TABLET | Refills: 3 | Status: SHIPPED | OUTPATIENT
Start: 2023-05-12 | End: 2024-02-07 | Stop reason: SDUPTHER

## 2023-05-12 NOTE — PROGRESS NOTES
Subjective:       Patient ID: Elan Yepez is a 83 y.o. male.    Chief Complaint: Follow-up      HPI  Elan Yepez is a 83 y.o. year old male with  CAD, t2DM, HTN, HLD, asbestos exposure, prostate cancer, gait abnormality, amnestic MCI presents for follow up    Review of Systems   Constitutional:  Negative for activity change, appetite change, chills, fatigue, fever and unexpected weight change.   HENT:  Negative for congestion, rhinorrhea and sore throat.    Eyes:  Negative for visual disturbance.   Respiratory:  Negative for shortness of breath.    Cardiovascular:  Negative for chest pain.   Gastrointestinal:  Negative for abdominal pain, diarrhea, nausea and vomiting.   Genitourinary:  Negative for difficulty urinating and dysuria.   Musculoskeletal:  Negative for arthralgias, back pain and myalgias.   Skin:  Negative for color change and rash.   Neurological:  Negative for dizziness, weakness and headaches.       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  Began Aricept 10 mg at bedtime.    Asbestos exposure 5/5/2016    BMI 40.0-44.9, adult     Colon polyp     Coronary artery calcification 3/18/2021    Diabetes mellitus     diet management    Diabetes mellitus, type 2     Dysmetabolic syndrome X 10/2/2012    Hyperlipidemia     Hypertension     diet managemen    Joint pain     MDD (major depressive disorder) 4/7/2015    Midline low back pain without sciatica 5/5/2016    Midline low back pain without sciatica 5/5/2016    Polyneuropathy     Primary osteoarthritis of right knee 2/20/2018    Prostate CA     Pulmonary nodule, stable to 2011 6/10/2020    01/2020 CT There are stable subcentimeter noncalcified pulmonary nodules the largest of which is located within the left upper lobe anteriorly and measures approximately 6 mm.. Stable to 2011    Renal manifestation of secondary  diabetes mellitus     Sleep apnea     cipap    Trouble in sleeping     Type II or unspecified type diabetes mellitus with neurological manifestations, not stated as uncontrolled(250.60)     Urinary incontinence         Prior to Admission medications    Medication Sig Start Date End Date Taking? Authorizing Provider   aspirin (ECOTRIN) 81 MG EC tablet Take 1 tablet (81 mg total) by mouth every evening. 6/22/17  Yes Liane Tijerina MD   blood sugar diagnostic (TRUE METRIX GLUCOSE TEST STRIP) Strp Test blood glucose once daily 3/24/22  Yes Elan Espinoza MD   cholecalciferol, vitamin D3, 5,000 unit capsule Take 1 capsule by mouth Daily.   Yes Historical Provider   donepeziL (ARICEPT) 10 MG tablet Take 1 tablet (10 mg total) by mouth every evening. 11/11/22 11/11/23 Yes Elan Espinoza MD   lancets (SOFT TOUCH LANCETS) Misc 1 Device by Misc.(Non-Drug; Combo Route) route once daily. 2/17/20  Yes Liane Tijerina MD   mirabegron (MYRBETRIQ) 50 mg Tb24 Take 1 tablet (50 mg total) by mouth once daily. 11/17/22 11/17/23 Yes Vijay Zapata MD   MULTIVITS-MINERALS/FA/LYCOPENE (ONE-A-DAY MEN'S ORAL) Take 1 tablet by mouth Daily.   Yes Historical Provider   sertraline (ZOLOFT) 50 MG tablet Take 1 tablet (50 mg total) by mouth every morning. Increase energy and boost mood 11/11/22 11/11/23 Yes Elan Espinoza MD   TRUE METRIX GLUCOSE METER Misc  9/16/16  Yes Historical Provider   metFORMIN (GLUCOPHAGE) 500 MG tablet Take 1 tablet (500 mg total) by mouth nightly.  Patient not taking: Reported on 5/12/2023 11/11/22   Elan Espinoza MD        Past medical history, surgical history, and family medical history reviewed and updated as appropriate.    Medications and allergies reviewed.     Objective:          Vitals:    05/12/23 0943   BP: 138/88   BP Location: Left arm   Patient Position: Sitting   BP Method: Large (Manual)   Pulse: 76   SpO2: 95%   Weight: (!) 146.6 kg (323 lb 3.1 oz)   Height: 6' (1.829 m)     Body mass index is 43.83  kg/m².  Physical Exam  Constitutional:       General: He is not in acute distress.     Appearance: He is well-developed.   HENT:      Head: Normocephalic and atraumatic.      Nose: Nose normal.   Eyes:      General: No scleral icterus.     Extraocular Movements: Extraocular movements intact.   Neck:      Thyroid: No thyromegaly.      Vascular: No JVD.      Trachea: No tracheal deviation.   Cardiovascular:      Rate and Rhythm: Normal rate and regular rhythm.      Heart sounds: Normal heart sounds. No murmur heard.    No friction rub. No gallop.   Pulmonary:      Effort: Pulmonary effort is normal. No respiratory distress.      Breath sounds: Normal breath sounds. No wheezing or rales.   Abdominal:      General: Bowel sounds are normal. There is no distension.      Palpations: Abdomen is soft. There is no mass.      Tenderness: There is no abdominal tenderness.   Musculoskeletal:         General: No tenderness. Normal range of motion.      Cervical back: Normal range of motion and neck supple.   Lymphadenopathy:      Cervical: No cervical adenopathy.   Skin:     General: Skin is warm and dry.      Findings: No rash.   Neurological:      Mental Status: He is alert and oriented to person, place, and time.      Cranial Nerves: No cranial nerve deficit.      Deep Tendon Reflexes: Reflexes normal.   Psychiatric:         Behavior: Behavior normal.       Lab Results   Component Value Date    WBC 8.21 03/16/2022    HGB 12.7 (L) 03/16/2022    HCT 40.1 03/16/2022     03/16/2022    CHOL 217 (H) 03/16/2022    TRIG 162 (H) 03/16/2022    HDL 45 03/16/2022    ALT 14 09/10/2021    AST 16 09/10/2021     03/16/2022    K 4.3 03/16/2022     03/16/2022    CREATININE 1.0 03/16/2022    BUN 14 03/16/2022    CO2 21 (L) 03/16/2022    TSH 1.855 03/16/2022    PSA <0.01 03/16/2022    INR 1.0 08/14/2019    HGBA1C 6.4 (H) 11/11/2022       Assessment:       1. Statin myopathy    2. Prostate cancer    3. Type 2 diabetes mellitus  with diabetic neuropathy, without long-term current use of insulin    4. Morbid obesity with BMI of 40.0-44.9, adult    5. Recurrent major depressive disorder, in full remission    6. Aortic atherosclerosis    7. Dementia without behavioral disturbance, psychotic disturbance, mood disturbance, or anxiety, unspecified dementia severity, unspecified dementia type    8. Mixed hyperlipidemia    9. Frailty          Plan:     Elan was seen today for follow-up.    Diagnoses and all orders for this visit:    Statin myopathy  Comments:  while taking atorvastatin 40 mg, will try pravastatin    Prostate cancer    Type 2 diabetes mellitus with diabetic neuropathy, without long-term current use of insulin  -     CBC Auto Differential; Future  -     Comprehensive Metabolic Panel; Future  -     Hemoglobin A1C; Future  -     Microalbumin/Creatinine Ratio, Urine; Future    Morbid obesity with BMI of 40.0-44.9, adult    Recurrent major depressive disorder, in full remission    Aortic atherosclerosis  -     pravastatin (PRAVACHOL) 20 MG tablet; Take 1 tablet (20 mg total) by mouth once daily.    Dementia without behavioral disturbance, psychotic disturbance, mood disturbance, or anxiety, unspecified dementia severity, unspecified dementia type    Mixed hyperlipidemia  -     TSH; Future  -     Lipid Panel; Future  -     pravastatin (PRAVACHOL) 20 MG tablet; Take 1 tablet (20 mg total) by mouth once daily.    Frailty    Dementia - to follow up with neuropsych  Depression - controlled, no changes to current management  T2dm - recheck a1c, due for screenings  Prostate ca - no changes to management  Morbid obesity - discussed lifestyle changes  Aortic atherosclerosis - on ASA, retry statin    Health maintenance reviewed with patient.     Follow up in about 3 months (around 8/12/2023).    Elan Espinoza MD  Internal Medicine / Primary Care  [facility]  5/12/2023

## 2023-05-12 NOTE — PATIENT INSTRUCTIONS
Contact [facility] regarding hearing aid consultation. You have already had a hearing test completed earliest this year. You need to find someone close to home regarding hearing aid.     Fasting labwork today  Urine today  Return to clinic in 3 months or sooner if needed.

## 2023-06-07 DIAGNOSIS — E11.40 TYPE 2 DIABETES MELLITUS WITH DIABETIC NEUROPATHY, WITHOUT LONG-TERM CURRENT USE OF INSULIN: ICD-10-CM

## 2023-06-07 NOTE — TELEPHONE ENCOUNTER
No care due was identified.  [facility] Embedded Care Due Messages. Reference number: [id number].   6/07/2023 3:21:31 PM CDT

## 2023-06-07 NOTE — TELEPHONE ENCOUNTER
Refill Decision Note   Elan Lucho  is requesting a refill authorization.  Brief Assessment and Rationale for Refill:  Approve     Medication Therapy Plan:       Medication Reconciliation Completed: No   Comments:     No Care Gaps recommended.     Note composed:3:35 PM 06/07/2023

## 2023-08-18 ENCOUNTER — TELEPHONE (OUTPATIENT)
Dept: PRIMARY CARE CLINIC | Facility: CLINIC | Age: 83
End: 2023-08-18
Payer: MEDICARE

## 2023-08-18 NOTE — TELEPHONE ENCOUNTER
----- Message from Erlindasukhjinder Jose Miguel sent at 8/18/2023 12:16 PM CDT -----  Contact: Guillermina/Wife  .Type:  Needs Medical Advice    Who Called: Guillermina  Symptoms (please be specific): appt reschedule   Would the patient rather a call back or a response via [facility]? Callback  Best Call Back Number: [phone]  Additional Information: pt will not be able to make it to appt today and is needing to reschedule, pt wife isn't feeling well and won't be able to take him          Thanks  VAISHNAVI

## 2023-09-06 ENCOUNTER — OFFICE VISIT (OUTPATIENT)
Dept: PRIMARY CARE CLINIC | Facility: CLINIC | Age: 83
End: 2023-09-06
Payer: MEDICARE

## 2023-09-06 VITALS
HEIGHT: 72 IN | WEIGHT: 314.13 LBS | TEMPERATURE: 99 F | DIASTOLIC BLOOD PRESSURE: 74 MMHG | BODY MASS INDEX: 42.55 KG/M2 | HEART RATE: 88 BPM | SYSTOLIC BLOOD PRESSURE: 118 MMHG

## 2023-09-06 DIAGNOSIS — Z76.89 ENCOUNTER TO ESTABLISH CARE: Primary | ICD-10-CM

## 2023-09-06 DIAGNOSIS — M17.11 PRIMARY OSTEOARTHRITIS OF RIGHT KNEE: ICD-10-CM

## 2023-09-06 DIAGNOSIS — E11.69 TYPE 2 DIABETES MELLITUS WITH OTHER SPECIFIED COMPLICATION, WITHOUT LONG-TERM CURRENT USE OF INSULIN: ICD-10-CM

## 2023-09-06 PROCEDURE — 99215 OFFICE O/P EST HI 40 MIN: CPT | Mod: HCNC,S$GLB,, | Performed by: FAMILY MEDICINE

## 2023-09-06 PROCEDURE — 3074F PR MOST RECENT SYSTOLIC BLOOD PRESSURE < 130 MM HG: ICD-10-PCS | Mod: HCNC,CPTII,S$GLB, | Performed by: FAMILY MEDICINE

## 2023-09-06 PROCEDURE — 1159F MED LIST DOCD IN RCRD: CPT | Mod: HCNC,CPTII,S$GLB, | Performed by: FAMILY MEDICINE

## 2023-09-06 PROCEDURE — 3078F PR MOST RECENT DIASTOLIC BLOOD PRESSURE < 80 MM HG: ICD-10-PCS | Mod: HCNC,CPTII,S$GLB, | Performed by: FAMILY MEDICINE

## 2023-09-06 PROCEDURE — 1126F PR PAIN SEVERITY QUANTIFIED, NO PAIN PRESENT: ICD-10-PCS | Mod: HCNC,CPTII,S$GLB, | Performed by: FAMILY MEDICINE

## 2023-09-06 PROCEDURE — 3072F PR LOW RISK FOR RETINOPATHY: ICD-10-PCS | Mod: HCNC,CPTII,S$GLB, | Performed by: FAMILY MEDICINE

## 2023-09-06 PROCEDURE — 99999 PR PBB SHADOW E&M-EST. PATIENT-LVL V: ICD-10-PCS | Mod: PBBFAC,HCNC,, | Performed by: FAMILY MEDICINE

## 2023-09-06 PROCEDURE — 1101F PT FALLS ASSESS-DOCD LE1/YR: CPT | Mod: HCNC,CPTII,S$GLB, | Performed by: FAMILY MEDICINE

## 2023-09-06 PROCEDURE — 1101F PR PT FALLS ASSESS DOC 0-1 FALLS W/OUT INJ PAST YR: ICD-10-PCS | Mod: HCNC,CPTII,S$GLB, | Performed by: FAMILY MEDICINE

## 2023-09-06 PROCEDURE — 3078F DIAST BP <80 MM HG: CPT | Mod: HCNC,CPTII,S$GLB, | Performed by: FAMILY MEDICINE

## 2023-09-06 PROCEDURE — 3074F SYST BP LT 130 MM HG: CPT | Mod: HCNC,CPTII,S$GLB, | Performed by: FAMILY MEDICINE

## 2023-09-06 PROCEDURE — 3072F LOW RISK FOR RETINOPATHY: CPT | Mod: HCNC,CPTII,S$GLB, | Performed by: FAMILY MEDICINE

## 2023-09-06 PROCEDURE — 3288F FALL RISK ASSESSMENT DOCD: CPT | Mod: HCNC,CPTII,S$GLB, | Performed by: FAMILY MEDICINE

## 2023-09-06 PROCEDURE — 1159F PR MEDICATION LIST DOCUMENTED IN MEDICAL RECORD: ICD-10-PCS | Mod: HCNC,CPTII,S$GLB, | Performed by: FAMILY MEDICINE

## 2023-09-06 PROCEDURE — 1157F ADVNC CARE PLAN IN RCRD: CPT | Mod: HCNC,CPTII,S$GLB, | Performed by: FAMILY MEDICINE

## 2023-09-06 PROCEDURE — 1126F AMNT PAIN NOTED NONE PRSNT: CPT | Mod: HCNC,CPTII,S$GLB, | Performed by: FAMILY MEDICINE

## 2023-09-06 PROCEDURE — 99215 PR OFFICE/OUTPT VISIT, EST, LEVL V, 40-54 MIN: ICD-10-PCS | Mod: HCNC,S$GLB,, | Performed by: FAMILY MEDICINE

## 2023-09-06 PROCEDURE — 1157F PR ADVANCE CARE PLAN OR EQUIV PRESENT IN MEDICAL RECORD: ICD-10-PCS | Mod: HCNC,CPTII,S$GLB, | Performed by: FAMILY MEDICINE

## 2023-09-06 PROCEDURE — 99999 PR PBB SHADOW E&M-EST. PATIENT-LVL V: CPT | Mod: PBBFAC,HCNC,, | Performed by: FAMILY MEDICINE

## 2023-09-06 PROCEDURE — 3288F PR FALLS RISK ASSESSMENT DOCUMENTED: ICD-10-PCS | Mod: HCNC,CPTII,S$GLB, | Performed by: FAMILY MEDICINE

## 2023-09-06 RX ORDER — DICLOFENAC SODIUM 10 MG/G
2 GEL TOPICAL 3 TIMES DAILY PRN
Qty: 450 G | Refills: 3 | Status: SHIPPED | OUTPATIENT
Start: 2023-09-06 | End: 2024-03-06

## 2023-09-06 NOTE — PATIENT INSTRUCTIONS
Physically, everything looks pretty good today.      One sugar looks pretty well controlled with just diet.  Keep up the good work.    We will be due for eye exam, foot exam in the near future.  Let us get you established with ophthalmology and podiatry here, in [facility] so you do not have to go all the way to [place].  Referrals placed today.    For the knee, try usingSupplement with:              OTC Tylenol - two, 500mg tablets every 8 hours              OTC Aleve - two tablets every 12 hours    You can also try using my Voltaren (diclofenac) gel.  Rub it on the knee 3 times per day, as needed.      If it continues to bother you, please let me know.  We can always get you in with our sports medicine specialist, Dr. Page.  He comes here every Monday.      Tell your wife that if her back/legs continue to hurt, we can get her in with our back specialist, Dr. Mendez.  It comes here every Tuesday.      I am filling out a handicap tag for you today.  This will help minimize the walking when you go out in public.    If you need refills of your medications, feel free to let us know.  We will be happy to send them to the pharmacy.      Continue to eat a healthy diet.  Be careful with portion sizes.  Includes lots of fresh fruits, vegetables, whole grains, lean proteins.  See info below.    Keep hydrated.  Be sure to drink at least 8-10, 8 oz, glasses of water every day.    Stay active.  Try to do some sort of physical activity every day.  Nothing outrageous, just try walking for 5-10 minutes each day.
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
## 2023-09-11 DIAGNOSIS — E11.40 TYPE 2 DIABETES MELLITUS WITH DIABETIC NEUROPATHY, WITHOUT LONG-TERM CURRENT USE OF INSULIN: ICD-10-CM

## 2023-09-11 NOTE — TELEPHONE ENCOUNTER
----- Message from Marcel Gillespie sent at 9/11/2023 10:36 AM CDT -----  Contact: self  Pt is asking for an return call in reference to needing an refill on True Metrix test strips sent to pharmacy listed below, please call back at .[phone] Thx CJ    [id number] - [place] - [address] Replaced by [facility]  [address] [place] [address]  Phone: [phone] Fax: [phone]

## 2023-09-11 NOTE — TELEPHONE ENCOUNTER
No care due was identified.  [facility] Embedded Care Due Messages. Reference number: [id number].   9/11/2023 3:20:57 PM CDT

## 2023-09-12 ENCOUNTER — OFFICE VISIT (OUTPATIENT)
Dept: OPHTHALMOLOGY | Facility: CLINIC | Age: 83
End: 2023-09-12
Payer: MEDICARE

## 2023-09-12 ENCOUNTER — OFFICE VISIT (OUTPATIENT)
Dept: PODIATRY | Facility: CLINIC | Age: 83
End: 2023-09-12
Payer: MEDICARE

## 2023-09-12 ENCOUNTER — TELEPHONE (OUTPATIENT)
Dept: OPHTHALMOLOGY | Facility: CLINIC | Age: 83
End: 2023-09-12
Payer: MEDICARE

## 2023-09-12 VITALS — WEIGHT: 314 LBS | HEIGHT: 72 IN | BODY MASS INDEX: 42.53 KG/M2

## 2023-09-12 DIAGNOSIS — B35.1 DERMATOPHYTOSIS, NAIL: ICD-10-CM

## 2023-09-12 DIAGNOSIS — E11.49 TYPE 2 DIABETES MELLITUS WITH NEUROLOGICAL MANIFESTATIONS: Primary | ICD-10-CM

## 2023-09-12 DIAGNOSIS — E11.69 TYPE 2 DIABETES MELLITUS WITH OTHER SPECIFIED COMPLICATION, WITHOUT LONG-TERM CURRENT USE OF INSULIN: ICD-10-CM

## 2023-09-12 DIAGNOSIS — H43.811 POSTERIOR VITREOUS DETACHMENT, RIGHT: ICD-10-CM

## 2023-09-12 DIAGNOSIS — E11.9 DIABETES MELLITUS WITHOUT COMPLICATION: ICD-10-CM

## 2023-09-12 DIAGNOSIS — H40.023 AT HIGH RISK FOR OPEN ANGLE GLAUCOMA OF BOTH EYES: Primary | ICD-10-CM

## 2023-09-12 DIAGNOSIS — E11.36 DIABETIC CATARACT: ICD-10-CM

## 2023-09-12 PROCEDURE — 3288F FALL RISK ASSESSMENT DOCD: CPT | Mod: HCNC,CPTII,S$GLB, | Performed by: PODIATRIST

## 2023-09-12 PROCEDURE — 1126F PR PAIN SEVERITY QUANTIFIED, NO PAIN PRESENT: ICD-10-PCS | Mod: HCNC,CPTII,S$GLB, | Performed by: PODIATRIST

## 2023-09-12 PROCEDURE — 99213 OFFICE O/P EST LOW 20 MIN: CPT | Mod: 25,HCNC,S$GLB, | Performed by: PODIATRIST

## 2023-09-12 PROCEDURE — 1101F PT FALLS ASSESS-DOCD LE1/YR: CPT | Mod: HCNC,CPTII,S$GLB, | Performed by: PODIATRIST

## 2023-09-12 PROCEDURE — 1126F AMNT PAIN NOTED NONE PRSNT: CPT | Mod: HCNC,CPTII,S$GLB, | Performed by: PODIATRIST

## 2023-09-12 PROCEDURE — 99999 PR PBB SHADOW E&M-EST. PATIENT-LVL III: CPT | Mod: PBBFAC,HCNC,, | Performed by: OPTOMETRIST

## 2023-09-12 PROCEDURE — 3288F PR FALLS RISK ASSESSMENT DOCUMENTED: ICD-10-PCS | Mod: HCNC,CPTII,S$GLB, | Performed by: PODIATRIST

## 2023-09-12 PROCEDURE — 92133 CPTRZD OPH DX IMG PST SGM ON: CPT | Mod: HCNC,S$GLB,, | Performed by: OPTOMETRIST

## 2023-09-12 PROCEDURE — 92133 OCT, OPTIC NERVE - OU - BOTH EYES: ICD-10-PCS | Mod: HCNC,S$GLB,, | Performed by: OPTOMETRIST

## 2023-09-12 PROCEDURE — 11721 DEBRIDE NAIL 6 OR MORE: CPT | Mod: Q9,HCNC,S$GLB, | Performed by: PODIATRIST

## 2023-09-12 PROCEDURE — 1157F PR ADVANCE CARE PLAN OR EQUIV PRESENT IN MEDICAL RECORD: ICD-10-PCS | Mod: HCNC,CPTII,S$GLB, | Performed by: OPTOMETRIST

## 2023-09-12 PROCEDURE — 3072F LOW RISK FOR RETINOPATHY: CPT | Mod: HCNC,CPTII,S$GLB, | Performed by: PODIATRIST

## 2023-09-12 PROCEDURE — 1159F MED LIST DOCD IN RCRD: CPT | Mod: HCNC,CPTII,S$GLB, | Performed by: PODIATRIST

## 2023-09-12 PROCEDURE — 99999 PR PBB SHADOW E&M-EST. PATIENT-LVL III: ICD-10-PCS | Mod: PBBFAC,HCNC,, | Performed by: OPTOMETRIST

## 2023-09-12 PROCEDURE — 1157F ADVNC CARE PLAN IN RCRD: CPT | Mod: HCNC,CPTII,S$GLB, | Performed by: PODIATRIST

## 2023-09-12 PROCEDURE — 2023F PR DILATED RETINAL EXAM W/O EVID OF RETINOPATHY: ICD-10-PCS | Mod: HCNC,CPTII,S$GLB, | Performed by: OPTOMETRIST

## 2023-09-12 PROCEDURE — 1157F ADVNC CARE PLAN IN RCRD: CPT | Mod: HCNC,CPTII,S$GLB, | Performed by: OPTOMETRIST

## 2023-09-12 PROCEDURE — 99213 PR OFFICE/OUTPT VISIT, EST, LEVL III, 20-29 MIN: ICD-10-PCS | Mod: 25,HCNC,S$GLB, | Performed by: PODIATRIST

## 2023-09-12 PROCEDURE — 1159F PR MEDICATION LIST DOCUMENTED IN MEDICAL RECORD: ICD-10-PCS | Mod: HCNC,CPTII,S$GLB, | Performed by: PODIATRIST

## 2023-09-12 PROCEDURE — 99999 PR PBB SHADOW E&M-EST. PATIENT-LVL III: ICD-10-PCS | Mod: PBBFAC,HCNC,, | Performed by: PODIATRIST

## 2023-09-12 PROCEDURE — 92014 PR EYE EXAM, EST PATIENT,COMPREHESV: ICD-10-PCS | Mod: HCNC,S$GLB,, | Performed by: OPTOMETRIST

## 2023-09-12 PROCEDURE — 92014 COMPRE OPH EXAM EST PT 1/>: CPT | Mod: HCNC,S$GLB,, | Performed by: OPTOMETRIST

## 2023-09-12 PROCEDURE — 1101F PR PT FALLS ASSESS DOC 0-1 FALLS W/OUT INJ PAST YR: ICD-10-PCS | Mod: HCNC,CPTII,S$GLB, | Performed by: PODIATRIST

## 2023-09-12 PROCEDURE — 1157F PR ADVANCE CARE PLAN OR EQUIV PRESENT IN MEDICAL RECORD: ICD-10-PCS | Mod: HCNC,CPTII,S$GLB, | Performed by: PODIATRIST

## 2023-09-12 PROCEDURE — 3072F PR LOW RISK FOR RETINOPATHY: ICD-10-PCS | Mod: HCNC,CPTII,S$GLB, | Performed by: PODIATRIST

## 2023-09-12 PROCEDURE — 2023F DILAT RTA XM W/O RTNOPTHY: CPT | Mod: HCNC,CPTII,S$GLB, | Performed by: OPTOMETRIST

## 2023-09-12 PROCEDURE — 1159F PR MEDICATION LIST DOCUMENTED IN MEDICAL RECORD: ICD-10-PCS | Mod: HCNC,CPTII,S$GLB, | Performed by: OPTOMETRIST

## 2023-09-12 PROCEDURE — 1159F MED LIST DOCD IN RCRD: CPT | Mod: HCNC,CPTII,S$GLB, | Performed by: OPTOMETRIST

## 2023-09-12 PROCEDURE — 11721 PR DEBRIDEMENT OF NAILS, 6 OR MORE: ICD-10-PCS | Mod: Q9,HCNC,S$GLB, | Performed by: PODIATRIST

## 2023-09-12 PROCEDURE — 99999 PR PBB SHADOW E&M-EST. PATIENT-LVL III: CPT | Mod: PBBFAC,HCNC,, | Performed by: PODIATRIST

## 2023-09-12 NOTE — PROGRESS NOTES
HPI     Diabetic Eye Exam            Comments: NP to DKT  Patient here today for yearly DM eye exam          Comments    Diagnosed with diabetes 20+ years ago  Lab Results       Component                Value               Date                       HGBA1C                   6.4 (H)             05/12/2023            Vision changes since last eye exam?: Yes at distance and near  Wears PAL glasses left at home     Any eye pain today: No    Other ocular symptoms: No    Interested in contact lens fitting today? No                      Last edited by Alanis Weeks, PCT on 9/12/2023 10:33 AM.            Assessment /Plan     For exam results, see Encounter Report.      Diabetic cataract  -     Ambulatory referral/consult to Ophthalmology; Future; Expected date: 09/19/2023  OS>OD, consult Dr Kovacs for CE/IOL eval next available.     At high risk for open angle glaucoma of both eyes  -     OCT, Optic Nerve - OU - Both Eyes  The patient has the following Glaucoma risk factors: Age, Corneal Thickness, and Optic Nerve Asymmetry.  gOCT appears stable   Patient will require [id number] follow up after cataract is addressed.     Diabetes mellitus without complication  20+ years, last A1c 6.4 There was no diabetic retinopathy present on either eye on examination today. Recommend good blood pressure control, strict blood glucose control, and good cholesterol control.  Continue close care with Dr. Sharma regarding diabetes.     Posterior vitreous detachment right  RD precautions reviewed. Observe.       RTC with Dr. Kovacs for cataract evaluation, sooner if changes to vision or worsening symptoms.  Discussed above and answered questions.

## 2023-09-12 NOTE — PROGRESS NOTES
[facility] -   PODIATRIC MEDICINE AND SURGERY      CHIEF COMPLAINT  Chief Complaint   Patient presents with    Diabetic Foot Exam     Diabetic foot exam , pt is a diabetic, pt is wearing shoes with socks , last seen PCP Faustino Sharma MD 9/6/23         HPI    SUBJECTIVE: Elan Yepez is a 83 y.o. 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), Asbestos exposure (5/5/2016), BMI 40.0-44.9, adult, Colon polyp, Coronary artery calcification (3/18/2021), Diabetes mellitus, Diabetes mellitus, type 2, Dysmetabolic syndrome X (10/2/2012), Hyperlipidemia, Hypertension, Joint pain, MDD (major depressive disorder) (4/7/2015), Midline low back pain without sciatica (5/5/2016), Midline low back pain without sciatica (5/5/2016), Polyneuropathy, Primary osteoarthritis of right knee (2/20/2018), Prostate CA, Pulmonary nodule, stable to 2011 (6/10/2020), Renal manifestation of secondary diabetes mellitus, Sleep apnea, Trouble in sleeping, Type II or unspecified type diabetes mellitus with neurological manifestations, not stated as uncontrolled(250.60), and Urinary incontinence. Elanpresents to clinic for high risk diabetic foot exam and care.  Elan admits numbness, burning, and/or tingling sensations in their feet. Patient relates blood sugars normally run around 120. Pt has established care with primary care physician and would like to establish care with a podiatric physician. He does complain about thickened, elongated toenail.s Patient has no other pedal complaints at this time      HgA1c:   Hemoglobin A1C   Date Value Ref Range Status   05/12/2023 6.4 (H) 4.0 - 5.6 % Final     Comment:     ADA Screening Guidelines:  5.7-6.4%  Consistent with prediabetes  >or=6.5%  Consistent with diabetes    High levels of fetal hemoglobin interfere with the HbA1C  assay. Heterozygous hemoglobin variants (HbS, HgC, etc)do  not significantly interfere with  this assay.   However, presence of multiple variants may affect accuracy.     11/11/2022 6.4 (H) 4.0 - 5.6 % Final     Comment:     ADA Screening Guidelines:  5.7-6.4%  Consistent with prediabetes  >or=6.5%  Consistent with diabetes    High levels of fetal hemoglobin interfere with the HbA1C  assay. Heterozygous hemoglobin variants (HbS, HgC, etc)do  not significantly interfere with this assay.   However, presence of multiple variants may affect accuracy.     03/16/2022 6.6 (H) 4.0 - 5.6 % Final     Comment:     ADA Screening Guidelines:  5.7-6.4%  Consistent with prediabetes  >or=6.5%  Consistent with diabetes    High levels of fetal hemoglobin interfere with the HbA1C  assay. Heterozygous hemoglobin variants (HbS, HgC, etc)do  not significantly interfere with this assay.   However, presence of multiple variants may affect accuracy.           PMH  Past Medical History:   Diagnosis Date    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  Began Aricept 10 mg at bedtime.    Asbestos exposure 5/5/2016    BMI 40.0-44.9, adult     Colon polyp     Coronary artery calcification 3/18/2021    Diabetes mellitus     diet management    Diabetes mellitus, type 2     Dysmetabolic syndrome X 10/2/2012    Hyperlipidemia     Hypertension     diet managemen    Joint pain     MDD (major depressive disorder) 4/7/2015    Midline low back pain without sciatica 5/5/2016    Midline low back pain without sciatica 5/5/2016    Polyneuropathy     Primary osteoarthritis of right knee 2/20/2018    Prostate CA     Pulmonary nodule, stable to 2011 6/10/2020    01/2020 CT There are stable subcentimeter noncalcified pulmonary nodules the largest of which is located within the left upper lobe anteriorly and measures approximately 6 mm.. Stable to 2011    Renal manifestation of secondary diabetes mellitus     Sleep apnea     cipap    Trouble in sleeping      Type II or unspecified type diabetes mellitus with neurological manifestations, not stated as uncontrolled(250.60)     Urinary incontinence        MEDS  Current Outpatient Medications on File Prior to Visit   Medication Sig Dispense Refill    aspirin (ECOTRIN) 81 MG EC tablet Take 1 tablet (81 mg total) by mouth every evening. 100 tablet 3    blood sugar diagnostic Strp USE  STRIP TO CHECK GLUCOSE ONCE DAILY 100 each 3    cholecalciferol, vitamin D3, 5,000 unit capsule Take 1 capsule by mouth Daily.      diclofenac sodium (VOLTAREN) 1 % Gel Apply 2 g topically 3 (three) times daily as needed (knee pain). 450 g 3    donepeziL (ARICEPT) 10 MG tablet Take 1 tablet (10 mg total) by mouth every evening. 90 tablet 3    lancets (SOFT TOUCH LANCETS) Misc 1 Device by Misc.(Non-Drug; Combo Route) route once daily. 100 each 3    mirabegron (MYRBETRIQ) 50 mg Tb24 Take 1 tablet (50 mg total) by mouth once daily. 30 tablet 11    MULTIVITS-MINERALS/FA/LYCOPENE (ONE-A-DAY MEN'S ORAL) Take 1 tablet by mouth Daily.      pravastatin (PRAVACHOL) 20 MG tablet Take 1 tablet (20 mg total) by mouth once daily. 90 tablet 3    sertraline (ZOLOFT) 50 MG tablet Take 1 tablet (50 mg total) by mouth every morning. Increase energy and boost mood 90 tablet 3    TRUE METRIX GLUCOSE METER Misc        No current facility-administered medications on file prior to visit.       PSH     Past Surgical History:   Procedure Laterality Date    COLONOSCOPY  02/04/2014    ENDOSCOPIC ULTRASOUND OF UPPER GASTROINTESTINAL TRACT N/A 8/14/2019    Procedure: ULTRASOUND, UPPER GI TRACT, ENDOSCOPIC;  Surgeon: Pravin Myrick MD;  Location: [facility] ([address] [place] [address]);  Service: Endoscopy;  Laterality: N/A;    LAPAROSCOPIC CHOLECYSTECTOMY WITH CHOLANGIOGRAPHY N/A 8/12/2019    Procedure: CHOLECYSTECTOMY, LAPAROSCOPIC, WITH CHOLANGIOGRAM;  Surgeon: Phong Beltran MD;  Location: [facility] OR [address] [place] [address];  Service: General;  Laterality: N/A;  23 hours observation    NASAL SEPTUM  SURGERY      PROSTATE SURGERY      removal        ALL  Review of patient's allergies indicates:   Allergen Reactions    No known drug allergies        SOC     Social History     Tobacco Use    Smoking status: Never    Smokeless tobacco: Never   Substance Use Topics    Alcohol use: No    Drug use: No         Family HX    Family History   Problem Relation Age of Onset    Heart disease Mother     Diabetes Mother     Heart disease Father     Hypertension Father     Diabetes Sister     Allergies Brother     Thyroid disease Brother     Dementia Brother     No Known Problems Son     Cancer Sister     No Known Problems Sister     Diabetes Sister     Hypertension Brother     Glaucoma Paternal Grandmother     Melanoma Neg Hx             REVIEW OF SYSTEMS  General: Denies any fever or chills  Chest: Denies shortness of breath, wheezing, coughing, or sputum production  Heart: Denies chest pain.  As noted above and per history of current illness above, otherwise negative in the remainder of the 14 systems.     PHYSICAL EXAM  Vitals:    09/12/23 0904   Weight: (!) 142.4 kg (314 lb)   Height: 6' (1.829 m)   PainSc: 0-No pain       GEN:  This patient is well-developed, well-nourished and appears stated age, well-oriented to person, place and time, and cooperative and pleasant on today's visit.      LOWER EXTREMITY PHYSICAL EXAMINATION   VASCULAR  DP pedal pulse 2/4 RIGHT, LEFT2/4   PT pedal pulse 2/4 RIGHT, LEFT2/4  Capillary refill time immediate to the toes.   Feet are warm to the touch. Skin temperature warm to warm from proximally to distally   There are no varicosities, telangiectasias noted to bilateral foot and ankle regions.   There are no ecchymoses noted to bilateral foot and ankle regions.   There is mild gross lower extremity edema.    DERMATOLOGIC  Skin moist with healthy texture and turgor.  Thickened, dystrophic, elongated toenails with subungal debris 1-5 b/l   There are no open ulcerations, lacerations, or fissures  to bilateral foot and ankle regions. There are no signs of infection as there is no erythema, no proximal-extending lymphangiitis, no fluctuance, or crepitus noted on palpation to bilateral foot and ankle regions.   There is no interdigital maceration.   There are hyperkeratotic lesions noted to feet.     NEUROLOGIC  Protective sensation intact at 9/10 sites upon examination with [place] Weinsten 5.07 g monofilament.  Propioception intact at 1st MTPJ b/l.  Achilles and patellar deep tendon reflexes intact  Babinski reflex absent    ORTHOPEDIC/BIOMECHANICAL  No symptomatic structural abnormalities noted. Muscle strength is 5/5 for foot inverters, everters, plantarflexors, and dorsiflexors. Muscle tone is normal.  Inspection/palpation of bone, joints and muscles unremarkable.      ASSESSMENT  Encounter Diagnoses   Name Primary?    Type 2 diabetes mellitus with other specified complication, without long-term current use of insulin     Type 2 diabetes mellitus with neurological manifestations Yes    Dermatophytosis, nail          Plan:  -Initial patient evaluation with H&P was performed  -Discuss presenting problems, etiology, pathologic processes and management options with patient today.   -I counseled the patient on their conditions, their implications and medical management. An in depth discussion on diabetic management, risk prevention, amputation prevention verbally and provided educational literature in written format.    -With patient's permission, the elongated onychomycotic toenails, as outlined in the physical examination, were sharply debrided with a double action nail nipper to their soft tissue attachment. If indicated, the nails were then smoothed down in thickness with a mechanical rotary arian and/or arielle board to facilitate in further debridement removing all offending nail and subungual debris.      - Shoe inspection. Diabetic Foot Education. Patient reminded of the importance of good nutrition and  blood sugar control to help prevent podiatric complications of diabetes. Patient instructed on proper foot hygeine. We discussed wearing proper shoe gear, daily foot inspections, never walking without protective shoe gear, never putting sharp instruments to feet, routine podiatric visits annually/semi annually or sooner if symptoms arise    Disclaimer: This note was partially prepared using a voice recognition system and is likely to have sound alike errors within the text.        Future Appointments   Date Time Provider Department Center   9/12/2023 10:00 AM Isaías Leong OD [facility] OPH[facility] [place]   11/2/2023  2:00 PM Lejeune, Elizabeth B, NP [facility] PULMSVS [place]   3/6/2024 11:00 AM Faustino Sharma MD [facility] [place]       Report Electronically Signed By:     Jessika Goldman DPM   Podiatry  [facility]  9/12/2023

## 2023-09-12 NOTE — TELEPHONE ENCOUNTER
Refill Decision Note   Elan Yepez  is requesting a refill authorization.  Brief Assessment and Rationale for Refill:  Quick Discontinue     Medication Therapy Plan:  No sig   Pharmacy is requesting new scripts for the following medications without required information, (sig/ frequency/qty/etc)      Medication Reconciliation Completed: No     Comments: Pharmacies have been requesting medications for patients without required information, (sig, frequency, qty, etc.). In addition, requests are sent for medication(s) pt. are currently not taking, and medications patients have never taken.    We have spoken to the pharmacies about these request types and advised their teams previously that we are unable to assess these New Script requests and require all details for these requests. This is a known issue and has been reported.     Note composed:4:33 AM 09/12/2023

## 2023-09-19 ENCOUNTER — OFFICE VISIT (OUTPATIENT)
Dept: HOME HEALTH SERVICES | Facility: CLINIC | Age: 83
End: 2023-09-19
Payer: MEDICARE

## 2023-09-19 VITALS
BODY MASS INDEX: 42.53 KG/M2 | WEIGHT: 314 LBS | TEMPERATURE: 98 F | DIASTOLIC BLOOD PRESSURE: 80 MMHG | HEART RATE: 78 BPM | HEIGHT: 72 IN | OXYGEN SATURATION: 97 % | SYSTOLIC BLOOD PRESSURE: 134 MMHG

## 2023-09-19 DIAGNOSIS — F33.42 RECURRENT MAJOR DEPRESSIVE DISORDER, IN FULL REMISSION: ICD-10-CM

## 2023-09-19 DIAGNOSIS — G47.33 OSA ON CPAP: ICD-10-CM

## 2023-09-19 DIAGNOSIS — M17.11 PRIMARY OSTEOARTHRITIS OF RIGHT KNEE: ICD-10-CM

## 2023-09-19 DIAGNOSIS — E11.21 TYPE 2 DIABETES MELLITUS WITH DIABETIC NEPHROPATHY, WITHOUT LONG-TERM CURRENT USE OF INSULIN: ICD-10-CM

## 2023-09-19 DIAGNOSIS — E11.40 TYPE 2 DIABETES MELLITUS WITH DIABETIC NEUROPATHY, WITHOUT LONG-TERM CURRENT USE OF INSULIN: ICD-10-CM

## 2023-09-19 DIAGNOSIS — F03.90 DEMENTIA WITHOUT BEHAVIORAL DISTURBANCE, PSYCHOTIC DISTURBANCE, MOOD DISTURBANCE, OR ANXIETY, UNSPECIFIED DEMENTIA SEVERITY, UNSPECIFIED DEMENTIA TYPE: ICD-10-CM

## 2023-09-19 DIAGNOSIS — G72.0 STATIN MYOPATHY: ICD-10-CM

## 2023-09-19 DIAGNOSIS — Z00.00 ENCOUNTER FOR PREVENTIVE HEALTH EXAMINATION: Primary | ICD-10-CM

## 2023-09-19 DIAGNOSIS — E66.01 MORBID OBESITY WITH BMI OF 40.0-44.9, ADULT: ICD-10-CM

## 2023-09-19 DIAGNOSIS — E78.2 MIXED HYPERLIPIDEMIA: ICD-10-CM

## 2023-09-19 DIAGNOSIS — Z85.46 HISTORY OF PROSTATE CANCER: ICD-10-CM

## 2023-09-19 DIAGNOSIS — T46.6X5A STATIN MYOPATHY: ICD-10-CM

## 2023-09-19 DIAGNOSIS — R26.9 ABNORMALITY OF GAIT AND MOBILITY: ICD-10-CM

## 2023-09-19 DIAGNOSIS — H90.0 CONDUCTIVE HEARING LOSS, BILATERAL: ICD-10-CM

## 2023-09-19 DIAGNOSIS — I70.0 AORTIC ATHEROSCLEROSIS: ICD-10-CM

## 2023-09-19 DIAGNOSIS — H40.013 OAG (OPEN ANGLE GLAUCOMA) SUSPECT, LOW RISK, BILATERAL: ICD-10-CM

## 2023-09-19 PROBLEM — N18.2 STAGE 2 CHRONIC KIDNEY DISEASE: Status: RESOLVED | Noted: 2019-02-07 | Resolved: 2023-09-19

## 2023-09-19 PROBLEM — C61 PROSTATE CANCER: Status: RESOLVED | Noted: 2018-05-23 | Resolved: 2023-09-19

## 2023-09-19 PROBLEM — R06.09 DOE (DYSPNEA ON EXERTION): Status: RESOLVED | Noted: 2018-02-20 | Resolved: 2023-09-19

## 2023-09-19 PROCEDURE — 3288F PR FALLS RISK ASSESSMENT DOCUMENTED: ICD-10-PCS | Mod: CPTII,S$GLB,, | Performed by: NURSE PRACTITIONER

## 2023-09-19 PROCEDURE — 3075F SYST BP GE 130 - 139MM HG: CPT | Mod: CPTII,S$GLB,, | Performed by: NURSE PRACTITIONER

## 2023-09-19 PROCEDURE — 1160F RVW MEDS BY RX/DR IN RCRD: CPT | Mod: CPTII,S$GLB,, | Performed by: NURSE PRACTITIONER

## 2023-09-19 PROCEDURE — G0439 PR MEDICARE ANNUAL WELLNESS SUBSEQUENT VISIT: ICD-10-PCS | Mod: S$GLB,,, | Performed by: NURSE PRACTITIONER

## 2023-09-19 PROCEDURE — 3079F PR MOST RECENT DIASTOLIC BLOOD PRESSURE 80-89 MM HG: ICD-10-PCS | Mod: CPTII,S$GLB,, | Performed by: NURSE PRACTITIONER

## 2023-09-19 PROCEDURE — 3079F DIAST BP 80-89 MM HG: CPT | Mod: CPTII,S$GLB,, | Performed by: NURSE PRACTITIONER

## 2023-09-19 PROCEDURE — 3288F FALL RISK ASSESSMENT DOCD: CPT | Mod: CPTII,S$GLB,, | Performed by: NURSE PRACTITIONER

## 2023-09-19 PROCEDURE — 1170F FXNL STATUS ASSESSED: CPT | Mod: CPTII,S$GLB,, | Performed by: NURSE PRACTITIONER

## 2023-09-19 PROCEDURE — G0439 PPPS, SUBSEQ VISIT: HCPCS | Mod: S$GLB,,, | Performed by: NURSE PRACTITIONER

## 2023-09-19 PROCEDURE — 1157F ADVNC CARE PLAN IN RCRD: CPT | Mod: CPTII,S$GLB,, | Performed by: NURSE PRACTITIONER

## 2023-09-19 PROCEDURE — 1160F PR REVIEW ALL MEDS BY PRESCRIBER/CLIN PHARMACIST DOCUMENTED: ICD-10-PCS | Mod: CPTII,S$GLB,, | Performed by: NURSE PRACTITIONER

## 2023-09-19 PROCEDURE — 1159F MED LIST DOCD IN RCRD: CPT | Mod: CPTII,S$GLB,, | Performed by: NURSE PRACTITIONER

## 2023-09-19 PROCEDURE — 1101F PT FALLS ASSESS-DOCD LE1/YR: CPT | Mod: CPTII,S$GLB,, | Performed by: NURSE PRACTITIONER

## 2023-09-19 PROCEDURE — 1101F PR PT FALLS ASSESS DOC 0-1 FALLS W/OUT INJ PAST YR: ICD-10-PCS | Mod: CPTII,S$GLB,, | Performed by: NURSE PRACTITIONER

## 2023-09-19 PROCEDURE — 1159F PR MEDICATION LIST DOCUMENTED IN MEDICAL RECORD: ICD-10-PCS | Mod: CPTII,S$GLB,, | Performed by: NURSE PRACTITIONER

## 2023-09-19 PROCEDURE — 1126F AMNT PAIN NOTED NONE PRSNT: CPT | Mod: CPTII,S$GLB,, | Performed by: NURSE PRACTITIONER

## 2023-09-19 PROCEDURE — 3075F PR MOST RECENT SYSTOLIC BLOOD PRESS GE 130-139MM HG: ICD-10-PCS | Mod: CPTII,S$GLB,, | Performed by: NURSE PRACTITIONER

## 2023-09-19 PROCEDURE — 1126F PR PAIN SEVERITY QUANTIFIED, NO PAIN PRESENT: ICD-10-PCS | Mod: CPTII,S$GLB,, | Performed by: NURSE PRACTITIONER

## 2023-09-19 PROCEDURE — 1157F PR ADVANCE CARE PLAN OR EQUIV PRESENT IN MEDICAL RECORD: ICD-10-PCS | Mod: CPTII,S$GLB,, | Performed by: NURSE PRACTITIONER

## 2023-09-19 PROCEDURE — 1170F PR FUNCTIONAL STATUS ASSESSED: ICD-10-PCS | Mod: CPTII,S$GLB,, | Performed by: NURSE PRACTITIONER

## 2023-09-19 NOTE — PROGRESS NOTES
Elan Yepez presented for Medicare AW today. The following components were reviewed and updated:    Medical history  Family History  Social history  Allergies and Current Medications  Health Risk Assessment  Health Maintenance  Care Team    **See Completed Assessments for Annual Wellness visit with in the encounter summary    The following assessments were completed:  Depression Screening  Cognitive function Screening      Timed Get Up Test  Whisper Test    Vitals:    09/19/23 1000   BP: 134/80   Pulse: 78   Temp: 98.2 °F (36.8 °C)   TempSrc: Temporal   SpO2: 97%   Weight: (!) 142.4 kg (314 lb)   Height: 6' (1.829 m)     Body mass index is 42.59 kg/m².   ]    Physical Exam  Vitals reviewed.   Constitutional:       Appearance: Normal appearance. He is obese.   HENT:      Head: Normocephalic and atraumatic.      Ears:      Comments: Decreased hearing bilaterally  Cardiovascular:      Rate and Rhythm: Normal rate and regular rhythm.      Pulses: Normal pulses.      Heart sounds: Normal heart sounds.   Pulmonary:      Effort: Pulmonary effort is normal.      Breath sounds: Normal breath sounds.   Musculoskeletal:         General: Normal range of motion.      Cervical back: Normal range of motion and neck supple.      Right lower leg: Edema (1+) present.      Left lower leg: Edema (1+) present.   Skin:     General: Skin is warm and dry.   Neurological:      Mental Status: He is alert and oriented to person, place, and time.      Gait: Gait abnormal.   Psychiatric:         Mood and Affect: Mood normal.         Behavior: Behavior normal.          Outpatient Medications Marked as Taking for the 9/19/23 encounter (Office Visit) with Carol Rodriguez FNP   Medication Sig Dispense Refill    aspirin (ECOTRIN) 81 MG EC tablet Take 1 tablet (81 mg total) by mouth every evening. 100 tablet 3    blood sugar diagnostic Strp USE  STRIP TO CHECK GLUCOSE ONCE DAILY 100 each 3    cholecalciferol, vitamin D3, 5,000 unit capsule Take  1 capsule by mouth Daily.      diclofenac sodium (VOLTAREN) 1 % Gel Apply 2 g topically 3 (three) times daily as needed (knee pain). 450 g 3    donepeziL (ARICEPT) 10 MG tablet Take 1 tablet (10 mg total) by mouth every evening. 90 tablet 3    lancets (SOFT TOUCH LANCETS) Misc 1 Device by Misc.(Non-Drug; Combo Route) route once daily. 100 each 3    mirabegron (MYRBETRIQ) 50 mg Tb24 Take 1 tablet (50 mg total) by mouth once daily. 30 tablet 11    MULTIVITS-MINERALS/FA/LYCOPENE (ONE-A-DAY MEN'S ORAL) Take 1 tablet by mouth Daily.      pravastatin (PRAVACHOL) 20 MG tablet Take 1 tablet (20 mg total) by mouth once daily. 90 tablet 3    sertraline (ZOLOFT) 50 MG tablet Take 1 tablet (50 mg total) by mouth every morning. Increase energy and boost mood 90 tablet 3    TRUE METRIX GLUCOSE METER Misc           Diagnoses and health risks identified today and associated recommendations/orders:  1. Encounter for preventive health examination  Medicare aw complete. Health maintenance:  pt declined shingles vaccines. Encouraged pt to obtain flu vaccine and new covid 19 vaccine when available.     2. Dementia without behavioral disturbance, psychotic disturbance, mood disturbance, or anxiety, unspecified dementia severity, unspecified dementia type  Chronic and stable. Continue current management. On aricept. See med list above. No safety issues identified. Follow up with PCP.     3. Aortic atherosclerosis  Chronic and stable on statin medication. Continue current. F/u with pcp.      4. Type 2 diabetes mellitus with diabetic nephropathy, without long-term current use of insulin   Latest Reference Range & Units 04/18/06 09:01 10/12/06 08:39 05/17/07 07:30 07/18/08 08:15 08/25/09 10:02 03/23/10 10:16 09/29/10 09:35 07/05/11 08:16 01/18/12 12:16 10/02/12 09:40 09/04/13 13:21 04/07/15 09:55 05/05/16 11:00 09/08/16 07:13 06/22/17 10:38 02/13/18 08:17 08/22/18 09:24 05/30/19 08:55 12/17/19 10:01 08/05/20 07:46 03/03/21 08:35 09/10/21  "12:09 03/16/22 16:24 11/11/22 15:09 05/12/23 11:08   Hemoglobin A1C External 4.0 - 5.6 % 6.2 6.4 (H) 6.2 6.3 (H) 6.1 6.2 6.1 6.4 (H) 6.4 (H) 6.4 (H) 6.5 (H) 6.6 (H) 6.7 (H) 6.5 (H) 6.3 (H) 6.1 (H) 6.1 (H) 6.1 (H) 6.1 (H) 6.3 (H) 6.2 (H) 6.3 (H) 6.6 (H) 6.4 (H) 6.4 (H)   Estimated Avg Glucose 68 - 131 mg/dL      131 128 137 (H) 137 (H) 137 (H) 140 (H) 143 (H) 146 (H) 140 (H) 134 (H) 128 128 128 128 134 (H) 131 134 (H) 143 (H) 137 (H) 137 (H)   (H): Data is abnormally high  Controlled. Continue current management. See med list. Patient states checks bs once a day and usually ranges 100s-120s. Reviewed diabetic diet, diabetic foot care, preferred BS, and HgbA1C levels. Follow up with your PCP as instructed, podiatry and ophthalmology yearly.      5. Type 2 diabetes mellitus with diabetic neuropathy, without long-term current use of insulin  Controlled. Continue current management. See med list. Patient states checks bs once a day and usually ranges 100s-120s. Reviewed diabetic diet, diabetic foot care, preferred BS, and HgbA1C levels. Follow up with your PCP as instructed, podiatry and ophthalmology yearly.      6. Morbid obesity with BMI of 40.0-44.9, adult  Chronic. Recommend diet and exercise to lose weight. Follow up with your PCP as planned to discuss adjustments to your treatment plan.      7. Recurrent major depressive disorder, in full remission  Chronic and stable. Continue current management. On zoloft. See med list above. Follow up with PCP.     8. Mixed hyperlipidemia  Lab Results   Component Value Date    CHOL 222 (H) 05/12/2023    CHOL 217 (H) 03/16/2022    CHOL 214 (H) 03/03/2021     Lab Results   Component Value Date    HDL 45 05/12/2023    HDL 45 03/16/2022    HDL 47 03/03/2021     Lab Results   Component Value Date    LDLCALC 156.0 05/12/2023    LDLCALC 139.6 03/16/2022    LDLCALC 147.6 03/03/2021     No results found for: "DLDL"  Lab Results   Component Value Date    TRIG 105 05/12/2023    TRIG 162 " (H) 03/16/2022    TRIG 97 03/03/2021       f1   Lab Results   Component Value Date    CHOLHDL 20.3 05/12/2023    CHOLHDL 20.7 03/16/2022    CHOLHDL 22.0 03/03/2021    Chronic. On statin medication. Recommend diet and exercise. F/u with pcp.      9. OAG (open angle glaucoma) suspect, low risk, bilateral  Chronic and stable. Continue current management. See med list above. Follow up with ophthalmology.      10. Conductive hearing loss, bilateral  Pt states he is working on getting hearing aids.     11. History of prostate cancer  S/p treatment.     12. Primary osteoarthritis of right knee  Chronic and stable. Continue current management. See med list above. Follow up with PCP.     14. JESSICA on CPAP  Chronic and stable. No acute issues. Continue current management. See med list above. Follow up with pulmonology.      15. Abnormality of gait and mobility  Chronic. Fall precautions recommended and discussed. Follow up with PCP.            Provided Elan with a 5-10 year written screening schedule and personal prevention plan. Recommendations were developed using the USPSTF age appropriate recommendations. Education, counseling, and referrals were provided as needed.  After Visit Summary printed and given to patient which includes a list of additional screenings\tests needed.    Follow up in about 1 year (around 9/19/2024) for annual wellness visit.      HUNTER Lea    I offered to discuss advanced care planning, including how to pick a person who would make decisions for you if you were unable to make them for yourself, called a health care power of , and what kind of decisions you might make such as use of life sustaining treatments such as ventilators and tube feeding when faced with a life limiting illness recorded on a living will that they will need to know. (How you want to be cared for as you near the end of your natural life)     X  Patient has advanced directives on file, which we reviewed, and  they do not wish to make changes.

## 2023-09-19 NOTE — PATIENT INSTRUCTIONS
Counseling and Referral of Other Preventative  (Italic type indicates deductible and co-insurance are waived)    Patient Name: Elan Yepez  Today's Date: 9/19/2023    Health Maintenance       Date Due Completion Date    Shingles Vaccine (1 of 2) 07/28/2010 6/2/2010    COVID-19 Vaccine (7 - Pfizer risk series) 01/06/2023 11/11/2022    Influenza Vaccine (1) 09/01/2023 11/22/2022    Override on 3/10/2016: Declined (Patient will wait till next flu season)    Hemoglobin A1c 11/12/2023 5/12/2023    Diabetes Urine Screening 05/12/2024 5/12/2023    Lipid Panel 05/12/2024 5/12/2023    Override on 5/23/2018: Not Clinically Appropriate    Eye Exam 09/12/2024 9/12/2023    TETANUS VACCINE 06/25/2028 6/25/2018 (Declined)    Override on 6/25/2018: Declined        No orders of the defined types were placed in this encounter.      The following information is provided to all patients.  This information is to help you find resources for any of the problems found today that may be affecting your health:                Living healthy guide: www.[facility].gov      Understanding Diabetes: www.diabetes.org      Eating healthy: www.cdc.gov/healthyweight      CDC home safety checklist: www.cdc.gov/steadi/patient.html      Agency on Aging: www.goea.[place].[facility]      Alcoholics anonymous (AA): www.aa.org      Physical Activity: www.anup.nih.gov/fd3ksuk      Tobacco use: www.quitwithusla.org

## 2023-09-19 NOTE — Clinical Note
Medicare awv complete. Health maintenance:  pt declined shingles vaccines. Encouraged pt to obtain flu vaccine and new covid 19 vaccine when available.

## 2023-10-16 ENCOUNTER — TELEPHONE (OUTPATIENT)
Dept: OPHTHALMOLOGY | Facility: CLINIC | Age: 83
End: 2023-10-16
Payer: MEDICARE

## 2023-10-25 ENCOUNTER — TELEPHONE (OUTPATIENT)
Dept: OPHTHALMOLOGY | Facility: CLINIC | Age: 83
End: 2023-10-25
Payer: MEDICARE

## 2023-11-02 ENCOUNTER — OFFICE VISIT (OUTPATIENT)
Dept: PULMONOLOGY | Facility: CLINIC | Age: 83
End: 2023-11-02
Payer: MEDICARE

## 2023-11-02 VITALS
HEART RATE: 84 BPM | SYSTOLIC BLOOD PRESSURE: 132 MMHG | HEIGHT: 72 IN | DIASTOLIC BLOOD PRESSURE: 80 MMHG | WEIGHT: 313.94 LBS | BODY MASS INDEX: 42.52 KG/M2 | OXYGEN SATURATION: 96 % | RESPIRATION RATE: 20 BRPM

## 2023-11-02 DIAGNOSIS — E66.01 MORBID OBESITY WITH BMI OF 40.0-44.9, ADULT: ICD-10-CM

## 2023-11-02 DIAGNOSIS — G47.33 OSA ON CPAP: Primary | ICD-10-CM

## 2023-11-02 PROCEDURE — 1157F PR ADVANCE CARE PLAN OR EQUIV PRESENT IN MEDICAL RECORD: ICD-10-PCS | Mod: HCNC,CPTII,S$GLB, | Performed by: NURSE PRACTITIONER

## 2023-11-02 PROCEDURE — 1159F MED LIST DOCD IN RCRD: CPT | Mod: HCNC,CPTII,S$GLB, | Performed by: NURSE PRACTITIONER

## 2023-11-02 PROCEDURE — 99999 PR PBB SHADOW E&M-EST. PATIENT-LVL IV: CPT | Mod: PBBFAC,HCNC,, | Performed by: NURSE PRACTITIONER

## 2023-11-02 PROCEDURE — 99213 PR OFFICE/OUTPT VISIT, EST, LEVL III, 20-29 MIN: ICD-10-PCS | Mod: HCNC,S$GLB,, | Performed by: NURSE PRACTITIONER

## 2023-11-02 PROCEDURE — 3075F SYST BP GE 130 - 139MM HG: CPT | Mod: HCNC,CPTII,S$GLB, | Performed by: NURSE PRACTITIONER

## 2023-11-02 PROCEDURE — 99999 PR PBB SHADOW E&M-EST. PATIENT-LVL IV: ICD-10-PCS | Mod: PBBFAC,HCNC,, | Performed by: NURSE PRACTITIONER

## 2023-11-02 PROCEDURE — 1126F PR PAIN SEVERITY QUANTIFIED, NO PAIN PRESENT: ICD-10-PCS | Mod: HCNC,CPTII,S$GLB, | Performed by: NURSE PRACTITIONER

## 2023-11-02 PROCEDURE — 1101F PT FALLS ASSESS-DOCD LE1/YR: CPT | Mod: HCNC,CPTII,S$GLB, | Performed by: NURSE PRACTITIONER

## 2023-11-02 PROCEDURE — 1126F AMNT PAIN NOTED NONE PRSNT: CPT | Mod: HCNC,CPTII,S$GLB, | Performed by: NURSE PRACTITIONER

## 2023-11-02 PROCEDURE — 1101F PR PT FALLS ASSESS DOC 0-1 FALLS W/OUT INJ PAST YR: ICD-10-PCS | Mod: HCNC,CPTII,S$GLB, | Performed by: NURSE PRACTITIONER

## 2023-11-02 PROCEDURE — 99213 OFFICE O/P EST LOW 20 MIN: CPT | Mod: HCNC,S$GLB,, | Performed by: NURSE PRACTITIONER

## 2023-11-02 PROCEDURE — 3075F PR MOST RECENT SYSTOLIC BLOOD PRESS GE 130-139MM HG: ICD-10-PCS | Mod: HCNC,CPTII,S$GLB, | Performed by: NURSE PRACTITIONER

## 2023-11-02 PROCEDURE — 3079F PR MOST RECENT DIASTOLIC BLOOD PRESSURE 80-89 MM HG: ICD-10-PCS | Mod: HCNC,CPTII,S$GLB, | Performed by: NURSE PRACTITIONER

## 2023-11-02 PROCEDURE — 1160F PR REVIEW ALL MEDS BY PRESCRIBER/CLIN PHARMACIST DOCUMENTED: ICD-10-PCS | Mod: HCNC,CPTII,S$GLB, | Performed by: NURSE PRACTITIONER

## 2023-11-02 PROCEDURE — 1160F RVW MEDS BY RX/DR IN RCRD: CPT | Mod: HCNC,CPTII,S$GLB, | Performed by: NURSE PRACTITIONER

## 2023-11-02 PROCEDURE — 3079F DIAST BP 80-89 MM HG: CPT | Mod: HCNC,CPTII,S$GLB, | Performed by: NURSE PRACTITIONER

## 2023-11-02 PROCEDURE — 1157F ADVNC CARE PLAN IN RCRD: CPT | Mod: HCNC,CPTII,S$GLB, | Performed by: NURSE PRACTITIONER

## 2023-11-02 PROCEDURE — 3288F PR FALLS RISK ASSESSMENT DOCUMENTED: ICD-10-PCS | Mod: HCNC,CPTII,S$GLB, | Performed by: NURSE PRACTITIONER

## 2023-11-02 PROCEDURE — 1159F PR MEDICATION LIST DOCUMENTED IN MEDICAL RECORD: ICD-10-PCS | Mod: HCNC,CPTII,S$GLB, | Performed by: NURSE PRACTITIONER

## 2023-11-02 PROCEDURE — 3288F FALL RISK ASSESSMENT DOCD: CPT | Mod: HCNC,CPTII,S$GLB, | Performed by: NURSE PRACTITIONER

## 2023-11-02 NOTE — PROGRESS NOTES
Subjective:      Patient ID: Elan Yepez is a 83 y.o. male.    Chief Complaint: Sleep Apnea    HPI    Presents to office for review of AutoPAP therapy. Patient states improved symptoms with use of AutoPAP. Sleeping more soundly. Waking up feeling more refreshed. Improved daytime sleepiness. Patient states he is benefiting from use of the AutoPAP.   No weight loss. BMI 42.58    Patient Active Problem List   Diagnosis    Hyperlipemia    History of prostate cancer    Morbid obesity with BMI of 40.0-44.9, adult    Aortic atherosclerosis    [place] (hard of hearing)    Type 2 diabetes mellitus with diabetic neuropathy    JESSICA on CPAP    Amnestic MCI (mild cognitive impairment with memory loss)    Primary osteoarthritis of right knee    Statin myopathy    Diabetes mellitus type 2 in obese    Type 2 diabetes mellitus with kidney complication, without long-term current use of insulin    Recurrent major depressive disorder, in full remission    Polyneuropathy due to type 2 diabetes mellitus    OAG (open angle glaucoma) suspect, low risk, bilateral    Erectile dysfunction    Dementia without behavioral disturbance, psychotic disturbance, mood disturbance, or anxiety, unspecified dementia severity, unspecified dementia type     /80   Pulse 84   Resp 20   Ht 6' (1.829 m)   Wt (!) 142.4 kg (313 lb 15 oz)   SpO2 96%   BMI 42.58 kg/m²   Body mass index is 42.58 kg/m².    Review of Systems   Constitutional: Negative.    HENT: Negative.     Respiratory: Negative.     Cardiovascular: Negative.    Musculoskeletal:  Positive for arthralgias and gait problem.   Gastrointestinal: Negative.    Psychiatric/Behavioral: Negative.       Objective:      Physical Exam  Constitutional:       Appearance: He is obese.   HENT:      Head: Normocephalic and atraumatic.      Nose: Nose normal.   Cardiovascular:      Rate and Rhythm: Normal rate and regular rhythm.   Pulmonary:      Effort: Pulmonary effort is normal.      Breath sounds: Normal  breath sounds.   Abdominal:      Palpations: Abdomen is soft.      Tenderness: There is no abdominal tenderness.   Musculoskeletal:         General: Normal range of motion.      Cervical back: Normal range of motion and neck supple.   Skin:     General: Skin is warm and dry.   Neurological:      General: No focal deficit present.      Mental Status: He is alert and oriented to person, place, and time.   Psychiatric:         Mood and Affect: Mood normal.         Behavior: Behavior normal.       Personal Diagnostic Review  Compliance Summary  10/2/2023 - 10/31/2023 (30 days)  Days with Device Usage 30 days  Days without Device Usage 0 days  Percent Days with Device Usage 100.0%  Cumulative Usage 11 days 4 hrs. 8 mins. 19 secs.  Maximum Usage (1 Day) 11 hrs. 36 mins. 24 secs.  Average Usage (All Days) 8 hrs. 56 mins. 16 secs.  Average Usage (Days Used) 8 hrs. 56 mins. 16 secs.  Minimum Usage (1 Day) 5 hrs. 8 mins. 22 secs.  Percent of Days with Usage >= 4 Hours 100.0%  Percent of Days with Usage < 4 Hours 0.0%  Date Range  Total Blower Time 11 days 5 hrs. 1 mins. 45 secs.  Average AHI 3.1  Auto-CPAP Summary  Auto-CPAP Mean Pressure 11.5 cmH2O  Auto-CPAP Peak Average Pressure 16.3 cmH2O  Device Pressure <= 90% of Time 14.1 cmH2O  Average Time in Large Leak Per Day 1 mins. 30 secs        Assessment:       1. JESSICA on CPAP    2. Morbid obesity with BMI of 40.0-44.9, adult        Outpatient Encounter Medications as of 11/2/2023   Medication Sig Dispense Refill    aspirin (ECOTRIN) 81 MG EC tablet Take 1 tablet (81 mg total) by mouth every evening. 100 tablet 3    blood sugar diagnostic Strp USE  STRIP TO CHECK GLUCOSE ONCE DAILY 100 each 3    cholecalciferol, vitamin D3, 5,000 unit capsule Take 1 capsule by mouth Daily.      diclofenac sodium (VOLTAREN) 1 % Gel Apply 2 g topically 3 (three) times daily as needed (knee pain). 450 g 3    donepeziL (ARICEPT) 10 MG tablet Take 1 tablet (10 mg total) by mouth every evening. 90  tablet 3    lancets (SOFT TOUCH LANCETS) Misc 1 Device by Misc.(Non-Drug; Combo Route) route once daily. 100 each 3    mirabegron (MYRBETRIQ) 50 mg Tb24 Take 1 tablet (50 mg total) by mouth once daily. 30 tablet 11    MULTIVITS-MINERALS/FA/LYCOPENE (ONE-A-DAY MEN'S ORAL) Take 1 tablet by mouth Daily.      pravastatin (PRAVACHOL) 20 MG tablet Take 1 tablet (20 mg total) by mouth once daily. 90 tablet 3    sertraline (ZOLOFT) 50 MG tablet Take 1 tablet (50 mg total) by mouth every morning. Increase energy and boost mood 90 tablet 3    TRUE METRIX GLUCOSE METER [facility] [place]        No facility-administered encounter medications on file as of 11/2/2023.     Orders Placed This Encounter   Procedures    CPAP/BIPAP SUPPLIES     Order Specific Question:   Length of need (1-99 months):     Answer:   99     Order Specific Question:   Choose ONE mask type and its corresponding cushions and/or pillows:     Answer:    Full Face Mask, 1 per 90 days:  Full Face Cushion, (3 per 90 days)     Order Specific Question:   Choose EITHER Heated or Non-Heated Tubjing     Answer:    Non-Heated Tubing, 1 per 90 days     Order Specific Question:   All other supplies as needed as listed below:     Answer:    Headgear, 1 per 180 days     Order Specific Question:   All other supplies as needed as listed below:     Answer:    Disposable Filter, 6 per 90 days     Order Specific Question:   All other supplies as needed as listed below:     Answer:    Non-Disposable Filter, 1 per 180 days     Order Specific Question:   All other supplies as needed as listed below:     Answer:    Humidifier Chamber, 1 per 180 days     Plan:     Problem List Items Addressed This Visit          Endocrine    Morbid obesity with BMI of 40.0-44.9, adult       Other    JESSICA on CPAP - Primary    Relevant Orders    CPAP/BIPAP SUPPLIES   Weight loss and exercise to improve overall health.  Compliant with PAP and benefits from use. Follow up annually  in the sleep clinic.                   Elizabeth LeJeune, ACNP, ANP

## 2024-01-19 DIAGNOSIS — G31.84 AMNESTIC MCI (MILD COGNITIVE IMPAIRMENT WITH MEMORY LOSS): ICD-10-CM

## 2024-01-19 NOTE — TELEPHONE ENCOUNTER
----- Message from Johnna Gutierrez sent at 1/19/2024  1:15 PM CST -----  Elan Yepez calling regarding Refills for donepeziL (ARICEPT) 10 MG tablet      [facility] - [place] - [address] [facility]  [address] [place] [address]  Phone: [phone] Fax: [phone]

## 2024-01-22 RX ORDER — DONEPEZIL HYDROCHLORIDE 10 MG/1
10 TABLET, FILM COATED ORAL NIGHTLY
Qty: 90 TABLET | Refills: 3 | Status: SHIPPED | OUTPATIENT
Start: 2024-01-22 | End: 2024-03-06 | Stop reason: SDUPTHER

## 2024-01-24 DIAGNOSIS — F33.42 RECURRENT MAJOR DEPRESSIVE DISORDER, IN FULL REMISSION: ICD-10-CM

## 2024-01-24 RX ORDER — SERTRALINE HYDROCHLORIDE 50 MG/1
50 TABLET, FILM COATED ORAL EVERY MORNING
Qty: 90 TABLET | Refills: 3 | Status: SHIPPED | OUTPATIENT
Start: 2024-01-24 | End: 2024-03-06 | Stop reason: SDUPTHER

## 2024-01-24 NOTE — TELEPHONE ENCOUNTER
----- Message from Nico Inman sent at 1/24/2024  9:35 AM CST -----  Contact: irene/ wife  Type:  RX Refill Request  Who Called: Irene  Refill or New Rx:refill  RX Name and Strength:sertraline (ZOLOFT) 50 MG tablet   How is the patient currently taking it? (ex. 1XDay):unknown  Is this a 30 day or 90 day RX:unknown  Preferred Pharmacy with phone number:  [address] [place] - [address] [facility]  [address] [facility]  [place] [address]  Phone: [phone] Fax: [phone]  Local or Mail Order:Local  Ordering Provider:Dr Espinoza  Would the patient rather a call back or a response via [facility]? Call back   Best Call Back Number:[phone]  Additional Information:

## 2024-01-24 NOTE — TELEPHONE ENCOUNTER
No care due was identified.  [facility] Embedded Care Due Messages. Reference number: [id number].   1/24/2024 10:48:37 AM CST

## 2024-02-07 DIAGNOSIS — I70.0 AORTIC ATHEROSCLEROSIS: ICD-10-CM

## 2024-02-07 DIAGNOSIS — E78.2 MIXED HYPERLIPIDEMIA: ICD-10-CM

## 2024-02-07 NOTE — TELEPHONE ENCOUNTER
Care Due:                  Date            Visit Type   Department     Provider  --------------------------------------------------------------------------------                                EP -                              PRIMARY      [facility] PRIMARY  Last Visit: 09-      CARE ([facility])   FELIPE Sharma                              EP -                              PRIMARY      [facility] PRIMARY  Next Visit: 03-      CARE ([facility])   FELIPE Sharma                                                            Last  Test          Frequency    Reason                     Performed    Due Date  --------------------------------------------------------------------------------    CBC.........  12 months..  diclofenac...............  05- 05-    CMP.........  12 months..  diclofenac, pravastatin..  05- 05-    Lipid Panel.  12 months..  pravastatin..............  05- 05-    [facility] Embedded Care Due Messages. Reference number: [id number].   2/07/2024 3:16:41 PM CST

## 2024-02-08 RX ORDER — PRAVASTATIN SODIUM 20 MG/1
20 TABLET ORAL DAILY
Qty: 90 TABLET | Refills: 3 | Status: SHIPPED | OUTPATIENT
Start: 2024-02-08 | End: 2024-03-06

## 2024-03-04 RX ORDER — MIRABEGRON 50 MG/1
50 TABLET, EXTENDED RELEASE ORAL DAILY
Qty: 30 TABLET | Refills: 11 | Status: SHIPPED | OUTPATIENT
Start: 2024-03-04 | End: 2024-03-06 | Stop reason: SDUPTHER

## 2024-03-04 NOTE — TELEPHONE ENCOUNTER
No care due was identified.  [facility] Embedded Care Due Messages. Reference number: [id number].   3/04/2024 3:46:09 PM CST

## 2024-03-04 NOTE — TELEPHONE ENCOUNTER
----- Message from Claudette Cornejo sent at 3/4/2024  2:58 PM CST -----  Contact: ELLIOTT VIVEROS [[id number]]  ..Type:  Patient Requesting Call    Who Called: ELLIOTT VIVEROS [[id number]]  Does the patient know what this is regarding?: refill  - mirabegron (MYRBETRIQ) 50 mg Tb24  Would the patient rather a call back or a response via [facility]?  call  Best Call Back Number: .[phone] (home) [phone] (work)  Additional Information:     .  [facility] - [place] - [address] [facility]  [address] [place] [address]  Phone: [phone] Fax: [phone]

## 2024-03-06 ENCOUNTER — OFFICE VISIT (OUTPATIENT)
Dept: PRIMARY CARE CLINIC | Facility: CLINIC | Age: 84
End: 2024-03-06
Payer: MEDICARE

## 2024-03-06 VITALS
OXYGEN SATURATION: 96 % | HEIGHT: 72 IN | BODY MASS INDEX: 42.66 KG/M2 | HEART RATE: 70 BPM | WEIGHT: 315 LBS | DIASTOLIC BLOOD PRESSURE: 86 MMHG | TEMPERATURE: 98 F | SYSTOLIC BLOOD PRESSURE: 124 MMHG

## 2024-03-06 DIAGNOSIS — F03.90 DEMENTIA WITHOUT BEHAVIORAL DISTURBANCE, PSYCHOTIC DISTURBANCE, MOOD DISTURBANCE, OR ANXIETY, UNSPECIFIED DEMENTIA SEVERITY, UNSPECIFIED DEMENTIA TYPE: ICD-10-CM

## 2024-03-06 DIAGNOSIS — E78.2 MIXED HYPERLIPIDEMIA: ICD-10-CM

## 2024-03-06 DIAGNOSIS — Z85.46 HISTORY OF PROSTATE CANCER: ICD-10-CM

## 2024-03-06 DIAGNOSIS — I70.0 AORTIC ATHEROSCLEROSIS: ICD-10-CM

## 2024-03-06 DIAGNOSIS — T46.6X5A STATIN MYOPATHY: ICD-10-CM

## 2024-03-06 DIAGNOSIS — Z12.5 ENCOUNTER FOR SCREENING FOR MALIGNANT NEOPLASM OF PROSTATE: ICD-10-CM

## 2024-03-06 DIAGNOSIS — G47.33 OSA ON CPAP: ICD-10-CM

## 2024-03-06 DIAGNOSIS — F33.42 RECURRENT MAJOR DEPRESSIVE DISORDER, IN FULL REMISSION: ICD-10-CM

## 2024-03-06 DIAGNOSIS — G72.0 STATIN MYOPATHY: ICD-10-CM

## 2024-03-06 DIAGNOSIS — E11.69 TYPE 2 DIABETES MELLITUS WITH OTHER SPECIFIED COMPLICATION, WITHOUT LONG-TERM CURRENT USE OF INSULIN: Primary | ICD-10-CM

## 2024-03-06 DIAGNOSIS — E66.01 MORBID OBESITY WITH BMI OF 40.0-44.9, ADULT: ICD-10-CM

## 2024-03-06 PROBLEM — G31.84 AMNESTIC MCI (MILD COGNITIVE IMPAIRMENT WITH MEMORY LOSS): Status: RESOLVED | Noted: 2017-03-21 | Resolved: 2024-03-06

## 2024-03-06 PROCEDURE — 3288F FALL RISK ASSESSMENT DOCD: CPT | Mod: HCNC,CPTII,S$GLB, | Performed by: FAMILY MEDICINE

## 2024-03-06 PROCEDURE — 1159F MED LIST DOCD IN RCRD: CPT | Mod: HCNC,CPTII,S$GLB, | Performed by: FAMILY MEDICINE

## 2024-03-06 PROCEDURE — 99215 OFFICE O/P EST HI 40 MIN: CPT | Mod: HCNC,S$GLB,, | Performed by: FAMILY MEDICINE

## 2024-03-06 PROCEDURE — 1126F AMNT PAIN NOTED NONE PRSNT: CPT | Mod: HCNC,CPTII,S$GLB, | Performed by: FAMILY MEDICINE

## 2024-03-06 PROCEDURE — 99999 PR PBB SHADOW E&M-EST. PATIENT-LVL IV: CPT | Mod: PBBFAC,HCNC,, | Performed by: FAMILY MEDICINE

## 2024-03-06 PROCEDURE — 1101F PT FALLS ASSESS-DOCD LE1/YR: CPT | Mod: HCNC,CPTII,S$GLB, | Performed by: FAMILY MEDICINE

## 2024-03-06 PROCEDURE — 3079F DIAST BP 80-89 MM HG: CPT | Mod: HCNC,CPTII,S$GLB, | Performed by: FAMILY MEDICINE

## 2024-03-06 PROCEDURE — 3074F SYST BP LT 130 MM HG: CPT | Mod: HCNC,CPTII,S$GLB, | Performed by: FAMILY MEDICINE

## 2024-03-06 PROCEDURE — G2211 COMPLEX E/M VISIT ADD ON: HCPCS | Mod: HCNC,S$GLB,, | Performed by: FAMILY MEDICINE

## 2024-03-06 PROCEDURE — 3072F LOW RISK FOR RETINOPATHY: CPT | Mod: HCNC,CPTII,S$GLB, | Performed by: FAMILY MEDICINE

## 2024-03-06 RX ORDER — MIRABEGRON 50 MG/1
50 TABLET, EXTENDED RELEASE ORAL DAILY
Qty: 90 TABLET | Refills: 3 | Status: SHIPPED | OUTPATIENT
Start: 2024-03-06 | End: 2025-03-06

## 2024-03-06 RX ORDER — SERTRALINE HYDROCHLORIDE 50 MG/1
50 TABLET, FILM COATED ORAL EVERY MORNING
Qty: 90 TABLET | Refills: 3 | Status: SHIPPED | OUTPATIENT
Start: 2024-03-06 | End: 2025-03-06

## 2024-03-06 RX ORDER — DONEPEZIL HYDROCHLORIDE 10 MG/1
10 TABLET, FILM COATED ORAL NIGHTLY
Qty: 90 TABLET | Refills: 3 | Status: SHIPPED | OUTPATIENT
Start: 2024-03-06 | End: 2025-03-06

## 2024-03-06 NOTE — PATIENT INSTRUCTIONS
Physically, everything looks really good today.      Blood pressure, however, is a bit higher than we would like.  We will recheck it before you go.      Let us get some screening blood work done to check things on the inside.  Would like to see where your sugar levels are.  Orders placed today.  We can get the blood drawn next week when you go to the foot doctor.  Would also like to collect a urine sample at that visit.      Refills of your medicines have been sent to the pharmacy.  Please continue taking them, as directed.    Continue to eat a healthy diet.  Be careful with portion sizes.  Includes lots of fresh fruits, vegetables, whole grains, lean proteins.  See info below.    Keep hydrated.  Be sure to drink at least 8-10, 8 oz, glasses of water every day.    Stay active.  Try to do some sort of physical activity every day.  Nothing outrageous, just try walking for 10-15 minutes each day.
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
## 2024-03-12 ENCOUNTER — LAB VISIT (OUTPATIENT)
Dept: LAB | Facility: HOSPITAL | Age: 84
End: 2024-03-12
Attending: FAMILY MEDICINE
Payer: MEDICARE

## 2024-03-12 ENCOUNTER — OFFICE VISIT (OUTPATIENT)
Dept: PODIATRY | Facility: CLINIC | Age: 84
End: 2024-03-12
Payer: MEDICARE

## 2024-03-12 VITALS — BODY MASS INDEX: 42.73 KG/M2 | HEIGHT: 72 IN

## 2024-03-12 DIAGNOSIS — B35.1 DERMATOPHYTOSIS, NAIL: ICD-10-CM

## 2024-03-12 DIAGNOSIS — L84 CORN OR CALLUS: ICD-10-CM

## 2024-03-12 DIAGNOSIS — E11.49 TYPE 2 DIABETES MELLITUS WITH NEUROLOGICAL MANIFESTATIONS: Primary | ICD-10-CM

## 2024-03-12 DIAGNOSIS — F03.90 DEMENTIA WITHOUT BEHAVIORAL DISTURBANCE, PSYCHOTIC DISTURBANCE, MOOD DISTURBANCE, OR ANXIETY, UNSPECIFIED DEMENTIA SEVERITY, UNSPECIFIED DEMENTIA TYPE: ICD-10-CM

## 2024-03-12 DIAGNOSIS — F33.42 RECURRENT MAJOR DEPRESSIVE DISORDER, IN FULL REMISSION: ICD-10-CM

## 2024-03-12 DIAGNOSIS — E11.69 TYPE 2 DIABETES MELLITUS WITH OTHER SPECIFIED COMPLICATION, WITHOUT LONG-TERM CURRENT USE OF INSULIN: ICD-10-CM

## 2024-03-12 DIAGNOSIS — E66.01 MORBID OBESITY WITH BMI OF 40.0-44.9, ADULT: ICD-10-CM

## 2024-03-12 DIAGNOSIS — E78.2 MIXED HYPERLIPIDEMIA: ICD-10-CM

## 2024-03-12 DIAGNOSIS — Z85.46 HISTORY OF PROSTATE CANCER: ICD-10-CM

## 2024-03-12 DIAGNOSIS — Z12.5 ENCOUNTER FOR SCREENING FOR MALIGNANT NEOPLASM OF PROSTATE: ICD-10-CM

## 2024-03-12 LAB
ALBUMIN SERPL BCP-MCNC: 3.7 G/DL (ref 3.5–5.2)
ALBUMIN/CREAT UR: 60.2 UG/MG (ref 0–30)
ALP SERPL-CCNC: 74 U/L (ref 55–135)
ALT SERPL W/O P-5'-P-CCNC: 29 U/L (ref 10–44)
ANION GAP SERPL CALC-SCNC: 13 MMOL/L (ref 8–16)
AST SERPL-CCNC: 25 U/L (ref 10–40)
BASOPHILS # BLD AUTO: 0.01 K/UL (ref 0–0.2)
BASOPHILS NFR BLD: 0.2 % (ref 0–1.9)
BILIRUB SERPL-MCNC: 0.5 MG/DL (ref 0.1–1)
BUN SERPL-MCNC: 14 MG/DL (ref 8–23)
CALCIUM SERPL-MCNC: 9.5 MG/DL (ref 8.7–10.5)
CHLORIDE SERPL-SCNC: 106 MMOL/L (ref 95–110)
CHOLEST SERPL-MCNC: 189 MG/DL (ref 120–199)
CHOLEST/HDLC SERPL: 4.3 {RATIO} (ref 2–5)
CO2 SERPL-SCNC: 23 MMOL/L (ref 23–29)
COMPLEXED PSA SERPL-MCNC: <0.01 NG/ML (ref 0–4)
CREAT SERPL-MCNC: 1.1 MG/DL (ref 0.5–1.4)
CREAT UR-MCNC: 171 MG/DL (ref 23–375)
DIFFERENTIAL METHOD BLD: ABNORMAL
EOSINOPHIL # BLD AUTO: 0.1 K/UL (ref 0–0.5)
EOSINOPHIL NFR BLD: 0.8 % (ref 0–8)
ERYTHROCYTE [DISTWIDTH] IN BLOOD BY AUTOMATED COUNT: 13.8 % (ref 11.5–14.5)
EST. GFR  (NO RACE VARIABLE): >60 ML/MIN/1.73 M^2
ESTIMATED AVG GLUCOSE: 140 MG/DL (ref 68–131)
GLUCOSE SERPL-MCNC: 134 MG/DL (ref 70–110)
HBA1C MFR BLD: 6.5 % (ref 4–5.6)
HCT VFR BLD AUTO: 41.9 % (ref 40–54)
HDLC SERPL-MCNC: 44 MG/DL (ref 40–75)
HDLC SERPL: 23.3 % (ref 20–50)
HGB BLD-MCNC: 13.1 G/DL (ref 14–18)
IMM GRANULOCYTES # BLD AUTO: 0.06 K/UL (ref 0–0.04)
IMM GRANULOCYTES NFR BLD AUTO: 1 % (ref 0–0.5)
LDLC SERPL CALC-MCNC: 126.6 MG/DL (ref 63–159)
LYMPHOCYTES # BLD AUTO: 2.7 K/UL (ref 1–4.8)
LYMPHOCYTES NFR BLD: 42.7 % (ref 18–48)
MCH RBC QN AUTO: 30.5 PG (ref 27–31)
MCHC RBC AUTO-ENTMCNC: 31.3 G/DL (ref 32–36)
MCV RBC AUTO: 97 FL (ref 82–98)
MICROALBUMIN UR DL<=1MG/L-MCNC: 103 UG/ML
MONOCYTES # BLD AUTO: 0.6 K/UL (ref 0.3–1)
MONOCYTES NFR BLD: 9.2 % (ref 4–15)
NEUTROPHILS # BLD AUTO: 2.9 K/UL (ref 1.8–7.7)
NEUTROPHILS NFR BLD: 46.1 % (ref 38–73)
NONHDLC SERPL-MCNC: 145 MG/DL
NRBC BLD-RTO: 0 /100 WBC
PLATELET # BLD AUTO: 174 K/UL (ref 150–450)
PMV BLD AUTO: 12.5 FL (ref 9.2–12.9)
POTASSIUM SERPL-SCNC: 4.7 MMOL/L (ref 3.5–5.1)
PROT SERPL-MCNC: 7.4 G/DL (ref 6–8.4)
RBC # BLD AUTO: 4.3 M/UL (ref 4.6–6.2)
SODIUM SERPL-SCNC: 142 MMOL/L (ref 136–145)
TRIGL SERPL-MCNC: 92 MG/DL (ref 30–150)
TSH SERPL DL<=0.005 MIU/L-ACNC: 2.91 UIU/ML (ref 0.4–4)
WBC # BLD AUTO: 6.28 K/UL (ref 3.9–12.7)

## 2024-03-12 PROCEDURE — 3072F LOW RISK FOR RETINOPATHY: CPT | Mod: HCNC,CPTII,S$GLB, | Performed by: PODIATRIST

## 2024-03-12 PROCEDURE — 3288F FALL RISK ASSESSMENT DOCD: CPT | Mod: HCNC,CPTII,S$GLB, | Performed by: PODIATRIST

## 2024-03-12 PROCEDURE — 82043 UR ALBUMIN QUANTITATIVE: CPT | Mod: HCNC | Performed by: FAMILY MEDICINE

## 2024-03-12 PROCEDURE — 84443 ASSAY THYROID STIM HORMONE: CPT | Mod: HCNC | Performed by: FAMILY MEDICINE

## 2024-03-12 PROCEDURE — 1159F MED LIST DOCD IN RCRD: CPT | Mod: HCNC,CPTII,S$GLB, | Performed by: PODIATRIST

## 2024-03-12 PROCEDURE — 85025 COMPLETE CBC W/AUTO DIFF WBC: CPT | Mod: HCNC | Performed by: FAMILY MEDICINE

## 2024-03-12 PROCEDURE — 80053 COMPREHEN METABOLIC PANEL: CPT | Mod: HCNC | Performed by: FAMILY MEDICINE

## 2024-03-12 PROCEDURE — 11721 DEBRIDE NAIL 6 OR MORE: CPT | Mod: Q9,HCNC,S$GLB, | Performed by: PODIATRIST

## 2024-03-12 PROCEDURE — 36415 COLL VENOUS BLD VENIPUNCTURE: CPT | Mod: HCNC,PO | Performed by: FAMILY MEDICINE

## 2024-03-12 PROCEDURE — 80061 LIPID PANEL: CPT | Mod: HCNC | Performed by: FAMILY MEDICINE

## 2024-03-12 PROCEDURE — 84153 ASSAY OF PSA TOTAL: CPT | Mod: HCNC | Performed by: FAMILY MEDICINE

## 2024-03-12 PROCEDURE — 83036 HEMOGLOBIN GLYCOSYLATED A1C: CPT | Mod: HCNC | Performed by: FAMILY MEDICINE

## 2024-03-12 PROCEDURE — 1101F PT FALLS ASSESS-DOCD LE1/YR: CPT | Mod: HCNC,CPTII,S$GLB, | Performed by: PODIATRIST

## 2024-03-12 PROCEDURE — 99999 PR PBB SHADOW E&M-EST. PATIENT-LVL III: CPT | Mod: PBBFAC,HCNC,, | Performed by: PODIATRIST

## 2024-03-12 PROCEDURE — 99213 OFFICE O/P EST LOW 20 MIN: CPT | Mod: 25,HCNC,S$GLB, | Performed by: PODIATRIST

## 2024-03-12 PROCEDURE — 1126F AMNT PAIN NOTED NONE PRSNT: CPT | Mod: HCNC,CPTII,S$GLB, | Performed by: PODIATRIST

## 2024-03-12 NOTE — PROGRESS NOTES
[facility] -   PODIATRIC MEDICINE AND SURGERY      CHIEF COMPLAINT  Chief Complaint   Patient presents with    Diabetic Foot Exam     Diabetic foot exam , pt is a diabetic, pt is wearing shoes with socks , last seen PCP Faustino Sharma MD 9/6/23         HPI    SUBJECTIVE: Elan Yepez is a 83 y.o. 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), Asbestos exposure (5/5/2016), BMI 40.0-44.9, adult, Colon polyp, Coronary artery calcification (3/18/2021), Diabetes mellitus, Diabetes mellitus, type 2, Dysmetabolic syndrome X (10/2/2012), Hyperlipidemia, Hypertension, Joint pain, MDD (major depressive disorder) (4/7/2015), Midline low back pain without sciatica (5/5/2016), Midline low back pain without sciatica (5/5/2016), Polyneuropathy, Primary osteoarthritis of right knee (2/20/2018), Prostate CA, Pulmonary nodule, stable to 2011 (6/10/2020), Renal manifestation of secondary diabetes mellitus, Sleep apnea, Trouble in sleeping, Type II or unspecified type diabetes mellitus with neurological manifestations, not stated as uncontrolled(250.60), and Urinary incontinence. Elanpresents to clinic for high risk diabetic foot exam and care.  Elan admits numbness, burning, and/or tingling sensations in their feet. Patient relates blood sugars normally run around 110-120. He does complain about thickened, elongated toenails.  Patient has no other pedal complaints at this time      HgA1c:   Hemoglobin A1C   Date Value Ref Range Status   05/12/2023 6.4 (H) 4.0 - 5.6 % Final     Comment:     ADA Screening Guidelines:  5.7-6.4%  Consistent with prediabetes  >or=6.5%  Consistent with diabetes    High levels of fetal hemoglobin interfere with the HbA1C  assay. Heterozygous hemoglobin variants (HbS, HgC, etc)do  not significantly interfere with this assay.   However, presence of multiple variants may affect accuracy.     11/11/2022 6.4 (H) 4.0 - 5.6  % Final     Comment:     ADA Screening Guidelines:  5.7-6.4%  Consistent with prediabetes  >or=6.5%  Consistent with diabetes    High levels of fetal hemoglobin interfere with the HbA1C  assay. Heterozygous hemoglobin variants (HbS, HgC, etc)do  not significantly interfere with this assay.   However, presence of multiple variants may affect accuracy.     03/16/2022 6.6 (H) 4.0 - 5.6 % Final     Comment:     ADA Screening Guidelines:  5.7-6.4%  Consistent with prediabetes  >or=6.5%  Consistent with diabetes    High levels of fetal hemoglobin interfere with the HbA1C  assay. Heterozygous hemoglobin variants (HbS, HgC, etc)do  not significantly interfere with this assay.   However, presence of multiple variants may affect accuracy.           PMH  Past Medical History:   Diagnosis Date    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  Began Aricept 10 mg at bedtime.    Asbestos exposure 5/5/2016    BMI 40.0-44.9, adult     Colon polyp     Coronary artery calcification 3/18/2021    Diabetes mellitus     diet management    Diabetes mellitus, type 2     Dysmetabolic syndrome X 10/2/2012    Hyperlipidemia     Hypertension     diet managemen    Joint pain     MDD (major depressive disorder) 4/7/2015    Midline low back pain without sciatica 5/5/2016    Midline low back pain without sciatica 5/5/2016    Polyneuropathy     Primary osteoarthritis of right knee 2/20/2018    Prostate CA     Pulmonary nodule, stable to 2011 6/10/2020    01/2020 CT There are stable subcentimeter noncalcified pulmonary nodules the largest of which is located within the left upper lobe anteriorly and measures approximately 6 mm.. Stable to 2011    Renal manifestation of secondary diabetes mellitus     Sleep apnea     cipap    Trouble in sleeping     Type II or unspecified type diabetes mellitus with neurological manifestations, not stated as  uncontrolled(250.60)     Urinary incontinence        MEDS  Current Outpatient Medications on File Prior to Visit   Medication Sig Dispense Refill    aspirin (ECOTRIN) 81 MG EC tablet Take 1 tablet (81 mg total) by mouth every evening. 100 tablet 3    blood sugar diagnostic Strp USE  STRIP TO CHECK GLUCOSE ONCE DAILY 100 each 3    cholecalciferol, vitamin D3, 5,000 unit capsule Take 1 capsule by mouth Daily.      diclofenac sodium (VOLTAREN) 1 % Gel Apply 2 g topically 3 (three) times daily as needed (knee pain). 450 g 3    donepeziL (ARICEPT) 10 MG tablet Take 1 tablet (10 mg total) by mouth every evening. 90 tablet 3    lancets (SOFT TOUCH LANCETS) Misc 1 Device by Misc.(Non-Drug; Combo Route) route once daily. 100 each 3    mirabegron (MYRBETRIQ) 50 mg Tb24 Take 1 tablet (50 mg total) by mouth once daily. 30 tablet 11    MULTIVITS-MINERALS/FA/LYCOPENE (ONE-A-DAY MEN'S ORAL) Take 1 tablet by mouth Daily.      pravastatin (PRAVACHOL) 20 MG tablet Take 1 tablet (20 mg total) by mouth once daily. 90 tablet 3    sertraline (ZOLOFT) 50 MG tablet Take 1 tablet (50 mg total) by mouth every morning. Increase energy and boost mood 90 tablet 3    TRUE METRIX GLUCOSE METER Misc        No current facility-administered medications on file prior to visit.       PSH     Past Surgical History:   Procedure Laterality Date    COLONOSCOPY  02/04/2014    ENDOSCOPIC ULTRASOUND OF UPPER GASTROINTESTINAL TRACT N/A 8/14/2019    Procedure: ULTRASOUND, UPPER GI TRACT, ENDOSCOPIC;  Surgeon: Pravin Myrick MD;  Location: [facility] ([address] [place] [address]);  Service: Endoscopy;  Laterality: N/A;    LAPAROSCOPIC CHOLECYSTECTOMY WITH CHOLANGIOGRAPHY N/A 8/12/2019    Procedure: CHOLECYSTECTOMY, LAPAROSCOPIC, WITH CHOLANGIOGRAM;  Surgeon: Phong Beltran MD;  Location: [facility] OR [address] [place] [address];  Service: General;  Laterality: N/A;  23 hours observation    NASAL SEPTUM SURGERY      PROSTATE SURGERY      removal        ALL  Review of patient's allergies  indicates:   Allergen Reactions    No known drug allergies        SOC     Social History     Tobacco Use    Smoking status: Never    Smokeless tobacco: Never   Substance Use Topics    Alcohol use: No    Drug use: No         Family HX    Family History   Problem Relation Age of Onset    Heart disease Mother     Diabetes Mother     Heart disease Father     Hypertension Father     Diabetes Sister     Allergies Brother     Thyroid disease Brother     Dementia Brother     No Known Problems Son     Cancer Sister     No Known Problems Sister     Diabetes Sister     Hypertension Brother     Glaucoma Paternal Grandmother     Melanoma Neg Hx             REVIEW OF SYSTEMS  General: Denies any fever or chills  Chest: Denies shortness of breath, wheezing, coughing, or sputum production  Heart: Denies chest pain.  As noted above and per history of current illness above, otherwise negative in the remainder of the 14 systems.     PHYSICAL EXAM  Vitals:    09/12/23 0904   Weight: (!) 142.4 kg (314 lb)   Height: 6' (1.829 m)   PainSc: 0-No pain       GEN:  This patient is well-developed, well-nourished and appears stated age, well-oriented to person, place and time, and cooperative and pleasant on today's visit.      LOWER EXTREMITY PHYSICAL EXAMINATION   VASCULAR  DP pedal pulse 2/4 RIGHT, LEFT2/4   PT pedal pulse 2/4 RIGHT, LEFT2/4  Capillary refill time immediate to the toes.   Feet are warm to the touch. Skin temperature warm to warm from proximally to distally   There are no varicosities, telangiectasias noted to bilateral foot and ankle regions.   There are no ecchymoses noted to bilateral foot and ankle regions.   There is mild gross lower extremity edema.    DERMATOLOGIC  Skin moist with healthy texture and turgor.  Thickened, dystrophic, elongated toenails with subungal debris 1-5 b/l   There are no open ulcerations, lacerations, or fissures to bilateral foot and ankle regions. There are no signs of infection as there is no  erythema, no proximal-extending lymphangiitis, no fluctuance, or crepitus noted on palpation to bilateral foot and ankle regions.   There is no interdigital maceration.   There are hyperkeratotic lesions noted to feet.     NEUROLOGIC  Protective sensation intact at 9/10 sites upon examination with Hughes Weinsten 5.07 g monofilament.  Propioception intact at 1st MTPJ b/l.  Achilles and patellar deep tendon reflexes intact  Babinski reflex absent    ORTHOPEDIC/BIOMECHANICAL  No symptomatic structural abnormalities noted. Muscle strength is 5/5 for foot inverters, everters, plantarflexors, and dorsiflexors. Muscle tone is normal.  Inspection/palpation of bone, joints and muscles unremarkable.      ASSESSMENT  Encounter Diagnoses   Name Primary?    Type 2 diabetes mellitus with other specified complication, without long-term current use of insulin     Type 2 diabetes mellitus with neurological manifestations Yes    Dermatophytosis, nail          Plan:    -Discuss presenting problems, etiology, pathologic processes and management options with patient today.   -I counseled the patient on their conditions, their implications and medical management. An in depth discussion on diabetic management, risk prevention, amputation prevention verbally and provided educational literature in written format.    -With patient's permission, the elongated onychomycotic toenails, as outlined in the physical examination, were sharply debrided with a double action nail nipper to their soft tissue attachment. If indicated, the nails were then smoothed down in thickness with a mechanical rotary arian and/or arielle board to facilitate in further debridement removing all offending nail and subungual debris.      - Shoe inspection. Diabetic Foot Education. Patient reminded of the importance of good nutrition and blood sugar control to help prevent podiatric complications of diabetes. Patient instructed on proper foot hygeine. We discussed wearing  proper shoe gear, daily foot inspections, never walking without protective shoe gear, never putting sharp instruments to feet, routine podiatric visits annually/semi annually or sooner if symptoms arise    Disclaimer: This note was partially prepared using a voice recognition system and is likely to have sound alike errors within the text.        Future Appointments   Date Time Provider Department Center   9/12/2023 10:00 AM Isaías Leong, ORLIN [facility] [place]   11/2/2023  2:00 PM [facility]jeune, [facility] B[facility]   3/6/2024 11:00 AM Faustino Sharma MD [facility] [place]       Report Electronically Signed By:     Jessika Goldman DPM   Podiatry  [facility]  9/12/2023

## 2024-06-24 ENCOUNTER — TELEPHONE (OUTPATIENT)
Dept: PRIMARY CARE CLINIC | Facility: CLINIC | Age: 84
End: 2024-06-24
Payer: MEDICARE

## 2024-06-24 NOTE — TELEPHONE ENCOUNTER
----- Message from Dorota Carlos sent at 6/24/2024  9:19 AM CDT -----  Regarding: appt  Name of who is calling:   wife /Guillermina      What is the request in detail: Requesting a call back in ref to scheduling a soon appt due to pt having black outs and do not want to go to ER      Can the clinic reply by [facility]:yes      What number to call back if not [facility]: [phone]

## 2024-06-25 ENCOUNTER — OFFICE VISIT (OUTPATIENT)
Dept: INTERNAL MEDICINE | Facility: CLINIC | Age: 84
End: 2024-06-25
Payer: MEDICARE

## 2024-06-25 ENCOUNTER — LAB VISIT (OUTPATIENT)
Dept: LAB | Facility: HOSPITAL | Age: 84
End: 2024-06-25
Attending: NURSE PRACTITIONER
Payer: MEDICARE

## 2024-06-25 ENCOUNTER — TELEPHONE (OUTPATIENT)
Dept: INTERNAL MEDICINE | Facility: CLINIC | Age: 84
End: 2024-06-25

## 2024-06-25 VITALS
OXYGEN SATURATION: 96 % | HEIGHT: 72 IN | BODY MASS INDEX: 42.66 KG/M2 | TEMPERATURE: 97 F | SYSTOLIC BLOOD PRESSURE: 122 MMHG | WEIGHT: 315 LBS | HEART RATE: 78 BPM | DIASTOLIC BLOOD PRESSURE: 76 MMHG

## 2024-06-25 DIAGNOSIS — R07.89 CHEST DISCOMFORT: ICD-10-CM

## 2024-06-25 DIAGNOSIS — R06.02 SHORTNESS OF BREATH: ICD-10-CM

## 2024-06-25 DIAGNOSIS — R55 SYNCOPE, UNSPECIFIED SYNCOPE TYPE: Primary | ICD-10-CM

## 2024-06-25 DIAGNOSIS — R55 SYNCOPE, UNSPECIFIED SYNCOPE TYPE: ICD-10-CM

## 2024-06-25 LAB
ALBUMIN SERPL BCP-MCNC: 3.6 G/DL (ref 3.5–5.2)
ALP SERPL-CCNC: 70 U/L (ref 55–135)
ALT SERPL W/O P-5'-P-CCNC: 16 U/L (ref 10–44)
ANION GAP SERPL CALC-SCNC: 8 MMOL/L (ref 8–16)
AST SERPL-CCNC: 22 U/L (ref 10–40)
BASOPHILS # BLD AUTO: 0.01 K/UL (ref 0–0.2)
BASOPHILS NFR BLD: 0.2 % (ref 0–1.9)
BILIRUB SERPL-MCNC: 0.5 MG/DL (ref 0.1–1)
BILIRUB SERPL-MCNC: NORMAL MG/DL
BLOOD URINE, POC: NORMAL
BNP SERPL-MCNC: 40 PG/ML (ref 0–99)
BUN SERPL-MCNC: 16 MG/DL (ref 8–23)
CALCIUM SERPL-MCNC: 9 MG/DL (ref 8.7–10.5)
CHLORIDE SERPL-SCNC: 104 MMOL/L (ref 95–110)
CLARITY, POC UA: CLEAR
CO2 SERPL-SCNC: 25 MMOL/L (ref 23–29)
COLOR, POC UA: NORMAL
CREAT SERPL-MCNC: 1.1 MG/DL (ref 0.5–1.4)
DIFFERENTIAL METHOD BLD: ABNORMAL
EOSINOPHIL # BLD AUTO: 0 K/UL (ref 0–0.5)
EOSINOPHIL NFR BLD: 0.4 % (ref 0–8)
ERYTHROCYTE [DISTWIDTH] IN BLOOD BY AUTOMATED COUNT: 13.8 % (ref 11.5–14.5)
EST. GFR  (NO RACE VARIABLE): >60 ML/MIN/1.73 M^2
GLUCOSE SERPL-MCNC: 129 MG/DL (ref 70–110)
GLUCOSE SERPL-MCNC: 130 MG/DL (ref 70–110)
GLUCOSE UR QL STRIP: NORMAL
HCT VFR BLD AUTO: 39.7 % (ref 40–54)
HGB BLD-MCNC: 13.1 G/DL (ref 14–18)
IMM GRANULOCYTES # BLD AUTO: 0.03 K/UL (ref 0–0.04)
IMM GRANULOCYTES NFR BLD AUTO: 0.7 % (ref 0–0.5)
KETONES UR QL STRIP: NORMAL
LEUKOCYTE ESTERASE URINE, POC: NORMAL
LYMPHOCYTES # BLD AUTO: 1.4 K/UL (ref 1–4.8)
LYMPHOCYTES NFR BLD: 30.7 % (ref 18–48)
MCH RBC QN AUTO: 30.5 PG (ref 27–31)
MCHC RBC AUTO-ENTMCNC: 33 G/DL (ref 32–36)
MCV RBC AUTO: 93 FL (ref 82–98)
MONOCYTES # BLD AUTO: 0.5 K/UL (ref 0.3–1)
MONOCYTES NFR BLD: 10.7 % (ref 4–15)
NEUTROPHILS # BLD AUTO: 2.6 K/UL (ref 1.8–7.7)
NEUTROPHILS NFR BLD: 57.3 % (ref 38–73)
NITRITE, POC UA: NORMAL
NRBC BLD-RTO: 0 /100 WBC
PH, POC UA: 6
PLATELET # BLD AUTO: 168 K/UL (ref 150–450)
PMV BLD AUTO: 11.7 FL (ref 9.2–12.9)
POTASSIUM SERPL-SCNC: 4.1 MMOL/L (ref 3.5–5.1)
PROT SERPL-MCNC: 7.4 G/DL (ref 6–8.4)
PROTEIN, POC: NORMAL
RBC # BLD AUTO: 4.29 M/UL (ref 4.6–6.2)
SODIUM SERPL-SCNC: 137 MMOL/L (ref 136–145)
SPECIFIC GRAVITY, POC UA: 1.03
TROPONIN I SERPL DL<=0.01 NG/ML-MCNC: <0.006 NG/ML (ref 0–0.03)
UROBILINOGEN, POC UA: 0.2
WBC # BLD AUTO: 4.5 K/UL (ref 3.9–12.7)

## 2024-06-25 PROCEDURE — 99999 PR PBB SHADOW E&M-EST. PATIENT-LVL IV: CPT | Mod: PBBFAC,HCNC,, | Performed by: NURSE PRACTITIONER

## 2024-06-25 PROCEDURE — 83880 ASSAY OF NATRIURETIC PEPTIDE: CPT | Mod: HCNC | Performed by: NURSE PRACTITIONER

## 2024-06-25 PROCEDURE — 80053 COMPREHEN METABOLIC PANEL: CPT | Mod: HCNC | Performed by: NURSE PRACTITIONER

## 2024-06-25 PROCEDURE — 82962 GLUCOSE BLOOD TEST: CPT | Mod: HCNC,S$GLB,, | Performed by: NURSE PRACTITIONER

## 2024-06-25 PROCEDURE — 1159F MED LIST DOCD IN RCRD: CPT | Mod: HCNC,CPTII,S$GLB, | Performed by: NURSE PRACTITIONER

## 2024-06-25 PROCEDURE — 36415 COLL VENOUS BLD VENIPUNCTURE: CPT | Mod: HCNC,PO | Performed by: NURSE PRACTITIONER

## 2024-06-25 PROCEDURE — 3078F DIAST BP <80 MM HG: CPT | Mod: HCNC,CPTII,S$GLB, | Performed by: NURSE PRACTITIONER

## 2024-06-25 PROCEDURE — 3288F FALL RISK ASSESSMENT DOCD: CPT | Mod: HCNC,CPTII,S$GLB, | Performed by: NURSE PRACTITIONER

## 2024-06-25 PROCEDURE — 81002 URINALYSIS NONAUTO W/O SCOPE: CPT | Mod: HCNC,S$GLB,, | Performed by: NURSE PRACTITIONER

## 2024-06-25 PROCEDURE — 84484 ASSAY OF TROPONIN QUANT: CPT | Mod: HCNC | Performed by: NURSE PRACTITIONER

## 2024-06-25 PROCEDURE — 93005 ELECTROCARDIOGRAM TRACING: CPT | Mod: HCNC,S$GLB,, | Performed by: NURSE PRACTITIONER

## 2024-06-25 PROCEDURE — 3072F LOW RISK FOR RETINOPATHY: CPT | Mod: HCNC,CPTII,S$GLB, | Performed by: NURSE PRACTITIONER

## 2024-06-25 PROCEDURE — 1126F AMNT PAIN NOTED NONE PRSNT: CPT | Mod: HCNC,CPTII,S$GLB, | Performed by: NURSE PRACTITIONER

## 2024-06-25 PROCEDURE — 93010 ELECTROCARDIOGRAM REPORT: CPT | Mod: HCNC,S$GLB,, | Performed by: STUDENT IN AN ORGANIZED HEALTH CARE EDUCATION/TRAINING PROGRAM

## 2024-06-25 PROCEDURE — 3074F SYST BP LT 130 MM HG: CPT | Mod: HCNC,CPTII,S$GLB, | Performed by: NURSE PRACTITIONER

## 2024-06-25 PROCEDURE — 1100F PTFALLS ASSESS-DOCD GE2>/YR: CPT | Mod: HCNC,CPTII,S$GLB, | Performed by: NURSE PRACTITIONER

## 2024-06-25 PROCEDURE — 85025 COMPLETE CBC W/AUTO DIFF WBC: CPT | Mod: HCNC | Performed by: NURSE PRACTITIONER

## 2024-06-25 PROCEDURE — 99214 OFFICE O/P EST MOD 30 MIN: CPT | Mod: HCNC,S$GLB,, | Performed by: NURSE PRACTITIONER

## 2024-06-25 PROCEDURE — 1160F RVW MEDS BY RX/DR IN RCRD: CPT | Mod: HCNC,CPTII,S$GLB, | Performed by: NURSE PRACTITIONER

## 2024-06-25 NOTE — TELEPHONE ENCOUNTER
----- Message from Wong Gardiner NP sent at 6/25/2024  2:49 PM CDT -----  Labs are stable. Please keep appt with cardiologist for continued workup

## 2024-06-25 NOTE — PROGRESS NOTES
"Subjective:       Patient ID: Elan Yepez is a 84 y.o. male.    Chief Complaint: Loss of Consciousness    Pt presents to clinic today for multiple syncopal episodes  He is alone for the visit and providing the history   New to me, PCP DR. Sharma    Pt reports about 3 weeks ago he was driving and had an episode of "heartburn" and tunnel vision  He stopped in the road, pulled off and and it passed after about 1-2 mins   Did not really think much of it- thought it was because he had ran out of his atorvastatin  Denies any chest pain or shortness of breath at that time  Then last week he had another episode while sitting on the sofa at his house  Describes a tightening in his chest,  heartburn sensation and then some tunnel like vision  Does not describe any dizziness, lightheadedness, vertigo type symptoms  No previous cardiac history  No clammy skin  Does endorse some shortness of breath with exertion however this has been his baseline for "a few years"  Uses a rollator and only walks short distances  Mostly sits on the rollator to get around           /76   Pulse 78   Temp 96.5 °F (35.8 °C)   Ht 6' (1.829 m)   Wt (!) 143 kg (315 lb 4.1 oz)   SpO2 96%   BMI 42.76 kg/m²     Review of Systems   Constitutional:  Negative for activity change, appetite change, chills, diaphoresis, fatigue, fever and unexpected weight change.   HENT: Negative.     Eyes: Negative.    Respiratory:  Positive for chest tightness. Negative for apnea, shortness of breath and stridor.    Cardiovascular:  Negative for chest pain, palpitations and leg swelling.   Gastrointestinal:  Positive for abdominal pain. Negative for constipation and nausea.   Endocrine: Negative.    Genitourinary: Negative.    Musculoskeletal:  Negative for arthralgias and myalgias.   Skin:  Negative for color change, pallor, rash and wound.   Allergic/Immunologic: Negative.    Neurological:  Positive for syncope. Negative for dizziness, facial asymmetry, " light-headedness and headaches.   Hematological:  Negative for adenopathy.   Psychiatric/Behavioral:  Negative for agitation and behavioral problems.        Objective:      Physical Exam  Vitals reviewed.   Constitutional:       General: He is not in acute distress.     Appearance: Normal appearance. He is obese.   HENT:      Head: Normocephalic.      Right Ear: Tympanic membrane normal.      Left Ear: Tympanic membrane normal.      Nose: Nose normal.   Eyes:      Conjunctiva/sclera: Conjunctivae normal.      Pupils: Pupils are equal, round, and reactive to light.   Cardiovascular:      Rate and Rhythm: Normal rate.      Pulses: Normal pulses.   Pulmonary:      Effort: Pulmonary effort is normal.   Abdominal:      General: Abdomen is flat.      Palpations: Abdomen is soft.   Musculoskeletal:      Cervical back: Normal range of motion.   Skin:     General: Skin is warm.      Capillary Refill: Capillary refill takes less than 2 seconds.   Neurological:      Mental Status: He is alert and oriented to person, place, and time. Mental status is at baseline.   Psychiatric:         Mood and Affect: Mood normal.         Assessment:       1. Syncope, unspecified syncope type    2. Chest discomfort    3. Shortness of breath        Plan:       Elan was seen today for loss of consciousness.    Diagnoses and all orders for this visit:    Syncope, unspecified syncope type  -     CBC Auto Differential; Future  -     Comprehensive Metabolic Panel; Future  -     POCT Glucose, Hand-Held Device  -     POCT URINE DIPSTICK WITHOUT MICROSCOPE  -     IN OFFICE EKG 12-LEAD (to Muse)  -     Orthostatic vital signs  -     Ambulatory referral/consult to Cardiology; Future    Chest discomfort  -     B-TYPE NATRIURETIC PEPTIDE; Future  -     TROPONIN I; Future  -     Ambulatory referral/consult to Cardiology; Future    Shortness of breath  -     Ambulatory referral/consult to Cardiology; Future      We discussed s/s of heart problems and my  high suspicion for something going on cardiac wise  He does not wish to seek ER treatment as the last episode was about 7 days ago  He understands if this occurs again, he is to call 911 and report to the ER immediately  Unable to obtain orthostatics due to inability to stand safely   EKG shows NSR today  Will do labs, refer to cardio  Pt understands strict ED precautions  Follow up with PCP in 2-3 weeks and PRN

## 2024-07-15 ENCOUNTER — OFFICE VISIT (OUTPATIENT)
Dept: CARDIOLOGY | Facility: CLINIC | Age: 84
End: 2024-07-15
Payer: MEDICARE

## 2024-07-15 VITALS
BODY MASS INDEX: 42.46 KG/M2 | OXYGEN SATURATION: 95 % | HEIGHT: 72 IN | HEART RATE: 85 BPM | WEIGHT: 313.5 LBS | SYSTOLIC BLOOD PRESSURE: 138 MMHG | DIASTOLIC BLOOD PRESSURE: 80 MMHG

## 2024-07-15 DIAGNOSIS — I45.10 RBBB: ICD-10-CM

## 2024-07-15 DIAGNOSIS — F03.90 DEMENTIA WITHOUT BEHAVIORAL DISTURBANCE, PSYCHOTIC DISTURBANCE, MOOD DISTURBANCE, OR ANXIETY, UNSPECIFIED DEMENTIA SEVERITY, UNSPECIFIED DEMENTIA TYPE: ICD-10-CM

## 2024-07-15 DIAGNOSIS — R55 SYNCOPE, UNSPECIFIED SYNCOPE TYPE: ICD-10-CM

## 2024-07-15 DIAGNOSIS — R07.89 CHEST DISCOMFORT: ICD-10-CM

## 2024-07-15 DIAGNOSIS — E78.2 MIXED HYPERLIPIDEMIA: ICD-10-CM

## 2024-07-15 DIAGNOSIS — E66.9 DIABETES MELLITUS TYPE 2 IN OBESE: ICD-10-CM

## 2024-07-15 DIAGNOSIS — I70.0 AORTIC ATHEROSCLEROSIS: ICD-10-CM

## 2024-07-15 DIAGNOSIS — E66.01 MORBID OBESITY WITH BMI OF 40.0-44.9, ADULT: ICD-10-CM

## 2024-07-15 DIAGNOSIS — E11.69 DIABETES MELLITUS TYPE 2 IN OBESE: ICD-10-CM

## 2024-07-15 DIAGNOSIS — E11.40 TYPE 2 DIABETES MELLITUS WITH DIABETIC NEUROPATHY, WITHOUT LONG-TERM CURRENT USE OF INSULIN: ICD-10-CM

## 2024-07-15 DIAGNOSIS — R55 SYNCOPE AND COLLAPSE: Primary | ICD-10-CM

## 2024-07-15 DIAGNOSIS — R09.89 CAROTID BRUIT, UNSPECIFIED LATERALITY: ICD-10-CM

## 2024-07-15 DIAGNOSIS — R06.02 SHORTNESS OF BREATH: ICD-10-CM

## 2024-07-15 DIAGNOSIS — R94.31 NONSPECIFIC ABNORMAL ELECTROCARDIOGRAM (ECG) (EKG): ICD-10-CM

## 2024-07-15 DIAGNOSIS — G47.33 OSA ON CPAP: ICD-10-CM

## 2024-07-15 PROCEDURE — 1160F RVW MEDS BY RX/DR IN RCRD: CPT | Mod: CPTII,S$GLB,, | Performed by: INTERNAL MEDICINE

## 2024-07-15 PROCEDURE — 3288F FALL RISK ASSESSMENT DOCD: CPT | Mod: CPTII,S$GLB,, | Performed by: INTERNAL MEDICINE

## 2024-07-15 PROCEDURE — 1126F AMNT PAIN NOTED NONE PRSNT: CPT | Mod: CPTII,S$GLB,, | Performed by: INTERNAL MEDICINE

## 2024-07-15 PROCEDURE — 3079F DIAST BP 80-89 MM HG: CPT | Mod: CPTII,S$GLB,, | Performed by: INTERNAL MEDICINE

## 2024-07-15 PROCEDURE — 99205 OFFICE O/P NEW HI 60 MIN: CPT | Mod: S$GLB,,, | Performed by: INTERNAL MEDICINE

## 2024-07-15 PROCEDURE — 3072F LOW RISK FOR RETINOPATHY: CPT | Mod: CPTII,S$GLB,, | Performed by: INTERNAL MEDICINE

## 2024-07-15 PROCEDURE — 3075F SYST BP GE 130 - 139MM HG: CPT | Mod: CPTII,S$GLB,, | Performed by: INTERNAL MEDICINE

## 2024-07-15 PROCEDURE — 99999 PR PBB SHADOW E&M-EST. PATIENT-LVL IV: CPT | Mod: PBBFAC,HCNC,, | Performed by: INTERNAL MEDICINE

## 2024-07-15 PROCEDURE — 1101F PT FALLS ASSESS-DOCD LE1/YR: CPT | Mod: CPTII,S$GLB,, | Performed by: INTERNAL MEDICINE

## 2024-07-15 PROCEDURE — 1159F MED LIST DOCD IN RCRD: CPT | Mod: CPTII,S$GLB,, | Performed by: INTERNAL MEDICINE

## 2024-07-15 PROCEDURE — G2211 COMPLEX E/M VISIT ADD ON: HCPCS | Mod: S$GLB,,, | Performed by: INTERNAL MEDICINE

## 2024-07-15 NOTE — PROGRESS NOTES
"Subjective:   Patient ID:  Elan Yepez is a 84 y.o. male who presents for cardiac consult of No chief complaint on file.      Referral by: Wong Gardiner Np  [id number] [place] [id number]     Reason for consult: syncope, CP, SOB      HPI  The patient came in today for cardiac consult of No chief complaint on file.      Elan Yepez is a 84 y.o. male pt with HLD, aortic atherosc, ED, DM2, obesity, depression, OAS, h/o prostate CA presents for CV eval of syncope.     From PCP office visit - Wong Gardiner - MIYA  Pt presents to clinic today for multiple syncopal episodes  He is alone for the visit and providing the history   New to me, PCP DR. Sharma  Pt reports about 3 weeks ago he was driving and had an episode of "heartburn" and tunnel vision  He stopped in the road, pulled off and and it passed after about 1-2 mins   Did not really think much of it- thought it was because he had ran out of his atorvastatin  Denies any chest pain or shortness of breath at that time  Then last week he had another episode while sitting on the sofa at his house  Describes a tightening in his chest,  heartburn sensation and then some tunnel like vision  Does not describe any dizziness, lightheadedness, vertigo type symptoms  No previous cardiac history  No clammy skin  Does endorse some shortness of breath with exertion however this has been his baseline for "a few years"  Uses a rollator and only walks short distances  Mostly sits on the rollator to get around     7/15/24  Here for further CV eval of syncope and CP/SOB.   He had neg trop, BNP, and CXR recently from PCP office     BP and HR stable today. BMI 42 - 313 lbs   He has intermittent CP, SOB - none today.   He has syncope occ - feels heartburn at times and has to pull over.   HE is cutting grass.     Normal sinus rhythm   Right bundle branch block   Since previous tracing - no significant change     Confirmed by Weiland MD, Marco Antonio FUNK Jr. ([id number]) on 6/25/2024 1:41:15 PM "     Results for orders placed during the hospital encounter of 03/28/22    Echo    Interpretation Summary  · The left ventricle is normal in size with normal systolic function. The estimated ejection fraction is 65%.  · Mild right ventricular enlargement with normal right ventricular systolic function.  · Indeterminate left ventricular diastolic function.  · Mild left atrial enlargement.  · There is mild mitral stenosis. The mean diastolic gradient across the mitral valve is 5 mmHg at a heart rate of 75 bpm.  · The estimated PA systolic pressure is 25 mmHg.  · Normal central venous pressure (3 mmHg).        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  Began Aricept 10 mg at bedtime.    Asbestos exposure 5/5/2016    BMI 40.0-44.9, adult     Colon polyp     Coronary artery calcification 3/18/2021    Diabetes mellitus     diet management    Diabetes mellitus, type 2     Dysmetabolic syndrome X 10/2/2012    Hyperlipidemia     Hypertension     diet managemen    Joint pain     MDD (major depressive disorder) 4/7/2015    Midline low back pain without sciatica 5/5/2016    Midline low back pain without sciatica 5/5/2016    Polyneuropathy     Primary osteoarthritis of right knee 2/20/2018    Prostate CA     Pulmonary nodule, stable to 2011 6/10/2020    01/2020 CT There are stable subcentimeter noncalcified pulmonary nodules the largest of which is located within the left upper lobe anteriorly and measures approximately 6 mm.. Stable to 2011    Renal manifestation of secondary diabetes mellitus     Sleep apnea     cipap    Trouble in sleeping     Type II or unspecified type diabetes mellitus with neurological manifestations, not stated as uncontrolled(250.60)     Urinary incontinence        Past Surgical History:   Procedure  Laterality Date    COLONOSCOPY  02/04/2014    ENDOSCOPIC ULTRASOUND OF UPPER GASTROINTESTINAL TRACT N/A 8/14/2019    Procedure: ULTRASOUND, UPPER GI TRACT, ENDOSCOPIC;  Surgeon: Pravin Myrick MD;  [facility]  Service: Endoscopy;  Laterality: N/A;    LAPAROSCOPIC CHOLECYSTECTOMY WITH CHOLANGIOGRAPHY N/A 8/12/2019    Procedure: CHOLECYSTECTOMY, LAPAROSCOPIC, WITH CHOLANGIOGRAM;  Surgeon: Phong Beltran MD;  [facility];  Service: General;  Laterality: N/A;  23 hours observation    NASAL SEPTUM SURGERY      PROSTATE SURGERY      removal       Social History     Tobacco Use    Smoking status: Never    Smokeless tobacco: Never   Substance Use Topics    Alcohol use: No    Drug use: No       Family History   Problem Relation Name Age of Onset    Heart disease Mother      Diabetes Mother      Heart disease Father      Hypertension Father      Diabetes Sister Beatris     Allergies Brother Alfonzo     Thyroid disease Brother Alfonzo     Dementia Brother Alfonzo     No Known Problems Son Elan Jr     Cancer Sister Marienthal     No Known Problems Sister Aisha     Diabetes Sister Meera     Hypertension Brother Kwabena     Glaucoma Paternal Grandmother      Melanoma Neg Hx         Patient's Medications   New Prescriptions    No medications on file   Previous Medications    ASPIRIN (ECOTRIN) 81 MG EC TABLET    Take 1 tablet (81 mg total) by mouth every evening.    BLOOD SUGAR DIAGNOSTIC STRP    USE  STRIP TO CHECK GLUCOSE ONCE DAILY    CHOLECALCIFEROL, VITAMIN D3, 5,000 UNIT CAPSULE    Take 1 capsule by mouth Daily.    DONEPEZIL (ARICEPT) 10 MG TABLET    Take 1 tablet (10 mg total) by mouth every evening.    LANCETS (SOFT TOUCH LANCETS) MISC    1 Device by Misc.(Non-Drug; Combo Route) route once daily.    MIRABEGRON (MYRBETRIQ) 50 MG TB24    Take 1 tablet (50 mg total) by mouth once daily.    MULTIVITS-MINERALS/FA/LYCOPENE (ONE-A-DAY MEN'S ORAL)    Take 1 tablet by mouth Daily.     SERTRALINE (ZOLOFT) 50 MG TABLET    Take 1 tablet (50 mg total) by mouth every morning. Increase energy and boost mood    TRUE METRIX GLUCOSE METER MISC       Modified Medications    No medications on file   Discontinued Medications    No medications on file       Review of Systems   Constitutional:  Positive for malaise/fatigue.   HENT: Negative.     Eyes: Negative.    Respiratory:  Positive for shortness of breath.    Cardiovascular:  Positive for chest pain and palpitations.   Gastrointestinal: Negative.    Genitourinary: Negative.    Musculoskeletal:  Positive for joint pain.   Skin: Negative.    Neurological:  Positive for dizziness and loss of consciousness.   Endo/Heme/Allergies: Negative.    Psychiatric/Behavioral: Negative.     All 12 systems otherwise negative.      Wt Readings from Last 3 Encounters:   07/15/24 (!) 142.2 kg (313 lb 7.9 oz)   06/25/24 (!) 143 kg (315 lb 4.1 oz)   03/06/24 (!) 142.9 kg (315 lb 0.6 oz)     Temp Readings from Last 3 Encounters:   06/25/24 96.5 °F (35.8 °C)   03/06/24 98.1 °F (36.7 °C)   09/19/23 98.2 °F (36.8 °C) (Temporal)     BP Readings from Last 3 Encounters:   07/15/24 138/80   06/25/24 122/76   03/06/24 124/86     Pulse Readings from Last 3 Encounters:   07/15/24 85   06/25/24 78   03/06/24 70       /80 (BP Location: Left arm, Patient Position: Sitting, BP Method: Medium (Manual))   Pulse 85   Ht 6' (1.829 m)   Wt (!) 142.2 kg (313 lb 7.9 oz)   SpO2 95%   BMI 42.52 kg/m²     Objective:   Physical Exam  Vitals and nursing note reviewed.   Constitutional:       General: He is not in acute distress.     Appearance: He is well-developed. He is obese. He is not diaphoretic.   HENT:      Head: Normocephalic and atraumatic.      Nose: Nose normal.   Eyes:      General: No scleral icterus.     Conjunctiva/sclera: Conjunctivae normal.   Neck:      Thyroid: No thyromegaly.      Vascular: No JVD.   Cardiovascular:      Rate and Rhythm: Normal rate and regular rhythm.       Heart sounds: S1 normal and S2 normal. Murmur heard.      No friction rub. No gallop. No S3 or S4 sounds.   Pulmonary:      Effort: Pulmonary effort is normal. No respiratory distress.      Breath sounds: Normal breath sounds. No stridor. No wheezing or rales.   Chest:      Chest wall: No tenderness.   Abdominal:      General: Bowel sounds are normal. There is no distension.      Palpations: Abdomen is soft. There is no mass.      Tenderness: There is no abdominal tenderness. There is no rebound.   Genitourinary:     Comments: Deferred  Musculoskeletal:         General: No tenderness or deformity. Normal range of motion.      Cervical back: Normal range of motion and neck supple.   Lymphadenopathy:      Cervical: No cervical adenopathy.   Skin:     General: Skin is warm and dry.      Coloration: Skin is not pale.      Findings: No erythema or rash.   Neurological:      Mental Status: He is alert and oriented to person, place, and time.      Motor: No abnormal muscle tone.      Coordination: Coordination normal.   Psychiatric:         Behavior: Behavior normal.         Thought Content: Thought content normal.         Judgment: Judgment normal.         Lab Results   Component Value Date     06/25/2024    K 4.1 06/25/2024     06/25/2024    CO2 25 06/25/2024    BUN 16 06/25/2024    CREATININE 1.1 06/25/2024     (H) 06/25/2024    HGBA1C 6.5 (H) 03/12/2024    MG 2.0 08/14/2019    AST 22 06/25/2024    ALT 16 06/25/2024    ALBUMIN 3.6 06/25/2024    PROT 7.4 06/25/2024    BILITOT 0.5 06/25/2024    WBC 4.50 06/25/2024    HGB 13.1 (L) 06/25/2024    HCT 39.7 (L) 06/25/2024    MCV 93 06/25/2024     06/25/2024    INR 1.0 08/14/2019    TSH 2.912 03/12/2024    CHOL 189 03/12/2024    HDL 44 03/12/2024    LDLCALC 126.6 03/12/2024    TRIG 92 03/12/2024    BNP 40 06/25/2024         BNP (pg/mL)   Date Value   06/25/2024 40     INR (no units)   Date Value   08/14/2019 1.0   07/16/2019 1.0   10/12/2009 1.0           Assessment:      1. Syncope and collapse    2. Mixed hyperlipidemia    3. Aortic atherosclerosis    4. Morbid obesity with BMI of 40.0-44.9, adult    5. Type 2 diabetes mellitus with diabetic neuropathy, without long-term current use of insulin    6. Diabetes mellitus type 2 in obese    7. JESSICA on CPAP    8. Dementia without behavioral disturbance, psychotic disturbance, mood disturbance, or anxiety, unspecified dementia severity, unspecified dementia type    9. Syncope, unspecified syncope type    10. Chest discomfort    11. Shortness of breath    12. Carotid bruit, unspecified laterality    13. RBBB    14. Nonspecific abnormal electrocardiogram (ECG) (EKG)        Plan:     Syncope, CP/SOB with RBBB on ECG   - recent neg trop, BNP, and CXR  - order pharm nuclear stress test -pt cannot walk on treadmill due joint issues  - order ECHO and 7 day Vital monitor   - order carotid u/s  - rec ER eval if recurrent syncope and neuro workup as well   - needs to avoid the heat and outdoor activity - until CV workup completed    2. HLD with aortic atherosc  - rec statin and monitor lipids if elevated     3. Obesity  BMI 42 - 313 lbs   - cont weight loss    4. JESSICA  - cont CPAP    5. DM2  - cont tx per PCP and monitor     6. Dementia  - cont tx and f/u neuro  - on Aricept    Visit today included increased complexity associated with the care of the episodic problem syncope addressed and managing the longitudinal care of the patient due to the serious and/or complex managed problem(s) .      Thank you for allowing me to participate in this patient's care. Please do not hesitate to contact me with any questions or concerns. Consult note has been forwarded to the referral physician.

## 2024-07-31 PROBLEM — Z87.898 HISTORY OF SYNCOPE: Status: ACTIVE | Noted: 2024-07-31

## 2024-07-31 PROBLEM — E66.9 DIABETES MELLITUS TYPE 2 IN OBESE: Status: RESOLVED | Noted: 2018-06-25 | Resolved: 2024-07-31

## 2024-07-31 PROBLEM — E11.69 DIABETES MELLITUS TYPE 2 IN OBESE: Status: RESOLVED | Noted: 2018-06-25 | Resolved: 2024-07-31

## 2024-08-09 ENCOUNTER — HOSPITAL ENCOUNTER (OUTPATIENT)
Dept: RADIOLOGY | Facility: HOSPITAL | Age: 84
Discharge: HOME | End: 2024-08-09
Attending: INTERNAL MEDICINE
Payer: MEDICARE

## 2024-08-09 ENCOUNTER — HOSPITAL ENCOUNTER (OUTPATIENT)
Dept: CARDIOLOGY | Facility: HOSPITAL | Age: 84
Discharge: HOME | End: 2024-08-09
Attending: INTERNAL MEDICINE
Payer: MEDICARE

## 2024-08-09 VITALS
HEIGHT: 72 IN | WEIGHT: 313 LBS | DIASTOLIC BLOOD PRESSURE: 80 MMHG | SYSTOLIC BLOOD PRESSURE: 138 MMHG | BODY MASS INDEX: 42.39 KG/M2

## 2024-08-09 VITALS
BODY MASS INDEX: 42.39 KG/M2 | SYSTOLIC BLOOD PRESSURE: 138 MMHG | DIASTOLIC BLOOD PRESSURE: 80 MMHG | HEIGHT: 72 IN | WEIGHT: 313 LBS

## 2024-08-09 DIAGNOSIS — E66.9 DIABETES MELLITUS TYPE 2 IN OBESE: ICD-10-CM

## 2024-08-09 DIAGNOSIS — E78.2 MIXED HYPERLIPIDEMIA: ICD-10-CM

## 2024-08-09 DIAGNOSIS — G47.33 OSA ON CPAP: ICD-10-CM

## 2024-08-09 DIAGNOSIS — E11.40 TYPE 2 DIABETES MELLITUS WITH DIABETIC NEUROPATHY, WITHOUT LONG-TERM CURRENT USE OF INSULIN: ICD-10-CM

## 2024-08-09 DIAGNOSIS — R07.89 CHEST DISCOMFORT: ICD-10-CM

## 2024-08-09 DIAGNOSIS — R55 SYNCOPE, UNSPECIFIED SYNCOPE TYPE: ICD-10-CM

## 2024-08-09 DIAGNOSIS — E11.69 DIABETES MELLITUS TYPE 2 IN OBESE: ICD-10-CM

## 2024-08-09 DIAGNOSIS — R94.31 NONSPECIFIC ABNORMAL ELECTROCARDIOGRAM (ECG) (EKG): ICD-10-CM

## 2024-08-09 DIAGNOSIS — R09.89 CAROTID BRUIT, UNSPECIFIED LATERALITY: ICD-10-CM

## 2024-08-09 DIAGNOSIS — R06.02 SHORTNESS OF BREATH: ICD-10-CM

## 2024-08-09 DIAGNOSIS — I45.10 RBBB: ICD-10-CM

## 2024-08-09 DIAGNOSIS — R55 SYNCOPE AND COLLAPSE: ICD-10-CM

## 2024-08-09 LAB
AORTIC ROOT ANNULUS: 3.99 CM
ASCENDING AORTA: 3.28 CM
AV INDEX (PROSTH): 0.77
AV MEAN GRADIENT: 4 MMHG
AV PEAK GRADIENT: 7 MMHG
AV VALVE AREA BY VELOCITY RATIO: 3.68 CM²
AV VALVE AREA: 4.06 CM²
AV VELOCITY RATIO: 0.7
BSA FOR ECHO PROCEDURE: 2.69 M2
CV ECHO LV RWT: 0.7 CM
CV STRESS BASE HR: 71 BPM
DIASTOLIC BLOOD PRESSURE: 64 MMHG
DOP CALC AO PEAK VEL: 1.33 M/S
DOP CALC AO VTI: 27.1 CM
DOP CALC LVOT AREA: 5.3 CM2
DOP CALC LVOT DIAMETER: 2.59 CM
DOP CALC LVOT PEAK VEL: 0.93 M/S
DOP CALC LVOT STROKE VOLUME: 110.06 CM3
DOP CALC MV VTI: 52.2 CM
DOP CALC RVOT PEAK VEL: 0.73 M/S
DOP CALC RVOT VTI: 14.7 CM
DOP CALCLVOT PEAK VEL VTI: 20.9 CM
E WAVE DECELERATION TIME: 414.18 MSEC
E/A RATIO: 0.8
E/E' RATIO: 27.4 M/S
ECHO LV POSTERIOR WALL: 1.4 CM (ref 0.6–1.1)
EJECTION FRACTION: 65 %
FRACTIONAL SHORTENING: 39 % (ref 28–44)
INTERVENTRICULAR SEPTUM: 1.31 CM (ref 0.6–1.1)
IVC DIAMETER: 1.8 CM
IVRT: 91.34 MSEC
LA MAJOR: 7.73 CM
LA MINOR: 7.14 CM
LA WIDTH: 4.7 CM
LEFT ARM DIASTOLIC BLOOD PRESSURE: 80 MMHG
LEFT ARM SYSTOLIC BLOOD PRESSURE: 140 MMHG
LEFT ATRIUM AREA SYSTOLIC (APICAL 2 CHAMBER): 20.11 CM2
LEFT ATRIUM AREA SYSTOLIC (APICAL 4 CHAMBER): 23.9 CM2
LEFT ATRIUM SIZE: 3.49 CM
LEFT ATRIUM VOLUME INDEX MOD: 19.7 ML/M2
LEFT ATRIUM VOLUME INDEX: 40.1 ML/M2
LEFT ATRIUM VOLUME MOD: 50.83 CM3
LEFT ATRIUM VOLUME: 103.5 CM3
LEFT CBA DIAS: 20 CM/S
LEFT CBA SYS: 68 CM/S
LEFT CCA DIST DIAS: 25 CM/S
LEFT CCA DIST SYS: 82 CM/S
LEFT CCA MID DIAS: 16 CM/S
LEFT CCA MID SYS: 91 CM/S
LEFT CCA PROX DIAS: 19 CM/S
LEFT CCA PROX SYS: 83 CM/S
LEFT ECA DIAS: 11 CM/S
LEFT ECA SYS: 102 CM/S
LEFT ICA DIST DIAS: 22 CM/S
LEFT ICA DIST SYS: 51 CM/S
LEFT ICA MID DIAS: 24 CM/S
LEFT ICA MID SYS: 63 CM/S
LEFT ICA PROX DIAS: 15 CM/S
LEFT ICA PROX SYS: 61 CM/S
LEFT INTERNAL DIMENSION IN SYSTOLE: 2.43 CM (ref 2.1–4)
LEFT VENTRICLE DIASTOLIC VOLUME INDEX: 26.76 ML/M2
LEFT VENTRICLE DIASTOLIC VOLUME: 69.05 ML
LEFT VENTRICLE END SYSTOLIC VOLUME APICAL 2 CHAMBER: 52.43 ML
LEFT VENTRICLE END SYSTOLIC VOLUME APICAL 4 CHAMBER: 60.52 ML
LEFT VENTRICLE MASS INDEX: 76 G/M2
LEFT VENTRICLE SYSTOLIC VOLUME INDEX: 8 ML/M2
LEFT VENTRICLE SYSTOLIC VOLUME: 20.75 ML
LEFT VENTRICULAR INTERNAL DIMENSION IN DIASTOLE: 3.98 CM (ref 3.5–6)
LEFT VENTRICULAR MASS: 197.26 G
LEFT VERTEBRAL DIAS: 21 CM/S
LEFT VERTEBRAL SYS: 60 CM/S
LV LATERAL E/E' RATIO: 22.83 M/S
LV SEPTAL E/E' RATIO: 34.25 M/S
LVED V (TEICH): 69.05 ML
LVES V (TEICH): 20.75 ML
LVOT MG: 1.91 MMHG
LVOT MV: 0.66 CM/S
MV MEAN GRADIENT: 5 MMHG
MV PEAK A VEL: 1.72 M/S
MV PEAK E VEL: 1.37 M/S
MV PEAK GRADIENT: 14 MMHG
MV STENOSIS PRESSURE HALF TIME: 120.11 MS
MV VALVE AREA BY CONTINUITY EQUATION: 2.11 CM2
MV VALVE AREA P 1/2 METHOD: 1.83 CM2
NUC REST EJECTION FRACTION: 71
NUC STRESS EJECTION FRACTION: 69 %
OHS CV CAROTID RIGHT ICA EDV HIGHEST: 32
OHS CV CAROTID ULTRASOUND LEFT ICA/CCA RATIO: 0.77
OHS CV CAROTID ULTRASOUND RIGHT ICA/CCA RATIO: 1.1
OHS CV CPX 85 PERCENT MAX PREDICTED HEART RATE MALE: 116
OHS CV CPX MAX PREDICTED HEART RATE: 136
OHS CV CPX PATIENT IS FEMALE: 0
OHS CV CPX PATIENT IS MALE: 1
OHS CV CPX PEAK DIASTOLIC BLOOD PRESSURE: 61 MMHG
OHS CV CPX PEAK HEAR RATE: 98 BPM
OHS CV CPX PEAK RATE PRESSURE PRODUCT: NORMAL
OHS CV CPX PEAK SYSTOLIC BLOOD PRESSURE: 107 MMHG
OHS CV CPX PERCENT MAX PREDICTED HEART RATE ACHIEVED: 72
OHS CV CPX RATE PRESSURE PRODUCT PRESENTING: 7739
OHS CV PV CAROTID LEFT HIGHEST CCA: 91
OHS CV PV CAROTID LEFT HIGHEST ICA: 63
OHS CV PV CAROTID RIGHT HIGHEST CCA: 113
OHS CV PV CAROTID RIGHT HIGHEST ICA: 78
OHS CV RV/LV RATIO: 0.97 CM
OHS CV US CAROTID LEFT HIGHEST EDV: 24
PISA MRMAX VEL: 5.12 M/S
PISA TR MAX VEL: 2.59 M/S
PV MEAN GRADIENT: 1 MMHG
PV MV: 0.72 M/S
PV PEAK GRADIENT: 4 MMHG
PV PEAK VELOCITY: 1.04 M/S
RA MAJOR: 5.23 CM
RA PRESSURE ESTIMATED: 3 MMHG
RA WIDTH: 3.25 CM
RIGHT ARM DIASTOLIC BLOOD PRESSURE: 85 MMHG
RIGHT ARM SYSTOLIC BLOOD PRESSURE: 138 MMHG
RIGHT CBA DIAS: 21 CM/S
RIGHT CBA SYS: 81 CM/S
RIGHT CCA DIST DIAS: 18 CM/S
RIGHT CCA DIST SYS: 71 CM/S
RIGHT CCA MID DIAS: 27 CM/S
RIGHT CCA MID SYS: 113 CM/S
RIGHT CCA PROX DIAS: 20 CM/S
RIGHT CCA PROX SYS: 89 CM/S
RIGHT ECA DIAS: 18 CM/S
RIGHT ECA SYS: 115 CM/S
RIGHT ICA DIST DIAS: 32 CM/S
RIGHT ICA DIST SYS: 78 CM/S
RIGHT ICA MID DIAS: 27 CM/S
RIGHT ICA MID SYS: 73 CM/S
RIGHT ICA PROX DIAS: 23 CM/S
RIGHT ICA PROX SYS: 59 CM/S
RIGHT VENTRICULAR END-DIASTOLIC DIMENSION: 3.85 CM
RIGHT VERTEBRAL DIAS: 15 CM/S
RIGHT VERTEBRAL SYS: 53 CM/S
RV TB RVSP: 6 MMHG
RV TISSUE DOPPLER FREE WALL SYSTOLIC VELOCITY 1 (APICAL 4 CHAMBER VIEW): 13.96 CM/S
SINUS: 4.04 CM
STJ: 3.19 CM
SYSTOLIC BLOOD PRESSURE: 109 MMHG
TDI LATERAL: 0.06 M/S
TDI SEPTAL: 0.04 M/S
TDI: 0.05 M/S
TR MAX PG: 27 MMHG
TRICUSPID ANNULAR PLANE SYSTOLIC EXCURSION: 2.12 CM
TV REST PULMONARY ARTERY PRESSURE: 30 MMHG
Z-SCORE OF LEFT VENTRICULAR DIMENSION IN END DIASTOLE: -14.75
Z-SCORE OF LEFT VENTRICULAR DIMENSION IN END SYSTOLE: -11.37

## 2024-08-09 PROCEDURE — 93016 CV STRESS TEST SUPVJ ONLY: CPT | Mod: ,,, | Performed by: STUDENT IN AN ORGANIZED HEALTH CARE EDUCATION/TRAINING PROGRAM

## 2024-08-09 PROCEDURE — 78452 HT MUSCLE IMAGE SPECT MULT: CPT

## 2024-08-09 PROCEDURE — 93306 TTE W/DOPPLER COMPLETE: CPT

## 2024-08-09 PROCEDURE — 63600175 PHARM REV CODE 636 W HCPCS: Performed by: INTERNAL MEDICINE

## 2024-08-09 PROCEDURE — A9502 TC99M TETROFOSMIN: HCPCS | Performed by: INTERNAL MEDICINE

## 2024-08-09 PROCEDURE — 93880 EXTRACRANIAL BILAT STUDY: CPT

## 2024-08-09 PROCEDURE — 93017 CV STRESS TEST TRACING ONLY: CPT

## 2024-08-09 PROCEDURE — 93018 CV STRESS TEST I&R ONLY: CPT | Mod: ,,, | Performed by: STUDENT IN AN ORGANIZED HEALTH CARE EDUCATION/TRAINING PROGRAM

## 2024-08-09 PROCEDURE — 93880 EXTRACRANIAL BILAT STUDY: CPT | Mod: 26,,, | Performed by: INTERNAL MEDICINE

## 2024-08-09 PROCEDURE — 93306 TTE W/DOPPLER COMPLETE: CPT | Mod: 26,,, | Performed by: STUDENT IN AN ORGANIZED HEALTH CARE EDUCATION/TRAINING PROGRAM

## 2024-08-09 PROCEDURE — 78452 HT MUSCLE IMAGE SPECT MULT: CPT | Mod: 26,,, | Performed by: STUDENT IN AN ORGANIZED HEALTH CARE EDUCATION/TRAINING PROGRAM

## 2024-08-09 RX ORDER — REGADENOSON 0.08 MG/ML
0.4 INJECTION, SOLUTION INTRAVENOUS ONCE
Status: COMPLETED | OUTPATIENT
Start: 2024-08-09 | End: 2024-08-09

## 2024-08-09 RX ADMIN — TETROFOSMIN 30 MILLICURIE: 1.38 INJECTION, POWDER, LYOPHILIZED, FOR SOLUTION INTRAVENOUS at 10:08

## 2024-08-09 RX ADMIN — REGADENOSON 0.4 MG: 0.08 INJECTION, SOLUTION INTRAVENOUS at 10:08

## 2024-08-09 RX ADMIN — TETROFOSMIN 8.9 MILLICURIE: 1.38 INJECTION, POWDER, LYOPHILIZED, FOR SOLUTION INTRAVENOUS at 09:08

## 2024-08-12 ENCOUNTER — TELEPHONE (OUTPATIENT)
Dept: CARDIOLOGY | Facility: CLINIC | Age: 84
End: 2024-08-12
Payer: MEDICARE

## 2024-08-12 NOTE — TELEPHONE ENCOUNTER
Attempted to contact pt there was no answer, LVM for call back.      ----- Message from Nick Vaughan MD sent at 8/10/2024 11:12 AM CDT -----  The nuclear stress test is normal. No ischemia  Continue current Rx.   F/U as scheduled

## 2024-08-12 NOTE — TELEPHONE ENCOUNTER
Spoke with pt in regards to pt lab results pt VB understanding.      ----- Message from Claudette Cornejo sent at 8/12/2024  9:15 AM CDT -----  Contact: Guillermina, Wife  .Type:  Patient Returning Call    Who Called: Guillermina, Wife  Who Left Message for Patient: Stvee Junior MA  Does the patient know what this is regarding?: results   Would the patient rather a call back or a response via [facility]?  Call   Best Call Back Number: [phone], wife cell   Additional Information:

## 2024-08-12 NOTE — TELEPHONE ENCOUNTER
Attempted to contact pt there was no answer I did LVM for call back.      ----- Message from Nick Vaughan MD sent at 8/10/2024 11:11 AM CDT -----  Carotid us showed mild Dz  Continue current Rx.   F/U as scheduled

## 2024-08-19 ENCOUNTER — OFFICE VISIT (OUTPATIENT)
Dept: PRIMARY CARE CLINIC | Facility: CLINIC | Age: 84
End: 2024-08-19
Payer: MEDICARE

## 2024-08-19 VITALS
HEART RATE: 75 BPM | OXYGEN SATURATION: 98 % | WEIGHT: 309.75 LBS | BODY MASS INDEX: 41.95 KG/M2 | DIASTOLIC BLOOD PRESSURE: 78 MMHG | SYSTOLIC BLOOD PRESSURE: 128 MMHG | RESPIRATION RATE: 18 BRPM | HEIGHT: 72 IN

## 2024-08-19 DIAGNOSIS — E11.69 HYPERLIPIDEMIA ASSOCIATED WITH TYPE 2 DIABETES MELLITUS: ICD-10-CM

## 2024-08-19 DIAGNOSIS — E11.40 TYPE 2 DIABETES MELLITUS WITH DIABETIC NEUROPATHY, WITHOUT LONG-TERM CURRENT USE OF INSULIN: ICD-10-CM

## 2024-08-19 DIAGNOSIS — F33.42 RECURRENT MAJOR DEPRESSIVE DISORDER, IN FULL REMISSION: ICD-10-CM

## 2024-08-19 DIAGNOSIS — G47.33 OSA ON CPAP: ICD-10-CM

## 2024-08-19 DIAGNOSIS — R31.9 HEMATURIA, UNSPECIFIED TYPE: ICD-10-CM

## 2024-08-19 DIAGNOSIS — G72.0 STATIN MYOPATHY: ICD-10-CM

## 2024-08-19 DIAGNOSIS — R26.9 ABNORMALITY OF GAIT AND MOBILITY: ICD-10-CM

## 2024-08-19 DIAGNOSIS — M17.11 PRIMARY OSTEOARTHRITIS OF RIGHT KNEE: ICD-10-CM

## 2024-08-19 DIAGNOSIS — Z00.00 ENCOUNTER FOR MEDICARE ANNUAL WELLNESS EXAM: Primary | ICD-10-CM

## 2024-08-19 DIAGNOSIS — F03.90 DEMENTIA WITHOUT BEHAVIORAL DISTURBANCE, PSYCHOTIC DISTURBANCE, MOOD DISTURBANCE, OR ANXIETY, UNSPECIFIED DEMENTIA SEVERITY, UNSPECIFIED DEMENTIA TYPE: ICD-10-CM

## 2024-08-19 DIAGNOSIS — E78.5 HYPERLIPIDEMIA ASSOCIATED WITH TYPE 2 DIABETES MELLITUS: ICD-10-CM

## 2024-08-19 DIAGNOSIS — E11.29 TYPE 2 DIABETES MELLITUS WITH DIABETIC MICROALBUMINURIA, WITHOUT LONG-TERM CURRENT USE OF INSULIN: ICD-10-CM

## 2024-08-19 DIAGNOSIS — T46.6X5A STATIN MYOPATHY: ICD-10-CM

## 2024-08-19 DIAGNOSIS — H40.013 OAG (OPEN ANGLE GLAUCOMA) SUSPECT, LOW RISK, BILATERAL: ICD-10-CM

## 2024-08-19 DIAGNOSIS — Z87.898 HISTORY OF SYNCOPE: ICD-10-CM

## 2024-08-19 DIAGNOSIS — E66.01 MORBID OBESITY WITH BMI OF 40.0-44.9, ADULT: ICD-10-CM

## 2024-08-19 DIAGNOSIS — I70.0 AORTIC ATHEROSCLEROSIS: ICD-10-CM

## 2024-08-19 DIAGNOSIS — R80.9 TYPE 2 DIABETES MELLITUS WITH DIABETIC MICROALBUMINURIA, WITHOUT LONG-TERM CURRENT USE OF INSULIN: ICD-10-CM

## 2024-08-19 PROCEDURE — 3288F FALL RISK ASSESSMENT DOCD: CPT | Mod: CPTII,S$GLB,, | Performed by: NURSE PRACTITIONER

## 2024-08-19 PROCEDURE — 1159F MED LIST DOCD IN RCRD: CPT | Mod: CPTII,S$GLB,, | Performed by: NURSE PRACTITIONER

## 2024-08-19 PROCEDURE — G0439 PPPS, SUBSEQ VISIT: HCPCS | Mod: S$GLB,,, | Performed by: NURSE PRACTITIONER

## 2024-08-19 PROCEDURE — 1158F ADVNC CARE PLAN TLK DOCD: CPT | Mod: CPTII,S$GLB,, | Performed by: NURSE PRACTITIONER

## 2024-08-19 PROCEDURE — 1160F RVW MEDS BY RX/DR IN RCRD: CPT | Mod: CPTII,S$GLB,, | Performed by: NURSE PRACTITIONER

## 2024-08-19 PROCEDURE — 1170F FXNL STATUS ASSESSED: CPT | Mod: CPTII,S$GLB,, | Performed by: NURSE PRACTITIONER

## 2024-08-19 PROCEDURE — 1126F AMNT PAIN NOTED NONE PRSNT: CPT | Mod: CPTII,S$GLB,, | Performed by: NURSE PRACTITIONER

## 2024-08-19 PROCEDURE — 3078F DIAST BP <80 MM HG: CPT | Mod: CPTII,S$GLB,, | Performed by: NURSE PRACTITIONER

## 2024-08-19 PROCEDURE — 99999 PR PBB SHADOW E&M-EST. PATIENT-LVL IV: CPT | Mod: PBBFAC,,, | Performed by: NURSE PRACTITIONER

## 2024-08-19 PROCEDURE — 3072F LOW RISK FOR RETINOPATHY: CPT | Mod: CPTII,S$GLB,, | Performed by: NURSE PRACTITIONER

## 2024-08-19 PROCEDURE — 1101F PT FALLS ASSESS-DOCD LE1/YR: CPT | Mod: CPTII,S$GLB,, | Performed by: NURSE PRACTITIONER

## 2024-08-19 PROCEDURE — 3074F SYST BP LT 130 MM HG: CPT | Mod: CPTII,S$GLB,, | Performed by: NURSE PRACTITIONER

## 2024-08-19 NOTE — PATIENT INSTRUCTIONS
Counseling and Referral of Other Preventative  (Italic type indicates deductible and co-insurance are waived)    Patient Name: Elan Yepez  Today's Date: 8/19/2024    Health Maintenance       Date Due Completion Date    RSV Vaccine (Age 60+ and Pregnant patients) (1 - 1-dose 60+ series) Never done ---    COVID-19 Vaccine (7 - 2023-24 season) 09/01/2023 11/11/2022    Hemoglobin A1c 09/12/2024 3/12/2024    Eye Exam 09/12/2024 9/12/2023    Shingles Vaccine (1 of 2) 09/19/2024 (Originally 7/28/2010) 6/2/2010    Influenza Vaccine (1) 09/01/2024 11/22/2022    Override on 3/10/2016: Declined (Patient will wait till next flu season)    Diabetes Urine Screening 03/12/2025 3/12/2024    Lipid Panel 03/12/2025 3/12/2024    Override on 5/23/2018: Not Clinically Appropriate    TETANUS VACCINE 06/25/2028 6/25/2018 (Declined)    Override on 6/25/2018: Declined        No orders of the defined types were placed in this encounter.      The following information is provided to all patients.  This information is to help you find resources for any of the problems found today that may be affecting your health:                  Living healthy guide: www.[facility].[place].gov      Understanding Diabetes: www.diabetes.org      Eating healthy: www.cdc.gov/healthyweight      CDC home safety checklist: www.cdc.gov/steadi/patient.html      Agency on Aging: www.goea.[place].[facility]      Alcoholics anonymous (AA): www.aa.org      Physical Activity: www.anup.nih.gov/ft3lbhg      Tobacco use: www.quitwithusla.org

## 2024-08-19 NOTE — PROGRESS NOTES
Elan Yepez presented for a follow-up Medicare AWV today. The following components were reviewed and updated:    Medical history  Family History  Social history  Allergies and Current Medications  Health Risk Assessment  Health Maintenance  Care Team    **See Completed Assessments for Annual Wellness visit with in the encounter summary    The following assessments were completed:  Depression Screening  Cognitive function Screening  Timed Get Up Test  Whisper Test        Opioid documentation:      Patient does not have a current opioid prescription.          Vitals:    08/19/24 1152   BP: 128/78   Pulse: 75   Resp: 18   SpO2: 98%   Weight: (!) 140.5 kg (309 lb 11.9 oz)   Height: 6' (1.829 m)     Body mass index is 42.01 kg/m².       Physical Exam  Constitutional:       Appearance: Normal appearance. He is obese.   HENT:      Head: Normocephalic and atraumatic.   Neck:      Vascular: No carotid bruit.   Cardiovascular:      Rate and Rhythm: Normal rate and regular rhythm.      Pulses: Normal pulses.      Heart sounds: Normal heart sounds.   Pulmonary:      Effort: Pulmonary effort is normal.      Breath sounds: Normal breath sounds.   Skin:     General: Skin is warm and dry.   Neurological:      General: No focal deficit present.      Mental Status: He is alert and oriented to person, place, and time.   Psychiatric:         Mood and Affect: Mood normal.         Behavior: Behavior normal.         Thought Content: Thought content normal.         Judgment: Judgment normal.         Diagnoses and health risks identified today and associated recommendations/orders:  1. Encounter for Medicare annual wellness exam  Reviewed and discussed preventive health screenings and vaccinations with the patient.   Eye exam scheduled     2. Type 2 diabetes mellitus with diabetic neuropathy, without long-term current use of insulin  Stable. Continue current treatment plan as previously prescribed with your PCP     Diabetes Management  "Status    Statin: Not taking  ACE/ARB: Not taking    Screening or Prevention Patient's value Goal Complete/Controlled?   HgA1C Testing and Control   Lab Results   Component Value Date    HGBA1C 6.5 (H) 03/12/2024      Annually/Less than 8% Yes   Lipid profile : 03/12/2024 Annually Yes   LDL control Lab Results   Component Value Date    LDLCALC 126.6 03/12/2024    Annually/Less than 100 mg/dl  No   Nephropathy screening Lab Results   Component Value Date    LABMICR 103.0 03/12/2024     Lab Results   Component Value Date    PROTEINUA Negative 03/16/2022    Annually Yes   Blood pressure BP Readings from Last 1 Encounters:   08/19/24 128/78    Less than 140/90 Yes   Dilated retinal exam : 09/12/2023 Annually Yes   Foot exam   Most Recent Foot Exam Date: Not Found Annually Yes     Lab Results   Component Value Date    HGBA1C 6.5 (H) 03/12/2024    HGBA1C 6.4 (H) 05/12/2023    HGBA1C 6.4 (H) 11/11/2022    EGFRNORACEVR >60.0 06/25/2024    MICALBCREAT 60.2 (H) 03/12/2024    LDLCALC 126.6 03/12/2024     No results found for: "GLUTAMICACID", "CPEPTIDE"   Last 5 Patient Entered Readings                                          Most Recent A1c:            No data to display               HEALTH MAINTENANCE: Diabetic health maintenance interventions reviewed and are up to date except for:      Topic    Hemoglobin A1C     Eye Exam          3. Type 2 diabetes mellitus with diabetic microalbuminuria, without long-term current use of insulin  Stable. Continue current treatment plan as previously prescribed with your PCP     4. Hyperlipidemia associated with type 2 diabetes mellitus  Stable. Continue current treatment plan as previously prescribed with your PCP     Lab Results   Component Value Date    CHOL 189 03/12/2024    CHOL 222 (H) 05/12/2023    CHOL 217 (H) 03/16/2022     Lab Results   Component Value Date    HDL 44 03/12/2024    HDL 45 05/12/2023    HDL 45 03/16/2022     Lab Results   Component Value Date    LDLCALC 126.6 " 03/12/2024    LDLCALC 156.0 05/12/2023    LDLCALC 139.6 03/16/2022     Lab Results   Component Value Date    TRIG 92 03/12/2024    TRIG 105 05/12/2023    TRIG 162 (H) 03/16/2022       Lab Results   Component Value Date    CHOLHDL 23.3 03/12/2024    CHOLHDL 20.3 05/12/2023    CHOLHDL 20.7 03/16/2022     5. Statin myopathy  Noted. Stable. Continue current treatment plan as previously prescribed with your PCP and Cardiologist.     6. Morbid obesity with BMI of 40.0-44.9, adult  Discussed comprehensive lifestyle interventions for obesity, including focusing on a diet that is low-fat/low-calorie with emphasis on fresh vegetables, fruits, and lean meats, as well as increasing physical activity as tolerated.     Wt Readings from Last 3 Encounters:   08/19/24 1152 (!) 140.5 kg (309 lb 11.9 oz)   08/09/24 1122 (!) 142 kg (313 lb)   08/09/24 1037 (!) 142 kg (313 lb)       7. Aortic atherosclerosis  CT Chest 1/9/2020   On ASA. Stable. Continue current treatment plan as previously prescribed with your Cardiologist.     8. Dementia without behavioral disturbance, psychotic disturbance, mood disturbance, or anxiety, unspecified dementia severity, unspecified dementia type  On Aricept. Stable. Cog function screening score 4/5 today. Continue current treatment plan as previously prescribed with your PCP     9. Recurrent major depressive disorder, in full remission  On Zoloft. Stable. Continue current treatment plan as previously prescribed with your PCP     10. JESSICA on CPAP  Stable. Continue current treatment plan as previously prescribed with your pulmonologist.     11. Primary osteoarthritis of right knee  Stable. Continue current treatment plan as previously prescribed with your PCP     12. OAG (open angle glaucoma) suspect, low risk, bilateral  Stable. Continue current treatment plan as previously prescribed with your Ophthalmologist // eye exam due next month.     13. History of syncope  Evaluated by Cardiologist.   Overall  normal echo, NM stress test, US carotids; awaiting monitor report.   Follow up with Cardiology as advised.     14. Abnormality of gait and mobility   Fall precautions reviewed/reinforced     15. Hematuria, unspecified type   Notes 2 episodes of blood in urine, last occurred 1 month ago. No associated pain, fever, N/V. Has follow up with PCP 9/6/2024 and will discuss with PCP.       Provided Elan with a 5-10 year written screening schedule and personal prevention plan. Recommendations were developed using the USPSTF age appropriate recommendations. Education, counseling, and referrals were provided as needed.  After Visit Summary printed and given to patient which includes a list of additional screenings\tests needed.    Follow up in about 1 year (around 8/19/2025).      Cici Lima NP    I offered to discuss advanced care planning, including how to pick a person who would make decisions for you if you were unable to make them for yourself, called a health care power of , and what kind of decisions you might make such as use of life sustaining treatments such as ventilators and tube feeding when faced with a life limiting illness recorded on a living will that they will need to know. (How you want to be cared for as you near the end of your natural life)     X Patient is interested in learning more about how to make advanced directives.  I provided them paperwork and offered to discuss this with them.

## 2024-08-23 ENCOUNTER — TELEPHONE (OUTPATIENT)
Dept: CARDIOLOGY | Facility: CLINIC | Age: 84
End: 2024-08-23
Payer: MEDICARE

## 2024-08-23 NOTE — TELEPHONE ENCOUNTER
Called and spoke to pt spouse. Results verbalized and understood by pt spouse. Confirmed pt appointment scheduled for 11/04/24    ----- Message from Pro Jackson MD sent at 8/23/2024 12:32 PM CDT -----  Please contact the patient and let them know that their results are Normal heart monitor with normal average heart rates without any adverse rhythms. Continue to monitor BP and heart rates and follow up as scheduled or sooner to discuss.

## 2024-09-06 ENCOUNTER — LAB VISIT (OUTPATIENT)
Dept: LAB | Facility: HOSPITAL | Age: 84
End: 2024-09-06
Attending: FAMILY MEDICINE
Payer: MEDICARE

## 2024-09-06 ENCOUNTER — TELEPHONE (OUTPATIENT)
Dept: INTERNAL MEDICINE | Facility: CLINIC | Age: 84
End: 2024-09-06
Payer: MEDICARE

## 2024-09-06 ENCOUNTER — OFFICE VISIT (OUTPATIENT)
Dept: PRIMARY CARE CLINIC | Facility: CLINIC | Age: 84
End: 2024-09-06
Payer: MEDICARE

## 2024-09-06 VITALS
HEART RATE: 80 BPM | DIASTOLIC BLOOD PRESSURE: 80 MMHG | OXYGEN SATURATION: 96 % | RESPIRATION RATE: 18 BRPM | BODY MASS INDEX: 42.18 KG/M2 | SYSTOLIC BLOOD PRESSURE: 132 MMHG | WEIGHT: 311 LBS | TEMPERATURE: 98 F

## 2024-09-06 DIAGNOSIS — Z85.46 HISTORY OF PROSTATE CANCER: ICD-10-CM

## 2024-09-06 DIAGNOSIS — F03.90 DEMENTIA WITHOUT BEHAVIORAL DISTURBANCE, PSYCHOTIC DISTURBANCE, MOOD DISTURBANCE, OR ANXIETY, UNSPECIFIED DEMENTIA SEVERITY, UNSPECIFIED DEMENTIA TYPE: ICD-10-CM

## 2024-09-06 DIAGNOSIS — R31.0 GROSS HEMATURIA: ICD-10-CM

## 2024-09-06 DIAGNOSIS — E11.69 TYPE 2 DIABETES MELLITUS WITH OTHER SPECIFIED COMPLICATION, WITHOUT LONG-TERM CURRENT USE OF INSULIN: ICD-10-CM

## 2024-09-06 DIAGNOSIS — R55 SYNCOPE AND COLLAPSE: ICD-10-CM

## 2024-09-06 DIAGNOSIS — R55 SYNCOPE, UNSPECIFIED SYNCOPE TYPE: Primary | ICD-10-CM

## 2024-09-06 DIAGNOSIS — I63.89 OTHER CEREBRAL INFARCTION: ICD-10-CM

## 2024-09-06 DIAGNOSIS — E11.40 TYPE 2 DIABETES MELLITUS WITH DIABETIC NEUROPATHY, WITHOUT LONG-TERM CURRENT USE OF INSULIN: Primary | ICD-10-CM

## 2024-09-06 DIAGNOSIS — F33.42 RECURRENT MAJOR DEPRESSIVE DISORDER, IN FULL REMISSION: ICD-10-CM

## 2024-09-06 LAB
BILIRUB UR QL STRIP: NEGATIVE
CLARITY UR REFRACT.AUTO: CLEAR
COLOR UR AUTO: YELLOW
GLUCOSE UR QL STRIP: NEGATIVE
HGB UR QL STRIP: NEGATIVE
KETONES UR QL STRIP: NEGATIVE
LEUKOCYTE ESTERASE UR QL STRIP: NEGATIVE
MICROSCOPIC COMMENT: NORMAL
NITRITE UR QL STRIP: NEGATIVE
PH UR STRIP: 6 [PH] (ref 5–8)
PROT UR QL STRIP: NEGATIVE
SP GR UR STRIP: 1.01 (ref 1–1.03)
URN SPEC COLLECT METH UR: NORMAL

## 2024-09-06 PROCEDURE — 81001 URINALYSIS AUTO W/SCOPE: CPT | Mod: HCNC | Performed by: FAMILY MEDICINE

## 2024-09-06 PROCEDURE — 87088 URINE BACTERIA CULTURE: CPT | Mod: HCNC | Performed by: FAMILY MEDICINE

## 2024-09-06 PROCEDURE — 99999 PR PBB SHADOW E&M-EST. PATIENT-LVL V: CPT | Mod: PBBFAC,HCNC,, | Performed by: FAMILY MEDICINE

## 2024-09-06 PROCEDURE — 87086 URINE CULTURE/COLONY COUNT: CPT | Mod: HCNC | Performed by: FAMILY MEDICINE

## 2024-09-06 PROCEDURE — 87186 SC STD MICRODIL/AGAR DIL: CPT | Mod: HCNC | Performed by: FAMILY MEDICINE

## 2024-09-06 RX ORDER — SERTRALINE HYDROCHLORIDE 50 MG/1
50 TABLET, FILM COATED ORAL EVERY MORNING
Qty: 90 TABLET | Refills: 3 | Status: SHIPPED | OUTPATIENT
Start: 2024-09-06 | End: 2025-09-06

## 2024-09-06 RX ORDER — MIRABEGRON 50 MG/1
50 TABLET, EXTENDED RELEASE ORAL DAILY
Qty: 90 TABLET | Refills: 3 | Status: SHIPPED | OUTPATIENT
Start: 2024-09-06 | End: 2025-09-06

## 2024-09-06 RX ORDER — DONEPEZIL HYDROCHLORIDE 10 MG/1
10 TABLET, FILM COATED ORAL NIGHTLY
Qty: 90 TABLET | Refills: 3 | Status: SHIPPED | OUTPATIENT
Start: 2024-09-06 | End: 2025-09-06

## 2024-09-06 NOTE — PATIENT INSTRUCTIONS
Physically, everything looks really good today.      All of the blood work you had done looks fine, as well.    I worry about these episodes where you blacked out.  Cardiology has already checked your heart and carotids, but I would like to check the vessels in your brain to make sure everything is okay.  I am placing orders for some CT scans.  Hopefully we can set this up the same day you go to [place] to see the eye doctor and foot doctor.    I do need to get some blood work before the scan, though.  We can do that today.  Let us also check a urine sample to look for the blood in the urine.    Otherwise, keep taking all of your medications, as prescribed.      Continue to eat a healthy diet.  Be careful with portion sizes.  Includes lots of fresh fruits, vegetables, whole grains, lean proteins.  See info below.    Keep hydrated.  Be sure to drink at least 8-10, 8 oz, glasses of water every day.    Stay active.  Try to do some sort of physical activity every day.  Nothing outrageous, just try walking for 10-15 minutes each day.

## 2024-09-06 NOTE — PROGRESS NOTES
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 head CT, and CTA of the head neck just to check the vessels inside.  Orders placed.      Recheck A1c today, BMP prior to scans.    He has appointments in [place] on September 17 with foot doctor, eye doctor.  We will try to schedule the scans at that location, on that day, to make it more convenient for him.    FOLLOW-UP:  Follow up in about 6 months (around 3/6/2025) for check up.    I spent a total of 40 minutes face to face and non-face to face on the date of this visit.This includes time preparing to see the patient (eg, review of tests, notes), obtaining and/or reviewing additional history from an independent historian and/or outside medical records, documenting clinical information in the electronic health record, independently interpreting results and/or communicating results to the patient/family/caregiver, or care coordinator.  Visit today included increased complexity associated with the care of the episodic problem addressed and managing the longitudinal care of the patient due to the serious and/or complex managed problem(s).    Signed by:  Faustino Sharma MD

## 2024-09-09 LAB — BACTERIA UR CULT: ABNORMAL

## 2024-09-12 ENCOUNTER — HOSPITAL ENCOUNTER (OUTPATIENT)
Dept: RADIOLOGY | Facility: HOSPITAL | Age: 84
Discharge: HOME | End: 2024-09-12
Attending: FAMILY MEDICINE
Payer: MEDICARE

## 2024-09-12 DIAGNOSIS — I63.89 OTHER CEREBRAL INFARCTION: ICD-10-CM

## 2024-09-12 DIAGNOSIS — R55 SYNCOPE, UNSPECIFIED SYNCOPE TYPE: ICD-10-CM

## 2024-09-12 PROCEDURE — 25500020 PHARM REV CODE 255: Mod: HCNC,PN | Performed by: FAMILY MEDICINE

## 2024-09-12 PROCEDURE — 70496 CT ANGIOGRAPHY HEAD: CPT | Mod: TC,HCNC,PN

## 2024-09-12 RX ADMIN — IOHEXOL 100 ML: 350 INJECTION, SOLUTION INTRAVENOUS at 09:09

## 2024-09-13 ENCOUNTER — TELEPHONE (OUTPATIENT)
Dept: PRIMARY CARE CLINIC | Facility: CLINIC | Age: 84
End: 2024-09-13
Payer: MEDICARE

## 2024-09-13 NOTE — TELEPHONE ENCOUNTER
----- Message from Tarun Lucas sent at 9/13/2024  9:45 AM CDT -----  Contact: Guillermina/wife  Type:  Test Results    Who Called: Guillermina/wife  Name of Test (Lab/Mammo/Etc): CT scan  Date of Test: 9/12/24  Ordering Provider:    Where the test was performed:  [facility]  Would the patient rather a call back or a response via [facility]?  Call back  Best Call Back Number: [phone]  Additional Information:      Thanks   Am

## 2024-09-17 ENCOUNTER — OFFICE VISIT (OUTPATIENT)
Dept: OPHTHALMOLOGY | Facility: CLINIC | Age: 84
End: 2024-09-17
Payer: MEDICARE

## 2024-09-17 ENCOUNTER — OFFICE VISIT (OUTPATIENT)
Dept: PODIATRY | Facility: CLINIC | Age: 84
End: 2024-09-17
Payer: MEDICARE

## 2024-09-17 VITALS — HEIGHT: 72 IN | BODY MASS INDEX: 42.13 KG/M2 | WEIGHT: 311.06 LBS

## 2024-09-17 DIAGNOSIS — L84 CORN OR CALLUS: ICD-10-CM

## 2024-09-17 DIAGNOSIS — E11.49 TYPE 2 DIABETES MELLITUS WITH NEUROLOGICAL MANIFESTATIONS: Primary | ICD-10-CM

## 2024-09-17 DIAGNOSIS — E11.9 DIABETES MELLITUS WITHOUT COMPLICATION: ICD-10-CM

## 2024-09-17 DIAGNOSIS — H43.811 POSTERIOR VITREOUS DETACHMENT, RIGHT: ICD-10-CM

## 2024-09-17 DIAGNOSIS — B35.1 DERMATOPHYTOSIS, NAIL: ICD-10-CM

## 2024-09-17 DIAGNOSIS — E11.36 DIABETIC CATARACT: ICD-10-CM

## 2024-09-17 DIAGNOSIS — H40.023 AT HIGH RISK FOR OPEN ANGLE GLAUCOMA OF BOTH EYES: Primary | ICD-10-CM

## 2024-09-17 PROCEDURE — 92133 CPTRZD OPH DX IMG PST SGM ON: CPT | Mod: HCNC,S$GLB,, | Performed by: OPTOMETRIST

## 2024-09-17 PROCEDURE — 1159F MED LIST DOCD IN RCRD: CPT | Mod: HCNC,CPTII,S$GLB, | Performed by: OPTOMETRIST

## 2024-09-17 PROCEDURE — 1160F RVW MEDS BY RX/DR IN RCRD: CPT | Mod: HCNC,CPTII,S$GLB, | Performed by: OPTOMETRIST

## 2024-09-17 PROCEDURE — 99999 PR PBB SHADOW E&M-EST. PATIENT-LVL III: CPT | Mod: PBBFAC,HCNC,, | Performed by: PODIATRIST

## 2024-09-17 PROCEDURE — 3288F FALL RISK ASSESSMENT DOCD: CPT | Mod: HCNC,CPTII,S$GLB, | Performed by: PODIATRIST

## 2024-09-17 PROCEDURE — 99213 OFFICE O/P EST LOW 20 MIN: CPT | Mod: 25,HCNC,S$GLB, | Performed by: PODIATRIST

## 2024-09-17 PROCEDURE — 1126F AMNT PAIN NOTED NONE PRSNT: CPT | Mod: HCNC,CPTII,S$GLB, | Performed by: PODIATRIST

## 2024-09-17 PROCEDURE — 11721 DEBRIDE NAIL 6 OR MORE: CPT | Mod: Q9,HCNC,S$GLB, | Performed by: PODIATRIST

## 2024-09-17 PROCEDURE — 99999 PR PBB SHADOW E&M-EST. PATIENT-LVL III: CPT | Mod: PBBFAC,HCNC,, | Performed by: OPTOMETRIST

## 2024-09-17 PROCEDURE — 99214 OFFICE O/P EST MOD 30 MIN: CPT | Mod: HCNC,S$GLB,, | Performed by: OPTOMETRIST

## 2024-09-17 PROCEDURE — 1101F PT FALLS ASSESS-DOCD LE1/YR: CPT | Mod: HCNC,CPTII,S$GLB, | Performed by: PODIATRIST

## 2024-09-17 PROCEDURE — 2023F DILAT RTA XM W/O RTNOPTHY: CPT | Mod: HCNC,CPTII,S$GLB, | Performed by: OPTOMETRIST

## 2024-09-17 NOTE — PROGRESS NOTES
[facility] -   PODIATRIC MEDICINE AND SURGERY      CHIEF COMPLAINT  Chief Complaint   Patient presents with    Routine Foot Care     6 month RFC, pt rates pain 0/10, pt is diabetic, pt last seen pcp Faustino Sharma MD 9/6/2024         HPI    SUBJECTIVE: Elan Yepez is a 84 y.o. 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), Asbestos exposure (5/5/2016), BMI 40.0-44.9, adult, Colon polyp, Coronary artery calcification (3/18/2021), Diabetes mellitus, Diabetes mellitus, type 2, Dysmetabolic syndrome X (10/2/2012), Hyperlipidemia, Hypertension, Joint pain, MDD (major depressive disorder) (4/7/2015), Midline low back pain without sciatica (5/5/2016), Midline low back pain without sciatica (5/5/2016), Polyneuropathy, Primary osteoarthritis of right knee (2/20/2018), Prostate CA, Pulmonary nodule, stable to 2011 (6/10/2020), Renal manifestation of secondary diabetes mellitus, Sleep apnea, Trouble in sleeping, Type II or unspecified type diabetes mellitus with neurological manifestations, not stated as uncontrolled(250.60), and Urinary incontinence. Elanpresents to clinic for high risk diabetic foot exam and care.  Elan admits numbness, burning, and/or tingling sensations in their feet. Patient relates blood sugars normally run around 110-120. He does complain about thickened, elongated toenails.  Patient has no other pedal complaints at this time      HgA1c:   Hemoglobin A1C   Date Value Ref Range Status   09/06/2024 6.6 (H) 4.0 - 5.6 % Final     Comment:     ADA Screening Guidelines:  5.7-6.4%  Consistent with prediabetes  >or=6.5%  Consistent with diabetes    High levels of fetal hemoglobin interfere with the HbA1C  assay. Heterozygous hemoglobin variants (HbS, HgC, etc)do  not significantly interfere with this assay.   However, presence of multiple variants may affect accuracy.     03/12/2024 6.5 (H) 4.0 - 5.6 % Final      Comment:     ADA Screening Guidelines:  5.7-6.4%  Consistent with prediabetes  >or=6.5%  Consistent with diabetes    High levels of fetal hemoglobin interfere with the HbA1C  assay. Heterozygous hemoglobin variants (HbS, HgC, etc)do  not significantly interfere with this assay.   However, presence of multiple variants may affect accuracy.     05/12/2023 6.4 (H) 4.0 - 5.6 % Final     Comment:     ADA Screening Guidelines:  5.7-6.4%  Consistent with prediabetes  >or=6.5%  Consistent with diabetes    High levels of fetal hemoglobin interfere with the HbA1C  assay. Heterozygous hemoglobin variants (HbS, HgC, etc)do  not significantly interfere with this assay.   However, presence of multiple variants may affect accuracy.           PMH  Past Medical History:   Diagnosis Date    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  Began Aricept 10 mg at bedtime.    Asbestos exposure 5/5/2016    BMI 40.0-44.9, adult     Colon polyp     Coronary artery calcification 3/18/2021    Diabetes mellitus     diet management    Diabetes mellitus, type 2     Dysmetabolic syndrome X 10/2/2012    Hyperlipidemia     Hypertension     diet managemen    Joint pain     MDD (major depressive disorder) 4/7/2015    Midline low back pain without sciatica 5/5/2016    Midline low back pain without sciatica 5/5/2016    Polyneuropathy     Primary osteoarthritis of right knee 2/20/2018    Prostate CA     Pulmonary nodule, stable to 2011 6/10/2020    01/2020 CT There are stable subcentimeter noncalcified pulmonary nodules the largest of which is located within the left upper lobe anteriorly and measures approximately 6 mm.. 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 100 tablet 3    blood sugar diagnostic Strp USE  STRIP TO CHECK GLUCOSE ONCE DAILY 100 each 3    cholecalciferol, vitamin D3, 5,000 unit capsule Take 1 capsule by mouth Daily.      donepeziL (ARICEPT) 10 MG tablet Take 1 tablet (10 mg total) by mouth every evening. 90 tablet 3    lancets (SOFT TOUCH LANCETS) Misc 1 Device by Misc.(Non-Drug; Combo Route) route once daily. 100 each 3    mirabegron (MYRBETRIQ) 50 mg Tb24 Take 1 tablet (50 mg total) by mouth once daily. 90 tablet 3    MULTIVITS-MINERALS/FA/LYCOPENE (ONE-A-DAY MEN'S ORAL) Take 1 tablet by mouth Daily.      sertraline (ZOLOFT) 50 MG tablet Take 1 tablet (50 mg total) by mouth every morning. Increase energy and boost mood 90 tablet 3    TRUE METRIX GLUCOSE METER Misc        No current facility-administered medications on file prior to visit.       PSH     Past Surgical History:   Procedure Laterality Date    COLONOSCOPY  02/04/2014    ENDOSCOPIC ULTRASOUND OF UPPER GASTROINTESTINAL TRACT N/A 8/14/2019    Procedure: ULTRASOUND, UPPER GI TRACT, ENDOSCOPIC;  Surgeon: Pravin Myrick MD;  Location: [address]);  Service: Endoscopy;  Laterality: N/A;    LAPAROSCOPIC CHOLECYSTECTOMY WITH CHOLANGIOGRAPHY N/A 8/12/2019    Procedure: CHOLECYSTECTOMY, LAPAROSCOPIC, WITH CHOLANGIOGRAM;  Surgeon: Phong Beltran MD;  Location: [facility] OR [address] [place] [address];  Service: General;  Laterality: N/A;  23 hours observation    NASAL SEPTUM SURGERY      PROSTATE SURGERY      removal        ALL  Review of patient's allergies indicates:  No Known Allergies      SOC     Social History     Tobacco Use    Smoking status: Never     Passive exposure: Past    Smokeless tobacco: Never   Substance Use Topics    Alcohol use: No    Drug use: No         Family HX    Family History    Problem Relation Name Age of Onset    Heart disease Mother      Diabetes Mother      Heart disease Father      Hypertension Father      Diabetes Sister Beatris     Allergies Brother Alfonzo     Thyroid disease Brother Alfonzo     Dementia Brother Alfonzo     No Known Problems Son Elan Brandon     Cancer Sister Brooke     No Known Problems Sister Aisha     Diabetes Sister Meera     Hypertension Brother Kwabena     Glaucoma Paternal Grandmother      Melanoma Neg Hx              REVIEW OF SYSTEMS  General: Denies any fever or chills  Chest: Denies shortness of breath, wheezing, coughing, or sputum production  Heart: Denies chest pain.  As noted above and per history of current illness above, otherwise negative in the remainder of the 14 systems.     PHYSICAL EXAM  Vitals:    09/17/24 1016   Weight: (!) 141.1 kg (311 lb 1.1 oz)   Height: 6' (1.829 m)   PainSc: 0-No pain       GEN:  This patient is well-developed, well-nourished and appears stated age, well-oriented to person, place and time, and cooperative and pleasant on today's visit.      LOWER EXTREMITY PHYSICAL EXAMINATION   VASCULAR  DP pedal pulse 2/4 RIGHT, LEFT2/4   PT pedal pulse 2/4 RIGHT, LEFT2/4  Capillary refill time immediate to the toes.   Feet are warm to the touch. Skin temperature warm to warm from proximally to distally   There are no varicosities, telangiectasias noted to bilateral foot and ankle regions.   There are no ecchymoses noted to bilateral foot and ankle regions.   There is mild gross lower extremity edema.    DERMATOLOGIC  Skin moist with healthy texture and turgor.  Thickened, dystrophic, elongated toenails with subungal debris 1-5 b/l   There are no open ulcerations, lacerations, or fissures to bilateral foot and ankle regions. There are no signs of infection as there is no erythema, no proximal-extending lymphangiitis, no fluctuance, or crepitus noted on palpation to bilateral foot and ankle regions.   There is no interdigital maceration.    There are hyperkeratotic lesions noted to feet.     NEUROLOGIC  Protective sensation intact at 9/10 sites upon examination with Rowley Weinsten 5.07 g monofilament.  Propioception intact at 1st MTPJ b/l.  Achilles and patellar deep tendon reflexes intact  Babinski reflex absent    ORTHOPEDIC/BIOMECHANICAL  No symptomatic structural abnormalities noted. Muscle strength is 5/5 for foot inverters, everters, plantarflexors, and dorsiflexors. Muscle tone is normal.  Inspection/palpation of bone, joints and muscles unremarkable.      ASSESSMENT  Encounter Diagnoses   Name Primary?    Type 2 diabetes mellitus with neurological manifestations Yes    Dermatophytosis, nail     Corn or callus            Plan:    -Discuss presenting problems, etiology, pathologic processes and management options with patient today.   -I counseled the patient on their conditions, their implications and medical management. An in depth discussion on diabetic management, risk prevention, amputation prevention verbally and provided educational literature in written format.    -With patient's permission, the elongated onychomycotic toenails, as outlined in the physical examination, were sharply debrided with a double action nail nipper to their soft tissue attachment. If indicated, the nails were then smoothed down in thickness with a mechanical rotary arian and/or arielle board to facilitate in further debridement removing all offending nail and subungual debris.      - Shoe inspection. Diabetic Foot Education. Patient reminded of the importance of good nutrition and blood sugar control to help prevent podiatric complications of diabetes. Patient instructed on proper foot hygeine. We discussed wearing proper shoe gear, daily foot inspections, never walking without protective shoe gear, never putting sharp instruments to feet, routine podiatric visits annually/semi annually or sooner if symptoms arise    Disclaimer: This note was partially prepared  using a voice recognition system and is likely to have sound alike errors within the text.        Future Appointments   Date Time Provider Department Center   10/31/2024  1:00 PM Lejeune, Elizabeth B, NP [facility] PULMSVS [place]   11/4/2024  1:20 PM Pro Jackson MD [facility] CARDIO [place]   3/6/2025  9:00 AM Faustino Sharma MD [facility] [place]   3/18/2025 10:30 AM Jessika Goldman DPM [facility] POD [place]       Report Electronically Signed By:     Jessika Goldman DPM   Podiatry  [facility]  9/18/2024

## 2024-09-17 NOTE — PROGRESS NOTES
HPI    1. Glaucoma suspect  2. CATS OU       Hemoglobin A1C       Date                     Value               Ref Range             Status                09/06/2024               6.6 (H)             4.0 - 5.6 %           Final              Vision changes since last eye exam?: Pt states his far vision has become a   little more blurry and things seem to be dim/foggy when he is at [employer] in   the morning per pt. Pt is using PAL as needed for watching TV and reading   per pt.      Any eye pain today: Pt denies any eye pain.    Other ocular symptoms: None noticed by pt.     Interested in contact lens fitting today? No.     Last edited by Nikki Stone on 9/17/2024 11:17 AM.            Assessment /Plan     For exam results, see Encounter Report.    At high risk for open angle glaucoma of both eyes  -     OCT, Optic Nerve - OU - Both Eyes  The patient has the following Glaucoma risk factors: Race, Age, and Optic Nerve Asymmetry   gOCT appears stable at this time.   Pt will require HVF after cataract is addressed.     Diabetes mellitus without complication  Last A1c 6.6 There was no diabetic retinopathy present on either eye on examination today. Recommend good blood pressure control, strict blood glucose control, and good cholesterol control.  Continue close care with Dr Sharma regarding diabetes.    Diabetic cataract  OS>OD, consult Dr BAPTISTE for CE/IOL eval.     Posterior vitreous detachment, right  Retina flat, RD precautions reviewed. Observe.     RTC with Dr BAPTISTE for CE/IOL consult OS>OD.

## 2024-09-18 ENCOUNTER — TELEPHONE (OUTPATIENT)
Dept: OPHTHALMOLOGY | Facility: CLINIC | Age: 84
End: 2024-09-18
Payer: MEDICARE

## 2024-09-18 NOTE — TELEPHONE ENCOUNTER
Called to schedule cat eval, no VM box set up at number listed in chart  Unable to send message through my chart, pt does not have account   Will try again    ----- Message from Isaías Leong OD sent at 9/17/2024 11:36 AM CDT -----  Please call to schedule cat eval w/ Dr BAPTISTE.     Thanks,  DKT

## 2024-10-08 ENCOUNTER — OFFICE VISIT (OUTPATIENT)
Dept: OPHTHALMOLOGY | Facility: CLINIC | Age: 84
End: 2024-10-08
Payer: MEDICARE

## 2024-10-08 DIAGNOSIS — E11.36 DIABETIC CATARACT OF LEFT EYE: Primary | ICD-10-CM

## 2024-10-08 DIAGNOSIS — H40.023 AT HIGH RISK FOR OPEN ANGLE GLAUCOMA OF BOTH EYES: ICD-10-CM

## 2024-10-08 DIAGNOSIS — E11.36 DIABETIC CATARACT OF RIGHT EYE: ICD-10-CM

## 2024-10-08 DIAGNOSIS — E11.9 DIABETES MELLITUS WITHOUT COMPLICATION: ICD-10-CM

## 2024-10-08 PROCEDURE — 2023F DILAT RTA XM W/O RTNOPTHY: CPT | Mod: HCNC,CPTII,S$GLB, | Performed by: OPHTHALMOLOGY

## 2024-10-08 PROCEDURE — 1159F MED LIST DOCD IN RCRD: CPT | Mod: HCNC,CPTII,S$GLB, | Performed by: OPHTHALMOLOGY

## 2024-10-08 PROCEDURE — 99999 PR PBB SHADOW E&M-EST. PATIENT-LVL III: CPT | Mod: PBBFAC,HCNC,, | Performed by: OPHTHALMOLOGY

## 2024-10-08 PROCEDURE — 1160F RVW MEDS BY RX/DR IN RCRD: CPT | Mod: HCNC,CPTII,S$GLB, | Performed by: OPHTHALMOLOGY

## 2024-10-08 PROCEDURE — 99214 OFFICE O/P EST MOD 30 MIN: CPT | Mod: HCNC,S$GLB,, | Performed by: OPHTHALMOLOGY

## 2024-10-08 RX ORDER — REGADENOSON 0.08 MG/ML
INJECTION, SOLUTION INTRAVENOUS
COMMUNITY
Start: 2024-08-09

## 2024-10-08 NOTE — PROGRESS NOTES
HPI     Cataract            Comments: Cataract eval referred by DKT  Patient states VA has decreased at all ranges over the last year, images   appear dull and cloudy along with glare sensitivity. Trouble reading       Lab Results       Component                Value               Date                       HGBA1C                   6.6 (H)             09/06/2024                     Comments    DKT REFERRAL    1. Glaucoma suspect  2. CATS OU  3. Diabetic since  4. PVD OD             Last edited by Marco Antonio Kovacs MD on 10/8/2024  2:22 PM.            Assessment /Plan     For exam results, see Encounter Report.      ICD-10-CM ICD-9-CM    1. Diabetic cataract of left eye  E11.36 250.50 You were found to have visually significant cataract(s) today, and it is our opinion that you would benefit from cataract surgery. However, you have chosen to not have surgery at this time. If you change your mind or if your symptoms worsen, please give us a call for an additional appointment.         If pt desires on surgery   Would be   Phaco left eye,   Block  Monofocal - Distance  Prescriptions sent for preoperative medications  Prednisolone Acetate- 4xs daily  and Ketorolac- 4xs daily   Anticoagulant status: Yes - ASA  stop ASA x 2 weeks prior sx       Explained that patient may need glasses after surgery.  Discussed that vision may be limited by: Glaucoma suspect, diabetes       Pt decides to follow at this time        366.41       2. Diabetic cataract of right eye  E11.36 250.50 Follow at patients request      366.41       3. At high risk for open angle glaucoma of both eyes  H40.023 365.05 Goct done , artifact os - follow at this time       4. Diabetes mellitus without complication  E11.9 250.00 Diabetes with no diabetic retinopathy on dilated exam.   Reviewed diabetic eye precautions including excellent blood sugar control, and importance of regular follow up.

## 2024-10-31 ENCOUNTER — OFFICE VISIT (OUTPATIENT)
Dept: PULMONOLOGY | Facility: CLINIC | Age: 84
End: 2024-10-31
Payer: MEDICARE

## 2024-10-31 VITALS
DIASTOLIC BLOOD PRESSURE: 88 MMHG | SYSTOLIC BLOOD PRESSURE: 132 MMHG | HEART RATE: 80 BPM | OXYGEN SATURATION: 98 % | WEIGHT: 307.75 LBS | HEIGHT: 72 IN | RESPIRATION RATE: 21 BRPM | BODY MASS INDEX: 41.68 KG/M2

## 2024-10-31 DIAGNOSIS — G47.33 OSA ON CPAP: ICD-10-CM

## 2024-10-31 DIAGNOSIS — E66.01 MORBID OBESITY WITH BMI OF 40.0-44.9, ADULT: Primary | ICD-10-CM

## 2024-10-31 PROCEDURE — 99999 PR PBB SHADOW E&M-EST. PATIENT-LVL IV: CPT | Mod: PBBFAC,HCNC,, | Performed by: NURSE PRACTITIONER

## 2024-11-04 ENCOUNTER — OFFICE VISIT (OUTPATIENT)
Dept: CARDIOLOGY | Facility: CLINIC | Age: 84
End: 2024-11-04
Payer: MEDICARE

## 2024-11-04 VITALS — DIASTOLIC BLOOD PRESSURE: 78 MMHG | BODY MASS INDEX: 41.64 KG/M2 | SYSTOLIC BLOOD PRESSURE: 122 MMHG | WEIGHT: 307 LBS

## 2024-11-04 DIAGNOSIS — G47.33 OSA ON CPAP: ICD-10-CM

## 2024-11-04 DIAGNOSIS — E11.69 DIABETES MELLITUS TYPE 2 IN OBESE: ICD-10-CM

## 2024-11-04 DIAGNOSIS — I45.10 RBBB: Primary | ICD-10-CM

## 2024-11-04 DIAGNOSIS — I70.0 AORTIC ATHEROSCLEROSIS: ICD-10-CM

## 2024-11-04 DIAGNOSIS — H91.90 HEARING LOSS, UNSPECIFIED HEARING LOSS TYPE, UNSPECIFIED LATERALITY: ICD-10-CM

## 2024-11-04 DIAGNOSIS — R94.31 NONSPECIFIC ABNORMAL ELECTROCARDIOGRAM (ECG) (EKG): ICD-10-CM

## 2024-11-04 DIAGNOSIS — E78.2 MIXED HYPERLIPIDEMIA: ICD-10-CM

## 2024-11-04 DIAGNOSIS — E66.9 DIABETES MELLITUS TYPE 2 IN OBESE: ICD-10-CM

## 2024-11-04 DIAGNOSIS — R07.89 CHEST DISCOMFORT: ICD-10-CM

## 2024-11-04 DIAGNOSIS — E11.40 TYPE 2 DIABETES MELLITUS WITH DIABETIC NEUROPATHY, WITHOUT LONG-TERM CURRENT USE OF INSULIN: ICD-10-CM

## 2024-11-04 DIAGNOSIS — R06.02 SHORTNESS OF BREATH: ICD-10-CM

## 2024-11-04 DIAGNOSIS — R55 SYNCOPE, UNSPECIFIED SYNCOPE TYPE: ICD-10-CM

## 2024-11-04 DIAGNOSIS — R55 SYNCOPE AND COLLAPSE: ICD-10-CM

## 2024-11-04 DIAGNOSIS — E66.01 MORBID OBESITY WITH BMI OF 40.0-44.9, ADULT: ICD-10-CM

## 2024-11-04 DIAGNOSIS — F03.90 DEMENTIA WITHOUT BEHAVIORAL DISTURBANCE, PSYCHOTIC DISTURBANCE, MOOD DISTURBANCE, OR ANXIETY, UNSPECIFIED DEMENTIA SEVERITY, UNSPECIFIED DEMENTIA TYPE: ICD-10-CM

## 2024-11-04 PROCEDURE — 3074F SYST BP LT 130 MM HG: CPT | Mod: HCNC,CPTII,S$GLB, | Performed by: INTERNAL MEDICINE

## 2024-11-04 PROCEDURE — G2211 COMPLEX E/M VISIT ADD ON: HCPCS | Mod: HCNC,S$GLB,, | Performed by: INTERNAL MEDICINE

## 2024-11-04 PROCEDURE — 1159F MED LIST DOCD IN RCRD: CPT | Mod: HCNC,CPTII,S$GLB, | Performed by: INTERNAL MEDICINE

## 2024-11-04 PROCEDURE — 3288F FALL RISK ASSESSMENT DOCD: CPT | Mod: HCNC,CPTII,S$GLB, | Performed by: INTERNAL MEDICINE

## 2024-11-04 PROCEDURE — 99999 PR PBB SHADOW E&M-EST. PATIENT-LVL III: CPT | Mod: PBBFAC,HCNC,, | Performed by: INTERNAL MEDICINE

## 2024-11-04 PROCEDURE — 3078F DIAST BP <80 MM HG: CPT | Mod: HCNC,CPTII,S$GLB, | Performed by: INTERNAL MEDICINE

## 2024-11-04 PROCEDURE — 99214 OFFICE O/P EST MOD 30 MIN: CPT | Mod: HCNC,S$GLB,, | Performed by: INTERNAL MEDICINE

## 2024-11-04 PROCEDURE — 1101F PT FALLS ASSESS-DOCD LE1/YR: CPT | Mod: HCNC,CPTII,S$GLB, | Performed by: INTERNAL MEDICINE

## 2024-11-04 PROCEDURE — 1160F RVW MEDS BY RX/DR IN RCRD: CPT | Mod: HCNC,CPTII,S$GLB, | Performed by: INTERNAL MEDICINE

## 2024-11-04 NOTE — PROGRESS NOTES
Subjective:   Patient ID:  Elan Yepez is a 84 y.o. male who presents for cardiac consult of No chief complaint on file.      Referral by: No referring provider defined for this encounter.     Reason for consult: syncope, CP, SOB      HPI  The patient came in today for cardiac consult of No chief complaint on file.      Elan Yepez is a 84 y.o. male pt with HLD, aortic atherosc, ED, DM2, obesity, depression, OAS, h/o prostate CA presents for follow up CV eval.     7/15/24  Here for further CV eval of syncope and CP/SOB.   He had neg trop, BNP, and CXR recently from PCP office     BP and HR stable today. BMI 42 - 313 lbs   He has intermittent CP, SOB - none today.   He has syncope occ - feels heartburn at times and has to pull over.   HE is cutting grass.     Normal sinus rhythm   Right bundle branch block   Since previous tracing - no significant change   Confirmed by Weiland MD, Marco Antonio FUNK Jr. ([id number]) on 6/25/2024 1:41:15 PM       10/4/24  Carotid u/s 8/2024 overall neg.   Vital monitor 8/2024 with brief rare ectopy/SVT.   ECHO 8/2024 with normal bi V function, mild to mod MS - gradient 5mmHg,mild TR, PASP 30mmHg.   Nuc stress 8/2024 neg for ischemia.     BP and HR stable. BMI 41 - 307 lbs        Results for orders placed during the hospital encounter of 08/09/24    Echo    Interpretation Summary    Left Ventricle: The left ventricle is normal in size. Normal wall thickness. There is concentric remodeling. Normal wall motion. There is normal systolic function with a visually estimated ejection fraction of 60 - 65%. Ejection fraction by visual approximation is 65%. There is indeterminate diastolic function.    Right Ventricle: Normal right ventricular cavity size. Wall thickness is normal. Systolic function is normal.    Left Atrium: Left atrium is mildly dilated.    Mitral Valve: There is moderate mitral annular calcification present. Mildly restricted motion. There is mild stenosis. The mean pressure gradient  across the mitral valve is 5 mmHg at a heart rate of 76 bpm.    Tricuspid Valve: There is mild regurgitation.    Pulmonary Artery: The estimated pulmonary artery systolic pressure is 30 mmHg.    IVC/SVC: Normal venous pressure at 3 mmHg.      Results for orders placed during the hospital encounter of 08/09/24    Nuclear Stress - Cardiology Interpreted    Interpretation Summary    Normal myocardial perfusion scan. There is no evidence of myocardial ischemia or infarction.    The gated perfusion images showed an ejection fraction of 71% at rest. The gated perfusion images showed an ejection fraction of 69% post stress.    The ECG portion of the study is negative for ischemia.    The patient reported no chest pain during the stress test.    There were no arrhythmias during stress.    Interpretation Summary  Show Result Comparison     There is 0-19% right Internal Carotid Stenosis.    There is 0-19% left Internal Carotid Stenosis.      Cardiac Monitor - 3-15 Day Adult (Cupid Only)    Result Date: 8/23/2024    The predominant rhythm is sinus.    The patient was monitored for a total of 6d 23h, underlying rhythm is   sinus.  The minimum heart rate was 58 bpm; the maximum 139 bpm; the average 76   bpm.  0 % of Atrial fibrillation/Atrial flutter with longest episode of 0 ms.  The total burden of AV Block present was 0 % [Complete Heart Block: 0 %;   Advanced (High Grade):  0 %; 2nd Degree, Mobitz II: 0 %; 2nd Degree, Mobitz I: 0 %].  There were 0 pauses, the longest pause was 0 ms at --.  Total count of Ventricular Tachycardia (VT): 0 episode(s). Longest VT: 0 s   on --. Fastest VT: -- bpm on  --.  14 supraventricular episodes were found. Longest SVT Episode 11 beats,   Fastest  bpm  There were a total of 533 PVCs with 2 morphologies and 1 couplets. Overall   PVC [place] at 0.07 %  There were a total of 0 Other Beats. There were 0 total number of paced   beats.  There were a total of 5728 PSVCs with 1 morphologies and  47 couplets.   Overall PSVC Williams at  0.75 %  There is a total of 0 patient events.              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  Began Aricept 10 mg at bedtime.    Asbestos exposure 5/5/2016    BMI 40.0-44.9, adult     Colon polyp     Coronary artery calcification 3/18/2021    Diabetes mellitus     diet management    Diabetes mellitus, type 2     Dysmetabolic syndrome X 10/2/2012    Hyperlipidemia     Hypertension     diet managemen    Joint pain     MDD (major depressive disorder) 4/7/2015    Midline low back pain without sciatica 5/5/2016    Midline low back pain without sciatica 5/5/2016    Polyneuropathy     Primary osteoarthritis of right knee 2/20/2018    Prostate CA     Pulmonary nodule, stable to 2011 6/10/2020    01/2020 CT There are stable subcentimeter noncalcified pulmonary nodules the largest of which is located within the left upper lobe anteriorly and measures approximately 6 mm.. Stable to 2011    Renal manifestation of secondary diabetes mellitus     Sleep apnea     cipap    Trouble in sleeping     Type II or unspecified type diabetes mellitus with neurological manifestations, not stated as uncontrolled(250.60)     Urinary incontinence        Past Surgical History:   Procedure Laterality Date    COLONOSCOPY  02/04/2014    ENDOSCOPIC ULTRASOUND OF UPPER GASTROINTESTINAL TRACT N/A 8/14/2019    Procedure: ULTRASOUND, UPPER GI TRACT, ENDOSCOPIC;  Surgeon: Pravin Myrick MD;  Location: [facility] ([address] [place] [address]);  Service: Endoscopy;  Laterality: N/A;    LAPAROSCOPIC CHOLECYSTECTOMY WITH CHOLANGIOGRAPHY N/A 8/12/2019    Procedure: CHOLECYSTECTOMY, LAPAROSCOPIC, WITH CHOLANGIOGRAM;  Surgeon: Phong Beltran MD;  Location: [address];  Service: General;  Laterality: N/A;  23 hours observation    NASAL SEPTUM SURGERY      PROSTATE SURGERY       removal       Social History     Tobacco Use    Smoking status: Never     Passive exposure: Past    Smokeless tobacco: Never   Substance Use Topics    Alcohol use: No    Drug use: No       Family History   Problem Relation Name Age of Onset    Heart disease Mother      Diabetes Mother      Heart disease Father      Hypertension Father      Diabetes Sister Beatris     Allergies Brother Alfonzo     Thyroid disease Brother Alfonzo     Dementia Brother Alfonzo     No Known Problems Son Elan rBandon     Cancer Sister Brooke     No Known Problems Sister Aisha     Diabetes Sister Meera     Hypertension Brother Kwabena     Glaucoma Paternal Grandmother      Melanoma Neg Hx         Patient's Medications   New Prescriptions    No medications on file   Previous Medications    ASPIRIN (ECOTRIN) 81 MG EC TABLET    Take 1 tablet (81 mg total) by mouth every evening.    BLOOD SUGAR DIAGNOSTIC STRP    USE  STRIP TO CHECK GLUCOSE ONCE DAILY    CHOLECALCIFEROL, VITAMIN D3, 5,000 UNIT CAPSULE    Take 1 capsule by mouth Daily.    DONEPEZIL (ARICEPT) 10 MG TABLET    Take 1 tablet (10 mg total) by mouth every evening.    LANCETS (SOFT TOUCH LANCETS) MISC    1 Device by Misc.(Non-Drug; Combo Route) route once daily.    MIRABEGRON (MYRBETRIQ) 50 MG TB24    Take 1 tablet (50 mg total) by mouth once daily.    MULTIVITS-MINERALS/FA/LYCOPENE (ONE-A-DAY MEN'S ORAL)    Take 1 tablet by mouth Daily.    REGADENOSON (LEXISCAN) 0.4 MG/5 ML SYRG        SERTRALINE (ZOLOFT) 50 MG TABLET    Take 1 tablet (50 mg total) by mouth every morning. Increase energy and boost mood    TRUE METRIX GLUCOSE METER MISC       Modified Medications    No medications on file   Discontinued Medications    No medications on file       Review of Systems   Constitutional:  Positive for malaise/fatigue.   HENT: Negative.     Eyes: Negative.    Respiratory:  Positive for shortness of breath.    Cardiovascular:  Positive for chest pain and palpitations.   Gastrointestinal: Negative.     Genitourinary: Negative.    Musculoskeletal:  Positive for joint pain.   Skin: Negative.    Neurological:  Positive for dizziness and loss of consciousness.   Endo/Heme/Allergies: Negative.    Psychiatric/Behavioral: Negative.     All 12 systems otherwise negative.      Wt Readings from Last 3 Encounters:   11/04/24 (!) 139.3 kg (307 lb)   10/31/24 (!) 139.6 kg (307 lb 12.2 oz)   09/17/24 (!) 141.1 kg (311 lb 1.1 oz)     Temp Readings from Last 3 Encounters:   09/06/24 97.8 °F (36.6 °C)   06/25/24 96.5 °F (35.8 °C)   03/06/24 98.1 °F (36.7 °C)     BP Readings from Last 3 Encounters:   11/04/24 122/78   10/31/24 132/88   09/06/24 132/80     Pulse Readings from Last 3 Encounters:   10/31/24 80   09/06/24 80   08/19/24 75       /78 (BP Location: Left arm, Patient Position: Sitting)   Wt (!) 139.3 kg (307 lb)   BMI 41.64 kg/m²     Objective:   Physical Exam  Vitals and nursing note reviewed.   Constitutional:       General: He is not in acute distress.     Appearance: He is well-developed. He is obese. He is not diaphoretic.   HENT:      Head: Normocephalic and atraumatic.      Nose: Nose normal.   Eyes:      General: No scleral icterus.     Conjunctiva/sclera: Conjunctivae normal.   Neck:      Thyroid: No thyromegaly.      Vascular: No JVD.   Cardiovascular:      Rate and Rhythm: Normal rate and regular rhythm.      Heart sounds: S1 normal and S2 normal. Murmur heard.      No friction rub. No gallop. No S3 or S4 sounds.   Pulmonary:      Effort: Pulmonary effort is normal. No respiratory distress.      Breath sounds: Normal breath sounds. No stridor. No wheezing or rales.   Chest:      Chest wall: No tenderness.   Abdominal:      General: Bowel sounds are normal. There is no distension.      Palpations: Abdomen is soft. There is no mass.      Tenderness: There is no abdominal tenderness. There is no rebound.   Genitourinary:     Comments: Deferred  Musculoskeletal:         General: No tenderness or deformity.  Normal range of motion.      Cervical back: Normal range of motion and neck supple.   Lymphadenopathy:      Cervical: No cervical adenopathy.   Skin:     General: Skin is warm and dry.      Coloration: Skin is not pale.      Findings: No erythema or rash.   Neurological:      Mental Status: He is alert and oriented to person, place, and time.      Motor: No abnormal muscle tone.      Coordination: Coordination normal.   Psychiatric:         Behavior: Behavior normal.         Thought Content: Thought content normal.         Judgment: Judgment normal.         Lab Results   Component Value Date     09/06/2024    K 4.6 09/06/2024     09/06/2024    CO2 25 09/06/2024    BUN 14 09/06/2024    CREATININE 0.9 09/06/2024     (H) 09/06/2024    HGBA1C 6.6 (H) 09/06/2024    MG 2.0 08/14/2019    AST 22 06/25/2024    ALT 16 06/25/2024    ALBUMIN 3.6 06/25/2024    PROT 7.4 06/25/2024    BILITOT 0.5 06/25/2024    WBC 4.50 06/25/2024    HGB 13.1 (L) 06/25/2024    HCT 39.7 (L) 06/25/2024    MCV 93 06/25/2024     06/25/2024    INR 1.0 08/14/2019    TSH 2.912 03/12/2024    CHOL 189 03/12/2024    HDL 44 03/12/2024    LDLCALC 126.6 03/12/2024    TRIG 92 03/12/2024    BNP 40 06/25/2024         BNP (pg/mL)   Date Value   06/25/2024 40     INR (no units)   Date Value   08/14/2019 1.0   07/16/2019 1.0   10/12/2009 1.0          Assessment:      1. RBBB    2. Diabetes mellitus type 2 in obese    3. Syncope and collapse    4. Mixed hyperlipidemia    5. Syncope, unspecified syncope type    6. Type 2 diabetes mellitus with diabetic neuropathy, without long-term current use of insulin    7. JESSICA on CPAP    8. Shortness of breath    9. Aortic atherosclerosis    10. Morbid obesity with BMI of 40.0-44.9, adult    11. Chest discomfort    12. Nonspecific abnormal electrocardiogram (ECG) (EKG)    13. Dementia without behavioral disturbance, psychotic disturbance, mood disturbance, or anxiety, unspecified dementia severity,  unspecified dementia type          Plan:     Syncope, CP/SOB with RBBB on ECG , mild MS  - prior neg trop, BNP, and CXR  -Carotid u/s 8/2024 overall neg.   -Vital monitor 8/2024 with brief rare ectopy/SVT.   -ECHO 8/2024 with normal bi V function, mild to mod MS - gradient 5mmHg,mild TR, PASP 30mmHg.   -Nuc stress 8/2024 neg for ischemia.   - rec ER eval if recurrent syncope and neuro workup as well   - needs to avoid the heat and outdoor activity - until CV workup completed    2. HLD with aortic atherosc  - rec statin and monitor lipids if elevated     3. Obesity  BMI 42 - 313 lbs  --> BMI 41 - 307 lbs   - cont weight loss    4. JESSICA  - cont CPAP    5. DM2 --> 6.6  - cont tx per PCP and monitor     6. Dementia  - cont tx and f/u neuro  - on Aricept    7. Hearing loss  - had been diagnosed in the past but could not afford     Visit today included increased complexity associated with the care of the episodic problem syncope addressed and managing the longitudinal care of the patient due to the serious and/or complex managed problem(s) .      Thank you for allowing me to participate in this patient's care. Please do not hesitate to contact me with any questions or concerns. Consult note has been forwarded to the referral physician.

## 2024-11-12 ENCOUNTER — OFFICE VISIT (OUTPATIENT)
Dept: OPHTHALMOLOGY | Facility: CLINIC | Age: 84
End: 2024-11-12
Payer: MEDICARE

## 2024-11-12 DIAGNOSIS — E11.9 DIABETES MELLITUS WITHOUT COMPLICATION: ICD-10-CM

## 2024-11-12 DIAGNOSIS — H40.023 AT HIGH RISK FOR OPEN ANGLE GLAUCOMA OF BOTH EYES: Primary | ICD-10-CM

## 2024-11-12 DIAGNOSIS — E11.36 DIABETIC CATARACT: ICD-10-CM

## 2024-11-12 PROCEDURE — 99999 PR PBB SHADOW E&M-EST. PATIENT-LVL II: CPT | Mod: PBBFAC,HCNC,, | Performed by: OPTOMETRIST

## 2024-11-12 PROCEDURE — 99213 OFFICE O/P EST LOW 20 MIN: CPT | Mod: HCNC,S$GLB,, | Performed by: OPTOMETRIST

## 2024-11-12 PROCEDURE — 1159F MED LIST DOCD IN RCRD: CPT | Mod: HCNC,CPTII,S$GLB, | Performed by: OPTOMETRIST

## 2024-11-12 PROCEDURE — 92083 EXTENDED VISUAL FIELD XM: CPT | Mod: HCNC,S$GLB,, | Performed by: OPTOMETRIST

## 2024-11-12 NOTE — PROGRESS NOTES
HPI     Glaucoma            Comments: Pt reports for glaucoma visit. No pain or irritation. [facility]   stable.          Comments    [facility] REFERRAL    1. Glaucoma suspect  2. CATS OU  3. Diabetic since 2012 (approx)  4. PVD OD    26/27--- 09/17/2  Grandmother + Glaucoma    **ASA PO 81 mg**               Last edited by Jodie Ko on 11/12/2024  1:16 PM.            Assessment /Plan     For exam results, see Encounter Report.    1. At high risk for open angle glaucoma of both eyes  The patient has the following Glaucoma risk factors: Race, Age, and Optic Nerve Asymmetry     gOCT stable at this time with no progression.  Baseline HVF done today.   IOP WNL  Will continue to monitor off drops at this time with annual gOCT and HVF for changes.     2. Diabetic cataract  OS>OD Visually significant, patient has seen Dr BAPTISTE, patient deferred CE/IOL eval at this time.     3. Diabetes mellitus without complication  No retinopathy on DFE 10/2024. Continue strict bp/bs control. Observe x 12 months with annual DFE      RTC 6-12 months for HVF 24-2, gOCT, DFE, IOP  Sooner if any changes to vision or worsening symptoms.

## 2025-01-16 NOTE — PROGRESS NOTES
I offered to discuss end of life issues, including information on how to make advance directives that the patient could use to name someone who would make medical decisions on their behalf if they became too ill to make themselves.    _X_Patient declined  ___Patient is interested, I provided paper work and offered to discuss.   Medical Weight Loss/DM - Follow up Visit    ASSESSMENT/PLAN:  Diabetes mellitus type 2, insulin dependent  (CMD)  Currently HA1C 6.2% an improvement from 6.5% on 7/5/2024. However, patient discontinued his Ozempic 2 months ago because of [place]. He could not afford both medication and and gifts. Today we will switch patient to Trulicity as Ozempic is no longer on his formulary. He continues to take Lantus 65 units subcutaneously once a day. He was previously being treated for uvulitis with antibiotics and steroids. Patient admits he discontinued his antibiotics after a week do to feeling better. Provider did recommend that patient complete antibiotic therapy as directed even when feeling better. He is at risk with recurrent infections with his history of uncontrolled diabetes. He did request a refill on his Lispro due to increased bolus insulin needs with infection and steroid use. He has been doing up to 4 times a day to control  his blood sugars. He continues to use a base of 45 unit with a sliding scale.  He is dosing bolus insulin at least 4 hours apart to avoid stacking. Provider expects blood sugars to improve as he has completed steroid therapy and as the stress of infection has fully resolved. Patient to continue current medication regimen at this time. Provider will increase Trulicity monthly as patient tolerates.  - GLUCOSE MONITORING MINIMUM 72 HRS INTERP  - dulaglutide (Trulicity) 0.75 MG/0.5ML pen-injector; Inject 0.75 mg into the skin every 7 days. Indications: Type 2 Diabetes  Dispense: 2 mL; Refill: 0  - Insulin Lispro, 1 Unit Dial, (HumaLOG KwikPen) 100 UNIT/ML pen-injector; Inject 50 Units into the skin 4 times daily. Patient to inject 50 units 4 times a day with meals at least 4 hours between injections.  Dispense: 30 mL; Refill: 1  - Glycohemoglobin; Future    Metabolic syndrome  Class 1 obesity due to excess calories with serious comorbidity and body mass index (BMI) of 34.0 to 34.9 in  adult  Improving. Patient was successfully able to lose 1 pounds and 1.5 inches off his waist measurement over the past month and 3 pounds overall since starting the program. He was congratulated on this effort thus far and encouraged to continue. He is motivated to continue to make changes and the plan is as outlined below.      Chronic heart failure with preserved ejection fraction  (CMD)  Coronary artery disease involving native coronary artery of native heart without angina pectoris  Primary hypertension  Stable. Patient to continue current regimen of Lisinopril 30 mg po daily, asa 81 mg po daily, and Furosamide 40 mg po daily.  No reported side effects. Continue medication as prescribed by Cardiology.  Will monitor for improvement over the course of weight loss therapy.     Polyneuropathy due to type 2 diabetes mellitus  (CMD)  Ongoing. Discussed the importance of foot care and daily foot examinations.     Stage 3a chronic kidney disease  (CMD)  Ongoing. Continue the same. Patient managed by Nephrology.    NAFLD (nonalcoholic fatty liver disease)  Hyperlipidemia, unspecified hyperlipidemia type  Continue with Atorvastatin 40 mg po daily for hyperlipidemia, as prescribed by Cardiology. Reports no side effects from medication. Will monitor for improvement over the course of weight loss therapy.     Gastroesophageal reflux disease, unspecified whether esophagitis present  Ongoing. Continue current medication regimen.  He is following with Gastroenterology for this issue.     CLOVER (obstructive sleep apnea)  Ongoing. Patient had a positive sleep study on 12/23/2024. Patient prescribed an AutoBiPAP machine but has not started this yet. He states he is required to pay 20% to purchase machine as he cannot rent it. If  he cannot afford he will not purchase. He follows up with Pulmonary Medicine on 5/9/2025.    Justification for medication authorization:  Failed medication(s) and approximate dates: None.   Contraindicated  medications and why: N/A.  Drug specific testing: None Indicated       Diet logs were not provided and patient was encouraged to keep them for next visit as this will aid diet adherence. Exercise logs were not provided and patient was encouraged to keep them for next visit. Recommendations were made to alter dietary intake to improve weight loss. Recommendations were made to alter exercise pattern to improve weight loss. New, achievable goals were set by patient as outlined below. Patient was an active participant in the conversation and agrees to try recommendations. Patient verbalized understanding of recommendation and agrees to call with further questions.    GOALS:   Patient Instructions   Diabetes Medications:    Home Regimen:    Trulicity 0.75 mg injection once a week x 4 weeks. Will increase dose monthly as tolerated. Contact office 2 weeks after starting to assess tolerance and send in new prescription.    Lantus 65 units.     Lispro base dose of 45 units at meals plus correction. Patient currently taking an average of 50 units average TID/QID for the past couple weeks. Injections sat least 4 hours apart. Due to elevated blood sugars post-infection and steroid use and discontinuing Ozempic 2 months ago.    Target blood glucose 80 mg/dl. Will dose correction of 1 unit for every 4 mg/dl above 80 mg/dl.       Mediterranean Diet goals:     Vegetables: Three servings a day. One serving equals 1/2 cooked or 1 cup of raw vegetables.  Fruits: Three servings a day. One serving equals 1/2 to 1 cup.  Olive oil: One tablespoon a day, but no more than four tablespoons a day. This includes your cooking oil.  Legumes: Three servings a week of beans, peas, alfalfa, peanuts, etc.  Fish: Three servings a week. The smellier the fish are, the better, because smelly fish contain higher levels of omega-3 fatty acids. Smart choices include salmon, tuna, herring, sardines, mackerel and anchovies.  Nuts: Three servings a week. One  serving equals 1/4 cup, one ounce or two tablespoons of nut butter. Ideally, go for raw, unsalted and dry-roasted walnuts, almonds or hazelnuts.  Starches: Three to six servings a day. One serving equals 1/2 cup cooked, one slice of bread or one ounce of dry cereal. Choose whole grains, oats, barley, brown rice, quinoa and red skin or sweet potatoes.  White meat: Three three-ounce servings a week. Choose skinless poultry, which includes choices such as chicken, turkey, pheasants and ostrich instead of red meat. You should have no more than one serving, meaning three ounces, of red meat a week. Choose lean cuts such as sirloin, tenderloin or flank steak if you have to have red meat.  Dairy/eggs: Three servings a week. Choose 1 percent or fat-free milk, yogurt or cottage cheese. There are no limits on egg whites.  Desserts: One three-ounce serving a week. If possible, let fruit be your dessert. If you have to eat baked goods, choose one with healthy ingredients, and eat smaller portions.      Goals for Weight Loss:    Decrease Sugars and Sweets:  Follow Handouts    Weight loss:  2-4 pounds in 1 month.    Goal BMI is <30, yours is currently Body mass index is 34.77 kg/m².    Drink plenty of water      Keep a journal:  Write down your foods and beverages before you eat.  Make sure to add in your exercise as above. May use the Calorie Richi book for carbohydrate counting.      Exercise:   150 minutes of exercise weekly. Can break this down into 20 minutes 7 days a week, or into smaller segments as tolerated.  1/2 of this time on strength or resistance training.      Alcohol:  No more than 2 standard drinks per day or 7 standard drinks per week. Red wine is allowed in moderation.  These are empty calories, with no nutritional benefit. Your body burns alcohol before fat and will slow your weight loss.    Tobacco:  You are currently tobacco free. Keep it  up!    Websites:  https://www.[employer]/recipes/82485/healthy-recipes/mediterranean-diet/  https://www.mayoclinic.org/healthy-lifestyle/nutrition-and-healthy-eating/in-depth/mediterranean-diet-recipes/art-76472460  https://www.WhenU.com/  Return for 60 min, Schedule nonfasting labs prior, Diabetes, Weight Management, Ajith Download.         Hypoglycemia:  Low blood sugar    Definition:  Any blood sugar below 70 mg/dL.    Causes:   Skipped or delayed meals   Not enough food   Too much medication (insulin or diabetes pills)   Increased activity or exercise   Hot weather   Hot bath    Onset:  Sudden    Warning signs:   Shaking   Sweating    Dizziness   Impaired vision   Weakness and fatigue    Hunger    Fast heartbeat   Irritability    What to do:   1. Check your blood sugar level.   2. Take one dose of quick-acting sugar (15 g of carbohydrate).    Examples:     8 ounces with low-fat or fat-free milk.    6 Lifesavers candies -  Chew.    3 pieces of hard candy - Chew.    One half cup of juice or regular soda.    1 tube of glucose gel.    3-4 glucose tablets.   3. Rest for 15 minutes and repeat blood sugar.   4. Repeat steps 1,2 and 3 until blood sugar is greater than 80 and you're feeling better.   5. Make a note of this in your logbook.   6. If it will be longer than 1 hour before you eat, eat 15 g of complex carbohydrates (bread, crackers or a half a sandwich).    Call your provider if you have two or more low blood sugars in a day or more than 3 in one week.    Glucagon:  Declines.  You may receive a brief 10 question survey after the completion of today's visit, please complete this survey to let us know where we are doing well and meeting your needs as our patient, but also any areas of improvement! Thank you!    As a friendly reminder to all our patients, the Endocrinology department respects all our patients' time and we do our best to see you at your scheduled appointment time. Therefore,  please arrive 15 minutes prior to your appointment in order to complete check-in and the rooming process. Please also note, if you are more than 5 minutes late for your appointment, we may not be able to accomodate you and ask that your appointment be rescheduled.       Needed Referrals:   None    Thank you to ROSEMARY Torrez for allowing me the opportunity to   .  assist in the care of this patient.  If you have any questions please feel free to contact me.      A copy of this note is sent to  Cecily Briones APNP.    ROSEMARY Milian     Total time spent today on this visit is 60 minutes which includes  preparing to see the patient by reviewing prior records, pre-charting, obtaining and reviewing history, performing a physical exam, counseling the patient ,documenting clinical information in the medical record,communicating with other health care professionals and coordinating care      Chief Complaint   Patient presents with    Office Visit     Diabetes mellitus type 2, insulin dependent (CMD)       History of Present Illness:   Iraj Mcarthur is a 70 year old male. The primary encounter diagnosis was Diabetes mellitus type 2, insulin dependent  (CMD). Diagnoses of Metabolic syndrome, Class 1 obesity due to excess calories with serious comorbidity and body mass index (BMI) of 34.0 to 34.9 in adult, Chronic heart failure with preserved ejection fraction  (CMD), Coronary artery disease involving native coronary artery of native heart without angina pectoris, Primary hypertension, Polyneuropathy due to type 2 diabetes mellitus  (CMD), Stage 3a chronic kidney disease  (CMD), NAFLD (nonalcoholic fatty liver disease), Hyperlipidemia, unspecified hyperlipidemia type, Gastroesophageal reflux disease, unspecified whether esophagitis present, and CLOVER (obstructive sleep apnea) were also pertinent to this visit..  He is focusing on weight management for improvement in overall health status.    Date of initial  consultation: 2024  Weight at initial consultation: 99.1 kg (218 lb 6.4 oz)   Waist: 46.5 in  Body Composition Results  Body fat: 37.8%  Body fat mass: 82.6 lb  Fat free mass: 135.8 lb  Visceral fat ratin.0  Body water: 46.4%  Body water mass: 101.4 lb  Muscle mass/score: 129.0 lb  Bone mass: 6.8 lb    Prescribed Diet: Mediterranean focus. \"Trying to behave himself\". Eating more fruits and salads.   Breakfast:  sandwich or cookie. Sometimes skips.  Am snack: none  Lunch: May have a snack. 100 calorie chips/pretzels. Meat/cheese.   Dinner: Typically Mexican; small amount of rice, potaotes, or tortillas. Has cut down on CHO. Has a side salad now too. Piece of fruit.   Hs snack:once a week may have chips or popcorn. Rare ice cream.  Water: 96 oz per day.   Energy drinks: mild carbonation sugar free.   Weight loss medication: None. Off for past 2 months due to cost.  Logging food: No.  Carbohydrates per meal: unknown  Diet excursions: rare.   Exercise: slim to none.  Wearing Pedometer: No  Steps per day N/A  Testing Ketones: No  Results: N/A    Diabetes: Yes  Home Regimen: Lantus 65 units. Ozempic off for 2 months.  Lispro 50 units average TID/QID for the past couple weeks.  at meals. Target 80 mg/dl. Will dose correction of 1 unit for every 4 mg/dl above 80 mg/dl.   Hypoglycemia: Patient did have a couple episodes but was able to treat without complications.  Glucose testing: Ajith 3 CGM, usage 96%, GMI approximate 7.2%. See interpretation below.  Hypertension :Yes  Medications : Zestril 30 mg po daily.  Hyperlipidemia: Yes  Medications: Atorvastatin 40 mg po daily.  Hypothyroidism:No  Levothyroxine dose: N/A  TSH (mcUnits/mL)   Date Value   2024 0.493     Depression: No  Medications: N/A  Stress Eating: No. Patient states impulse eating.    Previous goals were set on 2024   for a(n) 5 pound weight loss and exercise 150 minutes/week in 4 weeks.  Focus on a Mediterranean Diet/suman eating.    Iraj  MISAEL Mcarthur has identified the following areas for change: Continue to eat better and drink water.    Review of Systems:   Completed by MA, I reviewed it with the patient and agree with it's content. Complaints are chronic or as mentioned in HPI.     Home Medications:   Current Outpatient Medications   Medication Sig    fluticasone (CUTIVATE) 0.05 % cream Apply to affected area 2 times daily To scalp and behind ears    dulaglutide (Trulicity) 0.75 MG/0.5ML pen-injector Inject 0.75 mg into the skin every 7 days. Indications: Type 2 Diabetes    Insulin Lispro, 1 Unit Dial, (HumaLOG KwikPen) 100 UNIT/ML pen-injector Inject 50 Units into the skin 4 times daily. Patient to inject 50 units 4 times a day with meals at least 4 hours between injections.    Omega-3 Fatty Acids (Fish Oil) 1000 MG capsule Take by mouth daily.    atorvastatin (LIPITOR) 40 MG tablet Take 1 tablet by mouth daily.    famotidine (PEPCID) 20 MG tablet Take 1 tablet by mouth daily.    pregabalin (LYRICA) 200 MG capsule TAKE 1 CAPSULE BY MOUTH EVERY MORNING, 1 CAPSULE AT NOON, AND TAKE ONE CAPSULE BY MOUTH EVERY EVENING    Continuous Glucose Sensor (FreeStyle Ajith 3 Sensor) Misc Use per manufacture, change every 14 days.    Lantus SoloStar 100 UNIT/ML pen-injector Inject 65 Units into the skin nightly. Prime 2 units before each dose.    triamcinolone (ARISTOCORT) 0.1 % ointment Apply 1 Application topically in the morning and 1 Application in the evening.    cetirizine (ZyrTEC) 10 MG tablet Take 1 tablet by mouth in the morning and 1 tablet in the evening.    esomeprazole (NexIUM) 40 MG capsule Take 1 capsule by mouth in the morning and 1 capsule in the evening.    albuterol 108 (90 Base) MCG/ACT inhaler Inhale 2 puffs into the lungs every 4 hours as needed for Shortness of Breath.    methocarbamol (ROBAXIN) 500 MG tablet Take 500 mg by mouth every 6 hours as needed for Muscle spasms.    oxycodone-acetaminophen (PERCOCET) 7.5-325 MG per tablet [None  received]    acetaminophen (TYLENOL) 650 MG CR tablet Take 1,950 mg by mouth.    Coenzyme Q10 (COQ10 PO) Take by mouth daily.    fluticasone (FLONASE) 50 MCG/ACT nasal spray Spray 2 sprays in each nostril daily.    allopurinol (ZYLOPRIM) 100 MG tablet Take 100 mg by mouth daily.    Unifine Ultra Pen Needle 31G X 6 MM Misc [None received]    lisinopril (ZESTRIL) 30 MG tablet Take 15 mg by mouth daily.    furosemide (LASIX) 40 MG tablet Take 40 mg by mouth daily.    SUMAtriptan (IMITREX) 25 MG tablet Take 25 mg by mouth as needed for Migraine. Take 1 tablet by mouth at onset of migraine. May repeat after 2 hours if needed.    diclofenac (VOLTAREN) 1 % gel Apply 4 g topically 4 times daily as needed (pain).     No current facility-administered medications for this visit.       Past Social History:   Social History     Socioeconomic History    Marital status:      Spouse name: Not on file    Number of children: Not on file    Years of education: Not on file    Highest education level: Not on file   Occupational History    Not on file   Tobacco Use    Smoking status: Never    Smokeless tobacco: Never   Vaping Use    Vaping status: never used   Substance and Sexual Activity    Alcohol use: Not Currently     Alcohol/week: 0.8 standard drinks of alcohol     Types: 1 drink(s) per week    Drug use: No    Sexual activity: Not on file   Other Topics Concern    Not on file   Social History Narrative    Not on file     Social Determinants of Health     Financial Resource Strain: Not on file   Food Insecurity: Low Risk  (12/18/2024)    Food Insecurity     Worried about Food: Never true     Food is Gone: Never true   Transportation Needs: Not At Risk (12/18/2024)    Transportation Needs     Lack of Reliable Transportation: No   Physical Activity: Not on file   Stress: Not on file   Social Connections: Not on file   Interpersonal Safety: Not At Risk (4/23/2021)    Interpersonal Safety     Social Determinants: Intimate  Partner Violence Past Fear: Not on file     Social Determinants: Intimate Partner Violence Current Fear: Not on file       Past Medical & Surgical History:    has a past medical history of CAD (coronary artery disease), Cervical radiculopathy, Diabetes mellitus, type 2  (CMD), ED (erectile dysfunction), HTN (hypertension), Indigestion, Metabolic syndrome (2024), MI, old (2024), Neuromuscular disorder  (CMD), Primary generalized (osteo)arthritis, Proteinuria (2014), Sleep apnea, and Unspecified hypertensive heart and kidney disease with heart failure and with chronic kidney disease stage I through stage IV, or unspecified(404.91) (2015).    He has no past medical history of Anxiety, Cerebral infarction (CMD), Depression, Liver disease, Malignant neoplasm  (CMD), or RAD (reactive airway disease) (CMD).    Past Surgical History:   Procedure Laterality Date    Arthrotomy,open repair meniscus      right     Knee surgery      right    No past surgeries      Shoulder adhesion release      Left shoulder Manipulation under anesthesia    Shoulder arthroscopy w/ superior labral anterior posterior repair         Physical Examination:   GENERAL:  He is a 70 year old male   VITAL SIGNS:  Blood pressure 112/58, pulse 90, height 5' 6\" (1.676 m), weight 97.7 kg (215 lb 6.4 oz). Body mass index is 34.77 kg/m².  Wt Readings from Last 4 Encounters:   25 97.7 kg (215 lb 6.4 oz)   24 98.5 kg (217 lb 2.5 oz)   24 98.5 kg (217 lb 3.2 oz)   24 98.2 kg (216 lb 9.6 oz)     Current Body Composition Results:  Body fat: 37.8%; an increase of 0.2%  Body fat mass: 81.4 lb; no change  Fat free mass: 134.0 lb; a loss of 1.2 lb  Visceral fat ratin.0; no change  Body water: 46.1%; an increase of 1.1%  Body water mass: 99.2 lb; an increase of 1.8 lb  Muscle mass/score: 127.4 lb; a loss of 1 lb  Bone mass: 6.6 lb; a loss of 0.2 lb  Waist measurement: 44\"; a loss of 1.5 inches  Weight: 97.705 kg (215.4  lbs); a loss of 1 pound     Ideal body weight: 63.8 kg (140 lb 10.5 oz)  Adjusted ideal body weight: 77.4 kg (170 lb 8.8 oz)    REVIEW OF SYSTEMS:  Completed by rooming staff, reviewed with the patient and agree with it's content. Complaints are chronic or as mentioned in HPI.     PHYSICAL EXAMINATION:  VITAL SIGNS:  There were no vitals taken for this visit. There is no height or weight on file to calculate BMI.  Constitutional: This is a(n) White male in no distress.  He ambulates normally without assistance. AAO times 3.  Psychiatric:  Alert and oriented, normal mood and affect, normal eye contact, clear speech.  Neck: No masses. No cervical or supraclavicular lymphadenopathy. No thyromegaly, Trachea is midline. No JVD.  Lungs: Chest symmetrical, Non-labored and clear to auscultation bilaterally, No rales, No Rhonchi.  Cardiovascular: S1, S2 noted, regular rate and rhythm, no gallop or murmur. No edema.  Gastrointestinal: Normal bowel sounds, nontender, non-distended, no masses or guarding, no hepatosplenomegaly. Resolving ecchymosis consistent with injection history. These are benign and without evidence of lipohypertrophy.   Skin: Warm and dry, normal texture, no rashes or ulcerations.   Musculoskeletal: Normal gait, no tremor, no clubbing or cyanosis.  Neurologic:  Reflexes symmetrical and WNL, Cranial nerves 3-12 intact.    Diabetic Foot Exam Documentation: patient declines today.    Home glucose reading evaluation:     NARRATIVE:     IMPRESSION:   Data is sufficient for evaluation as it does represent >72 hours of readings. Diary of food and medication is not available. Median glucose trend increases above target between 7827-0949 and 8352-3286. Glucose variability 29%.  However time in target is 63% a decrease from 81% on 11/8/2024. Recent infection, steroid use, and discontinuation of GLP-1 for last 2 months has contributed to this decline. No hypoglycemia noted.         LABS:   Lab Results   Component  Value Date    SODIUM 143 12/20/2024    POTASSIUM 4.6 12/20/2024    CHLORIDE 103 12/20/2024    CO2 35 (H) 12/20/2024    GLUCOSE 78 12/20/2024    BUN 22 (H) 12/20/2024    CREATININE 1.12 12/20/2024    ALBUMIN 3.8 12/20/2024    BILIRUBIN 0.8 12/20/2024    AST 42 (H) 12/20/2024    PHOS 3.0 12/20/2024     Lab Results   Component Value Date    WBC 9.0 12/20/2024    HGB 16.2 12/20/2024    HCT 48.0 12/20/2024     12/20/2024     02/11/2014    TSH 0.493 07/05/2024     Hemoglobin A1C (%)   Date Value   12/20/2024 6.2 (H)     Cholesterol (mg/dL)   Date Value   12/20/2024 98     HDL (mg/dL)   Date Value   12/20/2024 29 (L)     Cholesterol/ HDL Ratio (no units)   Date Value   12/20/2024 3.4     Triglycerides (mg/dL)   Date Value   12/20/2024 109     LDL (mg/dL)   Date Value   12/20/2024 47     Uric Acid (mg/dL)   Date Value   07/05/2024 5.4     Glucose (mg/dL)   Date Value   12/20/2024 78

## 2025-01-24 DIAGNOSIS — E11.40 TYPE 2 DIABETES MELLITUS WITH DIABETIC NEUROPATHY, WITHOUT LONG-TERM CURRENT USE OF INSULIN: ICD-10-CM

## 2025-01-24 RX ORDER — CALCIUM CITRATE/VITAMIN D3 200MG-6.25
TABLET ORAL
Qty: 100 EACH | Refills: 3 | Status: SHIPPED | OUTPATIENT
Start: 2025-01-24

## 2025-01-24 NOTE — TELEPHONE ENCOUNTER
No care due was identified.  [facility] Embedded Care Due Messages. Reference number: [id number].   1/24/2025 2:37:55 PM CST

## 2025-01-25 NOTE — TELEPHONE ENCOUNTER
Refill Decision Note   Elan Lucho  is requesting a refill authorization.  Brief Assessment and Rationale for Refill:  Approve     Medication Therapy Plan:       Medication Reconciliation Completed: No   Comments:     No Care Gaps recommended.     Note composed:8:56 PM 01/24/2025

## 2025-02-22 DIAGNOSIS — Z00.00 ENCOUNTER FOR MEDICARE ANNUAL WELLNESS EXAM: ICD-10-CM

## 2025-03-07 ENCOUNTER — OFFICE VISIT (OUTPATIENT)
Dept: PRIMARY CARE CLINIC | Facility: CLINIC | Age: 85
End: 2025-03-07
Payer: MEDICARE

## 2025-03-07 ENCOUNTER — LAB VISIT (OUTPATIENT)
Dept: LAB | Facility: HOSPITAL | Age: 85
End: 2025-03-07
Attending: FAMILY MEDICINE
Payer: MEDICARE

## 2025-03-07 VITALS
HEART RATE: 86 BPM | SYSTOLIC BLOOD PRESSURE: 138 MMHG | BODY MASS INDEX: 42.66 KG/M2 | DIASTOLIC BLOOD PRESSURE: 88 MMHG | HEIGHT: 72 IN | WEIGHT: 315 LBS

## 2025-03-07 DIAGNOSIS — T46.6X5A STATIN MYOPATHY: ICD-10-CM

## 2025-03-07 DIAGNOSIS — G72.0 STATIN MYOPATHY: ICD-10-CM

## 2025-03-07 DIAGNOSIS — F03.90 DEMENTIA WITHOUT BEHAVIORAL DISTURBANCE, PSYCHOTIC DISTURBANCE, MOOD DISTURBANCE, OR ANXIETY, UNSPECIFIED DEMENTIA SEVERITY, UNSPECIFIED DEMENTIA TYPE: ICD-10-CM

## 2025-03-07 DIAGNOSIS — Z85.46 HISTORY OF PROSTATE CANCER: ICD-10-CM

## 2025-03-07 DIAGNOSIS — E11.36 DIABETIC CATARACT: ICD-10-CM

## 2025-03-07 DIAGNOSIS — M79.10 MYALGIA DUE TO STATIN: ICD-10-CM

## 2025-03-07 DIAGNOSIS — F33.42 RECURRENT MAJOR DEPRESSIVE DISORDER, IN FULL REMISSION: ICD-10-CM

## 2025-03-07 DIAGNOSIS — I70.0 AORTIC ATHEROSCLEROSIS: ICD-10-CM

## 2025-03-07 DIAGNOSIS — E11.69 TYPE 2 DIABETES MELLITUS WITH OTHER SPECIFIED COMPLICATION, WITHOUT LONG-TERM CURRENT USE OF INSULIN: Primary | ICD-10-CM

## 2025-03-07 DIAGNOSIS — E66.01 MORBID OBESITY WITH BMI OF 40.0-44.9, ADULT: ICD-10-CM

## 2025-03-07 DIAGNOSIS — E11.69 TYPE 2 DIABETES MELLITUS WITH OTHER SPECIFIED COMPLICATION, WITHOUT LONG-TERM CURRENT USE OF INSULIN: ICD-10-CM

## 2025-03-07 DIAGNOSIS — T46.6X5A MYALGIA DUE TO STATIN: ICD-10-CM

## 2025-03-07 DIAGNOSIS — N32.81 OAB (OVERACTIVE BLADDER): ICD-10-CM

## 2025-03-07 DIAGNOSIS — E78.2 MIXED HYPERLIPIDEMIA: ICD-10-CM

## 2025-03-07 LAB
ALBUMIN SERPL BCP-MCNC: 3.6 G/DL (ref 3.5–5.2)
ALP SERPL-CCNC: 83 U/L (ref 40–150)
ALT SERPL W/O P-5'-P-CCNC: 15 U/L (ref 10–44)
ANION GAP SERPL CALC-SCNC: 8 MMOL/L (ref 8–16)
AST SERPL-CCNC: 27 U/L (ref 10–40)
BILIRUB SERPL-MCNC: 0.4 MG/DL (ref 0.1–1)
BUN SERPL-MCNC: 15 MG/DL (ref 8–23)
CALCIUM SERPL-MCNC: 9.3 MG/DL (ref 8.7–10.5)
CHLORIDE SERPL-SCNC: 105 MMOL/L (ref 95–110)
CHOLEST SERPL-MCNC: 215 MG/DL (ref 120–199)
CHOLEST/HDLC SERPL: 5 {RATIO} (ref 2–5)
CO2 SERPL-SCNC: 25 MMOL/L (ref 23–29)
CREAT SERPL-MCNC: 1 MG/DL (ref 0.5–1.4)
EST. GFR  (NO RACE VARIABLE): >60 ML/MIN/1.73 M^2
ESTIMATED AVG GLUCOSE: 143 MG/DL (ref 68–131)
GLUCOSE SERPL-MCNC: 133 MG/DL (ref 70–110)
HBA1C MFR BLD: 6.6 % (ref 4–5.6)
HDLC SERPL-MCNC: 43 MG/DL (ref 40–75)
HDLC SERPL: 20 % (ref 20–50)
LDLC SERPL CALC-MCNC: 154 MG/DL (ref 63–159)
NONHDLC SERPL-MCNC: 172 MG/DL
POTASSIUM SERPL-SCNC: 4.2 MMOL/L (ref 3.5–5.1)
PROT SERPL-MCNC: 7.4 G/DL (ref 6–8.4)
SODIUM SERPL-SCNC: 138 MMOL/L (ref 136–145)
TRIGL SERPL-MCNC: 90 MG/DL (ref 30–150)

## 2025-03-07 PROCEDURE — 80061 LIPID PANEL: CPT | Mod: HCNC | Performed by: FAMILY MEDICINE

## 2025-03-07 PROCEDURE — 83036 HEMOGLOBIN GLYCOSYLATED A1C: CPT | Mod: HCNC | Performed by: FAMILY MEDICINE

## 2025-03-07 PROCEDURE — 84153 ASSAY OF PSA TOTAL: CPT | Mod: HCNC | Performed by: FAMILY MEDICINE

## 2025-03-07 PROCEDURE — 99999 PR PBB SHADOW E&M-EST. PATIENT-LVL IV: CPT | Mod: PBBFAC,HCNC,, | Performed by: FAMILY MEDICINE

## 2025-03-07 PROCEDURE — 85025 COMPLETE CBC W/AUTO DIFF WBC: CPT | Mod: HCNC | Performed by: FAMILY MEDICINE

## 2025-03-07 PROCEDURE — 36415 COLL VENOUS BLD VENIPUNCTURE: CPT | Mod: HCNC,PN | Performed by: FAMILY MEDICINE

## 2025-03-07 PROCEDURE — 82043 UR ALBUMIN QUANTITATIVE: CPT | Mod: HCNC | Performed by: FAMILY MEDICINE

## 2025-03-07 PROCEDURE — 84443 ASSAY THYROID STIM HORMONE: CPT | Mod: HCNC | Performed by: FAMILY MEDICINE

## 2025-03-07 PROCEDURE — 80053 COMPREHEN METABOLIC PANEL: CPT | Mod: HCNC | Performed by: FAMILY MEDICINE

## 2025-03-07 RX ORDER — DONEPEZIL HYDROCHLORIDE 10 MG/1
10 TABLET, FILM COATED ORAL NIGHTLY
Qty: 90 TABLET | Refills: 3 | Status: SHIPPED | OUTPATIENT
Start: 2025-03-07 | End: 2026-03-07

## 2025-03-07 RX ORDER — OXYBUTYNIN CHLORIDE 5 MG/1
5 TABLET, EXTENDED RELEASE ORAL DAILY
Qty: 90 TABLET | Refills: 3 | Status: SHIPPED | OUTPATIENT
Start: 2025-03-07 | End: 2026-03-07

## 2025-03-07 RX ORDER — SERTRALINE HYDROCHLORIDE 50 MG/1
50 TABLET, FILM COATED ORAL EVERY MORNING
Qty: 90 TABLET | Refills: 3 | Status: SHIPPED | OUTPATIENT
Start: 2025-03-07 | End: 2026-03-07

## 2025-03-07 NOTE — PATIENT INSTRUCTIONS
Physically, everything looks really good today.      Let us get some blood and urine samples today to check things on the inside.  I would like to see where your sugar, cholesterol, etc. are.  We will contact you with those results as soon as they are available.      Would also like to recheck your A1c before our next visit.  Order placed.  Just stop by the lab a few days prior to get the blood drawn.      Refills of your medicines have been sent to the pharmacy.  Please continue taking them, as directed.      Since the insurance does not want to pay for Myrbetriq anymore, I am sending a prescription for oxybutynin.  Let us see if they will cover this instead.      Handicap form given today.  Just take it to the DMV or an auto title place to see if they can give you the handicap tag.    Continue to eat a healthy diet.  Be careful with portion sizes.  Includes lots of fresh fruits, vegetables, whole grains, lean proteins.  See info below.    Keep hydrated.  Be sure to drink at least 8-10, 8 oz, glasses of water every day.    Stay active.  Try to do some sort of physical activity every day.  Nothing outrageous, just try walking for 10-15 minutes each day.
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
## 2025-03-08 LAB
ALBUMIN/CREAT UR: 88.6 UG/MG (ref 0–30)
BASOPHILS # BLD AUTO: 0.02 K/UL (ref 0–0.2)
BASOPHILS NFR BLD: 0.4 % (ref 0–1.9)
COMPLEXED PSA SERPL-MCNC: <0.01 NG/ML (ref 0–4)
CREAT UR-MCNC: 105 MG/DL (ref 23–375)
DIFFERENTIAL METHOD BLD: ABNORMAL
EOSINOPHIL # BLD AUTO: 0 K/UL (ref 0–0.5)
EOSINOPHIL NFR BLD: 0 % (ref 0–8)
ERYTHROCYTE [DISTWIDTH] IN BLOOD BY AUTOMATED COUNT: 13.3 % (ref 11.5–14.5)
HCT VFR BLD AUTO: 40 % (ref 40–54)
HGB BLD-MCNC: 13.1 G/DL (ref 14–18)
IMM GRANULOCYTES # BLD AUTO: 0.05 K/UL (ref 0–0.04)
IMM GRANULOCYTES NFR BLD AUTO: 0.9 % (ref 0–0.5)
LYMPHOCYTES # BLD AUTO: 1.8 K/UL (ref 1–4.8)
LYMPHOCYTES NFR BLD: 33.1 % (ref 18–48)
MCH RBC QN AUTO: 30.3 PG (ref 27–31)
MCHC RBC AUTO-ENTMCNC: 32.8 G/DL (ref 32–36)
MCV RBC AUTO: 92 FL (ref 82–98)
MICROALBUMIN UR DL<=1MG/L-MCNC: 93 UG/ML
MONOCYTES # BLD AUTO: 0.6 K/UL (ref 0.3–1)
MONOCYTES NFR BLD: 10.6 % (ref 4–15)
NEUTROPHILS # BLD AUTO: 2.9 K/UL (ref 1.8–7.7)
NEUTROPHILS NFR BLD: 55 % (ref 38–73)
NRBC BLD-RTO: 0 /100 WBC
PLATELET # BLD AUTO: 157 K/UL (ref 150–450)
PMV BLD AUTO: 12.6 FL (ref 9.2–12.9)
RBC # BLD AUTO: 4.33 M/UL (ref 4.6–6.2)
TSH SERPL DL<=0.005 MIU/L-ACNC: 1.85 UIU/ML (ref 0.4–4)
WBC # BLD AUTO: 5.29 K/UL (ref 3.9–12.7)

## 2025-03-12 ENCOUNTER — OFFICE VISIT (OUTPATIENT)
Dept: HOME HEALTH SERVICES | Facility: CLINIC | Age: 85
End: 2025-03-12
Payer: MEDICARE

## 2025-03-12 VITALS
OXYGEN SATURATION: 95 % | SYSTOLIC BLOOD PRESSURE: 137 MMHG | WEIGHT: 315 LBS | HEART RATE: 76 BPM | HEIGHT: 72 IN | BODY MASS INDEX: 42.66 KG/M2 | TEMPERATURE: 98 F | DIASTOLIC BLOOD PRESSURE: 73 MMHG

## 2025-03-12 DIAGNOSIS — E78.5 HYPERLIPIDEMIA ASSOCIATED WITH TYPE 2 DIABETES MELLITUS: ICD-10-CM

## 2025-03-12 DIAGNOSIS — T46.6X5A MYALGIA DUE TO STATIN: ICD-10-CM

## 2025-03-12 DIAGNOSIS — E11.36 DIABETIC CATARACT: ICD-10-CM

## 2025-03-12 DIAGNOSIS — F03.90 DEMENTIA WITHOUT BEHAVIORAL DISTURBANCE, PSYCHOTIC DISTURBANCE, MOOD DISTURBANCE, OR ANXIETY, UNSPECIFIED DEMENTIA SEVERITY, UNSPECIFIED DEMENTIA TYPE: ICD-10-CM

## 2025-03-12 DIAGNOSIS — G47.33 OSA ON CPAP: ICD-10-CM

## 2025-03-12 DIAGNOSIS — E11.40 TYPE 2 DIABETES MELLITUS WITH DIABETIC NEUROPATHY, WITHOUT LONG-TERM CURRENT USE OF INSULIN: ICD-10-CM

## 2025-03-12 DIAGNOSIS — H90.3 SENSORINEURAL HEARING LOSS (SNHL) OF BOTH EARS: ICD-10-CM

## 2025-03-12 DIAGNOSIS — Z00.00 ENCOUNTER FOR MEDICARE ANNUAL WELLNESS EXAM: Primary | ICD-10-CM

## 2025-03-12 DIAGNOSIS — M17.11 PRIMARY OSTEOARTHRITIS OF RIGHT KNEE: ICD-10-CM

## 2025-03-12 DIAGNOSIS — E11.69 HYPERLIPIDEMIA ASSOCIATED WITH TYPE 2 DIABETES MELLITUS: ICD-10-CM

## 2025-03-12 DIAGNOSIS — H40.013 OAG (OPEN ANGLE GLAUCOMA) SUSPECT, LOW RISK, BILATERAL: ICD-10-CM

## 2025-03-12 DIAGNOSIS — M79.10 MYALGIA DUE TO STATIN: ICD-10-CM

## 2025-03-12 DIAGNOSIS — I70.0 AORTIC ATHEROSCLEROSIS: ICD-10-CM

## 2025-03-12 DIAGNOSIS — E66.01 MORBID OBESITY WITH BMI OF 40.0-44.9, ADULT: ICD-10-CM

## 2025-03-12 DIAGNOSIS — F33.42 RECURRENT MAJOR DEPRESSIVE DISORDER, IN FULL REMISSION: ICD-10-CM

## 2025-03-12 PROBLEM — E11.29 TYPE 2 DIABETES MELLITUS WITH KIDNEY COMPLICATION, WITHOUT LONG-TERM CURRENT USE OF INSULIN: Status: RESOLVED | Noted: 2019-02-07 | Resolved: 2025-03-12

## 2025-03-12 PROCEDURE — 3072F LOW RISK FOR RETINOPATHY: CPT | Mod: CPTII,S$GLB,, | Performed by: NURSE PRACTITIONER

## 2025-03-12 PROCEDURE — 1158F ADVNC CARE PLAN TLK DOCD: CPT | Mod: CPTII,S$GLB,, | Performed by: NURSE PRACTITIONER

## 2025-03-12 PROCEDURE — 1160F RVW MEDS BY RX/DR IN RCRD: CPT | Mod: CPTII,S$GLB,, | Performed by: NURSE PRACTITIONER

## 2025-03-12 PROCEDURE — G0439 PPPS, SUBSEQ VISIT: HCPCS | Mod: S$GLB,,, | Performed by: NURSE PRACTITIONER

## 2025-03-12 PROCEDURE — 1101F PT FALLS ASSESS-DOCD LE1/YR: CPT | Mod: CPTII,S$GLB,, | Performed by: NURSE PRACTITIONER

## 2025-03-12 PROCEDURE — 1159F MED LIST DOCD IN RCRD: CPT | Mod: CPTII,S$GLB,, | Performed by: NURSE PRACTITIONER

## 2025-03-12 PROCEDURE — 3075F SYST BP GE 130 - 139MM HG: CPT | Mod: CPTII,S$GLB,, | Performed by: NURSE PRACTITIONER

## 2025-03-12 PROCEDURE — 3078F DIAST BP <80 MM HG: CPT | Mod: CPTII,S$GLB,, | Performed by: NURSE PRACTITIONER

## 2025-03-12 PROCEDURE — 3288F FALL RISK ASSESSMENT DOCD: CPT | Mod: CPTII,S$GLB,, | Performed by: NURSE PRACTITIONER

## 2025-03-12 PROCEDURE — 1170F FXNL STATUS ASSESSED: CPT | Mod: CPTII,S$GLB,, | Performed by: NURSE PRACTITIONER

## 2025-03-12 PROCEDURE — 1126F AMNT PAIN NOTED NONE PRSNT: CPT | Mod: CPTII,S$GLB,, | Performed by: NURSE PRACTITIONER

## 2025-03-12 NOTE — PATIENT INSTRUCTIONS
Counseling and Referral of Other Preventative  (Italic type indicates deductible and co-insurance are waived)    Patient Name: Elan Yepze  Today's Date: 3/12/2025    Health Maintenance       Date Due Completion Date    RSV Vaccine (Age 60+ and Pregnant patients) (1 - 1-dose 75+ series) 03/12/2025 (Originally 4/9/2015) ---    Influenza Vaccine (1) 06/30/2025 (Originally 9/1/2024) 11/22/2022    Override on 3/10/2016: Declined (Patient will wait till next flu season)    Shingles Vaccine (2 of 3) 03/12/2026 (Originally 7/28/2010) 6/2/2010    COVID-19 Vaccine (7 - 2024-25 season) 03/12/2026 (Originally 9/1/2024) 11/11/2022    Hemoglobin A1c 09/07/2025 3/7/2025    Diabetic Eye Exam 10/08/2025 10/8/2024    Diabetes Urine Screening 03/07/2026 3/7/2025    Lipid Panel 03/07/2026 3/7/2025    Override on 5/23/2018: Not Clinically Appropriate    TETANUS VACCINE 06/25/2028 6/25/2018 (Declined)    Override on 6/25/2018: Declined        No orders of the defined types were placed in this encounter.      The following information is provided to all patients.  This information is to help you find resources for any of the problems found today that may be affecting your health:                  Living healthy guide: www.[facility].[place].gov      Understanding Diabetes: www.diabetes.org      Eating healthy: www.cdc.gov/healthyweight      CDC home safety checklist: www.cdc.gov/steadi/patient.html      Agency on Aging: www.goea.[place].[facility]      Alcoholics anonymous (AA): www.aa.org      Physical Activity: www.anup.nih.gov/zj7fvqi      Tobacco use: www.quitwithus[place].org

## 2025-03-12 NOTE — PROGRESS NOTES
Elan Yepez presented for a follow-up Medicare AWV today. The following components were reviewed and updated:    Medical history  Family History  Social history  Allergies and Current Medications  Health Risk Assessment  Health Maintenance  Care Team    **See Completed Assessments for Annual Wellness visit with in the encounter summary    The following assessments were completed:  Depression Screening  Cognitive function Screening    Timed Get Up Test  Whisper Test      Opioid documentation:      Patient does not have a current opioid prescription.          Vitals:    03/12/25 1059   BP: 137/73   Pulse: 76   Temp: 98.1 °F (36.7 °C)   TempSrc: Temporal   SpO2: 95%   Weight: (!) 144.2 kg (318 lb)   Height: 6' (1.829 m)     Body mass index is 43.13 kg/m².       Physical Exam  Constitutional:       Appearance: He is obese.   HENT:      Ears:      Comments: Decreased hearing bilaterally     Mouth/Throat:      Mouth: Mucous membranes are moist.   Cardiovascular:      Rate and Rhythm: Normal rate and regular rhythm.      Pulses: Normal pulses.      Heart sounds: Normal heart sounds.   Pulmonary:      Effort: Pulmonary effort is normal.      Breath sounds: Normal breath sounds.   Skin:     General: Skin is warm and dry.   Neurological:      General: No focal deficit present.      Mental Status: He is alert and oriented to person, place, and time.      Gait: Gait abnormal.   Psychiatric:         Mood and Affect: Mood normal.         Behavior: Behavior normal.           Diagnoses and health risks identified today and associated recommendations/orders:  1. Encounter for Medicare annual wellness exam  Medicare awv complete. Health maintenance:  pt declined vaccines.   - Referral to Enhanced Annual Wellness Visit (eAWV) M+6    2. Dementia without behavioral disturbance, psychotic disturbance, mood disturbance, or anxiety, unspecified dementia severity, unspecified dementia type  Symptoms stable. Continue donepezil. No safety  issues identified. Follow up with pcp.      3. Morbid obesity with BMI of 40.0-44.9, adult  Recommend diet and exercise to lose weight. Follow up with your PCP as planned to discuss adjustments to your treatment plan.      4. Diabetic cataract  Chronic and stable. Continue current management. Follow up with ophthalmology.      5. Type 2 diabetes mellitus with diabetic neuropathy, without long-term current use of insulin   Latest Reference Range & Units 04/18/06 09:01 10/12/06 08:39 05/17/07 07:30 07/18/08 08:15 08/25/09 10:02 03/23/10 10:16 09/29/10 09:35 07/05/11 08:16 01/18/12 12:16 10/02/12 09:40 09/04/13 13:21 04/07/15 09:55 05/05/16 11:00 09/08/16 07:13 06/22/17 10:38 02/13/18 08:17 08/22/18 09:24 05/30/19 08:55 12/17/19 10:01 08/05/20 07:46 03/03/21 08:35 09/10/21 12:09 03/16/22 16:24 11/11/22 15:09 05/12/23 11:08 03/12/24 09:50 09/06/24 10:13 03/07/25 10:52   Hemoglobin A1C External 4.0 - 5.6 % 6.2 6.4 (H) 6.2 6.3 (H) 6.1 6.2 6.1 6.4 (H) 6.4 (H) 6.4 (H) 6.5 (H) 6.6 (H) 6.7 (H) 6.5 (H) 6.3 (H) 6.1 (H) 6.1 (H) 6.1 (H) 6.1 (H) 6.3 (H) 6.2 (H) 6.3 (H) 6.6 (H) 6.4 (H) 6.4 (H) 6.5 (H) 6.6 (H) 6.6 (H)   (H): Data is abnormally high  Controlled. Continue current management. Patient states he checks his bs once or twice a day and ranges 120-150s.  Reviewed diabetic diet, diabetic foot care, preferred BS, and HgbA1C levels. Follow up with your PCP as instructed, podiatry and ophthalmology yearly.      6. Hyperlipidemia associated with type 2 diabetes mellitus  11. Myalgia due to statin  Lab Results   Component Value Date    CHOL 215 (H) 03/07/2025    CHOL 189 03/12/2024    CHOL 222 (H) 05/12/2023     Lab Results   Component Value Date    HDL 43 03/07/2025    HDL 44 03/12/2024    HDL 45 05/12/2023     Lab Results   Component Value Date    LDLCALC 154.0 03/07/2025    LDLCALC 126.6 03/12/2024    LDLCALC 156.0 05/12/2023     Lab Results   Component Value Date    TRIG 90 03/07/2025    TRIG 92 03/12/2024    TRIG 105  05/12/2023       Lab Results   Component Value Date    CHOLHDL 20.0 03/07/2025    CHOLHDL 23.3 03/12/2024    CHOLHDL 20.3 05/12/2023    Discussed heart healthy diet.   Symptoms stable. Not on statin. Follow up with pcp.      7. Recurrent major depressive disorder, in full remission  Stable. On zoloft.  Pt denies any thoughts of suicide. Pt declined psychiatry/counseling. F/u with pcp.     8. OAG (open angle glaucoma) suspect, low risk, bilateral  stable. Continue current management.Follow up with ophthalmology.      9. Sensorineural hearing loss (SNHL) of both ears  Seen by audiology at [facility]. Will follow up with audiology with [facility] for hearing aids.     10. Aortic atherosclerosis  BP stable. Continue aspirin. Managed by PCP.       12. Primary osteoarthritis of right knee  Pain controlled. Continue  . Follow up with pcp.      13. JESSICA on CPAP  Symptoms stable. Continue cpap . Follow up with pulmonology.              Provided Elan with a 5-10 year written screening schedule and personal prevention plan. Recommendations were developed using the USPSTF age appropriate recommendations. Education, counseling, and referrals were provided as needed.  After Visit Summary printed and given to patient which includes a list of additional screenings\tests needed.    Follow up in about 1 year (around 3/12/2026) for annual wellness visit.      HUNTER Lea      I offered to discuss advanced care planning, including how to pick a person who would make decisions for you if you were unable to make them for yourself, called a health care power of , and what kind of decisions you might make such as use of life sustaining treatments such as ventilators and tube feeding when faced with a life limiting illness recorded on a living will that they will need to know. (How you want to be cared for as you near the end of your natural life)     X  Patient has advanced directives on file, which we reviewed, and they do not  wish to make changes.

## 2025-03-18 ENCOUNTER — OFFICE VISIT (OUTPATIENT)
Dept: PODIATRY | Facility: CLINIC | Age: 85
End: 2025-03-18
Payer: MEDICARE

## 2025-03-18 VITALS — BODY MASS INDEX: 42.66 KG/M2 | HEIGHT: 72 IN | WEIGHT: 315 LBS

## 2025-03-18 DIAGNOSIS — E11.49 TYPE 2 DIABETES MELLITUS WITH NEUROLOGICAL MANIFESTATIONS: Primary | ICD-10-CM

## 2025-03-18 DIAGNOSIS — B35.1 DERMATOPHYTOSIS, NAIL: ICD-10-CM

## 2025-03-18 PROCEDURE — 3288F FALL RISK ASSESSMENT DOCD: CPT | Mod: HCNC,CPTII,S$GLB, | Performed by: PODIATRIST

## 2025-03-18 PROCEDURE — 11721 DEBRIDE NAIL 6 OR MORE: CPT | Mod: Q9,HCNC,S$GLB, | Performed by: PODIATRIST

## 2025-03-18 PROCEDURE — 1126F AMNT PAIN NOTED NONE PRSNT: CPT | Mod: HCNC,CPTII,S$GLB, | Performed by: PODIATRIST

## 2025-03-18 PROCEDURE — 1101F PT FALLS ASSESS-DOCD LE1/YR: CPT | Mod: HCNC,CPTII,S$GLB, | Performed by: PODIATRIST

## 2025-03-18 PROCEDURE — 99213 OFFICE O/P EST LOW 20 MIN: CPT | Mod: 25,HCNC,S$GLB, | Performed by: PODIATRIST

## 2025-03-18 PROCEDURE — 3072F LOW RISK FOR RETINOPATHY: CPT | Mod: HCNC,CPTII,S$GLB, | Performed by: PODIATRIST

## 2025-03-18 PROCEDURE — 99999 PR PBB SHADOW E&M-EST. PATIENT-LVL II: CPT | Mod: PBBFAC,HCNC,, | Performed by: PODIATRIST

## 2025-03-18 NOTE — PROGRESS NOTES
[facility] -   PODIATRIC MEDICINE AND SURGERY      CHIEF COMPLAINT  Chief Complaint   Patient presents with    Nail Care     6 month Nail care. Diabetic. 0/10 pain rating. Pt wears tennis shoes and socks. Pt last PCP appt was 3/7/2025 with Dr Faustino Sharma.         HPI    SUBJECTIVE: Elan Yepez is a 84 y.o. 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), Asbestos exposure (5/5/2016), BMI 40.0-44.9, adult, Colon polyp, Coronary artery calcification (3/18/2021), Diabetes mellitus, Diabetes mellitus, type 2, Dysmetabolic syndrome X (10/2/2012), Hyperlipidemia, Hypertension, Joint pain, MDD (major depressive disorder) (4/7/2015), Midline low back pain without sciatica (5/5/2016), Midline low back pain without sciatica (5/5/2016), Myalgia due to statin (3/7/2025), Polyneuropathy, Primary osteoarthritis of right knee (2/20/2018), Prostate CA, Pulmonary nodule, stable to 2011 (6/10/2020), Renal manifestation of secondary diabetes mellitus, Sleep apnea, Trouble in sleeping, Type II or unspecified type diabetes mellitus with neurological manifestations, not stated as uncontrolled(250.60), and Urinary incontinence. Elanpresents to clinic for high risk diabetic foot exam and care.  Elan admits numbness, burning, and/or tingling sensations in their feet. Patient relates blood sugars normally run around 110-120. He does complain about thickened, elongated toenails.  Patient has no other pedal complaints at this time      HgA1c:   Hemoglobin A1C   Date Value Ref Range Status   03/07/2025 6.6 (H) 4.0 - 5.6 % Final     Comment:     ADA Screening Guidelines:  5.7-6.4%  Consistent with prediabetes  >or=6.5%  Consistent with diabetes    High levels of fetal hemoglobin interfere with the HbA1C  assay. Heterozygous hemoglobin variants (HbS, HgC, etc)do  not significantly interfere with this assay.   However, presence of multiple variants may  affect accuracy.     09/06/2024 6.6 (H) 4.0 - 5.6 % Final     Comment:     ADA Screening Guidelines:  5.7-6.4%  Consistent with prediabetes  >or=6.5%  Consistent with diabetes    High levels of fetal hemoglobin interfere with the HbA1C  assay. Heterozygous hemoglobin variants (HbS, HgC, etc)do  not significantly interfere with this assay.   However, presence of multiple variants may affect accuracy.     03/12/2024 6.5 (H) 4.0 - 5.6 % Final     Comment:     ADA Screening Guidelines:  5.7-6.4%  Consistent with prediabetes  >or=6.5%  Consistent with diabetes    High levels of fetal hemoglobin interfere with the HbA1C  assay. Heterozygous hemoglobin variants (HbS, HgC, etc)do  not significantly interfere with this assay.   However, presence of multiple variants may affect accuracy.           PMH  Past Medical History:   Diagnosis Date    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  Began Aricept 10 mg at bedtime.    Asbestos exposure 5/5/2016    BMI 40.0-44.9, adult     Colon polyp     Coronary artery calcification 3/18/2021    Diabetes mellitus     diet management    Diabetes mellitus, type 2     Dysmetabolic syndrome X 10/2/2012    Hyperlipidemia     Hypertension     diet managemen    Joint pain     MDD (major depressive disorder) 4/7/2015    Midline low back pain without sciatica 5/5/2016    Midline low back pain without sciatica 5/5/2016    Myalgia due to statin 3/7/2025    Polyneuropathy     Primary osteoarthritis of right knee 2/20/2018    Prostate CA     Pulmonary nodule, stable to 2011 6/10/2020    01/2020 CT There are stable subcentimeter noncalcified pulmonary nodules the largest of which is located within the left upper lobe anteriorly and measures approximately 6 mm.. Stable to 2011    Renal manifestation of secondary diabetes mellitus     Sleep apnea     cipap    Trouble in sleeping     Type II or unspecified  type diabetes mellitus with neurological manifestations, not stated as uncontrolled(250.60)     Urinary incontinence        MEDS  Current Outpatient Medications on File Prior to Visit   Medication Sig Dispense Refill    aspirin (ECOTRIN) 81 MG EC tablet Take 1 tablet (81 mg total) by mouth every evening. 100 tablet 3    cholecalciferol, vitamin D3, 5,000 unit capsule Take 1 capsule by mouth Daily.      donepeziL (ARICEPT) 10 MG tablet Take 1 tablet (10 mg total) by mouth every evening. 90 tablet 3    lancets (SOFT TOUCH LANCETS) Misc 1 Device by Misc.(Non-Drug; Combo Route) route once daily. 100 each 3    MULTIVITS-MINERALS/FA/LYCOPENE (ONE-A-DAY MEN'S ORAL) Take 1 tablet by mouth Daily.      oxybutynin (DITROPAN-XL) 5 MG TR24 Take 1 tablet (5 mg total) by mouth once daily. 90 tablet 3    sertraline (ZOLOFT) 50 MG tablet Take 1 tablet (50 mg total) by mouth every morning. Increase energy and boost mood 90 tablet 3    TRUE METRIX GLUCOSE METER Misc       TRUE METRIX GLUCOSE TEST STRIP Strp CHECK GLUCOSE ONCE DAILY 100 each 3     No current facility-administered medications on file prior to visit.       PSH     Past Surgical History:   Procedure Laterality Date    COLONOSCOPY  02/04/2014    ENDOSCOPIC ULTRASOUND OF UPPER GASTROINTESTINAL TRACT N/A 8/14/2019    Procedure: ULTRASOUND, UPPER GI TRACT, ENDOSCOPIC;  Surgeon: Pravin Myrick MD;  Location: [facility] ([address] [place] [address]);  Service: Endoscopy;  Laterality: N/A;    LAPAROSCOPIC CHOLECYSTECTOMY WITH CHOLANGIOGRAPHY N/A 8/12/2019    Procedure: CHOLECYSTECTOMY, LAPAROSCOPIC, WITH CHOLANGIOGRAM;  Surgeon: Phong Beltran MD;  Location: [address];  Service: General;  Laterality: N/A;  23 hours observation    NASAL SEPTUM SURGERY      PROSTATE SURGERY      removal        ALL  Review of patient's allergies indicates:  No Known Allergies      SOC     Social History     Tobacco Use    Smoking status: Never     Passive exposure: Past    Smokeless tobacco: Never    Substance Use Topics    Alcohol use: No    Drug use: No         Family HX    Family History   Problem Relation Name Age of Onset    Heart disease Mother      Diabetes Mother      Heart disease Father      Hypertension Father      Diabetes Sister Beatris     Allergies Brother Alfonzo     Thyroid disease Brother Alfonzo     Dementia Brother Alfonzo     No Known Problems Son Elan Brandon     Cancer Sister Brooke     No Known Problems Sister Aisha     Diabetes Sister Meera     Hypertension Brother Kwabena     Glaucoma Paternal Grandmother      Melanoma Neg Hx              REVIEW OF SYSTEMS  General: Denies any fever or chills  Chest: Denies shortness of breath, wheezing, coughing, or sputum production  Heart: Denies chest pain.  As noted above and per history of current illness above, otherwise negative in the remainder of the 14 systems.     PHYSICAL EXAM  Vitals:    03/18/25 1043   Weight: (!) 144.2 kg (317 lb 14.5 oz)   Height: 6' (1.829 m)   PainSc: 0-No pain       GEN:  This patient is well-developed, well-nourished and appears stated age, well-oriented to person, place and time, and cooperative and pleasant on today's visit.      LOWER EXTREMITY PHYSICAL EXAMINATION   VASCULAR  DP pedal pulse 2/4 RIGHT, LEFT2/4   PT pedal pulse 2/4 RIGHT, LEFT2/4  Capillary refill time immediate to the toes.   Feet are warm to the touch. Skin temperature warm to warm from proximally to distally   There are no varicosities, telangiectasias noted to bilateral foot and ankle regions.   There are no ecchymoses noted to bilateral foot and ankle regions.   There is mild gross lower extremity edema.    DERMATOLOGIC  Skin moist with healthy texture and turgor.  Thickened, dystrophic, elongated toenails with subungal debris 1-5 b/l   There are no open ulcerations, lacerations, or fissures to bilateral foot and ankle regions. There are no signs of infection as there is no erythema, no proximal-extending lymphangiitis, no fluctuance, or crepitus  noted on palpation to bilateral foot and ankle regions.   There is no interdigital maceration.   There are hyperkeratotic lesions noted to feet.     NEUROLOGIC  Protective sensation intact at 9/10 sites upon examination with [place] Weinsten 5.07 g monofilament.  Propioception intact at 1st MTPJ b/l.  Achilles and patellar deep tendon reflexes intact  Babinski reflex absent    ORTHOPEDIC/BIOMECHANICAL  No symptomatic structural abnormalities noted. Muscle strength is 5/5 for foot inverters, everters, plantarflexors, and dorsiflexors. Muscle tone is normal.  Inspection/palpation of bone, joints and muscles unremarkable.      ASSESSMENT  Encounter Diagnoses   Name Primary?    Type 2 diabetes mellitus with neurological manifestations Yes    Dermatophytosis, nail          Plan:  -Discuss presenting problems, etiology, pathologic processes and management options with patient today.   -I counseled the patient on their conditions, their implications and medical management. An in depth discussion on diabetic management, risk prevention, amputation prevention verbally and provided educational literature in written format.    -With patient's permission, the elongated onychomycotic toenails, as outlined in the physical examination, were sharply debrided with a double action nail nipper to their soft tissue attachment. If indicated, the nails were then smoothed down in thickness with a mechanical rotary arian and/or arielle board to facilitate in further debridement removing all offending nail and subungual debris.      - Shoe inspection. Diabetic Foot Education. Patient reminded of the importance of good nutrition and blood sugar control to help prevent podiatric complications of diabetes. Patient instructed on proper foot hygeine. We discussed wearing proper shoe gear, daily foot inspections, never walking without protective shoe gear, never putting sharp instruments to feet, routine podiatric visits annually/semi annually or  sooner if symptoms arise    Disclaimer: This note was partially prepared using a voice recognition system and is likely to have sound alike errors within the text.        Future Appointments   Date Time Provider Department Center   5/5/2025 11:20 AM Pro Jackson MD [facility] CARDIO [place]   9/8/2025  2:00 PM Faustino Sharma MD [facility] [place]   9/19/2025 10:30 AM Jessika Goldman DPM [facility] POD [place]   10/30/2025  2:00 PM Lejeune, Elizabeth B, NP [facility] PULMSVS [place]   11/18/2025  1:15 PM Isaías Leong OD [facility] OPHTHAL [place]       Report Electronically Signed By:     Jessika Goldman DPM   Podiatry  [facility]  3/18/2025

## 2025-04-22 ENCOUNTER — TELEPHONE (OUTPATIENT)
Dept: PRIMARY CARE CLINIC | Facility: CLINIC | Age: 85
End: 2025-04-22
Payer: MEDICARE

## 2025-04-22 NOTE — TELEPHONE ENCOUNTER
----- Message from Shaylee sent at 4/22/2025 12:22 PM CDT -----  Contact: Home  [phone] Pts wife Mrs. Yepez  Diabetic or Medical Supplies.What supplies are needed: TRUE METRIX GLUCOSE TEST STRIP StrpWhat is the brand name of the supplies: TRUE METRIX GLUCOSE TEST STRIP StrpIs this a refill or new prescription:  RefillWho prescribed the supplies:  Dr. Elan Plata or company name, phone # and location:  [facility] [address] [place] [address], [place] [address] [address]: [phone] Fax: [phone] the patient like a call back, or a response through their [facility] portal?:   Call

## 2025-04-30 DIAGNOSIS — R55 SYNCOPE AND COLLAPSE: Primary | ICD-10-CM

## 2025-05-05 ENCOUNTER — HOSPITAL ENCOUNTER (OUTPATIENT)
Dept: CARDIOLOGY | Facility: HOSPITAL | Age: 85
Discharge: HOME | End: 2025-05-05
Attending: INTERNAL MEDICINE
Payer: MEDICARE

## 2025-05-05 ENCOUNTER — OFFICE VISIT (OUTPATIENT)
Dept: CARDIOLOGY | Facility: CLINIC | Age: 85
End: 2025-05-05
Payer: MEDICARE

## 2025-05-05 ENCOUNTER — TELEPHONE (OUTPATIENT)
Dept: CARDIOLOGY | Facility: CLINIC | Age: 85
End: 2025-05-05
Payer: MEDICARE

## 2025-05-05 VITALS
BODY MASS INDEX: 42.25 KG/M2 | HEART RATE: 72 BPM | DIASTOLIC BLOOD PRESSURE: 78 MMHG | WEIGHT: 311.94 LBS | OXYGEN SATURATION: 97 % | SYSTOLIC BLOOD PRESSURE: 128 MMHG | HEIGHT: 72 IN

## 2025-05-05 DIAGNOSIS — R55 SYNCOPE, UNSPECIFIED SYNCOPE TYPE: ICD-10-CM

## 2025-05-05 DIAGNOSIS — I70.0 AORTIC ATHEROSCLEROSIS: ICD-10-CM

## 2025-05-05 DIAGNOSIS — I45.10 RBBB: ICD-10-CM

## 2025-05-05 DIAGNOSIS — G47.33 OSA ON CPAP: ICD-10-CM

## 2025-05-05 DIAGNOSIS — E11.40 TYPE 2 DIABETES MELLITUS WITH DIABETIC NEUROPATHY, WITHOUT LONG-TERM CURRENT USE OF INSULIN: ICD-10-CM

## 2025-05-05 DIAGNOSIS — R94.31 NONSPECIFIC ABNORMAL ELECTROCARDIOGRAM (ECG) (EKG): ICD-10-CM

## 2025-05-05 DIAGNOSIS — R07.89 CHEST DISCOMFORT: ICD-10-CM

## 2025-05-05 DIAGNOSIS — E66.01 MORBID OBESITY WITH BMI OF 40.0-44.9, ADULT: ICD-10-CM

## 2025-05-05 DIAGNOSIS — R06.02 SHORTNESS OF BREATH: ICD-10-CM

## 2025-05-05 DIAGNOSIS — E11.69 DIABETES MELLITUS TYPE 2 IN OBESE: ICD-10-CM

## 2025-05-05 DIAGNOSIS — E78.2 MIXED HYPERLIPIDEMIA: ICD-10-CM

## 2025-05-05 DIAGNOSIS — R55 SYNCOPE AND COLLAPSE: ICD-10-CM

## 2025-05-05 DIAGNOSIS — R55 SYNCOPE AND COLLAPSE: Primary | ICD-10-CM

## 2025-05-05 DIAGNOSIS — F03.90 DEMENTIA WITHOUT BEHAVIORAL DISTURBANCE, PSYCHOTIC DISTURBANCE, MOOD DISTURBANCE, OR ANXIETY, UNSPECIFIED DEMENTIA SEVERITY, UNSPECIFIED DEMENTIA TYPE: ICD-10-CM

## 2025-05-05 DIAGNOSIS — E66.9 DIABETES MELLITUS TYPE 2 IN OBESE: ICD-10-CM

## 2025-05-05 LAB
OHS QRS DURATION: 122 MS
OHS QTC CALCULATION: 453 MS

## 2025-05-05 PROCEDURE — 3288F FALL RISK ASSESSMENT DOCD: CPT | Mod: CPTII,HCNC,S$GLB, | Performed by: INTERNAL MEDICINE

## 2025-05-05 PROCEDURE — 93005 ELECTROCARDIOGRAM TRACING: CPT | Mod: HCNC,PO

## 2025-05-05 PROCEDURE — 3072F LOW RISK FOR RETINOPATHY: CPT | Mod: CPTII,HCNC,S$GLB, | Performed by: INTERNAL MEDICINE

## 2025-05-05 PROCEDURE — G2211 COMPLEX E/M VISIT ADD ON: HCPCS | Mod: HCNC,S$GLB,, | Performed by: INTERNAL MEDICINE

## 2025-05-05 PROCEDURE — 99214 OFFICE O/P EST MOD 30 MIN: CPT | Mod: HCNC,S$GLB,, | Performed by: INTERNAL MEDICINE

## 2025-05-05 PROCEDURE — 1101F PT FALLS ASSESS-DOCD LE1/YR: CPT | Mod: CPTII,HCNC,S$GLB, | Performed by: INTERNAL MEDICINE

## 2025-05-05 PROCEDURE — 93010 ELECTROCARDIOGRAM REPORT: CPT | Mod: HCNC,,, | Performed by: INTERNAL MEDICINE

## 2025-05-05 PROCEDURE — 1160F RVW MEDS BY RX/DR IN RCRD: CPT | Mod: CPTII,HCNC,S$GLB, | Performed by: INTERNAL MEDICINE

## 2025-05-05 PROCEDURE — 1159F MED LIST DOCD IN RCRD: CPT | Mod: CPTII,HCNC,S$GLB, | Performed by: INTERNAL MEDICINE

## 2025-05-05 PROCEDURE — 3078F DIAST BP <80 MM HG: CPT | Mod: CPTII,HCNC,S$GLB, | Performed by: INTERNAL MEDICINE

## 2025-05-05 PROCEDURE — 3074F SYST BP LT 130 MM HG: CPT | Mod: CPTII,HCNC,S$GLB, | Performed by: INTERNAL MEDICINE

## 2025-05-05 PROCEDURE — 99999 PR PBB SHADOW E&M-EST. PATIENT-LVL III: CPT | Mod: PBBFAC,HCNC,, | Performed by: INTERNAL MEDICINE

## 2025-05-05 PROCEDURE — 1126F AMNT PAIN NOTED NONE PRSNT: CPT | Mod: CPTII,HCNC,S$GLB, | Performed by: INTERNAL MEDICINE

## 2025-05-05 NOTE — PROGRESS NOTES
Subjective:   Patient ID:  Elan Yepez is a 85 y.o. male who presents for cardiac consult of No chief complaint on file.      Referral by: No referring provider defined for this encounter.     Reason for consult: syncope, CP, SOB      HPI  The patient came in today for cardiac consult of No chief complaint on file.      Elan Yepez is a 85 y.o. male pt with HLD, aortic atherosc, ED, DM2, obesity, depression, JESSICA, h/o prostate CA presents for follow up CV eval.     10/4/24  Carotid u/s 8/2024 overall neg.   Vital monitor 8/2024 with brief rare ectopy/SVT.   ECHO 8/2024 with normal bi V function, mild to mod MS - gradient 5mmHg,mild TR, PASP 30mmHg.   Nuc stress 8/2024 neg for ischemia.   BP and HR stable. BMI 41 - 307 lbs    5/5/25  BP and HR stable. BMI 42 - 311 lbs  He is active with tractor.   He has more URRUTIA and leg pain with walking.     ECG- NSR, PACs, RBBB       Results for orders placed during the hospital encounter of 08/09/24    Echo    Interpretation Summary    Left Ventricle: The left ventricle is normal in size. Normal wall thickness. There is concentric remodeling. Normal wall motion. There is normal systolic function with a visually estimated ejection fraction of 60 - 65%. Ejection fraction by visual approximation is 65%. There is indeterminate diastolic function.    Right Ventricle: Normal right ventricular cavity size. Wall thickness is normal. Systolic function is normal.    Left Atrium: Left atrium is mildly dilated.    Mitral Valve: There is moderate mitral annular calcification present. Mildly restricted motion. There is mild stenosis. The mean pressure gradient across the mitral valve is 5 mmHg at a heart rate of 76 bpm.    Tricuspid Valve: There is mild regurgitation.    Pulmonary Artery: The estimated pulmonary artery systolic pressure is 30 mmHg.    IVC/SVC: Normal venous pressure at 3 mmHg.      Results for orders placed during the hospital encounter of 08/09/24    Nuclear Stress -  Cardiology Interpreted    Interpretation Summary    Normal myocardial perfusion scan. There is no evidence of myocardial ischemia or infarction.    The gated perfusion images showed an ejection fraction of 71% at rest. The gated perfusion images showed an ejection fraction of 69% post stress.    The ECG portion of the study is negative for ischemia.    The patient reported no chest pain during the stress test.    There were no arrhythmias during stress.    Interpretation Summary  Show Result Comparison     There is 0-19% right Internal Carotid Stenosis.    There is 0-19% left Internal Carotid Stenosis.      Cardiac Monitor - 3-15 Day Adult (Cupid Only)  Result Date: 8/23/2024    The predominant rhythm is sinus.    The patient was monitored for a total of 6d 23h, underlying rhythm is   sinus.  The minimum heart rate was 58 bpm; the maximum 139 bpm; the average 76   bpm.  0 % of Atrial fibrillation/Atrial flutter with longest episode of 0 ms.  The total burden of AV Block present was 0 % [Complete Heart Block: 0 %;   Advanced (High Grade):  0 %; 2nd Degree, Mobitz II: 0 %; 2nd Degree, Mobitz I: 0 %].  There were 0 pauses, the longest pause was 0 ms at --.  Total count of Ventricular Tachycardia (VT): 0 episode(s). Longest VT: 0 s   on --. Fastest VT: -- bpm on  --.  14 supraventricular episodes were found. Longest SVT Episode 11 beats,   Fastest  bpm  There were a total of 533 PVCs with 2 morphologies and 1 couplets. Overall   PVC [place] at 0.07 %  There were a total of 0 Other Beats. There were 0 total number of paced   beats.  There were a total of 5728 PSVCs with 1 morphologies and 47 couplets.   Overall PSVC [place] at  0.75 %  There is a total of 0 patient events.               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  Began Aricept 10 mg at bedtime.    Asbestos  exposure 5/5/2016    BMI 40.0-44.9, adult     Colon polyp     Coronary artery calcification 3/18/2021    Diabetes mellitus     diet management    Diabetes mellitus, type 2     Dysmetabolic syndrome X 10/2/2012    Hyperlipidemia     Hypertension     diet managemen    Joint pain     MDD (major depressive disorder) 4/7/2015    Midline low back pain without sciatica 5/5/2016    Midline low back pain without sciatica 5/5/2016    Myalgia due to statin 3/7/2025    Polyneuropathy     Primary osteoarthritis of right knee 2/20/2018    Prostate CA     Pulmonary nodule, stable to 2011 6/10/2020    01/2020 CT There are stable subcentimeter noncalcified pulmonary nodules the largest of which is located within the left upper lobe anteriorly and measures approximately 6 mm.. Stable to 2011    Renal manifestation of secondary diabetes mellitus     Sleep apnea     cipap    Trouble in sleeping     Type II or unspecified type diabetes mellitus with neurological manifestations, not stated as uncontrolled(250.60)     Urinary incontinence        Past Surgical History:   Procedure Laterality Date    COLONOSCOPY  02/04/2014    ENDOSCOPIC ULTRASOUND OF UPPER GASTROINTESTINAL TRACT N/A 8/14/2019    Procedure: ULTRASOUND, UPPER GI TRACT, ENDOSCOPIC;  Surgeon: Pravin Myrick MD;  Location: [facility] ([address] [place] [address]);  Service: Endoscopy;  Laterality: N/A;    LAPAROSCOPIC CHOLECYSTECTOMY WITH CHOLANGIOGRAPHY N/A 8/12/2019    Procedure: CHOLECYSTECTOMY, LAPAROSCOPIC, WITH CHOLANGIOGRAM;  Surgeon: Phong Beltran MD;  Location: [facility] OR [address] [place] [address];  Service: General;  Laterality: N/A;  23 hours observation    NASAL SEPTUM SURGERY      PROSTATE SURGERY      removal       Social History     Tobacco Use    Smoking status: Never     Passive exposure: Past    Smokeless tobacco: Never   Substance Use Topics    Alcohol use: No    Drug use: No       Family History   Problem Relation Name Age of Onset    Heart disease Mother      Diabetes Mother      Heart  disease Father      Hypertension Father      Diabetes Sister Beatris     Allergies Brother Alfonzo     Thyroid disease Brother Alfonzo     Dementia Brother Alfonzo     No Known Problems Son Elan Brandon     Cancer Sister Brooke     No Known Problems Sister Aisha     Diabetes Sister Meera     Hypertension Brother Kwabena     Glaucoma Paternal Grandmother      Melanoma Neg Hx         Patient's Medications   New Prescriptions    No medications on file   Previous Medications    ASPIRIN (ECOTRIN) 81 MG EC TABLET    Take 1 tablet (81 mg total) by mouth every evening.    CHOLECALCIFEROL, VITAMIN D3, 5,000 UNIT CAPSULE    Take 1 capsule by mouth Daily.    DONEPEZIL (ARICEPT) 10 MG TABLET    Take 1 tablet (10 mg total) by mouth every evening.    LANCETS (SOFT TOUCH LANCETS) MISC    1 Device by Misc.(Non-Drug; Combo Route) route once daily.    MULTIVITS-MINERALS/FA/LYCOPENE (ONE-A-DAY MEN'S ORAL)    Take 1 tablet by mouth Daily.    OXYBUTYNIN (DITROPAN-XL) 5 MG TR24    Take 1 tablet (5 mg total) by mouth once daily.    SERTRALINE (ZOLOFT) 50 MG TABLET    Take 1 tablet (50 mg total) by mouth every morning. Increase energy and boost mood    TRUE METRIX GLUCOSE METER MISC        TRUE METRIX GLUCOSE TEST STRIP STRP    CHECK GLUCOSE ONCE DAILY   Modified Medications    No medications on file   Discontinued Medications    No medications on file       Review of Systems   Constitutional:  Positive for malaise/fatigue.   HENT: Negative.     Eyes: Negative.    Respiratory:  Positive for shortness of breath.    Cardiovascular:  Positive for chest pain and palpitations.   Gastrointestinal: Negative.    Genitourinary: Negative.    Musculoskeletal:  Positive for joint pain.   Skin: Negative.    Neurological:  Positive for dizziness and loss of consciousness.   Endo/Heme/Allergies: Negative.    Psychiatric/Behavioral: Negative.     All 12 systems otherwise negative.      Wt Readings from Last 3 Encounters:   05/05/25 (!) 141.5 kg (311 lb 15.2 oz)    03/18/25 (!) 144.2 kg (317 lb 14.5 oz)   03/12/25 (!) 144.2 kg (318 lb)     Temp Readings from Last 3 Encounters:   03/12/25 98.1 °F (36.7 °C) (Temporal)   09/06/24 97.8 °F (36.6 °C)   06/25/24 96.5 °F (35.8 °C)     BP Readings from Last 3 Encounters:   05/05/25 128/78   03/12/25 137/73   03/07/25 138/88     Pulse Readings from Last 3 Encounters:   05/05/25 72   03/12/25 76   03/07/25 86       /78 (BP Location: Right arm, Patient Position: Sitting)   Pulse 72   Ht 6' (1.829 m)   Wt (!) 141.5 kg (311 lb 15.2 oz)   SpO2 97%   BMI 42.31 kg/m²     Objective:   Physical Exam  Vitals and nursing note reviewed.   Constitutional:       General: He is not in acute distress.     Appearance: He is well-developed. He is obese. He is not diaphoretic.   HENT:      Head: Normocephalic and atraumatic.      Nose: Nose normal.   Eyes:      General: No scleral icterus.     Conjunctiva/sclera: Conjunctivae normal.   Neck:      Thyroid: No thyromegaly.      Vascular: No JVD.   Cardiovascular:      Rate and Rhythm: Normal rate and regular rhythm.      Heart sounds: S1 normal and S2 normal. Murmur heard.      No friction rub. No gallop. No S3 or S4 sounds.   Pulmonary:      Effort: Pulmonary effort is normal. No respiratory distress.      Breath sounds: Normal breath sounds. No stridor. No wheezing or rales.   Chest:      Chest wall: No tenderness.   Abdominal:      General: Bowel sounds are normal. There is no distension.      Palpations: Abdomen is soft. There is no mass.      Tenderness: There is no abdominal tenderness. There is no rebound.   Genitourinary:     Comments: Deferred  Musculoskeletal:         General: No tenderness or deformity. Normal range of motion.      Cervical back: Normal range of motion and neck supple.   Lymphadenopathy:      Cervical: No cervical adenopathy.   Skin:     General: Skin is warm and dry.      Coloration: Skin is not pale.      Findings: No erythema or rash.   Neurological:      Mental  Status: He is alert and oriented to person, place, and time.      Motor: No abnormal muscle tone.      Coordination: Coordination normal.   Psychiatric:         Behavior: Behavior normal.         Thought Content: Thought content normal.         Judgment: Judgment normal.         Lab Results   Component Value Date     03/07/2025    K 4.2 03/07/2025     03/07/2025    CO2 25 03/07/2025    BUN 15 03/07/2025    CREATININE 1.0 03/07/2025     (H) 03/07/2025    HGBA1C 6.6 (H) 03/07/2025    MG 2.0 08/14/2019    AST 27 03/07/2025    ALT 15 03/07/2025    ALBUMIN 3.6 03/07/2025    PROT 7.4 03/07/2025    BILITOT 0.4 03/07/2025    WBC 5.29 03/07/2025    HGB 13.1 (L) 03/07/2025    HCT 40.0 03/07/2025    MCV 92 03/07/2025     03/07/2025    INR 1.0 08/14/2019    TSH 1.849 03/07/2025    CHOL 215 (H) 03/07/2025    HDL 43 03/07/2025    LDLCALC 154.0 03/07/2025    TRIG 90 03/07/2025    BNP 40 06/25/2024         BNP (pg/mL)   Date Value   06/25/2024 40     INR (no units)   Date Value   08/14/2019 1.0   07/16/2019 1.0   10/12/2009 1.0          Assessment:      1. Syncope and collapse    2. RBBB    3. Mixed hyperlipidemia    4. Syncope, unspecified syncope type    5. Diabetes mellitus type 2 in obese    6. Type 2 diabetes mellitus with diabetic neuropathy, without long-term current use of insulin    7. Shortness of breath    8. JESSICA on CPAP    9. Aortic atherosclerosis    10. Morbid obesity with BMI of 40.0-44.9, adult    11. Chest discomfort    12. Nonspecific abnormal electrocardiogram (ECG) (EKG)    13. Dementia without behavioral disturbance, psychotic disturbance, mood disturbance, or anxiety, unspecified dementia severity, unspecified dementia type            Plan:     Syncope, CP/SOB with RBBB on ECG , mild MS  - prior neg trop, BNP, and CXR  -Carotid u/s 8/2024 overall neg.   -Vital monitor 8/2024 with brief rare ectopy/SVT.   -ECHO 8/2024 with normal bi V function, mild to mod MS - gradient 5mmHg,mild TR,  PASP 30mmHg.   -Nuc stress 8/2024 neg for ischemia.   - rec ER eval if recurrent syncope and neuro workup as well   - needs to avoid the heat and outdoor activity - until CV workup completed    2. HLD with aortic atherosc  - rec statin and monitor lipids if elevated     3. Obesity  BMI 42 - 313 lbs  --> BMI 41 - 307 lbs  BMI 42 - 311 lbs  - cont weight loss   - discussed GLPs if no improvement     4. JESSICA  - cont CPAP    5. DM2 --> 6.6  - cont tx per PCP and monitor     6. Dementia  - cont tx and f/u neuro  - on Aricept    7. Hearing loss  - had been diagnosed in the past but could not afford     Visit today included increased complexity associated with the care of the episodic problem syncope addressed and managing the longitudinal care of the patient due to the serious and/or complex managed problem(s) .      Thank you for allowing me to participate in this patient's care. Please do not hesitate to contact me with any questions or concerns. Consult note has been forwarded to the referral physician.

## 2025-05-05 NOTE — TELEPHONE ENCOUNTER
Attempted to reach patient to get him scheduled per Dr Jackson. Lvm for patient to call back.              Please schedule follow up in 3 months thanks

## 2025-05-05 NOTE — TELEPHONE ENCOUNTER
----- Message from Pro Jackson MD sent at 5/5/2025 11:44 AM CDT -----  Please schedule follow up in 3 months thanks

## 2025-05-06 ENCOUNTER — TELEPHONE (OUTPATIENT)
Dept: CARDIOLOGY | Facility: CLINIC | Age: 85
End: 2025-05-06
Payer: MEDICARE

## 2025-05-06 NOTE — TELEPHONE ENCOUNTER
Spoke with pt in regards to scheduling appt.                 ----- Message from Elaina sent at 5/6/2025  3:32 PM CDT -----  Contact: Elan  .Type:  Patient Returning CallWho Called:Mikey Left Message for Patient:Ale Garcia MADoes the patient know what this is regarding?:missed call on yesterdayWould the patient rather a call back or a response via [facility]? Call [facility] Call Back Number:.[phone] Additional Information:

## 2025-08-18 ENCOUNTER — OFFICE VISIT (OUTPATIENT)
Dept: CARDIOLOGY | Facility: CLINIC | Age: 85
End: 2025-08-18
Payer: MEDICARE

## 2025-08-18 VITALS
OXYGEN SATURATION: 96 % | HEIGHT: 72 IN | WEIGHT: 306.25 LBS | SYSTOLIC BLOOD PRESSURE: 118 MMHG | BODY MASS INDEX: 41.48 KG/M2 | DIASTOLIC BLOOD PRESSURE: 76 MMHG | HEART RATE: 92 BPM

## 2025-08-18 DIAGNOSIS — E78.2 MIXED HYPERLIPIDEMIA: ICD-10-CM

## 2025-08-18 DIAGNOSIS — F03.90 DEMENTIA WITHOUT BEHAVIORAL DISTURBANCE, PSYCHOTIC DISTURBANCE, MOOD DISTURBANCE, OR ANXIETY, UNSPECIFIED DEMENTIA SEVERITY, UNSPECIFIED DEMENTIA TYPE: ICD-10-CM

## 2025-08-18 DIAGNOSIS — G47.33 OSA ON CPAP: ICD-10-CM

## 2025-08-18 DIAGNOSIS — R07.89 CHEST DISCOMFORT: ICD-10-CM

## 2025-08-18 DIAGNOSIS — I70.0 AORTIC ATHEROSCLEROSIS: ICD-10-CM

## 2025-08-18 DIAGNOSIS — R55 SYNCOPE AND COLLAPSE: ICD-10-CM

## 2025-08-18 DIAGNOSIS — E66.01 MORBID OBESITY WITH BMI OF 40.0-44.9, ADULT: ICD-10-CM

## 2025-08-18 DIAGNOSIS — E11.40 TYPE 2 DIABETES MELLITUS WITH DIABETIC NEUROPATHY, WITHOUT LONG-TERM CURRENT USE OF INSULIN: ICD-10-CM

## 2025-08-18 DIAGNOSIS — R94.31 NONSPECIFIC ABNORMAL ELECTROCARDIOGRAM (ECG) (EKG): ICD-10-CM

## 2025-08-18 DIAGNOSIS — I45.10 RBBB: ICD-10-CM

## 2025-08-18 DIAGNOSIS — R06.02 SHORTNESS OF BREATH: ICD-10-CM

## 2025-08-18 DIAGNOSIS — E66.9 DIABETES MELLITUS TYPE 2 IN OBESE: ICD-10-CM

## 2025-08-18 DIAGNOSIS — R55 SYNCOPE, UNSPECIFIED SYNCOPE TYPE: Primary | ICD-10-CM

## 2025-08-18 DIAGNOSIS — E11.69 DIABETES MELLITUS TYPE 2 IN OBESE: ICD-10-CM

## 2025-08-18 PROCEDURE — 1126F AMNT PAIN NOTED NONE PRSNT: CPT | Mod: CPTII,HCNC,S$GLB, | Performed by: INTERNAL MEDICINE

## 2025-08-18 PROCEDURE — 1160F RVW MEDS BY RX/DR IN RCRD: CPT | Mod: CPTII,HCNC,S$GLB, | Performed by: INTERNAL MEDICINE

## 2025-08-18 PROCEDURE — 3078F DIAST BP <80 MM HG: CPT | Mod: CPTII,HCNC,S$GLB, | Performed by: INTERNAL MEDICINE

## 2025-08-18 PROCEDURE — 1101F PT FALLS ASSESS-DOCD LE1/YR: CPT | Mod: CPTII,HCNC,S$GLB, | Performed by: INTERNAL MEDICINE

## 2025-08-18 PROCEDURE — 3288F FALL RISK ASSESSMENT DOCD: CPT | Mod: CPTII,HCNC,S$GLB, | Performed by: INTERNAL MEDICINE

## 2025-08-18 PROCEDURE — 1159F MED LIST DOCD IN RCRD: CPT | Mod: CPTII,HCNC,S$GLB, | Performed by: INTERNAL MEDICINE

## 2025-08-18 PROCEDURE — G2211 COMPLEX E/M VISIT ADD ON: HCPCS | Mod: HCNC,S$GLB,, | Performed by: INTERNAL MEDICINE

## 2025-08-18 PROCEDURE — 3072F LOW RISK FOR RETINOPATHY: CPT | Mod: CPTII,HCNC,S$GLB, | Performed by: INTERNAL MEDICINE

## 2025-08-18 PROCEDURE — 3074F SYST BP LT 130 MM HG: CPT | Mod: CPTII,HCNC,S$GLB, | Performed by: INTERNAL MEDICINE

## 2025-08-18 PROCEDURE — 99999 PR PBB SHADOW E&M-EST. PATIENT-LVL III: CPT | Mod: PBBFAC,HCNC,, | Performed by: INTERNAL MEDICINE

## 2025-08-18 PROCEDURE — 99214 OFFICE O/P EST MOD 30 MIN: CPT | Mod: HCNC,S$GLB,, | Performed by: INTERNAL MEDICINE

## 2025-08-18 RX ORDER — PRAVASTATIN SODIUM 20 MG/1
20 TABLET ORAL NIGHTLY
Qty: 90 TABLET | Refills: 1 | Status: SHIPPED | OUTPATIENT
Start: 2025-08-18 | End: 2026-08-18

## 2025-09-04 ENCOUNTER — OFFICE VISIT (OUTPATIENT)
Dept: PRIMARY CARE CLINIC | Facility: CLINIC | Age: 85
End: 2025-09-04
Payer: MEDICARE

## 2025-09-04 VITALS
TEMPERATURE: 99 F | WEIGHT: 308.19 LBS | HEART RATE: 85 BPM | DIASTOLIC BLOOD PRESSURE: 78 MMHG | BODY MASS INDEX: 41.8 KG/M2 | SYSTOLIC BLOOD PRESSURE: 136 MMHG

## 2025-09-04 DIAGNOSIS — F03.90 DEMENTIA WITHOUT BEHAVIORAL DISTURBANCE, PSYCHOTIC DISTURBANCE, MOOD DISTURBANCE, OR ANXIETY, UNSPECIFIED DEMENTIA SEVERITY, UNSPECIFIED DEMENTIA TYPE: ICD-10-CM

## 2025-09-04 DIAGNOSIS — Z85.46 HISTORY OF PROSTATE CANCER: ICD-10-CM

## 2025-09-04 DIAGNOSIS — F33.42 RECURRENT MAJOR DEPRESSIVE DISORDER, IN FULL REMISSION: ICD-10-CM

## 2025-09-04 DIAGNOSIS — E78.2 MIXED HYPERLIPIDEMIA: ICD-10-CM

## 2025-09-04 DIAGNOSIS — T46.6X5A MYALGIA DUE TO STATIN: ICD-10-CM

## 2025-09-04 DIAGNOSIS — E11.69 TYPE 2 DIABETES MELLITUS WITH OTHER SPECIFIED COMPLICATION, WITHOUT LONG-TERM CURRENT USE OF INSULIN: Primary | ICD-10-CM

## 2025-09-04 DIAGNOSIS — I70.0 AORTIC ATHEROSCLEROSIS: ICD-10-CM

## 2025-09-04 DIAGNOSIS — M79.10 MYALGIA DUE TO STATIN: ICD-10-CM

## 2025-09-04 DIAGNOSIS — Z12.5 ENCOUNTER FOR SCREENING FOR MALIGNANT NEOPLASM OF PROSTATE: ICD-10-CM

## 2025-09-04 DIAGNOSIS — N32.81 OAB (OVERACTIVE BLADDER): ICD-10-CM

## 2025-09-04 PROCEDURE — 99999 PR PBB SHADOW E&M-EST. PATIENT-LVL IV: CPT | Mod: PBBFAC,HCNC,, | Performed by: FAMILY MEDICINE

## 2025-09-04 RX ORDER — SERTRALINE HYDROCHLORIDE 50 MG/1
50 TABLET, FILM COATED ORAL EVERY MORNING
Qty: 90 TABLET | Refills: 3 | Status: SHIPPED | OUTPATIENT
Start: 2025-09-04 | End: 2026-09-04

## 2025-09-04 RX ORDER — DONEPEZIL HYDROCHLORIDE 10 MG/1
10 TABLET, FILM COATED ORAL NIGHTLY
Qty: 90 TABLET | Refills: 3 | Status: SHIPPED | OUTPATIENT
Start: 2025-09-04 | End: 2026-09-04

## 2025-09-04 RX ORDER — OXYBUTYNIN CHLORIDE 5 MG/1
5 TABLET, EXTENDED RELEASE ORAL DAILY
Qty: 90 TABLET | Refills: 3 | Status: SHIPPED | OUTPATIENT
Start: 2025-09-04 | End: 2026-09-04